# Patient Record
Sex: MALE | Race: BLACK OR AFRICAN AMERICAN | Employment: OTHER | URBAN - METROPOLITAN AREA
[De-identification: names, ages, dates, MRNs, and addresses within clinical notes are randomized per-mention and may not be internally consistent; named-entity substitution may affect disease eponyms.]

---

## 2017-08-25 ENCOUNTER — HOSPITAL ENCOUNTER (INPATIENT)
Age: 72
LOS: 3 days | Discharge: HOME | DRG: 168 | End: 2017-08-28
Attending: EMERGENCY MEDICINE | Admitting: INTERNAL MEDICINE
Payer: MEDICARE

## 2017-08-25 ENCOUNTER — APPOINTMENT (OUTPATIENT)
Dept: CT IMAGING | Age: 72
DRG: 168 | End: 2017-08-25
Attending: EMERGENCY MEDICINE
Payer: MEDICARE

## 2017-08-25 ENCOUNTER — APPOINTMENT (OUTPATIENT)
Dept: GENERAL RADIOLOGY | Age: 72
DRG: 168 | End: 2017-08-25
Attending: EMERGENCY MEDICINE
Payer: MEDICARE

## 2017-08-25 DIAGNOSIS — J18.9 PNEUMONIA OF LEFT LOWER LOBE DUE TO INFECTIOUS ORGANISM: Primary | ICD-10-CM

## 2017-08-25 PROBLEM — I25.10 CAD (CORONARY ARTERY DISEASE): Status: ACTIVE | Noted: 2017-08-25

## 2017-08-25 PROBLEM — Z95.1 S/P CABG X 1: Status: ACTIVE | Noted: 2017-08-25

## 2017-08-25 PROBLEM — Z95.2 S/P AVR (AORTIC VALVE REPLACEMENT): Status: ACTIVE | Noted: 2017-08-25

## 2017-08-25 LAB
ALBUMIN SERPL-MCNC: 3.8 G/DL (ref 3.5–5)
ALBUMIN/GLOB SERPL: 1 {RATIO} (ref 1.1–2.2)
ALP SERPL-CCNC: 127 U/L (ref 45–117)
ALT SERPL-CCNC: 27 U/L (ref 12–78)
ANION GAP SERPL CALC-SCNC: 5 MMOL/L (ref 5–15)
AST SERPL-CCNC: 19 U/L (ref 15–37)
ATRIAL RATE: 79 BPM
BASOPHILS # BLD: 0 K/UL (ref 0–0.1)
BASOPHILS NFR BLD: 1 % (ref 0–1)
BILIRUB SERPL-MCNC: 0.7 MG/DL (ref 0.2–1)
BNP SERPL-MCNC: 106 PG/ML (ref 0–125)
BUN SERPL-MCNC: 12 MG/DL (ref 6–20)
BUN/CREAT SERPL: 12 (ref 12–20)
CALCIUM SERPL-MCNC: 9.4 MG/DL (ref 8.5–10.1)
CALCULATED P AXIS, ECG09: 79 DEGREES
CALCULATED R AXIS, ECG10: 42 DEGREES
CALCULATED T AXIS, ECG11: 89 DEGREES
CHLORIDE SERPL-SCNC: 105 MMOL/L (ref 97–108)
CO2 SERPL-SCNC: 32 MMOL/L (ref 21–32)
CREAT SERPL-MCNC: 0.98 MG/DL (ref 0.7–1.3)
DIAGNOSIS, 93000: NORMAL
EOSINOPHIL # BLD: 0.1 K/UL (ref 0–0.4)
EOSINOPHIL NFR BLD: 2 % (ref 0–7)
ERYTHROCYTE [DISTWIDTH] IN BLOOD BY AUTOMATED COUNT: 15.5 % (ref 11.5–14.5)
GLOBULIN SER CALC-MCNC: 4 G/DL (ref 2–4)
GLUCOSE SERPL-MCNC: 97 MG/DL (ref 65–100)
HCT VFR BLD AUTO: 38.6 % (ref 36.6–50.3)
HGB BLD-MCNC: 12.3 G/DL (ref 12.1–17)
INR PPP: 1.4 (ref 0.9–1.1)
LYMPHOCYTES # BLD: 1.2 K/UL (ref 0.8–3.5)
LYMPHOCYTES NFR BLD: 18 % (ref 12–49)
MAGNESIUM SERPL-MCNC: 1.8 MG/DL (ref 1.6–2.4)
MCH RBC QN AUTO: 28.5 PG (ref 26–34)
MCHC RBC AUTO-ENTMCNC: 31.9 G/DL (ref 30–36.5)
MCV RBC AUTO: 89.6 FL (ref 80–99)
MONOCYTES # BLD: 0.4 K/UL (ref 0–1)
MONOCYTES NFR BLD: 6 % (ref 5–13)
NEUTS SEG # BLD: 4.8 K/UL (ref 1.8–8)
NEUTS SEG NFR BLD: 73 % (ref 32–75)
P-R INTERVAL, ECG05: 176 MS
PLATELET # BLD AUTO: 268 K/UL (ref 150–400)
POTASSIUM SERPL-SCNC: 3.9 MMOL/L (ref 3.5–5.1)
PROT SERPL-MCNC: 7.8 G/DL (ref 6.4–8.2)
PROTHROMBIN TIME: 14 SEC (ref 9–11.1)
Q-T INTERVAL, ECG07: 404 MS
QRS DURATION, ECG06: 124 MS
QTC CALCULATION (BEZET), ECG08: 463 MS
RBC # BLD AUTO: 4.31 M/UL (ref 4.1–5.7)
SODIUM SERPL-SCNC: 142 MMOL/L (ref 136–145)
TROPONIN I SERPL-MCNC: <0.04 NG/ML
VENTRICULAR RATE, ECG03: 79 BPM
WBC # BLD AUTO: 6.5 K/UL (ref 4.1–11.1)

## 2017-08-25 PROCEDURE — 71250 CT THORAX DX C-: CPT

## 2017-08-25 PROCEDURE — 93005 ELECTROCARDIOGRAM TRACING: CPT

## 2017-08-25 PROCEDURE — 99285 EMERGENCY DEPT VISIT HI MDM: CPT

## 2017-08-25 PROCEDURE — 85025 COMPLETE CBC W/AUTO DIFF WBC: CPT | Performed by: EMERGENCY MEDICINE

## 2017-08-25 PROCEDURE — 71020 XR CHEST PA LAT: CPT

## 2017-08-25 PROCEDURE — 87798 DETECT AGENT NOS DNA AMP: CPT | Performed by: NURSE PRACTITIONER

## 2017-08-25 PROCEDURE — 83880 ASSAY OF NATRIURETIC PEPTIDE: CPT | Performed by: EMERGENCY MEDICINE

## 2017-08-25 PROCEDURE — 86738 MYCOPLASMA ANTIBODY: CPT | Performed by: INTERNAL MEDICINE

## 2017-08-25 PROCEDURE — 85610 PROTHROMBIN TIME: CPT | Performed by: EMERGENCY MEDICINE

## 2017-08-25 PROCEDURE — 94640 AIRWAY INHALATION TREATMENT: CPT

## 2017-08-25 PROCEDURE — 80053 COMPREHEN METABOLIC PANEL: CPT | Performed by: EMERGENCY MEDICINE

## 2017-08-25 PROCEDURE — 87040 BLOOD CULTURE FOR BACTERIA: CPT | Performed by: EMERGENCY MEDICINE

## 2017-08-25 PROCEDURE — 87449 NOS EACH ORGANISM AG IA: CPT | Performed by: NURSE PRACTITIONER

## 2017-08-25 PROCEDURE — 84484 ASSAY OF TROPONIN QUANT: CPT | Performed by: EMERGENCY MEDICINE

## 2017-08-25 PROCEDURE — 87899 AGENT NOS ASSAY W/OPTIC: CPT | Performed by: NURSE PRACTITIONER

## 2017-08-25 PROCEDURE — 74011250636 HC RX REV CODE- 250/636: Performed by: INTERNAL MEDICINE

## 2017-08-25 PROCEDURE — 36415 COLL VENOUS BLD VENIPUNCTURE: CPT | Performed by: EMERGENCY MEDICINE

## 2017-08-25 PROCEDURE — 74011250636 HC RX REV CODE- 250/636: Performed by: EMERGENCY MEDICINE

## 2017-08-25 PROCEDURE — 65660000000 HC RM CCU STEPDOWN

## 2017-08-25 PROCEDURE — 74011000258 HC RX REV CODE- 258: Performed by: INTERNAL MEDICINE

## 2017-08-25 PROCEDURE — 83735 ASSAY OF MAGNESIUM: CPT | Performed by: INTERNAL MEDICINE

## 2017-08-25 PROCEDURE — 74011250636 HC RX REV CODE- 250/636: Performed by: NURSE PRACTITIONER

## 2017-08-25 PROCEDURE — 96374 THER/PROPH/DIAG INJ IV PUSH: CPT

## 2017-08-25 PROCEDURE — 74011250637 HC RX REV CODE- 250/637: Performed by: INTERNAL MEDICINE

## 2017-08-25 PROCEDURE — 77030013140 HC MSK NEB VYRM -A

## 2017-08-25 PROCEDURE — 74011000250 HC RX REV CODE- 250: Performed by: EMERGENCY MEDICINE

## 2017-08-25 PROCEDURE — 74011000258 HC RX REV CODE- 258: Performed by: EMERGENCY MEDICINE

## 2017-08-25 PROCEDURE — 74011000250 HC RX REV CODE- 250: Performed by: INTERNAL MEDICINE

## 2017-08-25 RX ORDER — ALBUTEROL SULFATE 0.83 MG/ML
2.5 SOLUTION RESPIRATORY (INHALATION)
Status: DISCONTINUED | OUTPATIENT
Start: 2017-08-25 | End: 2017-08-28 | Stop reason: HOSPADM

## 2017-08-25 RX ORDER — ASPIRIN 81 MG/1
81 TABLET ORAL DAILY
Status: DISCONTINUED | OUTPATIENT
Start: 2017-08-26 | End: 2017-08-28 | Stop reason: HOSPADM

## 2017-08-25 RX ORDER — IPRATROPIUM BROMIDE AND ALBUTEROL SULFATE 2.5; .5 MG/3ML; MG/3ML
3 SOLUTION RESPIRATORY (INHALATION)
Status: DISCONTINUED | OUTPATIENT
Start: 2017-08-25 | End: 2017-08-28 | Stop reason: HOSPADM

## 2017-08-25 RX ORDER — SODIUM CHLORIDE 0.9 % (FLUSH) 0.9 %
5-10 SYRINGE (ML) INJECTION AS NEEDED
Status: DISCONTINUED | OUTPATIENT
Start: 2017-08-25 | End: 2017-08-28 | Stop reason: HOSPADM

## 2017-08-25 RX ORDER — CLONAZEPAM 0.5 MG/1
0.5 TABLET ORAL 3 TIMES DAILY
Status: DISCONTINUED | OUTPATIENT
Start: 2017-08-25 | End: 2017-08-28 | Stop reason: HOSPADM

## 2017-08-25 RX ORDER — SODIUM CHLORIDE 0.9 % (FLUSH) 0.9 %
5-10 SYRINGE (ML) INJECTION EVERY 8 HOURS
Status: DISCONTINUED | OUTPATIENT
Start: 2017-08-25 | End: 2017-08-28 | Stop reason: HOSPADM

## 2017-08-25 RX ORDER — IPRATROPIUM BROMIDE AND ALBUTEROL SULFATE 2.5; .5 MG/3ML; MG/3ML
3 SOLUTION RESPIRATORY (INHALATION)
Status: COMPLETED | OUTPATIENT
Start: 2017-08-25 | End: 2017-08-25

## 2017-08-25 RX ORDER — ESCITALOPRAM OXALATE 10 MG/1
10 TABLET ORAL DAILY
Status: DISCONTINUED | OUTPATIENT
Start: 2017-08-26 | End: 2017-08-28 | Stop reason: HOSPADM

## 2017-08-25 RX ORDER — GUAIFENESIN 600 MG/1
600 TABLET, EXTENDED RELEASE ORAL EVERY 12 HOURS
Status: DISCONTINUED | OUTPATIENT
Start: 2017-08-25 | End: 2017-08-28 | Stop reason: HOSPADM

## 2017-08-25 RX ORDER — FLUTICASONE FUROATE AND VILANTEROL 200; 25 UG/1; UG/1
1 POWDER RESPIRATORY (INHALATION) DAILY
Status: DISCONTINUED | OUTPATIENT
Start: 2017-08-26 | End: 2017-08-28 | Stop reason: HOSPADM

## 2017-08-25 RX ORDER — ATORVASTATIN CALCIUM 80 MG/1
80 TABLET, FILM COATED ORAL EVERY EVENING
COMMUNITY
End: 2020-01-01

## 2017-08-25 RX ORDER — ENOXAPARIN SODIUM 100 MG/ML
30 INJECTION SUBCUTANEOUS EVERY 12 HOURS
Status: DISCONTINUED | OUTPATIENT
Start: 2017-08-25 | End: 2017-08-28 | Stop reason: HOSPADM

## 2017-08-25 RX ORDER — ASPIRIN 81 MG/1
81 TABLET ORAL DAILY
Status: ON HOLD | COMMUNITY
End: 2020-01-01

## 2017-08-25 RX ORDER — ENOXAPARIN SODIUM 100 MG/ML
40 INJECTION SUBCUTANEOUS EVERY 12 HOURS
Status: DISCONTINUED | OUTPATIENT
Start: 2017-08-25 | End: 2017-08-28 | Stop reason: HOSPADM

## 2017-08-25 RX ORDER — ATORVASTATIN CALCIUM 20 MG/1
80 TABLET, FILM COATED ORAL EVERY EVENING
Status: DISCONTINUED | OUTPATIENT
Start: 2017-08-25 | End: 2017-08-28 | Stop reason: HOSPADM

## 2017-08-25 RX ORDER — NALOXONE HYDROCHLORIDE 0.4 MG/ML
0.4 INJECTION, SOLUTION INTRAMUSCULAR; INTRAVENOUS; SUBCUTANEOUS AS NEEDED
Status: DISCONTINUED | OUTPATIENT
Start: 2017-08-25 | End: 2017-08-28 | Stop reason: HOSPADM

## 2017-08-25 RX ORDER — WARFARIN SODIUM 5 MG/1
2.5 TABLET ORAL EVERY OTHER DAY
COMMUNITY
End: 2020-01-01

## 2017-08-25 RX ORDER — ISOSORBIDE MONONITRATE 10 MG/1
10 TABLET ORAL 3 TIMES DAILY
Status: DISCONTINUED | OUTPATIENT
Start: 2017-08-25 | End: 2017-08-28 | Stop reason: HOSPADM

## 2017-08-25 RX ORDER — ENOXAPARIN SODIUM 100 MG/ML
1 INJECTION SUBCUTANEOUS EVERY 12 HOURS
Status: DISCONTINUED | OUTPATIENT
Start: 2017-08-25 | End: 2017-08-25

## 2017-08-25 RX ADMIN — CLONAZEPAM 0.5 MG: 0.5 TABLET ORAL at 21:08

## 2017-08-25 RX ADMIN — ISOSORBIDE MONONITRATE 10 MG: 10 TABLET ORAL at 21:07

## 2017-08-25 RX ADMIN — AZITHROMYCIN MONOHYDRATE 500 MG: 500 INJECTION, POWDER, LYOPHILIZED, FOR SOLUTION INTRAVENOUS at 17:10

## 2017-08-25 RX ADMIN — CEFEPIME HYDROCHLORIDE 2 G: 2 INJECTION, POWDER, FOR SOLUTION INTRAVENOUS at 16:22

## 2017-08-25 RX ADMIN — IPRATROPIUM BROMIDE AND ALBUTEROL SULFATE 3 ML: .5; 3 SOLUTION RESPIRATORY (INHALATION) at 16:23

## 2017-08-25 RX ADMIN — GUAIFENESIN 600 MG: 600 TABLET, EXTENDED RELEASE ORAL at 21:08

## 2017-08-25 RX ADMIN — CEFEPIME HYDROCHLORIDE 2 G: 2 INJECTION, POWDER, FOR SOLUTION INTRAVENOUS at 23:50

## 2017-08-25 RX ADMIN — IPRATROPIUM BROMIDE AND ALBUTEROL SULFATE 3 ML: .5; 3 SOLUTION RESPIRATORY (INHALATION) at 19:31

## 2017-08-25 RX ADMIN — ENOXAPARIN SODIUM 40 MG: 40 INJECTION SUBCUTANEOUS at 19:06

## 2017-08-25 RX ADMIN — ENOXAPARIN SODIUM 30 MG: 40 INJECTION SUBCUTANEOUS at 19:06

## 2017-08-25 RX ADMIN — METHYLPREDNISOLONE SODIUM SUCCINATE 40 MG: 40 INJECTION, POWDER, FOR SOLUTION INTRAMUSCULAR; INTRAVENOUS at 21:06

## 2017-08-25 RX ADMIN — Medication 10 ML: at 21:09

## 2017-08-25 RX ADMIN — WARFARIN SODIUM 6 MG: 1 TABLET ORAL at 19:05

## 2017-08-25 RX ADMIN — METHYLPREDNISOLONE SODIUM SUCCINATE 125 MG: 125 INJECTION, POWDER, FOR SOLUTION INTRAMUSCULAR; INTRAVENOUS at 12:48

## 2017-08-25 RX ADMIN — ATORVASTATIN CALCIUM 80 MG: 20 TABLET, FILM COATED ORAL at 19:05

## 2017-08-25 RX ADMIN — IPRATROPIUM BROMIDE AND ALBUTEROL SULFATE 3 ML: .5; 3 SOLUTION RESPIRATORY (INHALATION) at 12:48

## 2017-08-25 NOTE — CONSULTS
Name: [address] [place] [address]    : 1945 Admit Date: 2017   Phone: [phone]  Room: 29 -[address]   PCP: Arie Fernández MD  MRN: [id number]   Date: 2017  Code: Full Code        HPI:    Chart and notes reviewed. Data reviewed. I review the patient's current medications in the medical record at each encounter. I have evaluated and examined the patient. History of Present Illness:  Mr. Rafael Emery presents to the ED with complaints of worsening SOB and wheezing. Symptoms have been progressively getting worse over the last 2 weeks. He denies cough, fever, chills, N/V/D. He does complain of some night sweats as well. He has been using Ventolin at least every 4 hours if not more over the last 2 weeks. He does have grandchildren that have been sick that he has been exposed to. Denies any recent travel. He has been treated in the last few months with Omnicef. Had chest imaging with PCP which was abnormal and Mr. Rafael Emery was advised to come to ED. He has a history of COPD, right MCA CVA, PNA, CAD s/p CABG , throat cancer, AVR, and anxiety/depression. He currently smokes a pipe daily. He used to smoke cigarettes. He is managed on Advair 500 and Ventolin/albuterol nebs. He was hospitalized in 2017 for a CVA. He also was found to have PNA and pulmonary was consulted. Following discharge, he was treated with Levaquin outpatient as well as prednisone taper. Has not been seen though since 2016. Afebrile  HR and BP stable  Oxygen saturations 99% on RA  WBC 6.5  HgB 12.3  INR 1.4  Na 142  K 3.9  Creatinine 0.98  Mg 1.8  Troponin < 0.04  proBNP 106  BC pending    Images:  CT Chest 17: Persistent air space process in the LLL. CXR 17: No acute findings. Resolution of pneumonia. CXR 17: Slight interval improvement of left basilar patchy airspace consolidation and slight interval worsening of medial right lung base patchy   airspace disease.     CT Chest 6/12/16: Persistent areas of pleural and parenchymal scarring in the left lung. New opacification in the lower lobes bilaterally, likely representing scarring; emphysema. ECHO 6/12/16: EF 65%; with no regional wall motion abnormalities. Aortic valve had mechanical prosthesis that was present. It exhibited normal  function. Valve mean gradient was 16 mmHg.     Past Medical History:   Diagnosis Date    CAD (coronary artery disease)     Cancer ([facility] \"10 years ago\"    throat    COPD (chronic obstructive pulmonary disease) ([facility]     CVA (cerebral vascular accident) ([facility]     H/O mechanical aortic valve replacement     CT surgery 1994 - CABG (LIMA to LAD) and AVR ([facility])    S/P CABG x 1     CT surgery 1994 - CABG (LIMA to LAD) and AVR ([facility])       Past Surgical History:   Procedure Laterality Date    HX HEART VALVE SURGERY      CT surgery 1994 - CABG (LIMA to LAD) and AVR ([facility])       Family History   Problem Relation Age of Onset    Heart Disease Mother     Heart Disease Sister        Social History   Substance Use Topics    Smoking status: Current Every Day Smoker    Smokeless tobacco: Never Used      Comment: pipe    Alcohol use No       Allergies   Allergen Reactions    Iodinated Contrast- Oral And Iv Dye Shortness of Breath    Pcn [Penicillins] Shortness of Breath       Current Facility-Administered Medications   Medication Dose Route Frequency    cefepime (MAXIPIME) 2 g in 0.9% sodium chloride (MBP/ADV) 100 mL  2 g IntraVENous ONCE    azithromycin (ZITHROMAX) 500 mg in 0.9% sodium chloride (MBP/ADV) 250 mL  500 mg IntraVENous NOW    sodium chloride (NS) flush 5-10 mL  5-10 mL IntraVENous Q8H    sodium chloride (NS) flush 5-10 mL  5-10 mL IntraVENous PRN    naloxone (NARCAN) injection 0.4 mg  0.4 mg IntraVENous PRN    [START ON 8/26/2017] cefepime (MAXIPIME) 2 g in 0.9% sodium chloride (MBP/ADV) 100 mL  2 g IntraVENous Q8H    [START ON 8/26/2017] azithromycin (ZITHROMAX) 500 mg in 0.9% sodium chloride 250 mL IVPB  500 mg IntraVENous Q24H    albuterol-ipratropium (DUO-NEB) 2.5 MG-0.5 MG/3 ML  3 mL Nebulization Q6H RT    guaiFENesin ER (MUCINEX) tablet 600 mg  600 mg Oral Q12H     Current Outpatient Prescriptions   Medication Sig    albuterol (PROVENTIL HFA, VENTOLIN HFA, PROAIR HFA) 90 mcg/actuation inhaler Take 2 Puffs by inhalation every six (6) hours as needed.  fluticasone-salmeterol (ADVAIR) 500-50 mcg/dose diskus inhaler Take 1 Puff by inhalation every twelve (12) hours.  albuterol (PROVENTIL VENTOLIN) 2.5 mg /3 mL (0.083 %) nebulizer solution 3 mL by Nebulization route every four (4) hours as needed for Wheezing.  atorvastatin (LIPITOR) 40 mg tablet Take 40 mg by mouth daily.  clonazepam (KLONOPIN) 0.5 mg tablet Take 0.5 mg by mouth three (3) times daily.  escitalopram oxalate (LEXAPRO) 10 mg tablet Take 10 mg by mouth daily.  warfarin (COUMADIN) 5 mg tablet Patient thinks he takes 7.5 mg three times a week (unsure what days) and 5 mg the rest of the week. Patient took 7.5 mg last night (6/11) at 17:00  Patient is due for 5 mg tonight (6/12)    isosorbide mononitrate (ISMO, MONOKET) 10 mg tablet Take 10 mg by mouth three (3) times daily. REVIEW OF SYSTEMS   12 point ROS negative except as stated in the HPI. Physical Exam:   Visit Vitals    /79 (BP 1 Location: Right arm, BP Patient Position: At rest)    Pulse 88    Temp 97.8 °F (36.6 °C)    Resp 26    Ht 5' 6\" (1.676 m)    Wt 70.9 kg (156 lb 4.9 oz)    SpO2 99%    BMI 25.23 kg/m2       General:  Alert, cooperative, no distress, appears stated age. Head:  Normocephalic, without obvious abnormality, atraumatic. Eyes:  Conjunctivae/corneas clear. Nose: Nares normal. Septum midline. Mucosa normal.    Throat: Lips, mucosa, and tongue normal.    Neck: Supple, symmetrical, trachea midline, no adenopathy.    Lungs:   Diminished breath sounds bilaterally with scattered rhonchi and expiratory wheezing. Chest wall:  No tenderness or deformity. Heart:  Regular rate and rhythm, S1, S2 normal, no murmur, click, rub or gallop. Abdomen:   Soft, non-tender. Bowel sounds normal. No masses,  No organomegaly. Extremities: Extremities normal, atraumatic, no cyanosis or edema. Pulses: 2+ and symmetric all extremities. Skin: Skin color, texture, turgor normal. No rashes or lesions   Lymph nodes: Cervical, supraclavicular nodes normal.   Neurologic: Grossly nonfocal       Lab Results   Component Value Date/Time    Sodium 142 08/25/2017 12:37 PM    Potassium 3.9 08/25/2017 12:37 PM    Chloride 105 08/25/2017 12:37 PM    CO2 32 08/25/2017 12:37 PM    BUN 12 08/25/2017 12:37 PM    Creatinine 0.98 08/25/2017 12:37 PM    Glucose 97 08/25/2017 12:37 PM    Calcium 9.4 08/25/2017 12:37 PM    Magnesium 1.8 08/25/2017 12:37 PM    Phosphorus 2.5 06/14/2016 02:06 AM       Lab Results   Component Value Date/Time    WBC 6.5 08/25/2017 12:37 PM    HGB 12.3 08/25/2017 12:37 PM    PLATELET 230 31/26/4005 12:37 PM    MCV 89.6 08/25/2017 12:37 PM       Lab Results   Component Value Date/Time    INR 4.3 06/14/2016 02:06 AM    aPTT 42.6 06/12/2016 02:10 PM    AST (SGOT) 19 08/25/2017 12:37 PM    Alk.  phosphatase 127 08/25/2017 12:37 PM    Protein, total 7.8 08/25/2017 12:37 PM    Albumin 3.8 08/25/2017 12:37 PM    Globulin 4.0 08/25/2017 12:37 PM       No results found for: IRON, FE, TIBC, IBCT, PSAT, FERR    Lab Results   Component Value Date/Time    C-Reactive protein 9.18 07/15/2010 03:15 AM    TSH 0.89 07/15/2010 03:15 AM        No results found for: PH, PHI, PCO2, PCO2I, PO2, PO2I, HCO3, HCO3I, FIO2, FIO2I    Lab Results   Component Value Date/Time    CK 71 07/15/2010 03:15 AM    CK-MB Index CANNOT BE CALCULATED 07/15/2010 03:15 AM    Troponin-I, Qt. <0.04 08/25/2017 12:37 PM        Lab Results   Component Value Date/Time    Culture result: LIGHT  NORMAL RESPIRATORY RUTH ANN   06/21/2016 09:40 PM    Culture result: MODERATE  APPARENT  SHARIFA ALBICANS   06/21/2016 09:40 PM    Culture result: NO GROWTH 1 DAY 06/15/2016 09:30 AM       No results found for: TOXA1, RPR, HBCM, HBSAG, HAAB, HCAB1, HAAT, G6PD, CRYAC, HIVGT, HIVR, HIV1, HIV12, HIVPC, HIVRPI    Lab Results   Component Value Date/Time    CK 71 07/15/2010 03:15 AM    CK 64 07/14/2010 10:20 PM       Lab Results   Component Value Date/Time    Color DARK YELLOW 06/15/2016 09:30 AM    Appearance CLEAR 06/15/2016 09:30 AM    pH (UA) 6.0 06/15/2016 09:30 AM    Protein NEGATIVE  06/15/2016 09:30 AM    Glucose NEGATIVE  06/15/2016 09:30 AM    Ketone NEGATIVE  06/15/2016 09:30 AM    Bilirubin NEGATIVE  06/15/2016 09:30 AM    Blood SMALL 06/15/2016 09:30 AM    Urobilinogen 1.0 06/15/2016 09:30 AM    Nitrites NEGATIVE  06/15/2016 09:30 AM    Leukocyte Esterase NEGATIVE  06/15/2016 09:30 AM    WBC 0-4 06/15/2016 09:30 AM    RBC 0-5 06/15/2016 09:30 AM    Bacteria NEGATIVE  06/15/2016 09:30 AM       IMPRESSION  · Dyspnea  · COPD exacerbation  · Abnormal Chest imaging: LLL airspace process  · Anxiety/Depression  · Hx CVA  · Hx Throat Cancer  · Current Pipe Smoker    PLAN  · Maintain oxygen saturations > 90% (no baseline home oxygen)  · Will start IV SoluMedrol 40 mg Q8  · Will order for RVP, legionella, strep pneumo, and mycoplama  · Start Breo 200 in place of home Advair  · Sputum culture and AFB ordered  · Continue DuoNebs Q6  · Continue Mucinex  · Continue Azithromycin and Cefepime  · Would benefit from outpatient pulmonary follow up and PFT's; can perform bronchoscopy outpatient if needed  · DVT prophylaxis: Lovenox  · GI prophylaxis: not indicated    Thank you for allowing us to participate in the care of this patient. We will be happy to follow along in his progress with you.     Lesa Story NP

## 2017-08-25 NOTE — ED TRIAGE NOTES
Shortness of breath, wet cough and night time low grade fever. Outpatient treatment for upper respiratory infection on Cefdinir since 8/14 without improvement. Also has an abscess on right torso that needs attention.

## 2017-08-25 NOTE — IP AVS SNAPSHOT
[address] [place] [address] 
[phone] Patient: Tova Morel MRN: [id number] TJO:[id number] You are allergic to the following Allergen Reactions Iodinated Contrast- Oral And Iv Dye Shortness of Breath Pcn (Penicillins) Shortness of Breath Patient reports to have tolerated a 10 day course of cefdinir started 8/24/17 Recent Documentation Height Weight BMI Smoking Status 1.676 m 70.9 kg 25.23 kg/m2 Current Every Day Smoker Unresulted Labs Order Current Status AFB CULTURE + SMEAR W/RFLX ID FROM CULTURE In process AFB CULTURE + SMEAR W/RFLX PCR AND ID In process CULTURE, FUNGUS In process CULTURE, LEGIONELLA In process CULTURE, RESPIRATORY/SPUTUM/BRONCH W GRAM STAIN In process CULTURE, VIRAL In process LEGIONELLA PNEUMO BY DFA In process MYCOPLASMA AB, IGG/IGM In process CULTURE, BLOOD Preliminary result CULTURE, BLOOD Preliminary result CULTURE, RESPIRATORY/SPUTUM/BRONCH W GRAM STAIN Preliminary result Emergency Contacts Name Discharge Info Relation Home Work Mobile Rachel Francisco DISCHARGE CAREGIVER [3] Spouse [3] [phone] About your hospitalization You were admitted on:  August 25, 2017 You last received care in the:  [facility]  MED SURG 2 You were discharged on:  August 28, 2017 Unit phone number:  [phone] Why you were hospitalized Your primary diagnosis was:  Not on File Your diagnoses also included: Tobacco Abuse, Throat Cancer (Hcc), Depression, Cva (Cerebral Vascular Accident) ([facility]), Copd (Chronic Obstructive Pulmonary Disease) ([facility]), Aortic Stenosis, Anticoagulated, S/P Cabg X 1, Pneumonia, S/P Avr (Aortic Valve Replacement), Cad (Coronary Artery Disease) Providers Seen During Your Hospitalizations Provider Role Specialty Primary office phone Sabino Cuba MD Attending Provider Emergency Medicine [phone] Verlan Crigler, MD Attending Provider Internal Medicine [phone] Leonie Castro MD Attending Provider Internal Medicine [phone] Your Primary Care Physician (PCP) Primary Care Physician Office Phone Office Fax Jaspreet Wood [phone] Follow-up Information Follow up With Details Comments Contact Info Miriam Gill MD In 2 weeks For wound re-check [address] 
[phone] MD Natty Heath [address] 
[phone] Current Discharge Medication List  
  
START taking these medications Dose & Instructions Dispensing Information Comments Morning Noon Evening Bedtime  
 doxycycline 100 mg tablet Commonly known as:  ADOXA Your last dose was: Your next dose is:    
   
   
 Dose:  100 mg Take 1 Tab by mouth two (2) times a day for 7 days. Quantity:  14 Tab Refills:  0  
     
   
   
   
  
 guaiFENesin  mg ER tablet Commonly known as:  William & William Your last dose was: Your next dose is:    
   
   
 Dose:  600 mg Take 1 Tab by mouth every twelve (12) hours for 30 days. Quantity:  60 Tab Refills:  0  
     
   
   
   
  
 levoFLOXacin 750 mg tablet Commonly known as:  Elba Patient Your last dose was: Your next dose is:    
   
   
 Dose:  750 mg Take 1 Tab by mouth daily for 5 days. Quantity:  5 Tab Refills:  0  
     
   
   
   
  
 predniSONE 10 mg tablet Commonly known as:  Fay Nava Your last dose was: Your next dose is:    
   
   
 Prednisone 10mg tabs: 4 tabs (40 mg) daily x 3 days, then 3 tabs (30 mg) daily x 3 days, then 2 tabs (20 mg) daily x 3 days, then 1 tab   (10 mg) daily until gone. Dispense 30 tabs Quantity:  30 Tab Refills:  0 CONTINUE these medications which have CHANGED Dose & Instructions Dispensing Information Comments Morning Noon Evening Bedtime * warfarin 5 mg tablet Commonly known as:  COUMADIN What changed:  Another medication with the same name was added. Make sure you understand how and when to take each. Your last dose was: Your next dose is:    
   
   
 Dose:  5 mg Take 5 mg by mouth every evening. Refills:  0  
     
   
   
   
  
 * warfarin 1 mg tablet Commonly known as:  COUMADIN What changed: You were already taking a medication with the same name, and this prescription was added. Make sure you understand how and when to take each. Your last dose was: Your next dose is:    
   
   
 Dose:  1 mg Take 1 Tab by mouth daily for 30 doses. Quantity:  30 Tab Refills:  0  
     
   
   
   
  
 * Notice: This list has 2 medication(s) that are the same as other medications prescribed for you. Read the directions carefully, and ask your doctor or other care provider to review them with you. CONTINUE these medications which have NOT CHANGED Dose & Instructions Dispensing Information Comments Morning Noon Evening Bedtime * albuterol 90 mcg/actuation inhaler Commonly known as:  PROVENTIL HFA, VENTOLIN HFA, PROAIR HFA Your last dose was: Your next dose is:    
   
   
 Dose:  2 Puff Take 2 Puffs by inhalation every six (6) hours as needed. Refills:  0  
     
   
   
   
  
 * albuterol 2.5 mg /3 mL (0.083 %) nebulizer solution Commonly known as:  PROVENTIL VENTOLIN Your last dose was: Your next dose is:    
   
   
 Dose:  2.5 mg  
3 mL by Nebulization route every four (4) hours as needed for Wheezing. Quantity:  30 vial  
Refills:  1  
     
   
   
   
  
 aspirin delayed-release 81 mg tablet Your last dose was: Your next dose is:    
   
   
 Dose:  81 mg Take 81 mg by mouth daily. Refills:  0  
     
   
   
   
  
 atorvastatin 80 mg tablet Commonly known as:  LIPITOR Your last dose was: Your next dose is:    
   
   
 Dose:  80 mg Take 80 mg by mouth every evening. Refills:  0  
     
   
   
   
  
 clonazePAM 0.5 mg tablet Commonly known as:  Charis Pates Your last dose was: Your next dose is:    
   
   
 Dose:  0.5 mg Take 0.5 mg by mouth three (3) times daily. Refills:  0  
     
   
   
   
  
 escitalopram oxalate 10 mg tablet Commonly known as:  Jody Mor Your last dose was: Your next dose is:    
   
   
 Dose:  10 mg Take 10 mg by mouth daily. Refills:  0  
     
   
   
   
  
 fluticasone-salmeterol 500-50 mcg/dose diskus inhaler Commonly known as:  ADVAIR Your last dose was: Your next dose is:    
   
   
 Dose:  1 Puff Take 1 Puff by inhalation every twelve (12) hours. Refills:  0  
     
   
   
   
  
 isosorbide mononitrate 10 mg tablet Commonly known as:  ISMO, MONOKET Your last dose was: Your next dose is:    
   
   
 Dose:  10 mg Take 10 mg by mouth three (3) times daily. Patient takes at 9am, noon, and 5pm  
 Refills:  0  
     
   
   
   
  
 promethazine 12.5 mg tablet Commonly known as:  PHENERGAN Your last dose was: Your next dose is:    
   
   
 Dose:  12.5 mg Take 12.5 mg by mouth daily. Indications: Nausea Refills:  0  
     
   
   
   
  
 * Notice: This list has 2 medication(s) that are the same as other medications prescribed for you. Read the directions carefully, and ask your doctor or other care provider to review them with you. Where to Get Your Medications Information on where to get these meds will be given to you by the nurse or doctor. ! Ask your nurse or doctor about these medications  
  doxycycline 100 mg tablet  
 guaiFENesin  mg ER tablet  
 levoFLOXacin 750 mg tablet  
 predniSONE 10 mg tablet warfarin 1 mg tablet Discharge Instructions HOSPITALIST DISCHARGE INSTRUCTIONS 
 
NAME: Tian Pinzon :  1945 MRN:  [id number] Date:     2017 ADMIT DATE: 2017 DISCHARGE DATE: 2017 PRINCIPAL ADMITTING DIAGNOSIS: 
Pneumonia 
pneumonia DISCHARGE DIAGNOSES: 
Active Problems: 
  Pneumonia (2017) COPD (chronic obstructive pulmonary disease) ([facility]) () Tobacco abuse (10/22/2014) Throat cancer ([facility] (10/22/2014) Depression (10/22/2014) CVA (cerebral vascular accident) ([facility] (2016) Aortic stenosis (2016) Anticoagulated (2016) S/P CABG x 1 (2017): S/P AVR (aortic valve replacement) (2017) CAD (coronary artery disease) (2017) MEDICATIONS: 
 
· It is important that medications are taken exactly as they are prescribed on the discharge medication instructions and keep them your  in the bottles provided by the pharmacist.  
· Keep a list of the medication names, dosages, and times to be taken at all times. · Do not take other medications without consulting your doctor. Recommended diet:  Cardiac Diet Recommended activity: Activity as tolerated Post discharge care: 
 
Notify follow up health care provider or return to the emergency department if you cannot get hold of your doctor if you feel worse or experience symptoms similar to those that brought you to hospital 
 
Follow-up Information Follow up With Details Comments Contact Info Mauro Ayala MD In 2 weeks For wound re-check [address] [place] [address] 
[phone] Lizzie Reid MD   [address] 
[phone] Information obtained by : 
I understand that if any problems occur once I am at home I am to contact my physician and I understand and acknowledge receipt of the instructions indicated above. Physician's or R.N.'s Signature                                                                  Date/Time Patient or Representative Signature                                                          Date/Time Discharge Orders None [facility] Announcement We are excited to announce that we are making your provider's discharge notes available to you in [employer]. You will see these notes when they are completed and signed by the physician that discharged you from your recent hospital stay. If you have any questions or concerns about any information you see in [facility], please call the Health Information Department where you were seen or reach out to your Primary Care Provider for more information about your plan of care. General Information Please provide this summary of care documentation to your next provider. Patient Signature:  ____________________________________________________________ Date:  ____________________________________________________________  
  
[facility] Signature:  ____________________________________________________________ Date:  ____________________________________________________________

## 2017-08-25 NOTE — ED PROVIDER NOTES
HPI Comments: 67 y.o. male with past medical history significant for COPD, CAD s/p CABG (1994), mechanical aortic valve on Coumadin  who presents from home via private vehilce with chief complaint of SOB and cough. Pt reports that for the past 1-2 months he has had persistent SOB and associated dry cough. During that time he has been placed on steroids as well as antibiotics for a flouroquinolone and Omincef all without relief of his Sx. About 2 weeks ago he had a plain film CXR by his PCP that was concerning and was scheduled for a follow up outpatient CT scan that he had 7 days ago. Today he was called by his PCP and advised that his CT scan of the chest showed a right side pneumonia and he was advised to go to the ED for further evaluation. Pt states that he had nightly fevers for the past few weeks that have not relieved with the antibiotics, but that he feels slightly better symptomatically after completing the treatments. His last dose of steroids was 1-2 months ago and he completed a course of Omnicef yesterday. Pt denies CP, palpitations, diaphoresis, dizziness, nausea, vomiting, diarrhea, abdominal pain. There are no other acute medical concerns at this time. Social hx: Pt admits to smoking cigarettes daily. PCP: Raegan Manning MD  Note written by mariana Pelaez, as dictated by Aranza Enamorado MD 12:32 PM                                                                                                                                                                                                                                                                                                                                                                The history is provided by the patient and the spouse.         Past Medical History:   Diagnosis Date    CAD (coronary artery disease)     Cancer ([facility] \"10 years ago\"    throat    COPD (chronic obstructive pulmonary disease) ([facility]  CVA [facility]cere[facility]) [facility]     H/O mechanical aortic valve replacement     CT surgery 1994 - CABG (LIM[facility] to LAD) and [facility]R ([facility])    S/P CABG x 1     CT surgery 1994 - CABG (LIMA to LAD) and AVR ([facility])       Past Surgical History:   Procedure Laterality Date    HX HEART VALVE SURGERY      CT surgery 1994 - CABG (LIMA to LAD) and AVR ([facility])         Family History:   Problem Relation Age of Onset    Heart Disease Mother     Heart Disease Sister        Social History     Social History    Marital status:      Spouse name: N/A    Number of children: N/A    Years of education: N/A     Occupational History    Not on file. Social History Main Topics    Smoking status: Current Every Day Smoker    Smokeless tobacco: Never Used      Comment: pipe    Alcohol use No    Drug use: No    Sexual activity: Not on file     Other Topics Concern    Not on file     Social History Narrative         ALLERGIES: Iodinated contrast- oral and iv dye and Pcn [penicillins]    Review of Systems   Constitutional: Positive for chills, fatigue and fever. HENT: Negative for congestion, rhinorrhea, sneezing and sore throat. Eyes: Negative for redness and visual disturbance. Respiratory: Positive for cough and shortness of breath. Cardiovascular: Negative for chest pain, palpitations and leg swelling. Gastrointestinal: Negative for abdominal pain, nausea and vomiting. Genitourinary: Negative for difficulty urinating and frequency. Musculoskeletal: Negative for back pain, myalgias and neck stiffness. Skin: Negative for rash. Neurological: Negative for dizziness, syncope, weakness and headaches. Hematological: Negative for adenopathy. All other systems reviewed and are negative.       Vitals:    08/25/17 1207   BP: 175/79   Pulse: 88   Resp: 26   Temp: 97.8 °F (36.6 °C)   SpO2: 99%   Weight: 70.9 kg (156 lb 4.9 oz)   Height: 5' 6\" (1.676 m)            Physical Exam Constitutional: He is oriented to person, place, and time. He appears well-developed and well-nourished. No distress. HENT:   Head: Normocephalic and atraumatic. Eyes: Conjunctivae and EOM are normal. Pupils are equal, round, and reactive to light. Neck: Normal range of motion. Neck supple. Cardiovascular: Normal rate, regular rhythm, normal heart sounds and intact distal pulses. Exam reveals no friction rub. No murmur heard. Pulmonary/Chest: No respiratory distress. He has no wheezes. He has no rales. He exhibits no tenderness. Poor air entry b/l; rhonchi right base   Abdominal: Soft. Bowel sounds are normal. He exhibits no distension. There is no tenderness. There is no rebound and no guarding. Musculoskeletal: Normal range of motion. He exhibits no edema or tenderness. Neurological: He is alert and oriented to person, place, and time. Coordination normal.   Skin: Skin is warm and dry. He is not diaphoretic. No pallor. Small 2 cm area of warmth and erythema to right flank; no drainage   Psychiatric: He has a normal mood and affect. His behavior is normal.   Nursing note and vitals reviewed. MDM  Number of Diagnoses or Management Options  Pneumonia of left lower lobe due to infectious organism [facility]):   Diagnosis management comments: Get imaging if true PNA will  Need admission as has been on 2 rounds of abx. Nebs and sterids.  Check for anemia as well with fatigue, MI as well and reason for fatigue       Amount and/or Complexity of Data Reviewed  Clinical lab tests: ordered and reviewed  Tests in the radiology section of CPT®: ordered and reviewed  Obtain history from someone other than the patient: yes (wife)  Review and summarize past medical records: yes  Discuss the patient with other providers: yes (hospitalist)  Independent visualization of images, tracings, or specimens: yes    Patient Progress  Patient progress: stable    ED Course       Procedures    EKG interpretation: (Preliminary)  Rhythm: normal sinus rhythm; and regular . Rate (approx.): 80; Axis: normal; P wave: normal; QRS interval: normal ; ST/T wave: non-specific changes     3:34 PM  Spoke with hospitalist who will admit. Failed outpt treatment suspect needs a bronchoscopy.

## 2017-08-25 NOTE — ED NOTES
TRANSFER - OUT REPORT:    Verbal report given to Sarath on Piotr   being transferred to [address] [place] [address] for routine progression of care       Report consisted of patients Situation, Background, Assessment and   Recommendations(SBAR). Information from the following report(s) SBAR, ED Summary, [facility] and Recent Results was reviewed with the receiving nurse. Opportunity for questions and clarification was provided.

## 2017-08-25 NOTE — PROGRESS NOTES
[facility] Dosing Services: 8/25/17    Consult for Warfarin Dosing by Pharmacy by Dr. Min Morales provided for this 67 y.o.  male , for indication of Mechanical Heart Valve. Day of Therapy Continued from home (warfarin 5mg daily - The patient was on this dose two weeks prior to admission due to a previously elevated INR from recent antibiotic course)  Dose to achieve an INR goal of 2.5- 3.5    Order entered for  Warfarin  6 (mg) ordered to be given today at 18:00. Significant drug interactions: Lovenox Bridge (administration instruction from provider state - Bridging, with mechanical AVR. Stop this when INR >2.5)  Previous dose given 5mg on 8/24   PT/INR Lab Results   Component Value Date/Time    INR 1.4 08/25/2017 12:37 PM      Platelets Lab Results   Component Value Date/Time    PLATELET 825 01/59/1575 12:37 PM      H/H Lab Results   Component Value Date/Time    HGB 12.3 08/25/2017 12:37 PM        Pharmacy to follow daily and will provide subsequent Warfarin dosing based on clinical status.   CARI Jackson)  Contact information [phone]

## 2017-08-25 NOTE — PROGRESS NOTES
[facility]: MED RECONCILIATION    Comments/Recommendations:   Patient is awake, alert, and knowledgeable about home medications   Verifies allergies  Reports compliance to prescribed regimen  Clonazepam - The patient reports he has taken this medication scheduled for years due to \"nerves\" which occurred after a stroke years ago  Albuterol - Prior to his recent issues with infection he used his rescue albuterol inhaler twice per week  Warfarin - Patient can clearly report this his INR goal is 2.5 to 3.5 and is monitored by Dr. Juwan Dove. Due to recent use of antibiotics the patients INR has been elevated. The patient's INR was 3.9 two weeks ago. The patient was reduced to his current regimen of warfarin 5mg every evening with the last dose yesterday. Blood pressure is not monitored regularly at home but the patient reports it is \"good\" at office visits    Medications added:     · Aspirin    Medications removed:    · None    Medications adjusted:    · Atorvastatin changed from 40mg daily to 80mg daily  · Warfarin changed to 5mg every evening      Allergies: Iodinated contrast- oral and iv dye and Pcn [penicillins]    Prior to Admission Medications:     Medication Documentation Review Audit       Reviewed by Mary Kate Mendez PHARMD (Pharmacist) on 08/25/17 at 1625         Medication Sig Documenting Provider Last Dose Status Taking? albuterol (PROVENTIL HFA, VENTOLIN HFA, PROAIR HFA) 90 mcg/actuation inhaler Take 2 Puffs by inhalation every six (6) hours as needed. Kallie Bautista MD 8/24/2017 Unknown time Active Yes    albuterol (PROVENTIL VENTOLIN) 2.5 mg /3 mL (0.083 %) nebulizer solution 3 mL by Nebulization route every four (4) hours as needed for Wheezing. Verlan Crigler, MD 8/21/2017 Unknown time Active Yes    aspirin delayed-release 81 mg tablet Take 81 mg by mouth daily. Historical Provider 8/25/2017 Unknown time Active Yes    atorvastatin (LIPITOR) 80 mg tablet Take 80 mg by mouth every evening.  Historical Provider 8/24/2017 Unknown time Active Yes    clonazepam (KLONOPIN) 0.5 mg tablet Take 0.5 mg by mouth three (3) times daily. Kallie Bautista MD 8/25/2017 am Active Yes    escitalopram oxalate (LEXAPRO) 10 mg tablet Take 10 mg by mouth daily. Kallie Bautista MD 8/25/2017 am Active Yes    fluticasone-salmeterol (ADVAIR) 500-50 mcg/dose diskus inhaler Take 1 Puff by inhalation every twelve (12) hours. Kallie Bautista MD 8/24/2017 pm Active Yes    isosorbide mononitrate (ISMO, MONOKET) 10 mg tablet Take 10 mg by mouth three (3) times daily. Patient takes at 9am, noon, and 5pm Kallie Bautista MD 8/25/2017 am Active Yes    warfarin (COUMADIN) 5 mg tablet Take 5 mg by mouth every evening.  Historical Provider 8/24/2017 Unknown time Active Yes                  Thank you,    Maay Frias, PharmD, BCPS

## 2017-08-25 NOTE — H&P
[facility]  [address] [facility] [address]  [phone]    Hospitalist Admission Note      NAME:  Piotr Hardin   :   1945   MRN:  [id number]     PCP:  Мария Chris MD     Date/Time:  2017 4:20 PM          Subjective:     CHIEF COMPLAINT: cough    HISTORY OF PRESENT ILLNESS:     Mr. Michael Carl is a 67 y.o. male w/ hx of COPD, CAD, AS s/p mechanical AVR, CVA who presents with cough. Ongoing for a month, productive with clear sputum, no hemoptysis, intermittent, associated with dyspnea and low grade fevers and night sweats, worse with exertion, better with rest, no better after 10 days of Omnicef which he finished 1 day ago. No weight loss. ED workup showed persistent air space process in the LLL apical segment concerning for obstruction or Mycobacterium Avium. Mr. Michael Carl is admitted for further evaluation and management of PNA failing outpatient abx.        Past Medical History:   Diagnosis Date    CAD (coronary artery disease)     Cancer ([address] \"10 years ago\"    throat    COPD (chronic obstructive pulmonary disease) ([address]     CVA (cerebral vascular accident) ([address]     H/O mechanical aortic valve replacement     CT surgery  - CABG (LIMA to LAD) and AVR ([facility])    S/P CABG x 1     CT surgery  - CABG (LIMA to LAD) and AVR ([facility])        Past Surgical History:   Procedure Laterality Date    HX HEART VALVE SURGERY      CT surgery  - CABG (LIMA to LAD) and AVR ([facility])       Social History   Substance Use Topics    Smoking status: Current Every Day Smoker    Smokeless tobacco: Never Used      Comment: pipe    Alcohol use No        Family History   Problem Relation Age of Onset    Heart Disease Mother     Heart Disease Sister         Allergies   Allergen Reactions    Iodinated Contrast- Oral And Iv Dye Shortness of Breath    Pcn [Penicillins] Shortness of Breath        Prior to Admission medications    Medication Sig Start Date End Date Taking? Authorizing Provider   albuterol (PROVENTIL HFA, VENTOLIN HFA, PROAIR HFA) 90 mcg/actuation inhaler Take 2 Puffs by inhalation every six (6) hours as needed. Yes Kallie Bautista MD   fluticasone-salmeterol (ADVAIR) 500-50 mcg/dose diskus inhaler Take 1 Puff by inhalation every twelve (12) hours. Yes Kallie Bautista MD   albuterol (PROVENTIL VENTOLIN) 2.5 mg /3 mL (0.083 %) nebulizer solution 3 mL by Nebulization route every four (4) hours as needed for Wheezing. 10/26/14  Yes Solon Goodell, MD   atorvastatin (LIPITOR) 40 mg tablet Take 40 mg by mouth daily. 10/21/14  Yes Kallie Bautista MD   clonazepam (KLONOPIN) 0.5 mg tablet Take 0.5 mg by mouth three (3) times daily. 10/14/14  Yes Kallie Bautista MD   escitalopram oxalate (LEXAPRO) 10 mg tablet Take 10 mg by mouth daily. 10/21/14  Yes Kallie Bautista MD   warfarin (COUMADIN) 5 mg tablet Patient thinks he takes 7.5 mg three times a week (unsure what days) and 5 mg the rest of the week. Patient took 7.5 mg last night (6/11) at 17:00  Patient is due for 5 mg tonight (6/12) 10/1/14  Yes Kallie Bautista MD   isosorbide mononitrate (ISMO, MONOKET) 10 mg tablet Take 10 mg by mouth three (3) times daily.  10/21/14   Kallie Bautista MD       Review of Systems:  (bold if positive, if negative)    Gen:   fever, chills, fatigue, night sweats  Eyes:  ENT:  CVS:  Pulm:  Cough, dyspnea, sputum,GI:    :    MS:  Skin:  Psych:  Endo:    Hem:  Renal:    Neuro:            Objective:      VITALS:    Vital signs reviewed; most recent are:    Visit Vitals    /79 (BP 1 Location: Right arm, BP Patient Position: At rest)    Pulse 88    Temp 97.8 °F (36.6 °C)    Resp 26    Ht 5' 6\" (1.676 m)    Wt 70.9 kg (156 lb 4.9 oz)    SpO2 99%    BMI 25.23 kg/m2     SpO2 Readings from Last 6 Encounters:   08/25/17 99%   06/14/16 94%   10/26/14 97%        No intake or output data in the 24 hours ending 08/25/17 1620         Exam:     Physical Exam:    Gen:  Well-developed, well-nourished, in no acute distress  HEENT:  No scleral icterus, hearing intact to voice, moist mucous membranes. Hoarseness   Neck:  Supple  Resp:  No accessory muscle use. Increased WOB. Poor air movement with few exp wheezing. L basilar rales. Card: distant, RRR. Normal S1 and S2 w/ 2/6 systolic murmur and click. No rubs, or gallops. systo  No peripheral lower extremity edema. No JVD. Peripheral pulses in tact. Abd:  Normoactive bowel sounds. Soft, non-tender, non-distended. No rebound, no guarding. Musc:  No cyanosis or clubbing  Skin:  R flank nodule, scant drainage. Turgor intact. Cap refill ~2 secs  Neuro:  Cranial nerves are grossly intact, no focal motor weakness, follows commands appropriately  Psych:  Fair insight, normal affect. Alert, oriented x 3. Answers questions appropriately       Labs:    Recent Labs      08/25/17   1237   WBC  6.5   HGB  12.3   HCT  38.6   PLT  268     Recent Labs      08/25/17   1237   NA  142   K  3.9   CL  105   CO2  32   GLU  97   BUN  12   CREA  0.98   CA  9.4   MG  1.8   ALB  3.8   SGOT  19   ALT  27     No components found for: GLPOC  No results for input(s): PH, PCO2, PO2, HCO3, FIO2 in the last 72 hours. No results for input(s): INR in the last 72 hours. No lab exists for component: INREXT  Lab Results   Component Value Date/Time    Specimen Description: BRONCHIAL WASHING 07/20/2010 12:05 PM    Specimen Description: BRONCHIAL WASHING 07/20/2010 12:05 PM    Specimen Description: TRACHEAL ASPIRATE 07/14/2010 06:00 AM     Lab Results   Component Value Date/Time    Culture result: LIGHT  NORMAL RESPIRATORY RUTH ANN   06/21/2016 09:40 PM    Culture result: MODERATE  APPARENT  SHARIFA ALBICANS   06/21/2016 09:40 PM    Culture result: NO GROWTH 1 DAY 06/15/2016 09:30 AM     All other current labs reviewed in the computer. Imaging/Studies:    CT chest:  Persistent air space process in the LLL apical segment.  Consider obstruction or  Mycobacterium Avium   Imaging personally reviewed    EKG: NSR, LVH,   EKG personally reviewed         Assessment / Plan:       67 y.o. male with hx of COPD, CAD, AS s/p mechanical AVR, CVA who presents with cough, night sweats/fevers, found to have persistent ASDz in LLL concerning for MAC      Pneumonia: with abnormal CT chest findings concerning for MAC as above  -- failed Omnicef and recent Levaquin. Agree with azithromycin and addition of cefepime for now (Pseudomonal coverage). Doubt MRSA so will hold vanc  -- sputum culture  -- AFB per sputum x 2 at least eight hours apart, or bronch on Monday      COPD (chronic obstructive pulmonary disease) (HCC) (): with poor air movement and acute exacerbation, precipitated by the above. Still smoking pipes  -- start scheduled duo-nebs with PRN albuterol  -- cont LABA/ICS inpatient formulary equivalent  -- defer steroids for now given findings above     Aortic stenosis / s/p mechanical AVR  -- INR 1.4 and subtherapeutic (goal 2.5-3.5). Will bridge with Lovenox 1mg/kg Q12H given mechanical valve and high risk of CVA (history of this already). STOP LOVENOX WHEN INR >2.5. Discussed with nursing  -- cont warfarin, follow INR closely given interactions with azithromycin. Discussed with pharmacy    CAD:  S/P CABG   -- monitor on ASA and warfarin  -- cont statin      Tobacco abuse (10/22/2014)  -- recommended smoking cessation. I spent ~4 minutes (outside of the time documented for this note) exclusively focused on tobacco cessation counseling      Throat [facility] ([facility] (10/22/2014): with hoarseness which per pt and wife is not new  -- encourage outpatient follow up with ENT / oncology  -- smoking cessation      Depression (10/22/2014):   -- cont SSRI and clonazepam (scheduled, on it for years)      Skin lesion: R flank, ? resolving abscess. Per wife improved. Scant drainage  -- culture wound  -- was supposed to have gen surgery Dr. Cele Membreno appt outpatient.  Will courtesy consult in-house    Code Status: FULL     Surrogate decision maker: wife      Previous notes and lab results reviewed, including ED notes, d/c summary, prior echo result      Total time spent with patient: 79 Minutes     Risk of deterioration: High                 Care Plan discussed with: ED provider, Patient, Family and Nursing Staff    Discussed:  Care Plan and D/C Planning    Prophylaxis:  Lovenox and Coumadin    Disposition:  Home w/Family       ___________________________________________________    Attending Physician: Veronica Aaron MD

## 2017-08-25 NOTE — IP AVS SNAPSHOT
[address] 
[phone] Patient: Joey Boyd MRN: [id number] HO1 Current Discharge Medication List  
  
START taking these medications Dose & Instructions Dispensing Information Comments Morning Noon Evening Bedtime  
 doxycycline 100 mg tablet Commonly known as:  ADOXA Your last dose was: Your next dose is:    
   
   
 Dose:  100 mg Take 1 Tab by mouth two (2) times a day for 7 days. Quantity:  14 Tab Refills:  0  
     
   
   
   
  
 guaiFENesin  mg ER tablet Commonly known as:  William & William Your last dose was: Your next dose is:    
   
   
 Dose:  600 mg Take 1 Tab by mouth every twelve (12) hours for 30 days. Quantity:  60 Tab Refills:  0  
     
   
   
   
  
 levoFLOXacin 750 mg tablet Commonly known as:  Kelly Menchaca Your last dose was: Your next dose is:    
   
   
 Dose:  750 mg Take 1 Tab by mouth daily for 5 days. Quantity:  5 Tab Refills:  0  
     
   
   
   
  
 predniSONE 10 mg tablet Commonly known as:  Merwyn Going Your last dose was: Your next dose is:    
   
   
 Prednisone 10mg tabs: 4 tabs (40 mg) daily x 3 days, then 3 tabs (30 mg) daily x 3 days, then 2 tabs (20 mg) daily x 3 days, then 1 tab   (10 mg) daily until gone. Dispense 30 tabs Quantity:  30 Tab Refills:  0 CONTINUE these medications which have CHANGED Dose & Instructions Dispensing Information Comments Morning Noon Evening Bedtime * warfarin 5 mg tablet Commonly known as:  COUMADIN What changed:  Another medication with the same name was added. Make sure you understand how and when to take each. Your last dose was: Your next dose is:    
   
   
 Dose:  5 mg Take 5 mg by mouth every evening. Refills:  0  
     
   
   
   
  
 * warfarin 1 mg tablet Commonly known as:  COUMADIN What changed: You were already taking a medication with the same name, and this prescription was added. Make sure you understand how and when to take each. Your last dose was: Your next dose is:    
   
   
 Dose:  1 mg Take 1 Tab by mouth daily for 30 doses. Quantity:  30 Tab Refills:  0  
     
   
   
   
  
 * Notice: This list has 2 medication(s) that are the same as other medications prescribed for you. Read the directions carefully, and ask your doctor or other care provider to review them with you. CONTINUE these medications which have NOT CHANGED Dose & Instructions Dispensing Information Comments Morning Noon Evening Bedtime * albuterol 90 mcg/actuation inhaler Commonly known as:  PROVENTIL HFA, VENTOLIN HFA, PROAIR HFA Your last dose was: Your next dose is:    
   
   
 Dose:  2 Puff Take 2 Puffs by inhalation every six (6) hours as needed. Refills:  0  
     
   
   
   
  
 * albuterol 2.5 mg /3 mL (0.083 %) nebulizer solution Commonly known as:  PROVENTIL VENTOLIN Your last dose was: Your next dose is:    
   
   
 Dose:  2.5 mg  
3 mL by Nebulization route every four (4) hours as needed for Wheezing. Quantity:  30 vial  
Refills:  1  
     
   
   
   
  
 aspirin delayed-release 81 mg tablet Your last dose was: Your next dose is:    
   
   
 Dose:  81 mg Take 81 mg by mouth daily. Refills:  0  
     
   
   
   
  
 atorvastatin 80 mg tablet Commonly known as:  LIPITOR Your last dose was: Your next dose is:    
   
   
 Dose:  80 mg Take 80 mg by mouth every evening. Refills:  0  
     
   
   
   
  
 clonazePAM 0.5 mg tablet Commonly known as:  Shiva Brownos Your last dose was: Your next dose is:    
   
   
 Dose:  0.5 mg Take 0.5 mg by mouth three (3) times daily. Refills:  0 escitalopram oxalate 10 mg tablet Commonly known as:  David Salinas Your last dose was: Your next dose is:    
   
   
 Dose:  10 mg Take 10 mg by mouth daily. Refills:  0  
     
   
   
   
  
 fluticasone-salmeterol 500-50 mcg/dose diskus inhaler Commonly known as:  ADVAIR Your last dose was: Your next dose is:    
   
   
 Dose:  1 Puff Take 1 Puff by inhalation every twelve (12) hours. Refills:  0  
     
   
   
   
  
 isosorbide mononitrate 10 mg tablet Commonly known as:  ISMO, MONOKET Your last dose was: Your next dose is:    
   
   
 Dose:  10 mg Take 10 mg by mouth three (3) times daily. Patient takes at 9am, noon, and 5pm  
 Refills:  0  
     
   
   
   
  
 promethazine 12.5 mg tablet Commonly known as:  PHENERGAN Your last dose was: Your next dose is:    
   
   
 Dose:  12.5 mg Take 12.5 mg by mouth daily. Indications: Nausea Refills:  0  
     
   
   
   
  
 * Notice: This list has 2 medication(s) that are the same as other medications prescribed for you. Read the directions carefully, and ask your doctor or other care provider to review them with you. Where to Get Your Medications Information on where to get these meds will be given to you by the nurse or doctor. ! Ask your nurse or doctor about these medications  
  doxycycline 100 mg tablet  
 guaiFENesin  mg ER tablet  
 levoFLOXacin 750 mg tablet  
 predniSONE 10 mg tablet  
 warfarin 1 mg tablet

## 2017-08-26 LAB
ALBUMIN SERPL-MCNC: 3.4 G/DL (ref 3.5–5)
ALBUMIN/GLOB SERPL: 0.9 {RATIO} (ref 1.1–2.2)
ALP SERPL-CCNC: 113 U/L (ref 45–117)
ALT SERPL-CCNC: 24 U/L (ref 12–78)
ANION GAP SERPL CALC-SCNC: 9 MMOL/L (ref 5–15)
AST SERPL-CCNC: 20 U/L (ref 15–37)
B PERT DNA SPEC QL NAA+PROBE: NOT DETECTED
BASOPHILS # BLD: 0 K/UL (ref 0–0.1)
BASOPHILS NFR BLD: 0 % (ref 0–1)
BILIRUB SERPL-MCNC: 0.7 MG/DL (ref 0.2–1)
BUN SERPL-MCNC: 17 MG/DL (ref 6–20)
BUN/CREAT SERPL: 18 (ref 12–20)
C PNEUM DNA SPEC QL NAA+PROBE: NOT DETECTED
CALCIUM SERPL-MCNC: 8.9 MG/DL (ref 8.5–10.1)
CHLORIDE SERPL-SCNC: 106 MMOL/L (ref 97–108)
CO2 SERPL-SCNC: 25 MMOL/L (ref 21–32)
CREAT SERPL-MCNC: 0.93 MG/DL (ref 0.7–1.3)
EOSINOPHIL # BLD: 0 K/UL (ref 0–0.4)
EOSINOPHIL NFR BLD: 0 % (ref 0–7)
ERYTHROCYTE [DISTWIDTH] IN BLOOD BY AUTOMATED COUNT: 15.2 % (ref 11.5–14.5)
FLUAV H1 2009 PAND RNA SPEC QL NAA+PROBE: NOT DETECTED
FLUAV H1 RNA SPEC QL NAA+PROBE: NOT DETECTED
FLUAV H3 RNA SPEC QL NAA+PROBE: NOT DETECTED
FLUAV SUBTYP SPEC NAA+PROBE: NOT DETECTED
FLUBV RNA SPEC QL NAA+PROBE: NOT DETECTED
GLOBULIN SER CALC-MCNC: 3.8 G/DL (ref 2–4)
GLUCOSE SERPL-MCNC: 143 MG/DL (ref 65–100)
HADV DNA SPEC QL NAA+PROBE: NOT DETECTED
HCOV 229E RNA SPEC QL NAA+PROBE: NOT DETECTED
HCOV HKU1 RNA SPEC QL NAA+PROBE: NOT DETECTED
HCOV NL63 RNA SPEC QL NAA+PROBE: NOT DETECTED
HCOV OC43 RNA SPEC QL NAA+PROBE: NOT DETECTED
HCT VFR BLD AUTO: 34.9 % (ref 36.6–50.3)
HGB BLD-MCNC: 11.9 G/DL (ref 12.1–17)
HMPV RNA SPEC QL NAA+PROBE: NOT DETECTED
HPIV1 RNA SPEC QL NAA+PROBE: NOT DETECTED
HPIV2 RNA SPEC QL NAA+PROBE: NOT DETECTED
HPIV3 RNA SPEC QL NAA+PROBE: NOT DETECTED
HPIV4 RNA SPEC QL NAA+PROBE: NOT DETECTED
INR PPP: 1.5 (ref 0.9–1.1)
LYMPHOCYTES # BLD: 0.5 K/UL (ref 0.8–3.5)
LYMPHOCYTES NFR BLD: 9 % (ref 12–49)
M PNEUMO DNA SPEC QL NAA+PROBE: NOT DETECTED
MAGNESIUM SERPL-MCNC: 1.9 MG/DL (ref 1.6–2.4)
MCH RBC QN AUTO: 29.7 PG (ref 26–34)
MCHC RBC AUTO-ENTMCNC: 34.1 G/DL (ref 30–36.5)
MCV RBC AUTO: 87 FL (ref 80–99)
MONOCYTES # BLD: 0.1 K/UL (ref 0–1)
MONOCYTES NFR BLD: 1 % (ref 5–13)
NEUTS SEG # BLD: 4.9 K/UL (ref 1.8–8)
NEUTS SEG NFR BLD: 90 % (ref 32–75)
PLATELET # BLD AUTO: 250 K/UL (ref 150–400)
POTASSIUM SERPL-SCNC: 4.1 MMOL/L (ref 3.5–5.1)
PROT SERPL-MCNC: 7.2 G/DL (ref 6.4–8.2)
PROTHROMBIN TIME: 15.4 SEC (ref 9–11.1)
RBC # BLD AUTO: 4.01 M/UL (ref 4.1–5.7)
RBC MORPH BLD: ABNORMAL
RSV RNA SPEC QL NAA+PROBE: NOT DETECTED
RV+EV RNA SPEC QL NAA+PROBE: NOT DETECTED
SODIUM SERPL-SCNC: 140 MMOL/L (ref 136–145)
WBC # BLD AUTO: 5.5 K/UL (ref 4.1–11.1)

## 2017-08-26 PROCEDURE — 87070 CULTURE OTHR SPECIMN AEROBIC: CPT | Performed by: INTERNAL MEDICINE

## 2017-08-26 PROCEDURE — 65660000000 HC RM CCU STEPDOWN

## 2017-08-26 PROCEDURE — 74011000250 HC RX REV CODE- 250: Performed by: INTERNAL MEDICINE

## 2017-08-26 PROCEDURE — 94640 AIRWAY INHALATION TREATMENT: CPT

## 2017-08-26 PROCEDURE — 85025 COMPLETE CBC W/AUTO DIFF WBC: CPT | Performed by: INTERNAL MEDICINE

## 2017-08-26 PROCEDURE — 83735 ASSAY OF MAGNESIUM: CPT | Performed by: INTERNAL MEDICINE

## 2017-08-26 PROCEDURE — 0H97XZZ DRAINAGE OF ABDOMEN SKIN, EXTERNAL APPROACH: ICD-10-PCS | Performed by: SURGERY

## 2017-08-26 PROCEDURE — 85610 PROTHROMBIN TIME: CPT | Performed by: INTERNAL MEDICINE

## 2017-08-26 PROCEDURE — 76010000093 HC SPECIAL PROCEDURE

## 2017-08-26 PROCEDURE — 80053 COMPREHEN METABOLIC PANEL: CPT | Performed by: INTERNAL MEDICINE

## 2017-08-26 PROCEDURE — 74011250636 HC RX REV CODE- 250/636: Performed by: NURSE PRACTITIONER

## 2017-08-26 PROCEDURE — 74011000250 HC RX REV CODE- 250: Performed by: SURGERY

## 2017-08-26 PROCEDURE — 87116 MYCOBACTERIA CULTURE: CPT | Performed by: INTERNAL MEDICINE

## 2017-08-26 PROCEDURE — 74011000258 HC RX REV CODE- 258: Performed by: INTERNAL MEDICINE

## 2017-08-26 PROCEDURE — 74011250637 HC RX REV CODE- 250/637: Performed by: INTERNAL MEDICINE

## 2017-08-26 PROCEDURE — 36415 COLL VENOUS BLD VENIPUNCTURE: CPT | Performed by: INTERNAL MEDICINE

## 2017-08-26 PROCEDURE — 77030018836 HC SOL IRR NACL ICUM -A

## 2017-08-26 PROCEDURE — 77030013140 HC MSK NEB VYRM -A

## 2017-08-26 PROCEDURE — 74011250636 HC RX REV CODE- 250/636: Performed by: INTERNAL MEDICINE

## 2017-08-26 PROCEDURE — 74011250637 HC RX REV CODE- 250/637: Performed by: NURSE PRACTITIONER

## 2017-08-26 RX ORDER — PROMETHAZINE HYDROCHLORIDE 12.5 MG/1
25 TABLET ORAL
COMMUNITY

## 2017-08-26 RX ORDER — MORPHINE SULFATE 4 MG/ML
2 INJECTION, SOLUTION INTRAMUSCULAR; INTRAVENOUS
Status: DISCONTINUED | OUTPATIENT
Start: 2017-08-26 | End: 2017-08-28 | Stop reason: HOSPADM

## 2017-08-26 RX ORDER — LIDOCAINE HYDROCHLORIDE 10 MG/ML
10 INJECTION INFILTRATION; PERINEURAL ONCE
Status: COMPLETED | OUTPATIENT
Start: 2017-08-26 | End: 2017-08-26

## 2017-08-26 RX ADMIN — METHYLPREDNISOLONE SODIUM SUCCINATE 40 MG: 40 INJECTION, POWDER, FOR SOLUTION INTRAMUSCULAR; INTRAVENOUS at 13:50

## 2017-08-26 RX ADMIN — CEFEPIME HYDROCHLORIDE 2 G: 2 INJECTION, POWDER, FOR SOLUTION INTRAVENOUS at 16:41

## 2017-08-26 RX ADMIN — IPRATROPIUM BROMIDE AND ALBUTEROL SULFATE 3 ML: .5; 3 SOLUTION RESPIRATORY (INHALATION) at 01:29

## 2017-08-26 RX ADMIN — ESCITALOPRAM OXALATE 10 MG: 10 TABLET ORAL at 08:30

## 2017-08-26 RX ADMIN — Medication 10 ML: at 06:06

## 2017-08-26 RX ADMIN — FLUTICASONE FUROATE AND VILANTEROL TRIFENATATE 1 PUFF: 200; 25 POWDER RESPIRATORY (INHALATION) at 08:30

## 2017-08-26 RX ADMIN — Medication 2 MG: at 10:02

## 2017-08-26 RX ADMIN — Medication 2 MG: at 23:26

## 2017-08-26 RX ADMIN — CEFEPIME HYDROCHLORIDE 2 G: 2 INJECTION, POWDER, FOR SOLUTION INTRAVENOUS at 23:27

## 2017-08-26 RX ADMIN — Medication 2 MG: at 18:41

## 2017-08-26 RX ADMIN — GUAIFENESIN 600 MG: 600 TABLET, EXTENDED RELEASE ORAL at 08:31

## 2017-08-26 RX ADMIN — GUAIFENESIN 600 MG: 600 TABLET, EXTENDED RELEASE ORAL at 21:03

## 2017-08-26 RX ADMIN — ENOXAPARIN SODIUM 40 MG: 40 INJECTION SUBCUTANEOUS at 17:40

## 2017-08-26 RX ADMIN — WARFARIN SODIUM 1 MG: 1 TABLET ORAL at 17:40

## 2017-08-26 RX ADMIN — CEFEPIME HYDROCHLORIDE 2 G: 2 INJECTION, POWDER, FOR SOLUTION INTRAVENOUS at 08:30

## 2017-08-26 RX ADMIN — METHYLPREDNISOLONE SODIUM SUCCINATE 40 MG: 40 INJECTION, POWDER, FOR SOLUTION INTRAMUSCULAR; INTRAVENOUS at 21:03

## 2017-08-26 RX ADMIN — ENOXAPARIN SODIUM 30 MG: 40 INJECTION SUBCUTANEOUS at 06:06

## 2017-08-26 RX ADMIN — ISOSORBIDE MONONITRATE 10 MG: 10 TABLET ORAL at 08:30

## 2017-08-26 RX ADMIN — IPRATROPIUM BROMIDE AND ALBUTEROL SULFATE 3 ML: .5; 3 SOLUTION RESPIRATORY (INHALATION) at 13:11

## 2017-08-26 RX ADMIN — Medication 10 ML: at 13:51

## 2017-08-26 RX ADMIN — METHYLPREDNISOLONE SODIUM SUCCINATE 40 MG: 40 INJECTION, POWDER, FOR SOLUTION INTRAMUSCULAR; INTRAVENOUS at 06:06

## 2017-08-26 RX ADMIN — ENOXAPARIN SODIUM 30 MG: 40 INJECTION SUBCUTANEOUS at 18:00

## 2017-08-26 RX ADMIN — ATORVASTATIN CALCIUM 80 MG: 20 TABLET, FILM COATED ORAL at 17:40

## 2017-08-26 RX ADMIN — CLONAZEPAM 0.5 MG: 0.5 TABLET ORAL at 21:03

## 2017-08-26 RX ADMIN — AZITHROMYCIN MONOHYDRATE 500 MG: 500 INJECTION, POWDER, LYOPHILIZED, FOR SOLUTION INTRAVENOUS at 17:00

## 2017-08-26 RX ADMIN — ASPIRIN 81 MG: 81 TABLET, COATED ORAL at 08:31

## 2017-08-26 RX ADMIN — CLONAZEPAM 0.5 MG: 0.5 TABLET ORAL at 16:40

## 2017-08-26 RX ADMIN — ISOSORBIDE MONONITRATE 10 MG: 10 TABLET ORAL at 16:40

## 2017-08-26 RX ADMIN — Medication 2 MG: at 14:35

## 2017-08-26 RX ADMIN — IPRATROPIUM BROMIDE AND ALBUTEROL SULFATE 3 ML: .5; 3 SOLUTION RESPIRATORY (INHALATION) at 07:46

## 2017-08-26 RX ADMIN — IPRATROPIUM BROMIDE AND ALBUTEROL SULFATE 3 ML: .5; 3 SOLUTION RESPIRATORY (INHALATION) at 20:12

## 2017-08-26 RX ADMIN — CLONAZEPAM 0.5 MG: 0.5 TABLET ORAL at 08:30

## 2017-08-26 RX ADMIN — ISOSORBIDE MONONITRATE 10 MG: 10 TABLET ORAL at 12:17

## 2017-08-26 RX ADMIN — ENOXAPARIN SODIUM 40 MG: 40 INJECTION SUBCUTANEOUS at 06:05

## 2017-08-26 RX ADMIN — LIDOCAINE HYDROCHLORIDE 10 ML: 10 INJECTION, SOLUTION INFILTRATION; PERINEURAL at 09:10

## 2017-08-26 RX ADMIN — Medication 10 ML: at 21:03

## 2017-08-26 NOTE — PROGRESS NOTES
[facility] [address], [place], [address] [facility]  [phone]      Medical Progress Note      NAME:         Olivia Fink   :        1945  MRM:        [id number]    Date:          2017      Subjective: Patient has been seen and examined as a follow up for pneumonia, flank abscess. Chart, labs, diagnostics reviewed. Has aching right flank discomfort. No fever or chills. Some cough but no fever. Objective:    Vital Signs:    Visit Vitals    /67 (BP 1 Location: Left arm, BP Patient Position: At rest)    Pulse 76    Temp 97.2 °F (36.2 °C)    Resp 18    Ht 5' 6\" (1.676 m)    Wt 70.9 kg (156 lb 4.9 oz)    SpO2 97%    BMI 25.23 kg/m2        Intake/Output Summary (Last 24 hours) at 17 1212  Last data filed at 17 1904   Gross per 24 hour   Intake                0 ml   Output              200 ml   Net             -200 ml        Physical Examination:    General:   Weak looking and not in much acute distress   Eyes:   pink conjunctivae, PERRLA with no discharge. ENT:   no ottorrhea or rhinorrhea with moist mucous membranes  Neck: no masses, thyroid non-tender and trachea central.  Pulm:  no accessory muscle use, clear breath sounds without crackles or wheezes  Card:  no JVD or murmurs, has regular and normal S1, S2 without thrills, bruits or peripheral edema  Abd:  Soft, non-tender, non-distended, normoactive bowel sounds with no palpable organomegaly  Musc:  No cyanosis, clubbing, atrophy or deformities. R flank dressing  Skin:  No rashes, bruising or ulcers. Neuro: Awake and alert.  Generally a non focal exam. Follows commands appropriately  Psych:  Has a good insight and is oriented x 3    Current Facility-Administered Medications   Medication Dose Route Frequency    warfarin (COUMADIN) tablet 6 mg  6 mg Oral ONCE    morphine injection 2 mg  2 mg IntraVENous Q4H PRN    sodium chloride (NS) flush 5-10 mL  5-10 mL IntraVENous Q8H    sodium chloride (NS) flush 5-10 mL  5-10 mL IntraVENous PRN    naloxone (NARCAN) injection 0.4 mg  0.4 mg IntraVENous PRN    cefepime (MAXIPIME) 2 g in 0.9% sodium chloride (MBP/ADV) 100 mL  2 g IntraVENous Q8H    albuterol-ipratropium (DUO-NEB) 2.5 MG-0.5 MG/3 ML  3 mL Nebulization Q6H RT    guaiFENesin ER (MUCINEX) tablet 600 mg  600 mg Oral Q12H    aspirin delayed-release tablet 81 mg  81 mg Oral DAILY    atorvastatin (LIPITOR) tablet 80 mg  80 mg Oral QPM    clonazePAM (KlonoPIN) tablet 0.5 mg  0.5 mg Oral TID    escitalopram oxalate (LEXAPRO) tablet 10 mg  10 mg Oral DAILY    isosorbide mononitrate (ISMO, MONOKET) tablet 10 mg  10 mg Oral TID    enoxaparin (LOVENOX) injection 30 mg  30 mg SubCUTAneous Q12H    And    enoxaparin (LOVENOX) injection 40 mg  40 mg SubCUTAneous Q12H    albuterol (PROVENTIL VENTOLIN) nebulizer solution 2.5 mg  2.5 mg Nebulization Q2H PRN    fluticasone-vilanterol (BREO ELLIPTA) 200mcg-25mcg/puff  1 Puff Inhalation DAILY    Warfarin Pharmacy to Dose   Other Rx Dosing/Monitoring    azithromycin (ZITHROMAX) 500 mg in 0.9% sodium chloride (MBP/ADV) 250 mL  500 mg IntraVENous Q24H    methylPREDNISolone (PF) (SOLU-MEDROL) injection 40 mg  40 mg IntraVENous Q8H        Laboratory data and review:    Recent Labs      08/26/17   0150  08/25/17   1237   WBC  5.5  6.5   HGB  11.9*  12.3   HCT  34.9*  38.6   PLT  250  268     Recent Labs      08/26/17   0150  08/25/17   1237   NA  140  142   K  4.1  3.9   CL  106  105   CO2  25  32   GLU  143*  97   BUN  17  12   CREA  0.93  0.98   CA  8.9  9.4   MG  1.9  1.8   ALB  3.4*  3.8   SGOT  20  19   ALT  24  27   INR  1.5*  1.4*     No components found for: [place]    CT scan chest - Persistent air space process in the LLL apical segment. Consider obstruction or  Mycobacterium Avium    Assessment and Plan:    Pneumonia, LLL apical POA: failed Omnicef and recent Levaquin.  Cultures and AFBs pending. Has been reviewed by pulmonology. Continue IV Azithromycin and Cefepime and follow cultures. COPD (chronic obstructive pulmonary disease) ([facility] [address]): minimal wheezing. Still smoking pipes. Continue scheduled duo-nebs with PRN albuterol, Breo, Mucinex, solumedrol and follow     Aortic stenosis / s/p mechanical AVR: he says he has been compliant with medications and follow up. INR 1.5 today. Continue enoxaparin and coumadin. Pharmacy monitoring. Target INR 2.5 -3.5     CAD:  S/P CABG: continue Asprin, Imdur, Lipitor     Throat cancer ([facility] [address] (10/22/2014): with hoarseness which per pt and wife is not new. Out patient follow up      Depression (10/22/2014): continue Lexapro, Clonazepam     Abscess R flank POA: seen by surgery and had a bedside I&D. Continue wound care. Follow clinically.      Tobacco abuse (10/22/2014): already been counseled on cessation    Total time spent for the patient's care: [address] discussed with: Patient and Nursing Staff    Discussed:  Care Plan    Prophylaxis:  Lovenox and Coumadin    Anticipated Disposition:  Home w/Family           ___________________________________________________    Attending Physician:   Xu Lutz MD

## 2017-08-26 NOTE — CONSULTS
Assessment:     Right flank abscess    Plan:     Consent for : Incision and drainage of Right flank abscess at the bedside. Risks discussed with the patient included bleeding, infection, wound complication, recurrence. All questions answered. Will send Culture  Local wound care: Pack right flank abscess cavity with 1/4\" gauze strip damp with saline. Cover with dry gauze and secure with tape. Change Daily. Signed By: Becki Westfall MD  [facility]  Office:  [phone]    August 26, 2017          General Surgery Consult    Subjective:      I was asked to see Geovani Bautista by Gustavo Salazar MD for management of a right flank abscess. The patient is a 67 y.o. male admitted for pneumonia. He has a history of a aortic valve repair with a mechanical valve, on warfarin, currently being bridged with lovenox. For the last 2 months he has had an intermittently draining right flank abscess. His wife has been caring for it but it has not resolved. He is currently on azithromycin and cefepime for his pneumonia.      Past Medical History:   Diagnosis Date    CAD (coronary artery disease)     Cancer ([facility] [address] \"10 years ago\"    throat    COPD (chronic obstructive pulmonary disease) ([facility] [address]     CVA (cerebral vascular accident) ([facility] [address]     H/O mechanical aortic valve replacement     CT surgery 1994 - CABG (LIMA to LAD) and AVR ([facility])    S/P CABG x 1     CT surgery 1994 - CABG (LIMA to LAD) and AVR ([facility])         Past Surgical History:   Procedure Laterality Date    HX HEART VALVE SURGERY      CT surgery 1994 - CABG (LIMA to LAD) and AVR ([facility])      Family History   Problem Relation Age of Onset    Heart Disease Mother     Heart Disease Sister      Social History     Social History    Marital status:      Spouse name: N/A    Number of children: N/A    Years of education: N/A     Social History Main Topics    Smoking status: Current Every Day Smoker    Smokeless tobacco: Never Used Comment: pipe    Alcohol use No    Drug use: No    Sexual activity: Not Asked     Other Topics Concern    None     Social History Narrative      Current Facility-Administered Medications   Medication Dose Route Frequency    lidocaine (XYLOCAINE) 10 mg/mL (1 %) injection 10 mL  10 mL SubCUTAneous ONCE    sodium chloride (NS) flush 5-10 mL  5-10 mL IntraVENous Q8H    sodium chloride (NS) flush 5-10 mL  5-10 mL IntraVENous PRN    naloxone (NARCAN) injection 0.4 mg  0.4 mg IntraVENous PRN    cefepime (MAXIPIME) 2 g in 0.9% sodium chloride (MBP/ADV) 100 mL  2 g IntraVENous Q8H    albuterol-ipratropium (DUO-NEB) 2.5 MG-0.5 MG/3 ML  3 mL Nebulization Q6H RT    guaiFENesin ER (MUCINEX) tablet 600 mg  600 mg Oral Q12H    aspirin delayed-release tablet 81 mg  81 mg Oral DAILY    atorvastatin (LIPITOR) tablet 80 mg  80 mg Oral QPM    clonazePAM (KlonoPIN) tablet 0.5 mg  0.5 mg Oral TID    escitalopram oxalate (LEXAPRO) tablet 10 mg  10 mg Oral DAILY    isosorbide mononitrate (ISMO, MONOKET) tablet 10 mg  10 mg Oral TID    enoxaparin (LOVENOX) injection 30 mg  30 mg SubCUTAneous Q12H    And    enoxaparin (LOVENOX) injection 40 mg  40 mg SubCUTAneous Q12H    albuterol (PROVENTIL VENTOLIN) nebulizer solution 2.5 mg  2.5 mg Nebulization Q2H PRN    fluticasone-vilanterol (BREO ELLIPTA) 200mcg-25mcg/puff  1 Puff Inhalation DAILY    Warfarin Pharmacy to Dose   Other Rx Dosing/Monitoring    azithromycin (ZITHROMAX) 500 mg in 0.9% sodium chloride (MBP/ADV) 250 mL  500 mg IntraVENous Q24H    methylPREDNISolone (PF) (SOLU-MEDROL) injection 40 mg  40 mg IntraVENous Q8H      Allergies   Allergen Reactions    Iodinated Contrast- Oral And Iv Dye Shortness of Breath    Pcn [Penicillins] Shortness of Breath     Patient reports to have tolerated a 10 day course of cefdinir started 8/24/17       Review of Systems:     []     Unable to obtain  ROS due to  []    mental status change  []    sedated   []    intubated [x]    Total of 12 system negative, unless specified below or in HPI:  Constitutional: negative fever, negative chills, negative weight loss  Eyes:   negative visual changes  ENT:   negative sore throat, tongue or lip swelling  Respiratory:  negative cough, negative dyspnea  Cards:  negative for chest pain, palpitations, lower extremity edema  GI:   negative for nausea, vomiting, diarrhea, and abdominal pain  :  negative for frequency, dysuria  Integument:  negative for rash and pruritus  Heme:  negative for easy bruising and gum/nose bleeding  Musculoskel: negative for myalgias,  back pain and muscle weakness  Neuro:  negative for headaches, dizziness, vertigo  Psych:  negative for feelings of anxiety, depression     Objective:      Patient Vitals for the past 8 hrs:   BP Temp Pulse Resp SpO2   17 0839 144/75 98 °F (36.7 °C) 76 18 98 %   17 0817 128/74 98 °F (36.7 °C) 79 17 97 %   17 0701 - - 72 - -   17 0358 120/62 97.8 °F (36.6 °C) 81 17 96 %   17 0052 - - 91 - -       Temp (24hrs), Av.9 °F (36.6 °C), Min:97.5 °F (36.4 °C), Max:98.3 °F (36.8 °C)      Physical Exam:  General:  Alert, cooperative, no distress, appears stated age. Eyes:  Conjunctivae/corneas clear. PERRL, EOMs intact. Nose: Nares normal. Septum midline. Mucosa normal. No drainage or sinus tenderness. Mouth/Throat: Lips, mucosa, and tongue normal. Teeth and gums normal.   Neck: Supple, symmetrical, trachea midline, no adenopathy, thyroid: no enlargment/tenderness/nodules, no carotid bruit and no JVD. Back:   Symmetric, no curvature. ROM normal. No CVA tenderness. Lungs:   Clear to auscultation bilaterally. Heart:  Regular rate and rhythm, S1, S2 normal, no murmur, click, rub or gallop. Abdomen:   Soft, non-tender. Bowel sounds normal. No masses,  No organomegaly. Extremities: Extremities normal, atraumatic, no cyanosis or edema. Pulses: 2+ and symmetric all extremities.    Skin: 1.5 cm right flank abscess with purulent discharge   Lymph nodes: Cervical, supraclavicular, and axillary nodes normal.     Labs: Recent Labs      08/26/17 0150   WBC  5.5   HGB  11.9*   HCT  34.9*   PLT  250     Recent Labs      08/26/17 0150   NA  140   K  4.1   CL  106   CO2  25   GLU  143*   BUN  17   CREA  0.93   CA  8.9   MG  1.9   ALB  3.4*   TBILI  0.7   SGOT  20   ALT  24     Recent Labs      08/26/17 0150   INR  1.5*

## 2017-08-26 NOTE — OP NOTES
Date: 08/26/17    Pre-operative Diagnosis: Right flank abscess    Post-operative Diagnosis:  Right flank abscess    Procedure: Incision and drainage of Right flank abscess    Surgeon: Mae Fox MD    Assistant:  None    Anesthesia: Local    Estimated Blood Loss: 5 cc    Findings: 1.5 cm ruptured sebaceous cyst    Specimen: Wound culture; Contents of abscess cavity       Indication: 67year-old male with a ruptured epidermal cyst       Procedure: Written informed consent was obtained. The right flank was prepped and draped in the usual sterile fashion. A time-out was performed verifying the correct patient, procedure, allergies, laterality and administration of antibiotics. He was on scheduled antibiotics pre-incision.      The skin and subcutaneous tissue was anesthetized with 1% lidocaine. A 2 cm incision was made with a #11 blade. Purulent material was expressed. The abscess cavity was cultured. It appeared that the source of the infection was from a ruptured epidermal cyst.  The contents of the cyst were sent as specimen. The cavity was irrigated and packed with 1/4\" gauze strips. Dry gauze was applied and secured with tape. The patient tolerated the procedure well.      Mae Fox MD

## 2017-08-26 NOTE — ED NOTES
Chart opened to review medication administration for the 5th floor per Lenis Nissen RN antibiotics were hung prior to pt leaving the ED

## 2017-08-26 NOTE — PROGRESS NOTES
[facility] Pharmacy Dosing Services: 8/26/17    Consult for Warfarin Dosing by Pharmacy by Dr. Natalie Da Silva provided for this 67 y.o.  male , for indication of Mechanical Heart Valve. Day of Therapy Continued from home (warfarin 5mg daily - The patient was on this dose two weeks prior to admission due to a previously elevated INR from recent antibiotic course)  Dose to achieve an INR goal of 2.5- 3.5    Order entered for  Warfarin  6 (mg) ordered to be given today at 18:00. Significant drug interactions: Azithromycing, Lovenox Bridge (administration instruction from provider state - Bridging, with mechanical AVR. Stop this when INR >2.5)  Previous dose given 6mg   PT/INR Lab Results   Component Value Date/Time    INR 1.5 08/26/2017 01:50 AM      Platelets Lab Results   Component Value Date/Time    PLATELET 813 70/00/5152 01:50 AM      H/H Lab Results   Component Value Date/Time    HGB 11.9 08/26/2017 01:50 AM        Pharmacy to follow daily and will provide subsequent Warfarin dosing based on clinical status. CARI Hall)  [facility] 23 information [phone]

## 2017-08-26 NOTE — PROGRESS NOTES
Bedside and Verbal shift change report given to Jessica Choudhary RN (oncoming nurse) by Aranza Salazar RN (offgoing nurse). Report included the following information SBAR, Kardex, ED Summary, Intake/Output, MAR and Recent Results.

## 2017-08-26 NOTE — PROGRESS NOTES
Name: [address] [place] [address] Dr HOWARDB: 1945 Admit Date: 2017   Phone: [phone]  Room: [facility]/   PCP: Мария Chris MD  MRN: [id number]   Date: 2017  Code: Full Code        Overnight events: Had right sided flank abscess I&Ded. Feels better today. Denies any shortness of breath.      Past Medical History:   Diagnosis Date    CAD (coronary artery disease)     Cancer ([facility] [address] \"10 years ago\"    throat    COPD (chronic obstructive pulmonary disease) ([facility] [address]     CVA (cerebral vascular accident) ([facility] [address]     H/O mechanical aortic valve replacement     CT surgery  - CABG (LIMA to LAD) and AVR ([facility])    S/P CABG x 1     CT surgery  - CABG (LIMA to LAD) and AVR ([facility])       Past Surgical History:   Procedure Laterality Date    HX HEART VALVE SURGERY      CT surgery  - CABG (LIMA to LAD) and AVR ([facility])       Family History   Problem Relation Age of Onset    Heart Disease Mother     Heart Disease Sister        Social History   Substance Use Topics    Smoking status: Current Every Day Smoker    Smokeless tobacco: Never Used      Comment: pipe    Alcohol use No       Allergies   Allergen Reactions    Iodinated Contrast- Oral And Iv Dye Shortness of Breath    Pcn [Penicillins] Shortness of Breath     Patient reports to have tolerated a 10 day course of cefdinir started 17       Current Facility-Administered Medications   Medication Dose Route Frequency    warfarin (COUMADIN) tablet 6 mg  6 mg Oral ONCE    morphine injection 2 mg  2 mg IntraVENous Q4H PRN    sodium chloride (NS) flush 5-10 mL  5-10 mL IntraVENous Q8H    sodium chloride (NS) flush 5-10 mL  5-10 mL IntraVENous PRN    naloxone (NARCAN) injection 0.4 mg  0.4 mg IntraVENous PRN    cefepime (MAXIPIME) 2 g in 0.9% sodium chloride (MBP/ADV) 100 mL  2 g IntraVENous Q8H    albuterol-ipratropium (DUO-NEB) 2.5 MG-0.5 MG/3 ML  3 mL Nebulization Q6H RT    guaiFENesin ER (MUCINEX) tablet 600 mg  600 mg Oral Q12H    aspirin delayed-release tablet 81 mg  81 mg Oral DAILY    atorvastatin (LIPITOR) tablet 80 mg  80 mg Oral QPM    clonazePAM (KlonoPIN) tablet 0.5 mg  0.5 mg Oral TID    escitalopram oxalate (LEXAPRO) tablet 10 mg  10 mg Oral DAILY    isosorbide mononitrate (ISMO, MONOKET) tablet 10 mg  10 mg Oral TID    enoxaparin (LOVENOX) injection 30 mg  30 mg SubCUTAneous Q12H    And    enoxaparin (LOVENOX) injection 40 mg  40 mg SubCUTAneous Q12H    albuterol (PROVENTIL VENTOLIN) nebulizer solution 2.5 mg  2.5 mg Nebulization Q2H PRN    fluticasone-vilanterol (BREO ELLIPTA) 200mcg-25mcg/puff  1 Puff Inhalation DAILY    Warfarin Pharmacy to Dose   Other Rx Dosing/Monitoring    azithromycin (ZITHROMAX) 500 mg in 0.9% sodium chloride (MBP/ADV) 250 mL  500 mg IntraVENous Q24H    methylPREDNISolone (PF) (SOLU-MEDROL) injection 40 mg  40 mg IntraVENous Q8H         REVIEW OF SYSTEMS   12 point ROS negative except as stated in the HPI. Physical Exam:   Visit Vitals    /67 (BP 1 Location: Left arm, BP Patient Position: At rest)    Pulse 76    Temp 97.2 °F (36.2 °C)    Resp 18    Ht 5' 6\" (1.676 m)    Wt 70.9 kg (156 lb 4.9 oz)    SpO2 97%    BMI 25.23 kg/m2       General:  Alert, cooperative, no distress, appears stated age. Head:  Normocephalic, without obvious abnormality, atraumatic. Eyes:  Conjunctivae/corneas clear. Nose: Nares normal. Septum midline. Mucosa normal.    Throat: Lips, mucosa, and tongue normal.    Neck: Supple, symmetrical, trachea midline, no adenopathy. Lungs:   Diminished breath sounds bilaterally with scattered rhonchi and expiratory wheezing. Chest wall:  No tenderness or deformity. Heart:  Regular rate and rhythm, S1, S2 normal, no murmur, click, rub or gallop. Abdomen:   Soft, non-tender. Bowel sounds normal. No masses,  No organomegaly. Extremities: Extremities normal, atraumatic, no cyanosis or edema.    Pulses: 2+ and symmetric all extremities. Skin: Skin color, texture, turgor normal. No rashes or lesions   Lymph nodes: Cervical, supraclavicular nodes normal.   Neurologic: Grossly nonfocal       Lab Results   Component Value Date/Time    Sodium 140 08/26/2017 01:50 AM    Potassium 4.1 08/26/2017 01:50 AM    Chloride 106 08/26/2017 01:50 AM    CO2 25 08/26/2017 01:50 AM    BUN 17 08/26/2017 01:50 AM    Creatinine 0.93 08/26/2017 01:50 AM    Glucose 143 08/26/2017 01:50 AM    Calcium 8.9 08/26/2017 01:50 AM    Magnesium 1.9 08/26/2017 01:50 AM    Phosphorus 2.5 06/14/2016 02:06 AM       Lab Results   Component Value Date/Time    WBC 5.5 08/26/2017 01:50 AM    HGB 11.9 08/26/2017 01:50 AM    PLATELET 073 99/78/3035 01:50 AM    MCV 87.0 08/26/2017 01:50 AM       Lab Results   Component Value Date/Time    INR 1.5 08/26/2017 01:50 AM    aPTT 42.6 06/12/2016 02:10 PM    AST (SGOT) 20 08/26/2017 01:50 AM    Alk.  phosphatase 113 08/26/2017 01:50 AM    Protein, total 7.2 08/26/2017 01:50 AM    Albumin 3.4 08/26/2017 01:50 AM    Globulin 3.8 08/26/2017 01:50 AM       No results found for: IRON, FE, TIBC, IBCT, PSAT, FERR    Lab Results   Component Value Date/Time    C-Reactive protein 9.18 07/15/2010 03:15 AM    TSH 0.89 07/15/2010 03:15 AM        No results found for: PH, PHI, PCO2, PCO2I, PO2, PO2I, HCO3, HCO3I, FIO2, FIO2I    Lab Results   Component Value Date/Time    CK 71 07/15/2010 03:15 AM    CK-MB Index CANNOT BE CALCULATED 07/15/2010 03:15 AM    Troponin-I, Qt. <0.04 08/25/2017 12:37 PM        Lab Results   Component Value Date/Time    Culture result: NO GROWTH AFTER 18 HOURS 08/25/2017 01:24 PM    Culture result: NO GROWTH AFTER 18 HOURS 08/25/2017 12:37 PM    Culture result: LIGHT  NORMAL RESPIRATORY RUTH ANN   06/21/2016 09:40 PM    Culture result: MODERATE  APPARENT  SHARIFA ALBICANS   06/21/2016 09:40 PM       No results found for: TOXA1, RPR, HBCM, HBSAG, HAAB, HCAB1, HAAT, G6PD, CRYAC, HIVGT, HIVR, HIV1, HIV12, HIVPC, HIVRPI    Lab Results   Component Value Date/Time    CK 71 07/15/2010 03:15 AM    CK 64 07/14/2010 10:20 PM       Lab Results   Component Value Date/Time    Color DARK YELLOW 06/15/2016 09:30 AM    Appearance CLEAR 06/15/2016 09:30 AM    pH (UA) 6.0 06/15/2016 09:30 AM    Protein NEGATIVE  06/15/2016 09:30 AM    Glucose NEGATIVE  06/15/2016 09:30 AM    Ketone NEGATIVE  06/15/2016 09:30 AM    Bilirubin NEGATIVE  06/15/2016 09:30 AM    Blood SMALL 06/15/2016 09:30 AM    Urobilinogen 1.0 06/15/2016 09:30 AM    Nitrites NEGATIVE  06/15/2016 09:30 AM    Leukocyte Esterase NEGATIVE  06/15/2016 09:30 AM    WBC 0-4 06/15/2016 09:30 AM    RBC 0-5 06/15/2016 09:30 AM    Bacteria NEGATIVE  06/15/2016 09:30 AM       IMPRESSION  · Dyspnea  · COPD exacerbation  · Abnormal Chest imaging: LLL airspace process- present since 2010  · Anxiety/Depression  · Hx CVA  · Hx Throat Cancer  · Current Pipe Smoker    PLAN  - LLL consolidation appears to be chronic but since pt has had off and on infectious sxs (may have been due to right flank abscess) will bronch Monday if still in hospital. If discharged, can be bronched as an outpt.     Vipin Valles MD

## 2017-08-26 NOTE — PROGRESS NOTES
Bedside and Verbal shift change report given to Marjorie Silverman RN (oncoming nurse) by Manpreet Lopez RN (offgoing nurse). Report included the following information SBAR, Kardex, Intake/Output, MAR, Accordion and Recent Results.

## 2017-08-27 LAB
BACTERIA SPEC CULT: NORMAL
BACTERIA SPEC CULT: NORMAL
INR PPP: 2.2 (ref 0.9–1.1)
PROTHROMBIN TIME: 22.6 SEC (ref 9–11.1)
SERVICE CMNT-IMP: NORMAL

## 2017-08-27 PROCEDURE — 74011250636 HC RX REV CODE- 250/636: Performed by: NURSE PRACTITIONER

## 2017-08-27 PROCEDURE — 74011250637 HC RX REV CODE- 250/637: Performed by: INTERNAL MEDICINE

## 2017-08-27 PROCEDURE — 77030018836 HC SOL IRR NACL ICUM -A

## 2017-08-27 PROCEDURE — 65660000000 HC RM CCU STEPDOWN

## 2017-08-27 PROCEDURE — 85610 PROTHROMBIN TIME: CPT | Performed by: INTERNAL MEDICINE

## 2017-08-27 PROCEDURE — 74011250636 HC RX REV CODE- 250/636: Performed by: INTERNAL MEDICINE

## 2017-08-27 PROCEDURE — 74011000258 HC RX REV CODE- 258: Performed by: INTERNAL MEDICINE

## 2017-08-27 PROCEDURE — 74011000250 HC RX REV CODE- 250: Performed by: INTERNAL MEDICINE

## 2017-08-27 PROCEDURE — 94640 AIRWAY INHALATION TREATMENT: CPT

## 2017-08-27 PROCEDURE — 36415 COLL VENOUS BLD VENIPUNCTURE: CPT | Performed by: INTERNAL MEDICINE

## 2017-08-27 RX ORDER — PREDNISONE 20 MG/1
40 TABLET ORAL
Status: DISCONTINUED | OUTPATIENT
Start: 2017-08-28 | End: 2017-08-28 | Stop reason: HOSPADM

## 2017-08-27 RX ADMIN — ISOSORBIDE MONONITRATE 10 MG: 10 TABLET ORAL at 08:18

## 2017-08-27 RX ADMIN — METHYLPREDNISOLONE SODIUM SUCCINATE 40 MG: 40 INJECTION, POWDER, FOR SOLUTION INTRAMUSCULAR; INTRAVENOUS at 21:37

## 2017-08-27 RX ADMIN — ENOXAPARIN SODIUM 40 MG: 40 INJECTION SUBCUTANEOUS at 17:01

## 2017-08-27 RX ADMIN — ENOXAPARIN SODIUM 40 MG: 40 INJECTION SUBCUTANEOUS at 07:04

## 2017-08-27 RX ADMIN — IPRATROPIUM BROMIDE AND ALBUTEROL SULFATE 3 ML: .5; 3 SOLUTION RESPIRATORY (INHALATION) at 14:24

## 2017-08-27 RX ADMIN — CEFEPIME HYDROCHLORIDE 2 G: 2 INJECTION, POWDER, FOR SOLUTION INTRAVENOUS at 08:18

## 2017-08-27 RX ADMIN — ISOSORBIDE MONONITRATE 10 MG: 10 TABLET ORAL at 16:20

## 2017-08-27 RX ADMIN — GUAIFENESIN 600 MG: 600 TABLET, EXTENDED RELEASE ORAL at 21:37

## 2017-08-27 RX ADMIN — CLONAZEPAM 0.5 MG: 0.5 TABLET ORAL at 16:20

## 2017-08-27 RX ADMIN — CLONAZEPAM 0.5 MG: 0.5 TABLET ORAL at 08:18

## 2017-08-27 RX ADMIN — Medication 10 ML: at 21:37

## 2017-08-27 RX ADMIN — CLONAZEPAM 0.5 MG: 0.5 TABLET ORAL at 21:37

## 2017-08-27 RX ADMIN — IPRATROPIUM BROMIDE AND ALBUTEROL SULFATE 3 ML: .5; 3 SOLUTION RESPIRATORY (INHALATION) at 02:26

## 2017-08-27 RX ADMIN — GUAIFENESIN 600 MG: 600 TABLET, EXTENDED RELEASE ORAL at 09:20

## 2017-08-27 RX ADMIN — Medication 10 ML: at 13:10

## 2017-08-27 RX ADMIN — METHYLPREDNISOLONE SODIUM SUCCINATE 40 MG: 40 INJECTION, POWDER, FOR SOLUTION INTRAMUSCULAR; INTRAVENOUS at 13:10

## 2017-08-27 RX ADMIN — AZITHROMYCIN MONOHYDRATE 500 MG: 500 INJECTION, POWDER, LYOPHILIZED, FOR SOLUTION INTRAVENOUS at 16:58

## 2017-08-27 RX ADMIN — Medication 10 ML: at 07:04

## 2017-08-27 RX ADMIN — IPRATROPIUM BROMIDE AND ALBUTEROL SULFATE 3 ML: .5; 3 SOLUTION RESPIRATORY (INHALATION) at 08:11

## 2017-08-27 RX ADMIN — ENOXAPARIN SODIUM 30 MG: 40 INJECTION SUBCUTANEOUS at 07:03

## 2017-08-27 RX ADMIN — Medication 2 MG: at 21:45

## 2017-08-27 RX ADMIN — FLUTICASONE FUROATE AND VILANTEROL TRIFENATATE 1 PUFF: 200; 25 POWDER RESPIRATORY (INHALATION) at 09:46

## 2017-08-27 RX ADMIN — ISOSORBIDE MONONITRATE 10 MG: 10 TABLET ORAL at 13:00

## 2017-08-27 RX ADMIN — ATORVASTATIN CALCIUM 80 MG: 20 TABLET, FILM COATED ORAL at 17:01

## 2017-08-27 RX ADMIN — CEFEPIME HYDROCHLORIDE 2 G: 2 INJECTION, POWDER, FOR SOLUTION INTRAVENOUS at 16:21

## 2017-08-27 RX ADMIN — ESCITALOPRAM OXALATE 10 MG: 10 TABLET ORAL at 09:20

## 2017-08-27 RX ADMIN — ENOXAPARIN SODIUM 30 MG: 40 INJECTION SUBCUTANEOUS at 17:01

## 2017-08-27 RX ADMIN — ASPIRIN 81 MG: 81 TABLET, COATED ORAL at 09:20

## 2017-08-27 RX ADMIN — WARFARIN SODIUM 6 MG: 5 TABLET ORAL at 17:01

## 2017-08-27 RX ADMIN — IPRATROPIUM BROMIDE AND ALBUTEROL SULFATE 3 ML: .5; 3 SOLUTION RESPIRATORY (INHALATION) at 20:11

## 2017-08-27 RX ADMIN — METHYLPREDNISOLONE SODIUM SUCCINATE 40 MG: 40 INJECTION, POWDER, FOR SOLUTION INTRAMUSCULAR; INTRAVENOUS at 07:03

## 2017-08-27 NOTE — PROGRESS NOTES
[facility] Pharmacy Dosing Services: 8/27/17     Consult for Warfarin Dosing by Pharmacy by Dr. Ramos Houston provided for this 67 y.o.  male , for indication of Mechanical Heart Valve. Day of Therapy Continued from home (warfarin 5mg daily - The patient was on this dose two weeks prior to admission due to a previously elevated INR from recent antibiotic course)  Dose to achieve an INR goal of 2.5- 3.5     S/w Dr. Josey Treviño. INR increased from 1.5 to 2.2 in ~24 hr. He would like to continue 6 mg tonight to ensure patient is therapeutic in preparation for discharge, possibly tomorrow. Order entered for  Warfarin  6 (mg) ordered to be given today at 18:00. Significant drug interactions: Lovenox Bridge (administration instruction from provider state - Bridging, with mechanical AVR. Stop this when INR >2.5)  LABS    PT/INR Lab Results   Component Value Date/Time    INR 2.2 08/27/2017 03:55 AM      Platelets Lab Results   Component Value Date/Time    PLATELET 259 71/61/6161 01:50 AM      H/H     Lab Results   Component Value Date/Time    HGB 11.9 08/26/2017 01:50 AM        Warfarin Administrations (last 168 hours)       Date/Time Action Medication Dose      08/26/17 1740 Given    warfarin (COUMADIN) tablet 6 mg 1 mg    08/25/17 1905 Given    warfarin (COUMADIN) tablet 6 mg 6 mg          Pharmacy to follow daily and will provide subsequent Warfarin dosing based on clinical status. Thank you for the consult,  Brad L. Severa Buckles D, [place]

## 2017-08-27 NOTE — PROGRESS NOTES
Bedside and Verbal shift change report given to  [facility] [place] (oncoming nurse) by Judah Manrique (offgoing nurse). Report included the following information SBAR, Kardex, Intake/Output, MAR and Recent Results.

## 2017-08-27 NOTE — PROGRESS NOTES
[facility] [place] [facility]  [address] [facility], [place], [address] [place] [address]  [phone]      Medical Progress Note      NAME:         Norman Parekh   :        1945  MRM:        [id number]    Date:          2017      Subjective: Patient has been seen and examined as a follow up for pneumonia, flank abscess and a subtherapeutic INR. Chart, labs, diagnostics reviewed. Has minimal aching right flank discomfort, well controlled by current pain medications. Has no fever or chills. No nausea or vomiting. Objective:    Vital Signs:    Visit Vitals    /80    Pulse 72    Temp 97.5 °F (36.4 °C)    Resp 20    Ht 5' 6\" (1.676 m)    Wt 70.9 kg (156 lb 4.9 oz)    SpO2 97%    BMI 25.23 kg/m2          Intake/Output Summary (Last 24 hours) at 17 1135  Last data filed at 17 0808   Gross per 24 hour   Intake              140 ml   Output             1500 ml   Net            -1360 ml        Physical Examination:    General:   Weak looking and not in much acute distress   Eyes:   pink conjunctivae, PERRLA with no discharge. ENT:   no ottorrhea or rhinorrhea with moist mucous membranes  Neck: no masses, thyroid non-tender and trachea central.  Pulm: clear breath sounds without crackles or wheezes  Card:  has regular and normal S1, S2 without thrills, bruits or peripheral edema  Abd:  Soft, non-tender, non-distended, normoactive bowel sounds   Musc:  No cyanosis, clubbing, atrophy or deformities. R flank dressing  Skin:  No rashes, bruising or ulcers. Neuro: Awake and alert.  Generally a non focal exam.   Psych:  Has a good insight and is oriented x 3    Current Facility-Administered Medications   Medication Dose Route Frequency    morphine injection 2 mg  2 mg IntraVENous Q4H PRN    sodium chloride (NS) flush 5-10 mL  5-10 mL IntraVENous Q8H    sodium chloride (NS) flush 5-10 mL  5-10 mL IntraVENous PRN    naloxone [facility]) injection 0.4 mg  0.4 mg IntraVENous PRN    cefepime (MAXIPIME) 2 g in 0.9% sodium chloride (MBP/ADV) 100 mL  2 g IntraVENous Q8H    albuterol-ipratropium (DUO-NEB) 2.5 MG-0.5 MG/3 ML  3 mL Nebulization Q6H RT    guaiFENesin ER (MUCINEX) tablet 600 mg  600 mg Oral Q12H    aspirin delayed-release tablet 81 mg  81 mg Oral DAILY    atorvastatin (LIPITOR) tablet 80 mg  80 mg Oral QPM    clonazePAM (KlonoPIN) tablet 0.5 mg  0.5 mg Oral TID    escitalopram oxalate (LEXAPRO) tablet 10 mg  10 mg Oral DAILY    isosorbide mononitrate (ISMO, MONOKET) tablet 10 mg  10 mg Oral TID    enoxaparin (LOVENOX) injection 30 mg  30 mg SubCUTAneous Q12H    And    enoxaparin (LOVENOX) injection 40 mg  40 mg SubCUTAneous Q12H    albuterol (PROVENTIL VENTOLIN) nebulizer solution 2.5 mg  2.5 mg Nebulization Q2H PRN    fluticasone-vilanterol (BREO ELLIPTA) 200mcg-25mcg/puff  1 Puff Inhalation DAILY    Warfarin Pharmacy to Dose   Other Rx Dosing/Monitoring    azithromycin (ZITHROMAX) 500 mg in 0.9% sodium chloride (MBP/ADV) 250 mL  500 mg IntraVENous Q24H    methylPREDNISolone (PF) (SOLU-MEDROL) injection 40 mg  40 mg IntraVENous Q8H        Laboratory data and review:    Recent Labs      08/26/17   0150  08/25/17   1237   WBC  5.5  6.5   HGB  11.9*  12.3   HCT  34.9*  38.6   PLT  250  268     Recent Labs      08/27/17   0355  08/26/17   0150  08/25/17   1237   NA   --   140  142   K   --   4.1  3.9   CL   --   106  105   CO2   --   25  32   GLU   --   143*  97   BUN   --   17  12   CREA   --   0.93  0.98   CA   --   8.9  9.4   MG   --   1.9  1.8   ALB   --   3.4*  3.8   SGOT   --   20  19   ALT   --   24  27   INR  2.2*  1.5*  1.4*     No components found for: [place]    CT scan chest - Persistent air space process in the LLL apical segment. Consider obstruction or  Mycobacterium Avium    Assessment and Plan:    Pneumonia, LLL apical POA: failed Omnicef and recent Levaquin. Cultures and AFBs still pending.  Has been reviewed by pulmonology. Continue IV Azithromycin and Cefepime for now. +/- bronchoscopy prior to discharge. COPD (chronic obstructive pulmonary disease) ([facility]): minimal wheezing. Still smoking pipes. Continue scheduled duo-nebs with PRN albuterol, Breo, Mucinex, taper solumedrol and start prednisone in AM     Aortic stenosis / s/p mechanical AVR: he says he has been compliant with medications and follow up. INR 2.2. Today. Continue enoxaparin and coumadin. Pharmacy monitoring. Target INR 2.5 -3.5. CAD:  S/P CABG: continue Asprin, Imdur, Lipitor     Throat cancer ([facility] (10/22/2014): with hoarseness which per pt and wife is not new. Out patient follow up      Depression (10/22/2014): continue Lexapro, Clonazepam     Abscess R flank POA: seen by surgery and had a bedside I&D. Cultures neg. Continue antibiotics and wound care. Follow clinically.      Tobacco abuse (10/22/2014): already been counseled on cessation    Total time spent for the patient's care: [facility] discussed with: Patient and Nursing Staff    Discussed:  Care Plan    Prophylaxis:  Lovenox and Coumadin    Anticipated Disposition:  Home w/Family           ___________________________________________________    Attending Physician:   Mu Cordero MD

## 2017-08-27 NOTE — PROGRESS NOTES
Bedside and Verbal shift change report given to Milton Griggs RN (oncoming nurse) by Kal Koroma RN (offgoing nurse). Report included the following information SBAR, MAR and Vinion.

## 2017-08-27 NOTE — PROGRESS NOTES
Bedside and Verbal shift change report given to Marianna Eduardo RN (oncoming nurse) by Mitul Dimas RN (offgoing nurse). Report included the following information SBAR, Kardex, Intake/Output, MAR, Accordion and Recent Results.

## 2017-08-27 NOTE — PROGRESS NOTES
General Surgery Progress Note      S: Pain controlled on current regimen. No organisms on Gram stain. Patient Vitals for the past 24 hrs:   Temp Pulse Resp BP SpO2   08/27/17 0811 - - - - 97 %   08/27/17 0756 97.5 °F (36.4 °C) 72 20 160/80 95 %   08/27/17 0659 - 68 - - -   08/27/17 0353 97.6 °F (36.4 °C) 64 18 111/59 93 %   08/27/17 0219 - 73 - - -   08/26/17 2355 97.4 °F (36.3 °C) 66 17 129/64 93 %   08/26/17 2102 98.4 °F (36.9 °C) 70 18 129/62 95 %   08/26/17 2014 - - - - 100 %   08/26/17 1549 97.6 °F (36.4 °C) 73 18 118/56 97 %   08/26/17 1519 - - - - 98 %   08/26/17 1436 - 80 - - -   08/26/17 1145 97.2 °F (36.2 °C) 76 18 136/67 97 %           Date 08/26/17 0700 - 08/27/17 0659 08/27/17 0700 - 08/28/17 0659   Shift 2889-4832 6404-3140 24 Hour Total 0166-5924 8608-2139 24 Hour Total   I  N  T  A  K  E   I.V.  (mL/kg/hr)    140  140      I.V.    140  140    Shift Total  (mL/kg)    140  (2)  140  (2)   O  U  T  P  U  T   Urine  (mL/kg/hr) 200  (0.2) 500  (0.6) 700  (0.4) 800  800      Urine Voided 200 500 700 800  800      Urine Occurrence(s)  1 x 1 x       Shift Total  (mL/kg) 200  (2.8) 500  (7.1) 700  (9.9) 800  (11.3)  800  (11.3)   NET -200 -500 -700 -660  -660   Weight (kg) 70.9 70.9 70.9 70.9 70.9 70.9           Physical Exam:      General: NAD, A&Ox3  Resp: non-labored  CV: RRR  Abdomen: soft, non-tender  Skin: Right flank I&D site dressing changed, base clean without odor.   Extremity: no edema    Lab Results   Component Value Date/Time    WBC 5.5 08/26/2017 01:50 AM    HGB 11.9 08/26/2017 01:50 AM    HCT 34.9 08/26/2017 01:50 AM    PLATELET 906 25/31/2592 01:50 AM    MCV 87.0 08/26/2017 01:50 AM       Lab Results   Component Value Date/Time    Sodium 140 08/26/2017 01:50 AM    Potassium 4.1 08/26/2017 01:50 AM    Chloride 106 08/26/2017 01:50 AM    CO2 25 08/26/2017 01:50 AM    Anion gap 9 08/26/2017 01:50 AM    Glucose 143 08/26/2017 01:50 AM    BUN 17 08/26/2017 01:50 AM    Creatinine 0.93 08/26/2017 01:50 AM    BUN/Creatinine ratio 18 08/26/2017 01:50 AM    GFR est AA >60 08/26/2017 01:50 AM    GFR est non-AA >60 08/26/2017 01:50 AM    Calcium 8.9 08/26/2017 01:50 AM        Lab Results   Component Value Date/Time    INR 2.2 08/27/2017 03:55 AM    INR 1.5 08/26/2017 01:50 AM    INR 1.4 08/25/2017 12:37 PM    Prothrombin time 22.6 08/27/2017 03:55 AM    Prothrombin time 15.4 08/26/2017 01:50 AM    Prothrombin time 14.0 08/25/2017 12:37 PM           A/P:  72M s/p bedside I&D of a ruptured epidermal cyst. Doing well. Continue local wound care  Antibiotics per primary team  Can follow-up with me in the clinic in 2 weeks. Call with questions or concerns.        Mike Ramos MD

## 2017-08-28 VITALS
WEIGHT: 156.31 LBS | OXYGEN SATURATION: 96 % | TEMPERATURE: 97.6 F | HEIGHT: 66 IN | RESPIRATION RATE: 16 BRPM | BODY MASS INDEX: 25.12 KG/M2 | SYSTOLIC BLOOD PRESSURE: 142 MMHG | HEART RATE: 65 BPM | DIASTOLIC BLOOD PRESSURE: 95 MMHG

## 2017-08-28 LAB
APPEARANCE FLD: ABNORMAL
BACTERIA SPEC CULT: NORMAL
COLOR FLD: COLORLESS
FLUID CULTURE, SPNG2: NORMAL
GLUCOSE BLD STRIP.AUTO-MCNC: 120 MG/DL (ref 65–100)
GLUCOSE BLD STRIP.AUTO-MCNC: 96 MG/DL (ref 65–100)
GRAM STN SPEC: NORMAL
GRAM STN SPEC: NORMAL
INR PPP: 2.3 (ref 0.9–1.1)
L PNEUMO1 AG UR QL IA: NEGATIVE
LYMPHOCYTES NFR FLD: 15 %
MONOS+MACROS NFR FLD: 18 %
NEUTROPHILS NFR FLD: 53 %
NUC CELL # FLD: 56 /CU MM (ref 0–5)
ORGANISM ID, SPNG3: NORMAL
OTHER CELL,FOTHC: 14 %
PLEASE NOTE, SPNG4: NORMAL
PROTHROMBIN TIME: 24.2 SEC (ref 9–11.1)
RBC # FLD: >100 /CU MM
S PNEUM AG SPEC QL LA: NEGATIVE
SERVICE CMNT-IMP: ABNORMAL
SERVICE CMNT-IMP: NORMAL
SERVICE CMNT-IMP: NORMAL
SPECIMEN SOURCE FLD: ABNORMAL
SPECIMEN SOURCE: NORMAL
SPECIMEN SOURCE: NORMAL
SPECIMEN, SPNG1: NORMAL

## 2017-08-28 PROCEDURE — 74011250636 HC RX REV CODE- 250/636: Performed by: INTERNAL MEDICINE

## 2017-08-28 PROCEDURE — 87070 CULTURE OTHR SPECIMN AEROBIC: CPT | Performed by: INTERNAL MEDICINE

## 2017-08-28 PROCEDURE — 77030022556 HC FCPS BIOP TIS ENDOSC BSC -B: Performed by: INTERNAL MEDICINE

## 2017-08-28 PROCEDURE — 87278 LEGION PNEUMOPHILIA AG IF: CPT | Performed by: INTERNAL MEDICINE

## 2017-08-28 PROCEDURE — 85610 PROTHROMBIN TIME: CPT | Performed by: INTERNAL MEDICINE

## 2017-08-28 PROCEDURE — 88112 CYTOPATH CELL ENHANCE TECH: CPT | Performed by: INTERNAL MEDICINE

## 2017-08-28 PROCEDURE — 36415 COLL VENOUS BLD VENIPUNCTURE: CPT | Performed by: INTERNAL MEDICINE

## 2017-08-28 PROCEDURE — 74011250637 HC RX REV CODE- 250/637: Performed by: INTERNAL MEDICINE

## 2017-08-28 PROCEDURE — 76040000019: Performed by: INTERNAL MEDICINE

## 2017-08-28 PROCEDURE — 74011000258 HC RX REV CODE- 258: Performed by: INTERNAL MEDICINE

## 2017-08-28 PROCEDURE — 74011000250 HC RX REV CODE- 250: Performed by: INTERNAL MEDICINE

## 2017-08-28 PROCEDURE — 74011636637 HC RX REV CODE- 636/637: Performed by: INTERNAL MEDICINE

## 2017-08-28 PROCEDURE — 87106 FUNGI IDENTIFICATION YEAST: CPT | Performed by: INTERNAL MEDICINE

## 2017-08-28 PROCEDURE — 94640 AIRWAY INHALATION TREATMENT: CPT

## 2017-08-28 PROCEDURE — 82962 GLUCOSE BLOOD TEST: CPT

## 2017-08-28 PROCEDURE — 99152 MOD SED SAME PHYS/QHP 5/>YRS: CPT

## 2017-08-28 PROCEDURE — 77030003657 HC NDL SCLER BSC -B: Performed by: INTERNAL MEDICINE

## 2017-08-28 PROCEDURE — 87252 VIRUS INOCULATION TISSUE: CPT | Performed by: INTERNAL MEDICINE

## 2017-08-28 PROCEDURE — 89050 BODY FLUID CELL COUNT: CPT | Performed by: INTERNAL MEDICINE

## 2017-08-28 PROCEDURE — 87102 FUNGUS ISOLATION CULTURE: CPT | Performed by: INTERNAL MEDICINE

## 2017-08-28 PROCEDURE — 87116 MYCOBACTERIA CULTURE: CPT | Performed by: INTERNAL MEDICINE

## 2017-08-28 PROCEDURE — 0B9J8ZX DRAINAGE OF LEFT LOWER LUNG LOBE, VIA NATURAL OR ARTIFICIAL OPENING ENDOSCOPIC, DIAGNOSTIC: ICD-10-PCS | Performed by: INTERNAL MEDICINE

## 2017-08-28 RX ORDER — DOXYCYCLINE 100 MG/1
100 TABLET ORAL 2 TIMES DAILY
Qty: 14 TAB | Refills: 0 | Status: SHIPPED | OUTPATIENT
Start: 2017-08-28 | End: 2017-09-04

## 2017-08-28 RX ORDER — GUAIFENESIN 600 MG/1
600 TABLET, EXTENDED RELEASE ORAL EVERY 12 HOURS
Qty: 60 TAB | Refills: 0 | Status: SHIPPED | OUTPATIENT
Start: 2017-08-28 | End: 2017-09-27

## 2017-08-28 RX ORDER — SODIUM CHLORIDE 9 MG/ML
50 INJECTION, SOLUTION INTRAVENOUS CONTINUOUS
Status: DISCONTINUED | OUTPATIENT
Start: 2017-08-28 | End: 2017-08-28 | Stop reason: HOSPADM

## 2017-08-28 RX ORDER — FENTANYL CITRATE 50 UG/ML
25 INJECTION, SOLUTION INTRAMUSCULAR; INTRAVENOUS
Status: DISCONTINUED | OUTPATIENT
Start: 2017-08-28 | End: 2017-08-28 | Stop reason: HOSPADM

## 2017-08-28 RX ORDER — LIDOCAINE HYDROCHLORIDE 20 MG/ML
JELLY TOPICAL ONCE
Status: DISCONTINUED | OUTPATIENT
Start: 2017-08-28 | End: 2017-08-28 | Stop reason: HOSPADM

## 2017-08-28 RX ORDER — LEVOFLOXACIN 750 MG/1
750 TABLET ORAL DAILY
Qty: 5 TAB | Refills: 0 | Status: SHIPPED | OUTPATIENT
Start: 2017-08-28 | End: 2017-09-02

## 2017-08-28 RX ORDER — MIDAZOLAM HYDROCHLORIDE 1 MG/ML
.25-5 INJECTION, SOLUTION INTRAMUSCULAR; INTRAVENOUS
Status: DISCONTINUED | OUTPATIENT
Start: 2017-08-28 | End: 2017-08-28 | Stop reason: HOSPADM

## 2017-08-28 RX ORDER — LIDOCAINE HYDROCHLORIDE 20 MG/ML
5 SOLUTION OROPHARYNGEAL ONCE
Status: COMPLETED | OUTPATIENT
Start: 2017-08-28 | End: 2017-08-28

## 2017-08-28 RX ORDER — LIDOCAINE HYDROCHLORIDE AND EPINEPHRINE 20; 10 MG/ML; UG/ML
2 INJECTION, SOLUTION INFILTRATION; PERINEURAL AS NEEDED
Status: DISCONTINUED | OUTPATIENT
Start: 2017-08-28 | End: 2017-08-28 | Stop reason: HOSPADM

## 2017-08-28 RX ORDER — FLUMAZENIL 0.1 MG/ML
0.2 INJECTION INTRAVENOUS
Status: DISCONTINUED | OUTPATIENT
Start: 2017-08-28 | End: 2017-08-28 | Stop reason: HOSPADM

## 2017-08-28 RX ORDER — ACETYLCYSTEINE 100 MG/ML
8 SOLUTION ORAL; RESPIRATORY (INHALATION) ONCE
Status: DISCONTINUED | OUTPATIENT
Start: 2017-08-28 | End: 2017-08-28 | Stop reason: HOSPADM

## 2017-08-28 RX ORDER — LIDOCAINE HYDROCHLORIDE 40 MG/ML
SOLUTION TOPICAL ONCE
Status: COMPLETED | OUTPATIENT
Start: 2017-08-28 | End: 2017-08-28

## 2017-08-28 RX ORDER — LIDOCAINE HYDROCHLORIDE 20 MG/ML
2 INJECTION, SOLUTION INFILTRATION; PERINEURAL
Status: DISCONTINUED | OUTPATIENT
Start: 2017-08-28 | End: 2017-08-28 | Stop reason: HOSPADM

## 2017-08-28 RX ORDER — WARFARIN 1 MG/1
1 TABLET ORAL ONCE
Status: COMPLETED | OUTPATIENT
Start: 2017-08-28 | End: 2017-08-28

## 2017-08-28 RX ORDER — PREDNISONE 10 MG/1
TABLET ORAL
Qty: 30 TAB | Refills: 0 | Status: SHIPPED | OUTPATIENT
Start: 2017-08-28 | End: 2019-03-17

## 2017-08-28 RX ORDER — NALOXONE HYDROCHLORIDE 0.4 MG/ML
0.2 INJECTION, SOLUTION INTRAMUSCULAR; INTRAVENOUS; SUBCUTANEOUS
Status: DISCONTINUED | OUTPATIENT
Start: 2017-08-28 | End: 2017-08-28 | Stop reason: HOSPADM

## 2017-08-28 RX ORDER — WARFARIN SODIUM 5 MG/1
5 TABLET ORAL ONCE
Status: COMPLETED | OUTPATIENT
Start: 2017-08-28 | End: 2017-08-28

## 2017-08-28 RX ORDER — WARFARIN 1 MG/1
1 TABLET ORAL DAILY
Qty: 30 TAB | Refills: 0 | Status: SHIPPED | OUTPATIENT
Start: 2017-08-28 | End: 2017-09-27

## 2017-08-28 RX ADMIN — WARFARIN SODIUM 1 MG: 1 TABLET ORAL at 15:24

## 2017-08-28 RX ADMIN — IPRATROPIUM BROMIDE AND ALBUTEROL SULFATE 3 ML: .5; 3 SOLUTION RESPIRATORY (INHALATION) at 13:47

## 2017-08-28 RX ADMIN — ENOXAPARIN SODIUM 40 MG: 40 INJECTION SUBCUTANEOUS at 15:25

## 2017-08-28 RX ADMIN — LIDOCAINE HYDROCHLORIDE 5 ML: 20 SOLUTION ORAL; TOPICAL at 10:58

## 2017-08-28 RX ADMIN — ENOXAPARIN SODIUM 30 MG: 40 INJECTION SUBCUTANEOUS at 15:26

## 2017-08-28 RX ADMIN — ESCITALOPRAM OXALATE 10 MG: 10 TABLET ORAL at 08:39

## 2017-08-28 RX ADMIN — CEFEPIME HYDROCHLORIDE 2 G: 2 INJECTION, POWDER, FOR SOLUTION INTRAVENOUS at 00:52

## 2017-08-28 RX ADMIN — Medication 2 MG: at 08:39

## 2017-08-28 RX ADMIN — PREDNISONE 40 MG: 20 TABLET ORAL at 08:39

## 2017-08-28 RX ADMIN — FENTANYL CITRATE 50 MCG: 50 INJECTION, SOLUTION INTRAMUSCULAR; INTRAVENOUS at 11:27

## 2017-08-28 RX ADMIN — ISOSORBIDE MONONITRATE 10 MG: 10 TABLET ORAL at 13:45

## 2017-08-28 RX ADMIN — LIDOCAINE HYDROCHLORIDE: 40 SOLUTION TOPICAL at 10:55

## 2017-08-28 RX ADMIN — CLONAZEPAM 0.5 MG: 0.5 TABLET ORAL at 08:39

## 2017-08-28 RX ADMIN — LIDOCAINE HYDROCHLORIDE 18 MG: 20 INJECTION, SOLUTION INFILTRATION; PERINEURAL at 11:26

## 2017-08-28 RX ADMIN — MIDAZOLAM HYDROCHLORIDE 2 MG: 1 INJECTION, SOLUTION INTRAMUSCULAR; INTRAVENOUS at 11:28

## 2017-08-28 RX ADMIN — CEFEPIME HYDROCHLORIDE 2 G: 2 INJECTION, POWDER, FOR SOLUTION INTRAVENOUS at 08:39

## 2017-08-28 RX ADMIN — IPRATROPIUM BROMIDE AND ALBUTEROL SULFATE 3 ML: .5; 3 SOLUTION RESPIRATORY (INHALATION) at 07:24

## 2017-08-28 RX ADMIN — IPRATROPIUM BROMIDE AND ALBUTEROL SULFATE 3 ML: .5; 3 SOLUTION RESPIRATORY (INHALATION) at 01:04

## 2017-08-28 RX ADMIN — WARFARIN SODIUM 5 MG: 5 TABLET ORAL at 15:10

## 2017-08-28 RX ADMIN — GUAIFENESIN 600 MG: 600 TABLET, EXTENDED RELEASE ORAL at 08:39

## 2017-08-28 RX ADMIN — ISOSORBIDE MONONITRATE 10 MG: 10 TABLET ORAL at 08:39

## 2017-08-28 RX ADMIN — Medication 10 ML: at 07:08

## 2017-08-28 RX ADMIN — FLUTICASONE FUROATE AND VILANTEROL TRIFENATATE 1 PUFF: 200; 25 POWDER RESPIRATORY (INHALATION) at 08:39

## 2017-08-28 RX ADMIN — ASPIRIN 81 MG: 81 TABLET, COATED ORAL at 08:39

## 2017-08-28 NOTE — PROGRESS NOTES
Lalita Cardoza  1945  [id number]    Situation:  Verbal report received from: Kristen Dumas, RN  Procedure: Procedure(s):  BRONCHOSCOPY    Background:    Preoperative diagnosis: pneumonia  Postoperative diagnosis: * No post-op diagnosis entered *    :  Dr. Lyons Grant  Assistant(s): Endoscopy Technician-1: Jose Sullivan  Endoscopy RN-1: Juwan Spence RN    Specimens: * No specimens in log *  H. Pylori  no    Assessment:  Intra-procedure medications   Versed 2 mg  Fentanyl 50 mcg  Anesthesia gave intra-procedure sedation and medications, see anesthesia flow sheet yes and n/a    Intravenous fluids: NS@ KVO     Vital signs stable   yes    Abdominal assessment: round and soft   yes    Recommendation:  Discharge patient per MD order  inpatient.   Return to floor  yes  Family or Friend   spouse  Permission to share finding with family or friend yes

## 2017-08-28 NOTE — PROGRESS NOTES
8/28/2017  12:35 PM  CM met w/ Pt for needs assessment and D/C planning. Charted demographics verified, Pt lives w/ wife Chuyita Lung (J) [phone]  And adult son in 1 story home w/ 1 entry step in the back 6 in front. PTA Pt was independent w/ ADL's and drives. PCP is Pasha Vaughan MD; evans Rx coverage and fills long term through [facility]; short term. Pt uses no DME, has not had HH. Family will transort home and to all F/U. Care Management Interventions  PCP Verified by CM: Yes (PCP is Pasha Vaughan MD; last visit about 1 wk ago)  Palliative Care Consult (Criteria: CHF and RRAT>21): No  Reason for No Palliative Care Consult: Other (see comment) (Pt RRAT is 18)  Mode of Transport at Discharge: Self  Discharge Durable Medical Equipment: No  Physical Therapy Consult: No  Occupational Therapy Consult: No  Speech Therapy Consult: No  Current Support Network: Lives with Spouse (Pt lives w/ wife Chuyita Lung (H) [phone] and son additional emergency contact is DTCARRIE Lee [phone])  Confirm Follow Up Transport: Family  Discharge Location  Discharge Placement: Home  Plan is to D/C to home when stable, CM will follow for D/C needs.   Yon Liao

## 2017-08-28 NOTE — PROGRESS NOTES
[facility] Dosing Services  Consult for Warfarin Dosing by Pharmacy by Dr. Hannah Gilliam  Indication: Mechanical heart valve  Day of Therapy Continued from home (warfarin 5mg daily - The patient was on this dose two weeks prior to admission due to a previously elevated INR from recent antibiotic course)  Dose to achieve an INR goal of 2.5- 3.5      8/28/17 @ 15:22 addendum: MD would like warfarin 6 mg PO today. Significant drug interactions: azithromycin, levofloxacin, enoxaparin bridge  Previous dose given Warfarin 6 mg   PT/INR Lab Results   Component Value Date/Time    INR 2.3 08/28/2017 03:03 AM      Platelets Lab Results   Component Value Date/Time    PLATELET 801 21/40/9211 01:50 AM      H/H Lab Results   Component Value Date/Time    HGB 11.9 08/26/2017 01:50 AM        Pharmacy will follow daily and will provide subsequent warfarin dosing based on clinical status.     Thank you,  Melisa Montes, PharmD, [facility]

## 2017-08-28 NOTE — PERIOP NOTES
TRANSFER - OUT REPORT:    Verbal report given to Julianna Ferrer RN(name) on Pasha Comings  being transferred to [facility]) for routine progression of care       Report consisted of patients Situation, Background, Assessment and   Recommendations(SBAR). Information from the following report(s) SBAR, Procedure Summary, Intake/Output and Cardiac Rhythm Olga Jha was reviewed with the receiving nurse. Lines:   Peripheral IV 06/12/16 Left Antecubital (Active)       Peripheral IV 08/27/17 Right Forearm (Active)   Site Assessment Clean, dry, & intact 8/28/2017  8:42 AM   Phlebitis Assessment 0 8/28/2017  8:42 AM   Infiltration Assessment 0 8/28/2017  8:42 AM   Dressing Status Clean, dry, & intact 8/28/2017  8:42 AM   Dressing Type Transparent;Tape 8/28/2017  8:42 AM   Hub Color/Line Status Flushed;Pink;Patent 8/28/2017  8:42 AM   Action Taken Open ports on tubing capped 8/28/2017  8:42 AM   Alcohol Cap Used Yes 8/28/2017  8:42 AM        Opportunity for questions and clarification was provided.       Patient transported with:   O2 @ 2 liters

## 2017-08-28 NOTE — DISCHARGE INSTRUCTIONS
HOSPITALIST DISCHARGE INSTRUCTIONS    NAME: Kiya Sharma   :  1945   MRN:  [id number]     Date:     2017    ADMIT DATE: 2017     DISCHARGE DATE: 2017     PRINCIPAL ADMITTING DIAGNOSIS:  Pneumonia  pneumonia    DISCHARGE DIAGNOSES:  Active Problems:    Pneumonia (2017)    COPD (chronic obstructive pulmonary disease) ([facility] ()    Tobacco abuse (10/22/2014)    Throat cancer ([facility] (10/22/2014)    Depression (10/22/2014)    CVA (cerebral vascular accident) ([facility] (2016)    Aortic stenosis (2016)    Anticoagulated (2016)    S/P CABG x 1 (2017): S/P AVR (aortic valve replacement) (2017)    CAD (coronary artery disease) (2017)    MEDICATIONS:    · It is important that medications are taken exactly as they are prescribed on the discharge medication instructions and keep them your  in the bottles provided by the pharmacist.   · Keep a list of the medication names, dosages, and times to be taken at all times. · Do not take other medications without consulting your doctor. Recommended diet:  Cardiac Diet    Recommended activity: Activity as tolerated    Post discharge care:    Notify follow up health care provider or return to the emergency department if you cannot get hold of your doctor if you feel worse or experience symptoms similar to those that brought you to hospital    Follow-up Information     Follow up With Details Comments Howard Simpson MD In 2 weeks For wound re-check  [place] [address]  [phone]            Information obtained by :  I understand that if any problems occur once I am at home I am to contact my physician and I understand and acknowledge receipt of the instructions indicated above. Physician's or R.N.'s Signature                                                                  Date/Time                                                                                                                                              Patient or Representative Signature                                                          Date/Time

## 2017-08-28 NOTE — PERIOP NOTES
Patient tolerated procedure well. Alert and oriented. Denies c/o pain/discomfort. Will continue to monitor.

## 2017-08-28 NOTE — DISCHARGE SUMMARY
Hubert Allen [facility]  [address] [facility], [address] [place] [address]  Tel: [phone]    Physician Discharge Summary    Patient ID:  Jasiel Berg  Age: 67 y.o.    : 1945  MRN: [id number]     PCP: Raegan Manning MD     Admit date: 2017    Discharge date: 2017    Principal admission Diagnosis:  Pneumonia    Discharge Diagnoses: Active Problems:    Pneumonia (2017)    COPD (chronic obstructive pulmonary disease) (HCC) ()    Tobacco abuse (10/22/2014)    [facility] (10/22/2014)    Depression (10/22/2014)    CVA (cerebral vascular accident) ([facility] (2016): Overview: Acute CVA-- refer to MRI brain 16    Aortic stenosis (2016)    Anticoagulated (2016)    S/P CABG x 1 (2017): Overview: CT surgery  - CABG (LIMA to LAD) and AVR ([facility])    S/P AVR (aortic valve replacement) (2017)    CAD (coronary artery disease) (2017)    Consults: Pulmonary/Intensive care and [address] [place] [address] Course:     Mr. Angelina Virgen is a 67 y.o. admitted to [facility] and treated for the following:    Pneumonia, LLL apical POA: failed Omnicef and recent Levaquin. Cultures and AFBs unremarkable this far. Has been reviewed by pulmonology and has been on IV Azithromycin and Cefepime. He is sp bronchoscopy and will be discharged home on Levofloxacin. Follow up with pulm      COPD (chronic obstructive pulmonary disease) ([facility]): stable. Still smoking pipes. Continue home meds with a prednisone taper.        Aortic stenosis / s/p mechanical AVR: he says he has been compliant with medications and follow up. INR 2.3. Has been on enoxaparin bridge. Coumadin dose adjusted to 6 mg daily. He will have an INR checked in a few days.        CAD:  S/P CABG: continue Asprin, Imdur, Lipitor      Throat cancer ([facility] (10/22/2014): with hoarseness which per pt and wife is not new. Already being followed by ENT.  Continue follow up       Depression (10/22/2014): continue Lexapro, Clonazepam      Abscess R flank POA: seen by surgery and had a bedside I&D. Cultures neg. Discharge on doxycycline.       Tobacco abuse (10/22/2014): already been counseled on cessation     Discharge Exam:    Visit Vitals    BP (!) 142/95    Pulse 65    Temp 97.6 °F (36.4 °C)    Resp 16    Ht 5' 6\" (1.676 m)    Wt 70.9 kg (156 lb 4.9 oz)    SpO2 96%    BMI 25.23 kg/m2      General: well looking and stable patient in no acute distress  Pulm: clear breath sounds without wheezes  Card: no murmurs, normal S1, S2 without thrills, bruits   Abd:    soft, non-tender, normoactive bowel sounds  Skin: no rashes or ulcers, skin turgor is good  Neuro: awake, alert and has a non focal     Activity: Activity as tolerated    Diet: Cardiac Diet    Current Discharge Medication List      START taking these medications    Details   guaiFENesin ER (MUCINEX) 600 mg ER tablet Take 1 Tab by mouth every twelve (12) hours for 30 days. Qty: 60 Tab, Refills: 0      warfarin (COUMADIN) 1 mg tablet Take 1 Tab by mouth daily for 30 doses. Qty: 30 Tab, Refills: 0      levoFLOXacin (LEVAQUIN) 750 mg tablet Take 1 Tab by mouth daily for 5 days. Qty: 5 Tab, Refills: 0      doxycycline (ADOXA) 100 mg tablet Take 1 Tab by mouth two (2) times a day for 7 days. Qty: 14 Tab, Refills: 0      predniSONE (DELTASONE) 10 mg tablet Prednisone 10mg tabs:  4 tabs (40 mg) daily x 3 days, then  3 tabs (30 mg) daily x 3 days, then  2 tabs (20 mg) daily x 3 days, then  1 tab   (10 mg) daily until gone. Dispense 30 tabs  Qty: 30 Tab, Refills: 0         CONTINUE these medications which have NOT CHANGED    Details   promethazine (PHENERGAN) 12.5 mg tablet Take 12.5 mg by mouth daily. Indications: Nausea      atorvastatin (LIPITOR) 80 mg tablet Take 80 mg by mouth every evening. warfarin (COUMADIN) 5 mg tablet Take 5 mg by mouth every evening.       aspirin delayed-release 81 mg tablet Take 81 mg by mouth daily. albuterol (PROVENTIL HFA, VENTOLIN HFA, PROAIR HFA) 90 mcg/actuation inhaler Take 2 Puffs by inhalation every six (6) hours as needed. fluticasone-salmeterol (ADVAIR) 500-50 mcg/dose diskus inhaler Take 1 Puff by inhalation every twelve (12) hours. albuterol (PROVENTIL VENTOLIN) 2.5 mg /3 mL (0.083 %) nebulizer solution 3 mL by Nebulization route every four (4) hours as needed for Wheezing. Qty: 30 vial, Refills: 1      clonazepam (KLONOPIN) 0.5 mg tablet Take 0.5 mg by mouth three (3) times daily. escitalopram oxalate (LEXAPRO) 10 mg tablet Take 10 mg by mouth daily. isosorbide mononitrate (ISMO, MONOKET) 10 mg tablet Take 10 mg by mouth three (3) times daily. Patient takes at [address] [place] [address], noon, and 5pm             Follow-up Information     Follow up With Details Comments Howard Simpson MD In 2 weeks For wound re-check [address] Facundo Nelson   [phone]            Follow-up tests or labs: None    Discharge Condition: Stable    Disposition: home    Time taken to arrange discharge:  35 minutes.     Signed:  Pee Warren MD     [facility]  8/28/2017   2:22 PM

## 2017-08-28 NOTE — PROCEDURES
[address]  [address]  [address] [address] [facility], [facility]  [phone]    Talat Meza  1945  [id number]      Date of Procedure: 8/28/2017    Preoperative diagnosis: pneumonia    Procedure: Procedure(s):  BRONCHOSCOPY  BRONCHIAL ALVEOLAR LAVAGE  INJECTION    Indication: abnormal chest CT    :  Dmitry Rosas MD    Assistant(s): Endoscopy Technician-1: Brodie Hinojosa  Endoscopy RN-1: Dahiana Caceres RN    Anesthesia/Sedation:  Versed 2 mg IV and Fentanyl 50 mcg IV      Procedure Details:  After infomed consent was obtained for the procedure, with all risks and benefits of procedure explained the patient was taken to the endoscopy suite and placed in the supine position. Following sequential administration of sedation as per above, the bronchoscope was inserted into the mouth and advanced under direct vision to the tracheobronhial tree. Findings:   Oropharynx and vocal cords normal appearing. Pt had a moderate amount of thick white secretions in his tracheobronchial tree. Bronch advanced to all subsegments of the lung. LLL posterior segment with old,fibrotic changes with no signs of active infection. No secretions appreciated in LLL. BAL done of LLL- 20 cc inserted with 7 cc return. Therapies:   None    Specimens:   BAL and wash- see above           Complications:   None noted; patient tolerated the procedure well. EBL:  Minimal           Impression:    Sequela of old, remote infection in LLL. Recommendations: Follow up micro and cytology.        Ray Anand MD  8/28/2017  2:35 PM

## 2017-08-28 NOTE — PROGRESS NOTES
Name: [address] [place] [address] Dr HOWARDB: 1945 Admit Date: 2017   Phone: [phone]  Room: [facility]/   PCP: Olaf Gunter MD  MRN: [id number]   Date: 2017  Code: Full Code          Chart and notes reviewed. Data reviewed. I review the patient's current medications in the medical record at each encounter. I have evaluated and examined the patient. Overnight events:  Afebrile  HR mid 50s  Sats 100% on RA  Viral panel negative  Pna work up in process    ROS: Feeling fine this morning. Denies SOB or CP. Denies cough, fever, or chills. Denies flank/side pain. Denies abd pain or LE pain/swelling.       Current Facility-Administered Medications   Medication Dose Route Frequency    predniSONE (DELTASONE) tablet 40 mg  40 mg Oral DAILY WITH BREAKFAST    morphine injection 2 mg  2 mg IntraVENous Q4H PRN    sodium chloride (NS) flush 5-10 mL  5-10 mL IntraVENous Q8H    sodium chloride (NS) flush 5-10 mL  5-10 mL IntraVENous PRN    naloxone (NARCAN) injection 0.4 mg  0.4 mg IntraVENous PRN    cefepime (MAXIPIME) 2 g in 0.9% sodium chloride (MBP/ADV) 100 mL  2 g IntraVENous Q8H    albuterol-ipratropium (DUO-NEB) 2.5 MG-0.5 MG/3 ML  3 mL Nebulization Q6H RT    guaiFENesin ER (MUCINEX) tablet 600 mg  600 mg Oral Q12H    aspirin delayed-release tablet 81 mg  81 mg Oral DAILY    atorvastatin (LIPITOR) tablet 80 mg  80 mg Oral QPM    clonazePAM (KlonoPIN) tablet 0.5 mg  0.5 mg Oral TID    escitalopram oxalate (LEXAPRO) tablet 10 mg  10 mg Oral DAILY    isosorbide mononitrate (ISMO, MONOKET) tablet 10 mg  10 mg Oral TID    enoxaparin (LOVENOX) injection 30 mg  30 mg SubCUTAneous Q12H    And    enoxaparin (LOVENOX) injection 40 mg  40 mg SubCUTAneous Q12H    albuterol (PROVENTIL VENTOLIN) nebulizer solution 2.5 mg  2.5 mg Nebulization Q2H PRN    fluticasone-vilanterol (BREO ELLIPTA) 200mcg-25mcg/puff  1 Puff Inhalation DAILY    Warfarin Pharmacy to Dose   Other Rx Dosing/Monitoring    azithromycin (ZITHROMAX) 500 mg in 0.9% sodium chloride (MBP/ADV) 250 mL  500 mg IntraVENous Q24H         REVIEW OF SYSTEMS   12 point ROS negative except as stated in the HPI. Physical Exam:   Visit Vitals    /86    Pulse (!) 55    Temp 98 °F (36.7 °C)    Resp 19    Ht 5' 6\" (1.676 m)    Wt 70.9 kg (156 lb 4.9 oz)    SpO2 100%    BMI 25.23 kg/m2       General:  Alert, cooperative, no distress, appears stated age. Head:  Normocephalic, without obvious abnormality, atraumatic. Eyes:  Conjunctivae/corneas clear. Nose: Nares normal. Septum midline. Mucosa normal.    Throat: Lips, mucosa, and tongue normal.    Neck: Supple, symmetrical, trachea midline, no adenopathy. Lungs:   Mildly diminished breath sounds bilaterally with mild expiratory wheezing. Chest wall:  No tenderness or deformity. Heart:  Regular rate and rhythm, S1, S2 normal, no murmur, click, rub or gallop. Abdomen:   Soft, non-tender. Bowel sounds normal. No masses,  No organomegaly. Extremities: Extremities normal, atraumatic, no cyanosis or edema. Pulses: 2+ and symmetric all extremities.    Skin: Skin color, texture, turgor normal. No rashes or lesions   Lymph nodes: Cervical, supraclavicular nodes normal.   Neurologic: Grossly nonfocal       Lab Results   Component Value Date/Time    Sodium 140 08/26/2017 01:50 AM    Potassium 4.1 08/26/2017 01:50 AM    Chloride 106 08/26/2017 01:50 AM    CO2 25 08/26/2017 01:50 AM    BUN 17 08/26/2017 01:50 AM    Creatinine 0.93 08/26/2017 01:50 AM    Glucose 143 08/26/2017 01:50 AM    Calcium 8.9 08/26/2017 01:50 AM    Magnesium 1.9 08/26/2017 01:50 AM    Phosphorus 2.5 06/14/2016 02:06 AM       Lab Results   Component Value Date/Time    WBC 5.5 08/26/2017 01:50 AM    HGB 11.9 08/26/2017 01:50 AM    PLATELET 271 14/46/4784 01:50 AM    MCV 87.0 08/26/2017 01:50 AM       Lab Results   Component Value Date/Time    INR 2.3 08/28/2017 03:03 AM    aPTT 42.6 06/12/2016 02:10 PM    AST (SGOT) 20 08/26/2017 01:50 AM    Alk. phosphatase 113 08/26/2017 01:50 AM    Protein, total 7.2 08/26/2017 01:50 AM    Albumin 3.4 08/26/2017 01:50 AM    Globulin 3.8 08/26/2017 01:50 AM       No results found for: IRON, FE, TIBC, IBCT, PSAT, FERR    Lab Results   Component Value Date/Time    C-Reactive protein 9.18 07/15/2010 03:15 AM    TSH 0.89 07/15/2010 03:15 AM        No results found for: PH, PHI, PCO2, PCO2I, PO2, PO2I, HCO3, HCO3I, FIO2, FIO2I    Lab Results   Component Value Date/Time    CK 71 07/15/2010 03:15 AM    CK-MB Index CANNOT BE CALCULATED 07/15/2010 03:15 AM    Troponin-I, Qt. <0.04 08/25/2017 12:37 PM        Lab Results   Component Value Date/Time    Culture result: PENDING 08/26/2017 10:50 AM    Culture result: NO GROWTH AFTER 23 HOURS 08/26/2017 09:11 AM    Culture result: MRSA NOT PRESENT 08/25/2017 07:18 PM    Culture result:  08/25/2017 07:18 PM         Screening of patient nares for MRSA is for surveillance purposes and, if positive, to facilitate isolation considerations in high risk settings. It is not intended for automatic decolonization interventions per se as regimens are not sufficiently effective to warrant routine use.        No results found for: TOXA1, RPR, HBCM, HBSAG, HAAB, HCAB1, HAAT, G6PD, CRYAC, HIVGT, HIVR, HIV1, HIV12, HIVPC, HIVRPI    Lab Results   Component Value Date/Time    CK 71 07/15/2010 03:15 AM    CK 64 07/14/2010 10:20 PM       Lab Results   Component Value Date/Time    Color DARK YELLOW 06/15/2016 09:30 AM    Appearance CLEAR 06/15/2016 09:30 AM    pH (UA) 6.0 06/15/2016 09:30 AM    Protein NEGATIVE  06/15/2016 09:30 AM    Glucose NEGATIVE  06/15/2016 09:30 AM    Ketone NEGATIVE  06/15/2016 09:30 AM    Bilirubin NEGATIVE  06/15/2016 09:30 AM    Blood SMALL 06/15/2016 09:30 AM    Urobilinogen 1.0 06/15/2016 09:30 AM    Nitrites NEGATIVE  06/15/2016 09:30 AM    Leukocyte Esterase NEGATIVE  06/15/2016 09:30 AM    WBC 0-4 06/15/2016 09:30 AM    RBC 0-5 06/15/2016 09:30 AM    Bacteria NEGATIVE  06/15/2016 09:30 AM       Images: no new images this morning    IMPRESSION  · Dyspnea  · COPD exacerbation  · Abnormal Chest imaging: LLL consolidation appears to be chronic but since pt has had off and on infectious sxs (may have been due to right flank abscess) will bronch since still in hospital.  · Anxiety/Depression  · Hx CVA  · Hx Throat Cancer  · Current Pipe Smoker    PLAN  · Bronch with BAL to LLL this morning  · Prednisone taper  · F/U legionella, strep pneumo, and mycoplama  · Continue Breo 200 in place of home Advair  · F/U sputum culture and AFB ordered  · Continue DuoNebs Q6  · Continue Mucinex  · Continue Azithromycin and Cefepime  · Would benefit from outpatient pulmonary follow up and PFT's  · DVT prophylaxis: Lovenox  · GI prophylaxis: not indicated  · Dispo: potential d/c to home later today pending bronch findings    Addendum 1:37 pm - Bronch with old fibrotic findings and white sputum. Will treat for bacterial bronchitis with 5 days of po Levaquin. Discussed with Dr. Shanae Tran. Patient stable for discharge from pulmonary standpoint. We will arrange for outpatient follow up in 1-2 weeks.     Robert Hoff [place]

## 2017-08-28 NOTE — PROGRESS NOTES
Bedside and Verbal shift change report given to Agus Levy RN (oncoming nurse) by Sonya Sparks RN (offgoing nurse). Report included the following information SBAR, Kardex, Intake/Output, MAR and Recent Results.

## 2017-08-28 NOTE — PERIOP NOTES
Kiya Sharma  1945  [id number]    Situation:    Scheduled Procedure: Procedure(s):  BRONCHOSCOPY  Verbal report received from: [facility], RN  Preoperative diagnosis: pneumonia    Background:    Procedure: Procedure(s):  BRONCHOSCOPY  Physician performing procedure; Dr. Ivis Martinez RN    NPO Status/Last PO Intake: 12 midnight    Pregnancy Test:Not applicable If yes, result: none    Is the patient taking Blood Thinners: yes If yes, list: Coumadin and last taken 8/24/17  Is the patient diabetic:no       If yes, what was the last BS:    Time taken? Anything given? no and           Does the patient have a Pacemaker/Defibrillator in place?: no   Does the patient need antibiotics before/during/after procedure: no   If the patient is having a colon, How much prep was drank? NA    What were the Colon prep results? Does the patient have SCD in place:no   Is patient on CONTACT precautions:no        If yes, what kind of CONTACT precautions:     Assessment:  Are the vital signs stable prior to patient coming to ENDO?  yes  Is the patient alert/oriented and able to sign consent for the procedures:yes  How does the patient's abdomen feel prior to coming to ENDO? round and soft yes   Does the patient have a patient IV in place?  yes     Recommendation:  Family or Friend present no not at this time    Permission to share finding with Family or Friend yes

## 2017-08-28 NOTE — PROGRESS NOTES
Hubert Saldana [address]  [address] [facility], [address] [place] [address]  [phone]      Medical Progress Note      NAME:         Baldomero Lipoma   :        1945  MRM:        [id number]    Date:          2017      Subjective: Patient has been seen and examined as a follow up for pneumonia, flank abscess and a subtherapeutic INR. Chart, labs, diagnostics reviewed. Has says he feels well. Has some SOB, at rest but this is his baseline. Minimal cough, No fever or chills. Objective:    Vital Signs:    Visit Vitals    /86    Pulse (!) 55    Temp 98 °F (36.7 °C)    Resp 19    Ht 5' 6\" (1.676 m)    Wt 70.9 kg (156 lb 4.9 oz)    SpO2 100%    BMI 25.23 kg/m2          Intake/Output Summary (Last 24 hours) at 17 0754  Last data filed at 17 0056   Gross per 24 hour   Intake              140 ml   Output              950 ml   Net             -810 ml        Physical Examination:    General:   Weak looking and not in much acute distress   Eyes:   pink conjunctivae, PERRLA with no discharge. ENT:   no ottorrhea or rhinorrhea with moist mucous membranes  Pulm: clear breath sounds with scattered crackles. No wheezes  Card:  has regular and normal S1, S2 without thrills, bruits or peripheral edema  Abd:  Soft, non-tender, non-distended, normoactive bowel sounds   Musc:  No cyanosis, clubbing, atrophy or deformities. R flank dressing  Skin:  No rashes, bruising or ulcers. Neuro: Awake and alert.  Generally a non focal exam.   Psych:  Has a good insight and is oriented x 3    Current Facility-Administered Medications   Medication Dose Route Frequency    predniSONE (DELTASONE) tablet 40 mg  40 mg Oral DAILY WITH BREAKFAST    morphine injection 2 mg  2 mg IntraVENous Q4H PRN    sodium chloride (NS) flush 5-10 mL  5-10 mL IntraVENous Q8H    sodium chloride (NS) flush 5-10 mL  5-10 mL IntraVENous PRN    naloxone [facility]) injection 0.4 mg  0.4 mg IntraVENous PRN    cefepime (MAXIPIME) 2 g in 0.9% sodium chloride (MBP/ADV) 100 mL  2 g IntraVENous Q8H    albuterol-ipratropium (DUO-NEB) 2.5 MG-0.5 MG/3 ML  3 mL Nebulization Q6H RT    guaiFENesin ER (MUCINEX) tablet 600 mg  600 mg Oral Q12H    aspirin delayed-release tablet 81 mg  81 mg Oral DAILY    atorvastatin (LIPITOR) tablet 80 mg  80 mg Oral QPM    clonazePAM (KlonoPIN) tablet 0.5 mg  0.5 mg Oral TID    escitalopram oxalate (LEXAPRO) tablet 10 mg  10 mg Oral DAILY    isosorbide mononitrate (ISMO, MONOKET) tablet 10 mg  10 mg Oral TID    enoxaparin (LOVENOX) injection 30 mg  30 mg SubCUTAneous Q12H    And    enoxaparin (LOVENOX) injection 40 mg  40 mg SubCUTAneous Q12H    albuterol (PROVENTIL VENTOLIN) nebulizer solution 2.5 mg  2.5 mg Nebulization Q2H PRN    fluticasone-vilanterol (BREO ELLIPTA) 200mcg-25mcg/puff  1 Puff Inhalation DAILY    Warfarin Pharmacy to Dose   Other Rx Dosing/Monitoring    azithromycin (ZITHROMAX) 500 mg in 0.9% sodium chloride (MBP/ADV) 250 mL  500 mg IntraVENous Q24H        Laboratory data and review:    Recent Labs      08/26/17   0150  08/25/17   1237   WBC  5.5  6.5   HGB  11.9*  12.3   HCT  34.9*  38.6   PLT  250  268     Recent Labs      08/28/17   0303  08/27/17   0355  08/26/17   0150  08/25/17   1237   NA   --    --   140  142   K   --    --   4.1  3.9   CL   --    --   106  105   CO2   --    --   25  32   GLU   --    --   143*  97   BUN   --    --   17  12   CREA   --    --   0.93  0.98   CA   --    --   8.9  9.4   MG   --    --   1.9  1.8   ALB   --    --   3.4*  3.8   SGOT   --    --   20  19   ALT   --    --   24  27   INR  2.3*  2.2*  1.5*  1.4*     No components found for: [place]    CT scan chest - Persistent air space process in the LLL apical segment. Consider obstruction or  Mycobacterium Avium    Assessment and Plan:    Pneumonia, LLL apical POA: failed Omnicef and recent Levaquin. Cultures and AFBs still pending. Has been reviewed by pulmonology. Continue IV Azithromycin and Cefepime for now. Getting a bronchoscopy. COPD (chronic obstructive pulmonary disease) ([facility] [address]): minimal wheezing. Still smoking pipes. Continue scheduled duo-nebs with PRN albuterol, Breo, Mucinex, prednisone taper. Aortic stenosis / s/p mechanical AVR: he says he has been compliant with medications and follow up. INR 2.3. Today. Continue enoxaparin and coumadin while here. AM dose held for bronchoscopy. Pharmacy monitoring. Target INR 2.5 -3.5. CAD:  S/P CABG: continue Asprin, Imdur, Lipitor     Throat cancer ([facility] [address] (10/22/2014): with hoarseness which per pt and wife is not new. Out patient follow up      Depression (10/22/2014): continue Lexapro, Clonazepam     Abscess R flank POA: seen by surgery and had a bedside I&D. Cultures neg. Continue antibiotics and wound care. Follow clinically.      Tobacco abuse (10/22/2014): already been counseled on cessation    Total time spent for the patient's care: [address] [facility] discussed with: Patient and Nursing Staff    Discussed:  Care Plan    Prophylaxis:  Lovenox and Coumadin    Anticipated Disposition:  Home w/Family           ___________________________________________________    Attending Physician:   Faraz Alvarenga MD

## 2017-08-28 NOTE — PROGRESS NOTES
I have reviewed discharge instructions with the patient and spouse. The patient and spouse verbalized understanding. Teaching performed on patient's daily dressing changes with patient's spouse. Able to verbalize understanding. Send home with extra dressing supplies to last until follow up appointment. Per Dr. Chaitanya Bennett, patient to receive dose of coumadin prior to leaving. Pharmacist to dose and order, patient understands that he must receive this before departure this afternoon. Patient is aware he is to get his INR checked on Thursday this week.

## 2017-08-29 LAB
BACTERIA SPEC CULT: ABNORMAL
BACTERIA SPEC CULT: ABNORMAL
GRAM STN SPEC: ABNORMAL
L PNEUMO AG SPEC QL IF: NEGATIVE
M PNEUMO IGG SER IA-ACNC: 148 U/ML (ref 0–99)
M PNEUMO IGM SER IA-ACNC: 1096 U/ML (ref 0–769)
SERVICE CMNT-IMP: ABNORMAL
SPECIMEN SOURCE: NORMAL

## 2017-08-30 LAB
BACTERIA SPEC CULT: ABNORMAL
BACTERIA SPEC CULT: ABNORMAL
GRAM STN SPEC: ABNORMAL
SERVICE CMNT-IMP: ABNORMAL

## 2017-08-31 LAB
BACTERIA SPEC CULT: NORMAL
BACTERIA SPEC CULT: NORMAL
SERVICE CMNT-IMP: NORMAL
SERVICE CMNT-IMP: NORMAL

## 2017-09-05 LAB
SPECIMEN SOURCE: ABNORMAL
VIRUS SPEC CULT: ABNORMAL

## 2017-09-06 LAB
BACTERIA SPEC CULT: ABNORMAL
BACTERIA SPEC CULT: ABNORMAL
LEGIONELLA SPEC CULT: NORMAL
LEGIONELLA SPEC CULT: NORMAL
SERVICE CMNT-IMP: ABNORMAL
SPECIMEN SOURCE: NORMAL

## 2017-10-10 LAB
ACID FAST STN SPEC: NEGATIVE
MYCOBACTERIUM SPEC QL CULT: NEGATIVE
SPECIMEN PREPARATION: NORMAL
SPECIMEN SOURCE: NORMAL

## 2017-12-19 ENCOUNTER — HOSPITAL ENCOUNTER (EMERGENCY)
Age: 72
Discharge: HOME | End: 2017-12-19
Attending: STUDENT IN AN ORGANIZED HEALTH CARE EDUCATION/TRAINING PROGRAM
Payer: MEDICARE

## 2017-12-19 VITALS
RESPIRATION RATE: 16 BRPM | BODY MASS INDEX: 23.86 KG/M2 | HEART RATE: 88 BPM | TEMPERATURE: 97.7 F | HEIGHT: 67 IN | OXYGEN SATURATION: 96 % | SYSTOLIC BLOOD PRESSURE: 155 MMHG | DIASTOLIC BLOOD PRESSURE: 85 MMHG | WEIGHT: 152 LBS

## 2017-12-19 DIAGNOSIS — K92.1 HEMATOCHEZIA: Primary | ICD-10-CM

## 2017-12-19 LAB
ALBUMIN SERPL-MCNC: 3.6 G/DL (ref 3.5–5)
ALBUMIN/GLOB SERPL: 1 {RATIO} (ref 1.1–2.2)
ALP SERPL-CCNC: 133 U/L (ref 45–117)
ALT SERPL-CCNC: 34 U/L (ref 12–78)
ANION GAP SERPL CALC-SCNC: 8 MMOL/L (ref 5–15)
AST SERPL-CCNC: 27 U/L (ref 15–37)
BASOPHILS # BLD: 0 K/UL (ref 0–0.1)
BASOPHILS NFR BLD: 0 % (ref 0–1)
BILIRUB SERPL-MCNC: 0.7 MG/DL (ref 0.2–1)
BUN SERPL-MCNC: 13 MG/DL (ref 6–20)
BUN/CREAT SERPL: 14 (ref 12–20)
CALCIUM SERPL-MCNC: 8.8 MG/DL (ref 8.5–10.1)
CHLORIDE SERPL-SCNC: 102 MMOL/L (ref 97–108)
CO2 SERPL-SCNC: 30 MMOL/L (ref 21–32)
CREAT SERPL-MCNC: 0.96 MG/DL (ref 0.7–1.3)
EOSINOPHIL # BLD: 0.1 K/UL (ref 0–0.4)
EOSINOPHIL NFR BLD: 2 % (ref 0–7)
ERYTHROCYTE [DISTWIDTH] IN BLOOD BY AUTOMATED COUNT: 14.5 % (ref 11.5–14.5)
GLOBULIN SER CALC-MCNC: 3.7 G/DL (ref 2–4)
GLUCOSE SERPL-MCNC: 103 MG/DL (ref 65–100)
HCT VFR BLD AUTO: 37.1 % (ref 36.6–50.3)
HEMOCCULT STL QL: POSITIVE
HGB BLD-MCNC: 12.7 G/DL (ref 12.1–17)
INR PPP: 3.1 (ref 0.9–1.1)
LYMPHOCYTES # BLD: 1.1 K/UL (ref 0.8–3.5)
LYMPHOCYTES NFR BLD: 17 % (ref 12–49)
MCH RBC QN AUTO: 29.9 PG (ref 26–34)
MCHC RBC AUTO-ENTMCNC: 34.2 G/DL (ref 30–36.5)
MCV RBC AUTO: 87.3 FL (ref 80–99)
MONOCYTES # BLD: 0.4 K/UL (ref 0–1)
MONOCYTES NFR BLD: 6 % (ref 5–13)
NEUTS SEG # BLD: 4.6 K/UL (ref 1.8–8)
NEUTS SEG NFR BLD: 75 % (ref 32–75)
PLATELET # BLD AUTO: 284 K/UL (ref 150–400)
POTASSIUM SERPL-SCNC: 4.1 MMOL/L (ref 3.5–5.1)
PROT SERPL-MCNC: 7.3 G/DL (ref 6.4–8.2)
PROTHROMBIN TIME: 31.9 SEC (ref 9–11.1)
RBC # BLD AUTO: 4.25 M/UL (ref 4.1–5.7)
SODIUM SERPL-SCNC: 140 MMOL/L (ref 136–145)
WBC # BLD AUTO: 6.2 K/UL (ref 4.1–11.1)

## 2017-12-19 PROCEDURE — 85025 COMPLETE CBC W/AUTO DIFF WBC: CPT

## 2017-12-19 PROCEDURE — 85610 PROTHROMBIN TIME: CPT

## 2017-12-19 PROCEDURE — 99282 EMERGENCY DEPT VISIT SF MDM: CPT

## 2017-12-19 PROCEDURE — 82272 OCCULT BLD FECES 1-3 TESTS: CPT

## 2017-12-19 PROCEDURE — 80053 COMPREHEN METABOLIC PANEL: CPT

## 2017-12-19 PROCEDURE — 36415 COLL VENOUS BLD VENIPUNCTURE: CPT

## 2017-12-19 NOTE — ED NOTES
Discharged by [facility] Resident, with verbal and written instructions provided to patient.

## 2017-12-19 NOTE — ED TRIAGE NOTES
Pt reports dark blood noticed in his stool 2x this morning. Denies n/v/d and abdominal pain. Pt states he is on coumadin.

## 2017-12-19 NOTE — ED PROVIDER NOTES
HPI Comments: 67 y.o. male with past medical history significant for COPD, throat CA, CVA, CAD, and AVR who presents from home with chief complaint of melena and bloody bowel movements. Pt states that he had 2 bloody bowel movements this morning, stools were dark colored and there was bright red blood when wiping. Denies abdominal pain, N/V/D or hx of diverticulosis. Endorses hemorrhoidectomy 10 years ago. Pt is currently on coumadin, last INR was 2.6 one month ago in coumadin clinic. Last coumadin dose was 5mg yesterday. Reports that his last colonoscopy was 3 years ago, no abnormal findings and 10 year follow up. FIT test on 12/19/16 was wnl. Social hx: current smoker, no EtOH use  PCP: Gomez Rivera MD        The history is provided by the patient. No  was used. Past Medical History:   Diagnosis Date    CAD (coronary artery disease)     Cancer ([address] \"10 years ago\"    throat    COPD (chronic obstructive pulmonary disease) ([address]     CVA (cerebral vascular accident) ([address]     H/O mechanical aortic valve replacement     CT surgery 1994 - CABG ([place] to [place]) and AVR ([facility])    S/P CABG x 1     CT surgery 1994 - CABG ([place] to [place]) and AVR ([facility])       Past Surgical History:   Procedure Laterality Date    HX HEART VALVE SURGERY      CT surgery 1994 - CABG ([place] to LAD) and AVR ([facility])         Family History:   Problem Relation Age of Onset    Heart Disease Mother     Heart Disease Sister        Social History     Social History    Marital status:      Spouse name: N/A    Number of children: N/A    Years of education: N/A     Occupational History    Not on file.      Social History Main Topics    Smoking status: Current Every Day Smoker    Smokeless tobacco: Never Used      Comment: pipe    Alcohol use No    Drug use: No    Sexual activity: Not on file     Other Topics Concern    Not on file     Social History Narrative         ALLERGIES: Iodinated contrast- oral and iv dye and Pcn [penicillins]    Review of Systems   Constitutional: Negative for chills, fatigue and fever. HENT: Negative for sneezing and sore throat. Respiratory: Negative for cough, shortness of breath and wheezing. Cardiovascular: Negative for chest pain. Gastrointestinal: Positive for blood in stool. Negative for abdominal distention, abdominal pain, constipation, diarrhea, nausea, rectal pain and vomiting. Genitourinary: Negative for dysuria and frequency. Musculoskeletal: Negative for back pain and myalgias. Neurological: Negative for dizziness, weakness, numbness and headaches. Vitals:    12/19/17 1112   BP: 140/80   Pulse: 88   Resp: 16   Temp: 97.7 °F (36.5 °C)   SpO2: 95%   Weight: 68.9 kg (152 lb)   Height: 5' 7\" (1.702 m)            Physical Exam   Constitutional: He is oriented to person, place, and time. He appears well-developed and well-nourished. HENT:   Head: Normocephalic and atraumatic. Eyes: Pupils are equal, round, and reactive to light. Cardiovascular: Normal rate and regular rhythm. Pulmonary/Chest: Effort normal and breath sounds normal. No respiratory distress. He has no wheezes. He has no rales. He exhibits no tenderness. Abdominal: Soft. Bowel sounds are normal. He exhibits no distension and no mass. There is no tenderness. There is no rebound and no guarding. Neurological: He is alert and oriented to person, place, and time. Psychiatric: He has a normal mood and affect. MDM  Number of Diagnoses or Management Options  Diagnosis management comments: DDx includes internal hemorrhoids     ED Course   CBC: Hgb wnl  FOBT: positive  INR: 3.1  Procedures  A: 67 y.o. male with past medical history significant for COPD, throat CA, CVA, CAD, and AVR who presents from home with hematochezia. Plan:   - hold Coumadin dose today and restart tomorrow. - F/u with PCP to INR recheck and Coumadin dose adjustment.   - F/u with GI regarding GI bleed. - Advised Pt to return to ER if symptoms worsen.

## 2017-12-19 NOTE — DISCHARGE INSTRUCTIONS
Lower Gastrointestinal Bleeding: Care Instructions  Your Care Instructions    The digestive or gastrointestinal tract goes from the mouth to the anus. It is often called the GI tract. Bleeding in the lower GI tract can happen anywhere in your small or large intestine. It can also happen in your rectum or anus. In some cases, it is caused by an infection, cancer, or inflammatory bowel disease. Or it may be caused by hemorrhoids, diverticulitis, or clotting problems. Light bleeding may not cause any symptoms at first. But if you continue to bleed for a while, you may feel very weak or tired. Sudden, heavy bleeding means you need to see a doctor right away. This kind of bleeding can be very dangerous. But it can usually be cured or controlled. The doctor may do some tests to find the cause of your bleeding. Follow-up care is a key part of your treatment and safety. Be sure to make and go to all appointments, and call your doctor if you are having problems. It's also a good idea to know your test results and keep a list of the medicines you take. How can you care for yourself at home? · Be safe with medicines. Take your medicines exactly as prescribed. Call your doctor if you think you are having a problem with your medicine. You will get more details on the specific medicines your doctor prescribes. · Do not take aspirin or other anti-inflammatory medicines, such as naproxen (Aleve) or ibuprofen (Advil, Motrin), without talking to your doctor first. Ask your doctor if it is okay to use acetaminophen (Tylenol). · Do not drink alcohol. · The bleeding may make you lose iron. So it's important to eat foods that have a lot of iron. These include red meat, shellfish, poultry, and eggs. They also include beans, raisins, whole-grain breads, and leafy green vegetables. If you want help planning meals, you can meet with a dietitian. When should you call for help?   Call 911 anytime you think you may need emergency care. For example, call if:  ? · You have sudden, severe belly pain. ? · You vomit blood or what looks like coffee grounds. ? · You passed out (lost consciousness). ? · Your stools are maroon or very bloody. ?Call your doctor now or seek immediate medical care if:  ? · You are dizzy or lightheaded, or you feel like you may faint. ? · Your stools are black and look like tar, or they have streaks of blood. ? · You have belly pain. ? · You vomit or have nausea. ? Watch closely for changes in your health, and be sure to contact your doctor if you do not get better as expected. Where can you learn more? Go to http://rigoberto-marcos.info/. Enter X744 in the search box to learn more about \"Lower Gastrointestinal Bleeding: Care Instructions. \"  Current as of: March 20, 2017  Content Version: 11.4  © 5112-0697 [employer]. Care instructions adapted under license by [employer] (which disclaims liability or warranty for this information). If you have questions about a medical condition or this instruction, always ask your healthcare professional. [place] [address] any warranty or liability for your use of this information.

## 2018-07-30 ENCOUNTER — APPOINTMENT (OUTPATIENT)
Dept: GENERAL RADIOLOGY | Age: 73
End: 2018-07-30
Attending: EMERGENCY MEDICINE
Payer: MEDICARE

## 2018-07-30 ENCOUNTER — HOSPITAL ENCOUNTER (EMERGENCY)
Age: 73
Discharge: HOME | End: 2018-07-30
Attending: EMERGENCY MEDICINE
Payer: MEDICARE

## 2018-07-30 VITALS
WEIGHT: 162 LBS | OXYGEN SATURATION: 98 % | HEIGHT: 66 IN | TEMPERATURE: 97.7 F | SYSTOLIC BLOOD PRESSURE: 134 MMHG | DIASTOLIC BLOOD PRESSURE: 81 MMHG | HEART RATE: 87 BPM | BODY MASS INDEX: 26.03 KG/M2 | RESPIRATION RATE: 16 BRPM

## 2018-07-30 DIAGNOSIS — J01.00 ACUTE NON-RECURRENT MAXILLARY SINUSITIS: Primary | ICD-10-CM

## 2018-07-30 PROCEDURE — 71046 X-RAY EXAM CHEST 2 VIEWS: CPT

## 2018-07-30 PROCEDURE — 99281 EMR DPT VST MAYX REQ PHY/QHP: CPT

## 2018-07-30 RX ORDER — FEXOFENADINE HCL AND PSEUDOEPHEDRINE HCI 60; 120 MG/1; MG/1
1 TABLET, EXTENDED RELEASE ORAL EVERY 12 HOURS
Qty: 20 TAB | Refills: 0 | Status: SHIPPED | OUTPATIENT
Start: 2018-07-30 | End: 2019-01-01

## 2018-07-30 RX ORDER — ACETAMINOPHEN 500 MG
1000 TABLET ORAL
Status: DISCONTINUED | OUTPATIENT
Start: 2018-07-30 | End: 2018-07-30 | Stop reason: HOSPADM

## 2018-07-30 NOTE — ED PROVIDER NOTES
HPI Comments: 68 y.o. male with past medical history significant for COPD, throat cancer, mechanical aortic valve replacement, CVA, CAD, and CABG who presents from home with chief complaint of cough. Pt states that he has been experiencing worsening sinus congestion, cough, rhinitis, and a R-sided HA. He usually has a cough at baseline but it has worsened with clear phlegm. He has been taking Tylenol 500 mg at home with relief and his last dose was 4 hours ago. He has chronic problems with his R-eye that he has been f/u for eye drops with his Ophthalmologist. Pt denies sore throat. There are no other acute medical concerns at this time. Social hx: current everyday tobacco smoker; no EtOH use PCP: Bennie Joe MD 
 
Note written by Tee Seasl, as dictated by Khurram Marley MD 12:19 PM 
 
The history is provided by the patient and the spouse. No  was used. Past Medical History:  
Diagnosis Date  CAD (coronary artery disease)  Cancer ([facility] [address] \"10 years ago\" throat  COPD (chronic obstructive pulmonary disease) (HCC)  CVA (cerebral vascular accident) ([facility] [address]  H/O mechanical aortic valve replacement CT surgery 1994 - CABG ([facility]) and [facility] ([facility])  S/P CABG x 1   
 CT surgery 1994 - CABG ([facility] to [facility]) and AVR ([facility]) Past Surgical History:  
Procedure Laterality Date  HX HEART VALVE SURGERY    
 CT surgery 1994 - CABG ([facility] to [facility]) and AVR ([facility]) Family History:  
Problem Relation Age of Onset  Heart Disease Mother  Heart Disease Sister Social History Social History  Marital status:  Spouse name: N/A  
 Number of children: N/A  
 Years of education: N/A Occupational History  Not on file. Social History Main Topics  Smoking status: Current Every Day Smoker  Smokeless tobacco: Never Used Comment: pipe  Alcohol use No  
 Drug use: No  
 Sexual activity: Not on file Other Topics Concern  Not on file Social History Narrative ALLERGIES: Iodinated contrast- oral and iv dye and Pcn [penicillins] Review of Systems HENT: Positive for congestion, rhinorrhea and sinus pain. Negative for sore throat. Respiratory: Positive for cough. Cardiovascular: Negative for chest pain. Gastrointestinal: Negative for abdominal pain. Neurological: Positive for headaches. All other systems reviewed and are negative. Vitals:  
 07/30/18 1206 BP: 134/81 Pulse: 87 Resp: 16 Temp: 97.7 °F (36.5 °C) SpO2: 98% Weight: 73.5 kg (162 lb) Height: 5' 6\" (1.676 m) Physical Exam  
Constitutional: He is oriented to person, place, and time. He appears well-developed and well-nourished. No distress. HENT:  
Head: Normocephalic and atraumatic. Mouth/Throat: Posterior oropharyngeal erythema present. Eyes: Conjunctivae are normal.  
Neck: Neck supple. No tracheal deviation present. Cardiovascular: Normal rate, regular rhythm and normal heart sounds. Pulmonary/Chest: Effort normal and breath sounds normal. No respiratory distress. Clear auscultation bilaterally. Abdominal: He exhibits no distension. Musculoskeletal: Normal range of motion. Neurological: He is alert and oriented to person, place, and time. No cranial nerve deficit. Normal finger to nose. Skin: Skin is warm and dry. Psychiatric: He has a normal mood and affect. Nursing note and vitals reviewed. Note written by Tee Sims, as dictated by Joslyn Miramontes MD 12:19 PM 
 
 
MDM Number of Diagnoses or Management Options Acute non-recurrent maxillary sinusitis:  
 
68 y.o. male presents with headaches and sinus congestion with cough. CXR negative for pneumonia or other complicating features. Will treat with decongestants, antihistamine, cough suppression and expectorants.  Plan to follow up with PCP as needed and return precautions discussed for worsening or new concerning symptoms. ED Course Procedures

## 2018-07-30 NOTE — DISCHARGE INSTRUCTIONS
Sinusitis: Care Instructions  Your Care Instructions    Sinusitis is an infection of the lining of the sinus cavities in your head. Sinusitis often follows a cold. It causes pain and pressure in your head and face. In most cases, sinusitis gets better on its own in 1 to 2 weeks. But some mild symptoms may last for several weeks. Sometimes antibiotics are needed. Follow-up care is a key part of your treatment and safety. Be sure to make and go to all appointments, and call your doctor if you are having problems. It's also a good idea to know your test results and keep a list of the medicines you take. How can you care for yourself at home? · Take an over-the-counter pain medicine, such as acetaminophen (Tylenol), ibuprofen (Advil, Motrin), or naproxen (Aleve). Read and follow all instructions on the label. · If the doctor prescribed antibiotics, take them as directed. Do not stop taking them just because you feel better. You need to take the full course of antibiotics. · Be careful when taking over-the-counter cold or flu medicines and Tylenol at the same time. Many of these medicines have acetaminophen, which is Tylenol. Read the labels to make sure that you are not taking more than the recommended dose. Too much acetaminophen (Tylenol) can be harmful. · Breathe warm, moist air from a steamy shower, a hot bath, or a sink filled with hot water. Avoid cold, dry air. Using a humidifier in your home may help. Follow the directions for cleaning the machine. · Use saline (saltwater) nasal washes to help keep your nasal passages open and wash out mucus and bacteria. You can buy saline nose drops at a grocery store or drugstore. Or you can make your own at home by adding 1 teaspoon of salt and 1 teaspoon of baking soda to 2 cups of distilled water. If you make your own, fill a bulb syringe with the solution, insert the tip into your nostril, and squeeze gently. Alease Ruffini your nose.   · Put a hot, wet towel or a warm gel pack on your face 3 or 4 times a day for 5 to 10 minutes each time. · Try a decongestant nasal spray like oxymetazoline (Afrin). Do not use it for more than 3 days in a row. Using it for more than 3 days can make your congestion worse. When should you call for help? Call your doctor now or seek immediate medical care if:    · You have new or worse swelling or redness in your face or around your eyes.     · You have a new or higher fever.    Watch closely for changes in your health, and be sure to contact your doctor if:    · You have new or worse facial pain.     · The mucus from your nose becomes thicker (like pus) or has new blood in it.     · You are not getting better as expected. Where can you learn more? Go to http://rigoberto-marcos.info/. Enter P579 in the search box to learn more about \"Sinusitis: Care Instructions. \"  Current as of: May 12, 2017  Content Version: 11.7  © 7815-2864 [employer]. Care instructions adapted under license by [employer] (which disclaims liability or warranty for this information). If you have questions about a medical condition or this instruction, always ask your healthcare professional. [address] any warranty or liability for your use of this information.

## 2018-07-30 NOTE — ED TRIAGE NOTES
Pt c/o right sided headache, productive cough and sinus pressure. Pt has not been to PCP. Pt took tylenol at 0800. Pt adds his head hurts when he coughs.

## 2019-01-01 ENCOUNTER — HOSPITAL ENCOUNTER (EMERGENCY)
Age: 74
Discharge: HOME | End: 2019-11-03
Attending: EMERGENCY MEDICINE
Payer: MEDICARE

## 2019-01-01 ENCOUNTER — APPOINTMENT (OUTPATIENT)
Dept: VASCULAR SURGERY | Age: 74
End: 2019-01-01
Attending: INTERNAL MEDICINE
Payer: MEDICARE

## 2019-01-01 ENCOUNTER — APPOINTMENT (OUTPATIENT)
Dept: CT IMAGING | Age: 74
End: 2019-01-01
Attending: EMERGENCY MEDICINE
Payer: MEDICARE

## 2019-01-01 ENCOUNTER — APPOINTMENT (OUTPATIENT)
Dept: MRI IMAGING | Age: 74
End: 2019-01-01
Attending: EMERGENCY MEDICINE
Payer: MEDICARE

## 2019-01-01 ENCOUNTER — APPOINTMENT (OUTPATIENT)
Dept: NON INVASIVE DIAGNOSTICS | Age: 74
End: 2019-01-01
Attending: INTERNAL MEDICINE
Payer: MEDICARE

## 2019-01-01 ENCOUNTER — APPOINTMENT (OUTPATIENT)
Dept: CT IMAGING | Age: 74
End: 2019-01-01
Attending: INTERNAL MEDICINE
Payer: MEDICARE

## 2019-01-01 ENCOUNTER — OFFICE VISIT (OUTPATIENT)
Dept: NEUROLOGY | Age: 74
End: 2019-01-01

## 2019-01-01 ENCOUNTER — APPOINTMENT (OUTPATIENT)
Dept: CT IMAGING | Age: 74
End: 2019-01-01
Attending: PHYSICIAN ASSISTANT
Payer: MEDICARE

## 2019-01-01 ENCOUNTER — HOSPITAL ENCOUNTER (EMERGENCY)
Age: 74
Discharge: HOME | End: 2019-11-17
Attending: EMERGENCY MEDICINE
Payer: MEDICARE

## 2019-01-01 ENCOUNTER — HOSPITAL ENCOUNTER (OUTPATIENT)
Age: 74
Setting detail: OBSERVATION
Discharge: HOME | End: 2019-06-22
Attending: EMERGENCY MEDICINE | Admitting: INTERNAL MEDICINE
Payer: MEDICARE

## 2019-01-01 ENCOUNTER — APPOINTMENT (OUTPATIENT)
Dept: GENERAL RADIOLOGY | Age: 74
End: 2019-01-01
Attending: EMERGENCY MEDICINE
Payer: MEDICARE

## 2019-01-01 ENCOUNTER — HOSPITAL ENCOUNTER (INPATIENT)
Dept: MRI IMAGING | Age: 74
Discharge: HOME | End: 2019-06-19
Attending: EMERGENCY MEDICINE
Payer: MEDICARE

## 2019-01-01 ENCOUNTER — APPOINTMENT (OUTPATIENT)
Dept: GENERAL RADIOLOGY | Age: 74
End: 2019-01-01
Attending: INTERNAL MEDICINE
Payer: MEDICARE

## 2019-01-01 VITALS
RESPIRATION RATE: 19 BRPM | SYSTOLIC BLOOD PRESSURE: 139 MMHG | HEART RATE: 59 BPM | WEIGHT: 138 LBS | DIASTOLIC BLOOD PRESSURE: 87 MMHG | BODY MASS INDEX: 22.18 KG/M2 | HEIGHT: 66 IN | OXYGEN SATURATION: 99 % | TEMPERATURE: 97.6 F

## 2019-01-01 VITALS
BODY MASS INDEX: 24.59 KG/M2 | SYSTOLIC BLOOD PRESSURE: 119 MMHG | TEMPERATURE: 98.5 F | WEIGHT: 153 LBS | HEART RATE: 71 BPM | RESPIRATION RATE: 20 BRPM | HEIGHT: 66 IN | OXYGEN SATURATION: 95 % | DIASTOLIC BLOOD PRESSURE: 59 MMHG

## 2019-01-01 VITALS
TEMPERATURE: 97.5 F | DIASTOLIC BLOOD PRESSURE: 62 MMHG | WEIGHT: 142 LBS | OXYGEN SATURATION: 96 % | RESPIRATION RATE: 17 BRPM | BODY MASS INDEX: 22.82 KG/M2 | HEIGHT: 66 IN | SYSTOLIC BLOOD PRESSURE: 147 MMHG | HEART RATE: 61 BPM

## 2019-01-01 VITALS
OXYGEN SATURATION: 95 % | RESPIRATION RATE: 20 BRPM | BODY MASS INDEX: 23.3 KG/M2 | HEART RATE: 67 BPM | WEIGHT: 145 LBS | DIASTOLIC BLOOD PRESSURE: 67 MMHG | SYSTOLIC BLOOD PRESSURE: 130 MMHG | HEIGHT: 66 IN

## 2019-01-01 DIAGNOSIS — R10.84 ABDOMINAL PAIN, GENERALIZED: Primary | ICD-10-CM

## 2019-01-01 DIAGNOSIS — G51.0 BELL'S PALSY: Primary | ICD-10-CM

## 2019-01-01 DIAGNOSIS — G45.9 TIA (TRANSIENT ISCHEMIC ATTACK): Primary | ICD-10-CM

## 2019-01-01 DIAGNOSIS — I63.9 CEREBROVASCULAR ACCIDENT (CVA), UNSPECIFIED MECHANISM (HCC): Primary | ICD-10-CM

## 2019-01-01 DIAGNOSIS — I69.90 LATE EFFECTS OF CVA (CEREBROVASCULAR ACCIDENT): ICD-10-CM

## 2019-01-01 DIAGNOSIS — C79.51 OSTEOLYTIC LESION DUE TO METASTASIS (HCC): ICD-10-CM

## 2019-01-01 LAB
ALBUMIN SERPL-MCNC: 3.6 G/DL (ref 3.5–5)
ALBUMIN SERPL-MCNC: 3.8 G/DL (ref 3.5–5)
ALBUMIN SERPL-MCNC: 3.8 G/DL (ref 3.5–5)
ALBUMIN/GLOB SERPL: 0.9 {RATIO} (ref 1.1–2.2)
ALBUMIN/GLOB SERPL: 1 {RATIO} (ref 1.1–2.2)
ALBUMIN/GLOB SERPL: 1 {RATIO} (ref 1.1–2.2)
ALP SERPL-CCNC: 127 U/L (ref 45–117)
ALP SERPL-CCNC: 150 U/L (ref 45–117)
ALP SERPL-CCNC: 180 U/L (ref 45–117)
ALT SERPL-CCNC: 20 U/L (ref 12–78)
ALT SERPL-CCNC: 23 U/L (ref 12–78)
ALT SERPL-CCNC: 25 U/L (ref 12–78)
ANION GAP SERPL CALC-SCNC: 3 MMOL/L (ref 5–15)
ANION GAP SERPL CALC-SCNC: 4 MMOL/L (ref 5–15)
ANION GAP SERPL CALC-SCNC: 4 MMOL/L (ref 5–15)
ANION GAP SERPL CALC-SCNC: 5 MMOL/L (ref 5–15)
ANION GAP SERPL CALC-SCNC: 9 MMOL/L (ref 5–15)
APPEARANCE UR: CLEAR
APTT PPP: 26.5 SEC (ref 22.1–32)
APTT PPP: 54.9 SEC (ref 22.1–32)
AST SERPL-CCNC: 17 U/L (ref 15–37)
AST SERPL-CCNC: 18 U/L (ref 15–37)
AST SERPL-CCNC: 26 U/L (ref 15–37)
ATRIAL RATE: 63 BPM
ATRIAL RATE: 66 BPM
BACTERIA SPEC CULT: ABNORMAL
BACTERIA URNS QL MICRO: NEGATIVE /HPF
BASOPHILS # BLD: 0 K/UL (ref 0–0.1)
BASOPHILS NFR BLD: 0 % (ref 0–1)
BASOPHILS NFR BLD: 1 % (ref 0–1)
BILIRUB SERPL-MCNC: 0.6 MG/DL (ref 0.2–1)
BILIRUB UR QL: NEGATIVE
BUN SERPL-MCNC: 10 MG/DL (ref 6–20)
BUN SERPL-MCNC: 12 MG/DL (ref 6–20)
BUN SERPL-MCNC: 12 MG/DL (ref 6–20)
BUN SERPL-MCNC: 13 MG/DL (ref 6–20)
BUN SERPL-MCNC: 9 MG/DL (ref 6–20)
BUN/CREAT SERPL: 10 (ref 12–20)
BUN/CREAT SERPL: 11 (ref 12–20)
BUN/CREAT SERPL: 11 (ref 12–20)
BUN/CREAT SERPL: 12 (ref 12–20)
BUN/CREAT SERPL: 13 (ref 12–20)
CALCIUM SERPL-MCNC: 7.9 MG/DL (ref 8.5–10.1)
CALCIUM SERPL-MCNC: 8.9 MG/DL (ref 8.5–10.1)
CALCIUM SERPL-MCNC: 8.9 MG/DL (ref 8.5–10.1)
CALCIUM SERPL-MCNC: 9.1 MG/DL (ref 8.5–10.1)
CALCIUM SERPL-MCNC: 9.2 MG/DL (ref 8.5–10.1)
CALCULATED P AXIS, ECG09: 48 DEGREES
CALCULATED P AXIS, ECG09: 54 DEGREES
CALCULATED R AXIS, ECG10: 2 DEGREES
CALCULATED R AXIS, ECG10: 8 DEGREES
CALCULATED T AXIS, ECG11: 113 DEGREES
CALCULATED T AXIS, ECG11: 84 DEGREES
CHLORIDE SERPL-SCNC: 104 MMOL/L (ref 97–108)
CHLORIDE SERPL-SCNC: 106 MMOL/L (ref 97–108)
CHLORIDE SERPL-SCNC: 106 MMOL/L (ref 97–108)
CHLORIDE SERPL-SCNC: 109 MMOL/L (ref 97–108)
CHLORIDE SERPL-SCNC: 112 MMOL/L (ref 97–108)
CHOLEST SERPL-MCNC: 110 MG/DL
CO2 SERPL-SCNC: 26 MMOL/L (ref 21–32)
CO2 SERPL-SCNC: 26 MMOL/L (ref 21–32)
CO2 SERPL-SCNC: 28 MMOL/L (ref 21–32)
CO2 SERPL-SCNC: 29 MMOL/L (ref 21–32)
CO2 SERPL-SCNC: 30 MMOL/L (ref 21–32)
COLOR UR: ABNORMAL
COMMENT, HOLDF: NORMAL
COMMENT, HOLDF: NORMAL
CREAT SERPL-MCNC: 0.84 MG/DL (ref 0.7–1.3)
CREAT SERPL-MCNC: 0.95 MG/DL (ref 0.7–1.3)
CREAT SERPL-MCNC: 0.98 MG/DL (ref 0.7–1.3)
CREAT SERPL-MCNC: 1.06 MG/DL (ref 0.7–1.3)
CREAT SERPL-MCNC: 1.06 MG/DL (ref 0.7–1.3)
DIAGNOSIS, 93000: NORMAL
DIAGNOSIS, 93000: NORMAL
DIFFERENTIAL METHOD BLD: ABNORMAL
ECHO AO ASC DIAM: 2.43 CM
ECHO AO ROOT DIAM: 2.95 CM
ECHO AV AREA PEAK VELOCITY: 0.5 CM2
ECHO AV AREA VTI: 0.5 CM2
ECHO AV AREA/BSA PEAK VELOCITY: 0.3 CM2/M2
ECHO AV AREA/BSA VTI: 0.3 CM2/M2
ECHO AV MEAN GRADIENT: 29.7 MMHG
ECHO AV PEAK GRADIENT: 51.9 MMHG
ECHO AV PEAK VELOCITY: 360.35 CM/S
ECHO AV VTI: 86.69 CM
ECHO LA AREA 4C: 16.6 CM2
ECHO LA MAJOR AXIS: 3.61 CM
ECHO LA TO AORTIC ROOT RATIO: 1.22
ECHO LA VOL 2C: 31.35 ML (ref 18–58)
ECHO LA VOL 4C: 47.06 ML (ref 18–58)
ECHO LA VOL BP: 43.6 ML (ref 18–58)
ECHO LA VOL/BSA BIPLANE: 24.43 ML/M2 (ref 16–28)
ECHO LA VOLUME INDEX A2C: 17.56 ML/M2 (ref 16–28)
ECHO LA VOLUME INDEX A4C: 26.37 ML/M2 (ref 16–28)
ECHO LV E' LATERAL VELOCITY: 8.52 CENTIMETER/SECOND
ECHO LV E' SEPTAL VELOCITY: 7.01 CENTIMETER/SECOND
ECHO LV INTERNAL DIMENSION DIASTOLIC: 4.19 CM (ref 4.2–5.9)
ECHO LV INTERNAL DIMENSION SYSTOLIC: 3.1 CM
ECHO LV IVSD: 1.31 CM (ref 0.6–1)
ECHO LV MASS 2D: 214.8 G (ref 88–224)
ECHO LV MASS INDEX 2D: 120.3 G/M2 (ref 49–115)
ECHO LV POSTERIOR WALL DIASTOLIC: 1.14 CM (ref 0.6–1)
ECHO LVOT DIAM: 1.8 CM
ECHO LVOT PEAK GRADIENT: 2.2 MMHG
ECHO LVOT PEAK VELOCITY: 74.47 CM/S
ECHO LVOT SV: 47.5 ML
ECHO LVOT VTI: 18.58 CM
ECHO MV A VELOCITY: 91.18 CM/S
ECHO MV AREA PHT: 3.2 CM2
ECHO MV E DECELERATION TIME (DT): 240.4 MS
ECHO MV E VELOCITY: 89.39 CM/S
ECHO MV E/A RATIO: 0.98
ECHO MV PRESSURE HALF TIME (PHT): 69.7 MS
ECHO PULMONARY ARTERY SYSTOLIC PRESSURE (PASP): 29 MMHG
ECHO RV TAPSE: 1.7 CM (ref 1.5–2)
ECHO TV REGURGITANT MAX VELOCITY: 256.09 CM/S
ECHO TV REGURGITANT PEAK GRADIENT: 26.2 MMHG
EOSINOPHIL # BLD: 0 K/UL (ref 0–0.4)
EOSINOPHIL # BLD: 0.1 K/UL (ref 0–0.4)
EOSINOPHIL # BLD: 0.1 K/UL (ref 0–0.4)
EOSINOPHIL # BLD: 0.2 K/UL (ref 0–0.4)
EOSINOPHIL # BLD: 0.2 K/UL (ref 0–0.4)
EOSINOPHIL NFR BLD: 0 % (ref 0–7)
EOSINOPHIL NFR BLD: 1 % (ref 0–7)
EOSINOPHIL NFR BLD: 2 % (ref 0–7)
EOSINOPHIL NFR BLD: 2 % (ref 0–7)
EOSINOPHIL NFR BLD: 3 % (ref 0–7)
EPITH CASTS URNS QL MICRO: ABNORMAL /LPF
ERYTHROCYTE [DISTWIDTH] IN BLOOD BY AUTOMATED COUNT: 15 % (ref 11.5–14.5)
ERYTHROCYTE [DISTWIDTH] IN BLOOD BY AUTOMATED COUNT: 15.1 % (ref 11.5–14.5)
ERYTHROCYTE [DISTWIDTH] IN BLOOD BY AUTOMATED COUNT: 15.2 % (ref 11.5–14.5)
EST. AVERAGE GLUCOSE BLD GHB EST-MCNC: 126 MG/DL
GLOBULIN SER CALC-MCNC: 3.7 G/DL (ref 2–4)
GLOBULIN SER CALC-MCNC: 3.8 G/DL (ref 2–4)
GLOBULIN SER CALC-MCNC: 3.9 G/DL (ref 2–4)
GLUCOSE BLD STRIP.AUTO-MCNC: 88 MG/DL (ref 65–100)
GLUCOSE BLD STRIP.AUTO-MCNC: 94 MG/DL (ref 65–100)
GLUCOSE BLD STRIP.AUTO-MCNC: 97 MG/DL (ref 65–100)
GLUCOSE SERPL-MCNC: 73 MG/DL (ref 65–100)
GLUCOSE SERPL-MCNC: 82 MG/DL (ref 65–100)
GLUCOSE SERPL-MCNC: 87 MG/DL (ref 65–100)
GLUCOSE SERPL-MCNC: 95 MG/DL (ref 65–100)
GLUCOSE SERPL-MCNC: 95 MG/DL (ref 65–100)
GLUCOSE UR STRIP.AUTO-MCNC: NEGATIVE MG/DL
GRAM STN SPEC: ABNORMAL
HBA1C MFR BLD: 6 % (ref 4.2–6.3)
HCT VFR BLD AUTO: 38 % (ref 36.6–50.3)
HCT VFR BLD AUTO: 38.2 % (ref 36.6–50.3)
HCT VFR BLD AUTO: 39.4 % (ref 36.6–50.3)
HCT VFR BLD AUTO: 39.9 % (ref 36.6–50.3)
HCT VFR BLD AUTO: 40 % (ref 36.6–50.3)
HDLC SERPL-MCNC: 48 MG/DL
HDLC SERPL: 2.3 {RATIO} (ref 0–5)
HGB BLD-MCNC: 12.5 G/DL (ref 12.1–17)
HGB BLD-MCNC: 12.6 G/DL (ref 12.1–17)
HGB BLD-MCNC: 12.9 G/DL (ref 12.1–17)
HGB BLD-MCNC: 13 G/DL (ref 12.1–17)
HGB BLD-MCNC: 13.1 G/DL (ref 12.1–17)
HGB UR QL STRIP: ABNORMAL
HYALINE CASTS URNS QL MICRO: ABNORMAL /LPF (ref 0–5)
IMM GRANULOCYTES # BLD AUTO: 0 K/UL (ref 0–0.04)
IMM GRANULOCYTES # BLD AUTO: 0.1 K/UL (ref 0–0.04)
IMM GRANULOCYTES NFR BLD AUTO: 0 % (ref 0–0.5)
IMM GRANULOCYTES NFR BLD AUTO: 1 % (ref 0–0.5)
IMM GRANULOCYTES NFR BLD AUTO: 1 % (ref 0–0.5)
INR BLD: 5.4 (ref 0.9–1.2)
INR PPP: 1.3 (ref 0.9–1.1)
INR PPP: 2.6 (ref 0.9–1.1)
INR PPP: 7.3 (ref 0.9–1.1)
INR PPP: 7.7 (ref 0.9–1.1)
INR PPP: 8.1 (ref 0.9–1.1)
INR PPP: 8.1 (ref 0.9–1.1)
INR PPP: 9.7 (ref 0.9–1.1)
KETONES UR QL STRIP.AUTO: NEGATIVE MG/DL
LDLC SERPL CALC-MCNC: 55 MG/DL (ref 0–100)
LEFT CCA DIST DIAS: 11.6 CM/S
LEFT CCA DIST SYS: 63.3 CM/S
LEFT CCA PROX DIAS: 11.7 CM/S
LEFT CCA PROX SYS: 66.6 CM/S
LEFT ECA DIAS: 0 CM/S
LEFT ECA SYS: 175.6 CM/S
LEFT ICA DIST DIAS: 14.5 CM/S
LEFT ICA DIST SYS: 54.6 CM/S
LEFT ICA MID DIAS: 12.7 CM/S
LEFT ICA MID SYS: 58.1 CM/S
LEFT ICA PROX DIAS: 12.7 CM/S
LEFT ICA PROX SYS: 47.6 CM/S
LEFT ICA/CCA SYS: 0.92
LEFT SUBCLAVIAN DIAS: 0 CM/S
LEFT SUBCLAVIAN SYS: 96.8 CM/S
LEFT VERTEBRAL DIAS: 10.19 CM/S
LEFT VERTEBRAL SYS: 38.1 CM/S
LEUKOCYTE ESTERASE UR QL STRIP.AUTO: NEGATIVE
LIPASE SERPL-CCNC: 169 U/L (ref 73–393)
LIPID PROFILE,FLP: NORMAL
LYMPHOCYTES # BLD: 0.9 K/UL (ref 0.8–3.5)
LYMPHOCYTES # BLD: 1 K/UL (ref 0.8–3.5)
LYMPHOCYTES # BLD: 1 K/UL (ref 0.8–3.5)
LYMPHOCYTES # BLD: 1.3 K/UL (ref 0.8–3.5)
LYMPHOCYTES # BLD: 1.5 K/UL (ref 0.8–3.5)
LYMPHOCYTES NFR BLD: 13 % (ref 12–49)
LYMPHOCYTES NFR BLD: 16 % (ref 12–49)
LYMPHOCYTES NFR BLD: 18 % (ref 12–49)
LYMPHOCYTES NFR BLD: 20 % (ref 12–49)
LYMPHOCYTES NFR BLD: 6 % (ref 12–49)
MAGNESIUM SERPL-MCNC: 1.7 MG/DL (ref 1.6–2.4)
MAGNESIUM SERPL-MCNC: 2 MG/DL (ref 1.6–2.4)
MCH RBC QN AUTO: 29.4 PG (ref 26–34)
MCH RBC QN AUTO: 29.5 PG (ref 26–34)
MCH RBC QN AUTO: 29.6 PG (ref 26–34)
MCHC RBC AUTO-ENTMCNC: 32.6 G/DL (ref 30–36.5)
MCHC RBC AUTO-ENTMCNC: 32.7 G/DL (ref 30–36.5)
MCHC RBC AUTO-ENTMCNC: 32.8 G/DL (ref 30–36.5)
MCHC RBC AUTO-ENTMCNC: 32.9 G/DL (ref 30–36.5)
MCHC RBC AUTO-ENTMCNC: 33 G/DL (ref 30–36.5)
MCV RBC AUTO: 89.5 FL (ref 80–99)
MCV RBC AUTO: 89.7 FL (ref 80–99)
MCV RBC AUTO: 89.8 FL (ref 80–99)
MCV RBC AUTO: 90.1 FL (ref 80–99)
MCV RBC AUTO: 90.5 FL (ref 80–99)
MONOCYTES # BLD: 0.5 K/UL (ref 0–1)
MONOCYTES # BLD: 0.7 K/UL (ref 0–1)
MONOCYTES # BLD: 0.7 K/UL (ref 0–1)
MONOCYTES # BLD: 0.8 K/UL (ref 0–1)
MONOCYTES # BLD: 1.3 K/UL (ref 0–1)
MONOCYTES NFR BLD: 10 % (ref 5–13)
MONOCYTES NFR BLD: 11 % (ref 5–13)
MONOCYTES NFR BLD: 9 % (ref 5–13)
NEUTS SEG # BLD: 11.4 K/UL (ref 1.8–8)
NEUTS SEG # BLD: 4.1 K/UL (ref 1.8–8)
NEUTS SEG # BLD: 4.8 K/UL (ref 1.8–8)
NEUTS SEG # BLD: 5.4 K/UL (ref 1.8–8)
NEUTS SEG # BLD: 6.1 K/UL (ref 1.8–8)
NEUTS SEG NFR BLD: 67 % (ref 32–75)
NEUTS SEG NFR BLD: 71 % (ref 32–75)
NEUTS SEG NFR BLD: 73 % (ref 32–75)
NEUTS SEG NFR BLD: 73 % (ref 32–75)
NEUTS SEG NFR BLD: 84 % (ref 32–75)
NITRITE UR QL STRIP.AUTO: NEGATIVE
NRBC # BLD: 0 K/UL (ref 0–0.01)
NRBC BLD-RTO: 0 PER 100 WBC
P-R INTERVAL, ECG05: 148 MS
P-R INTERVAL, ECG05: 182 MS
PH UR STRIP: 6 [PH] (ref 5–8)
PHOSPHATE SERPL-MCNC: 2.3 MG/DL (ref 2.6–4.7)
PHOSPHATE SERPL-MCNC: 2.9 MG/DL (ref 2.6–4.7)
PLATELET # BLD AUTO: 243 K/UL (ref 150–400)
PLATELET # BLD AUTO: 250 K/UL (ref 150–400)
PLATELET # BLD AUTO: 259 K/UL (ref 150–400)
PLATELET # BLD AUTO: 292 K/UL (ref 150–400)
PLATELET # BLD AUTO: 330 K/UL (ref 150–400)
PMV BLD AUTO: 8.7 FL (ref 8.9–12.9)
PMV BLD AUTO: 8.8 FL (ref 8.9–12.9)
PMV BLD AUTO: 9.1 FL (ref 8.9–12.9)
PMV BLD AUTO: 9.3 FL (ref 8.9–12.9)
PMV BLD AUTO: 9.5 FL (ref 8.9–12.9)
POTASSIUM SERPL-SCNC: 3.4 MMOL/L (ref 3.5–5.1)
POTASSIUM SERPL-SCNC: 3.9 MMOL/L (ref 3.5–5.1)
POTASSIUM SERPL-SCNC: 3.9 MMOL/L (ref 3.5–5.1)
POTASSIUM SERPL-SCNC: 4.1 MMOL/L (ref 3.5–5.1)
POTASSIUM SERPL-SCNC: 4.3 MMOL/L (ref 3.5–5.1)
PROT SERPL-MCNC: 7.5 G/DL (ref 6.4–8.2)
PROT SERPL-MCNC: 7.5 G/DL (ref 6.4–8.2)
PROT SERPL-MCNC: 7.6 G/DL (ref 6.4–8.2)
PROT UR STRIP-MCNC: NEGATIVE MG/DL
PROTHROMBIN TIME: 12.9 SEC (ref 9–11.1)
PROTHROMBIN TIME: 25.2 SEC (ref 9–11.1)
PROTHROMBIN TIME: 66.9 SEC (ref 9–11.1)
PROTHROMBIN TIME: 70.2 SEC (ref 9–11.1)
PROTHROMBIN TIME: 73.2 SEC (ref 9–11.1)
PROTHROMBIN TIME: 73.3 SEC (ref 9–11.1)
PROTHROMBIN TIME: 87.3 SEC (ref 9–11.1)
Q-T INTERVAL, ECG07: 440 MS
Q-T INTERVAL, ECG07: 458 MS
QRS DURATION, ECG06: 126 MS
QRS DURATION, ECG06: 140 MS
QTC CALCULATION (BEZET), ECG08: 450 MS
QTC CALCULATION (BEZET), ECG08: 480 MS
RBC # BLD AUTO: 4.23 M/UL (ref 4.1–5.7)
RBC # BLD AUTO: 4.27 M/UL (ref 4.1–5.7)
RBC # BLD AUTO: 4.39 M/UL (ref 4.1–5.7)
RBC # BLD AUTO: 4.41 M/UL (ref 4.1–5.7)
RBC # BLD AUTO: 4.44 M/UL (ref 4.1–5.7)
RBC #/AREA URNS HPF: ABNORMAL /HPF (ref 0–5)
RIGHT CCA DIST DIAS: 18.9 CM/S
RIGHT CCA DIST SYS: 153 CM/S
RIGHT CCA PROX DIAS: 9.5 CM/S
RIGHT CCA PROX SYS: 73.2 CM/S
RIGHT ECA DIAS: 0 CM/S
RIGHT ECA SYS: 102.7 CM/S
RIGHT ICA DIST DIAS: 10.2 CM/S
RIGHT ICA DIST SYS: 44.2 CM/S
RIGHT ICA MID DIAS: 11.3 CM/S
RIGHT ICA MID SYS: 42 CM/S
RIGHT ICA PROX DIAS: 7.3 CM/S
RIGHT ICA PROX SYS: 55.6 CM/S
RIGHT ICA/CCA SYS: 0.4
RIGHT SUBCLAVIAN DIAS: 0 CM/S
RIGHT SUBCLAVIAN SYS: 101.6 CM/S
RIGHT VERTEBRAL DIAS: 5.81 CM/S
RIGHT VERTEBRAL SYS: 22.2 CM/S
SAMPLES BEING HELD,HOLD: NORMAL
SAMPLES BEING HELD,HOLD: NORMAL
SERVICE CMNT-IMP: ABNORMAL
SERVICE CMNT-IMP: NORMAL
SODIUM SERPL-SCNC: 139 MMOL/L (ref 136–145)
SODIUM SERPL-SCNC: 139 MMOL/L (ref 136–145)
SODIUM SERPL-SCNC: 140 MMOL/L (ref 136–145)
SODIUM SERPL-SCNC: 141 MMOL/L (ref 136–145)
SODIUM SERPL-SCNC: 142 MMOL/L (ref 136–145)
SP GR UR REFRACTOMETRY: 1.02 (ref 1–1.03)
THERAPEUTIC RANGE,PTTT: ABNORMAL SECS (ref 58–77)
THERAPEUTIC RANGE,PTTT: NORMAL SECS (ref 58–77)
TRIGL SERPL-MCNC: 35 MG/DL (ref ?–150)
TROPONIN I SERPL-MCNC: <0.05 NG/ML
UA: UC IF INDICATED,UAUC: ABNORMAL
UROBILINOGEN UR QL STRIP.AUTO: 1 EU/DL (ref 0.2–1)
VENTRICULAR RATE, ECG03: 63 BPM
VENTRICULAR RATE, ECG03: 66 BPM
VLDLC SERPL CALC-MCNC: 7 MG/DL
WBC # BLD AUTO: 13.7 K/UL (ref 4.1–11.1)
WBC # BLD AUTO: 5.8 K/UL (ref 4.1–11.1)
WBC # BLD AUTO: 7.3 K/UL (ref 4.1–11.1)
WBC # BLD AUTO: 7.4 K/UL (ref 4.1–11.1)
WBC # BLD AUTO: 8.2 K/UL (ref 4.1–11.1)
WBC URNS QL MICRO: ABNORMAL /HPF (ref 0–4)

## 2019-01-01 PROCEDURE — 92610 EVALUATE SWALLOWING FUNCTION: CPT

## 2019-01-01 PROCEDURE — 82962 GLUCOSE BLOOD TEST: CPT

## 2019-01-01 PROCEDURE — 85025 COMPLETE CBC W/AUTO DIFF WBC: CPT

## 2019-01-01 PROCEDURE — 51798 US URINE CAPACITY MEASURE: CPT

## 2019-01-01 PROCEDURE — 80053 COMPREHEN METABOLIC PANEL: CPT

## 2019-01-01 PROCEDURE — 74011000250 HC RX REV CODE- 250: Performed by: INTERNAL MEDICINE

## 2019-01-01 PROCEDURE — 94640 AIRWAY INHALATION TREATMENT: CPT

## 2019-01-01 PROCEDURE — 74011250637 HC RX REV CODE- 250/637: Performed by: INTERNAL MEDICINE

## 2019-01-01 PROCEDURE — 36415 COLL VENOUS BLD VENIPUNCTURE: CPT

## 2019-01-01 PROCEDURE — 85610 PROTHROMBIN TIME: CPT

## 2019-01-01 PROCEDURE — 74011250636 HC RX REV CODE- 250/636: Performed by: EMERGENCY MEDICINE

## 2019-01-01 PROCEDURE — 83735 ASSAY OF MAGNESIUM: CPT

## 2019-01-01 PROCEDURE — 65390000012 HC CONDITION CODE 44 OBSERVATION

## 2019-01-01 PROCEDURE — 70544 MR ANGIOGRAPHY HEAD W/O DYE: CPT

## 2019-01-01 PROCEDURE — A9575 INJ GADOTERATE MEGLUMI 0.1ML: HCPCS | Performed by: EMERGENCY MEDICINE

## 2019-01-01 PROCEDURE — 99218 HC RM OBSERVATION: CPT

## 2019-01-01 PROCEDURE — 93005 ELECTROCARDIOGRAM TRACING: CPT

## 2019-01-01 PROCEDURE — 93880 EXTRACRANIAL BILAT STUDY: CPT

## 2019-01-01 PROCEDURE — 84484 ASSAY OF TROPONIN QUANT: CPT

## 2019-01-01 PROCEDURE — 87186 SC STD MICRODIL/AGAR DIL: CPT

## 2019-01-01 PROCEDURE — 94760 N-INVAS EAR/PLS OXIMETRY 1: CPT

## 2019-01-01 PROCEDURE — 85730 THROMBOPLASTIN TIME PARTIAL: CPT

## 2019-01-01 PROCEDURE — 84100 ASSAY OF PHOSPHORUS: CPT

## 2019-01-01 PROCEDURE — 71250 CT THORAX DX C-: CPT

## 2019-01-01 PROCEDURE — 74176 CT ABD & PELVIS W/O CONTRAST: CPT

## 2019-01-01 PROCEDURE — 81001 URINALYSIS AUTO W/SCOPE: CPT

## 2019-01-01 PROCEDURE — 97112 NEUROMUSCULAR REEDUCATION: CPT

## 2019-01-01 PROCEDURE — C8929 TTE W OR WO FOL WCON,DOPPLER: HCPCS

## 2019-01-01 PROCEDURE — 70551 MRI BRAIN STEM W/O DYE: CPT

## 2019-01-01 PROCEDURE — 74011250636 HC RX REV CODE- 250/636: Performed by: INTERNAL MEDICINE

## 2019-01-01 PROCEDURE — 99285 EMERGENCY DEPT VISIT HI MDM: CPT

## 2019-01-01 PROCEDURE — 77030027138 HC INCENT SPIROMETER -A

## 2019-01-01 PROCEDURE — 97161 PT EVAL LOW COMPLEX 20 MIN: CPT

## 2019-01-01 PROCEDURE — 83690 ASSAY OF LIPASE: CPT

## 2019-01-01 PROCEDURE — 77010033678 HC OXYGEN DAILY

## 2019-01-01 PROCEDURE — 65660000000 HC RM CCU STEPDOWN

## 2019-01-01 PROCEDURE — 99284 EMERGENCY DEPT VISIT MOD MDM: CPT

## 2019-01-01 PROCEDURE — 96374 THER/PROPH/DIAG INJ IV PUSH: CPT

## 2019-01-01 PROCEDURE — 80048 BASIC METABOLIC PNL TOTAL CA: CPT

## 2019-01-01 PROCEDURE — 87077 CULTURE AEROBIC IDENTIFY: CPT

## 2019-01-01 PROCEDURE — 80061 LIPID PANEL: CPT

## 2019-01-01 PROCEDURE — 97165 OT EVAL LOW COMPLEX 30 MIN: CPT

## 2019-01-01 PROCEDURE — 97116 GAIT TRAINING THERAPY: CPT

## 2019-01-01 PROCEDURE — 83036 HEMOGLOBIN GLYCOSYLATED A1C: CPT

## 2019-01-01 PROCEDURE — 87070 CULTURE OTHR SPECIMN AEROBIC: CPT

## 2019-01-01 PROCEDURE — 77030029684 HC NEB SM VOL KT MONA -A

## 2019-01-01 PROCEDURE — 74011250637 HC RX REV CODE- 250/637: Performed by: EMERGENCY MEDICINE

## 2019-01-01 PROCEDURE — 92526 ORAL FUNCTION THERAPY: CPT

## 2019-01-01 PROCEDURE — 70450 CT HEAD/BRAIN W/O DYE: CPT

## 2019-01-01 PROCEDURE — 70553 MRI BRAIN STEM W/O & W/DYE: CPT

## 2019-01-01 PROCEDURE — 71045 X-RAY EXAM CHEST 1 VIEW: CPT

## 2019-01-01 PROCEDURE — P9612 CATHETERIZE FOR URINE SPEC: HCPCS

## 2019-01-01 PROCEDURE — 97535 SELF CARE MNGMENT TRAINING: CPT

## 2019-01-01 RX ORDER — OXYCODONE HYDROCHLORIDE 5 MG/1
2.5 TABLET ORAL
Status: DISCONTINUED | OUTPATIENT
Start: 2019-01-01 | End: 2019-01-01 | Stop reason: HOSPADM

## 2019-01-01 RX ORDER — BUDESONIDE 0.5 MG/2ML
500 INHALANT ORAL
Status: DISCONTINUED | OUTPATIENT
Start: 2019-01-01 | End: 2019-01-01 | Stop reason: HOSPADM

## 2019-01-01 RX ORDER — SODIUM CHLORIDE 0.9 % (FLUSH) 0.9 %
5-40 SYRINGE (ML) INJECTION EVERY 8 HOURS
Status: DISCONTINUED | OUTPATIENT
Start: 2019-01-01 | End: 2019-01-01 | Stop reason: HOSPADM

## 2019-01-01 RX ORDER — ACETAMINOPHEN 325 MG/1
650 TABLET ORAL
Status: DISCONTINUED | OUTPATIENT
Start: 2019-01-01 | End: 2019-01-01 | Stop reason: HOSPADM

## 2019-01-01 RX ORDER — BUPROPION HYDROCHLORIDE 100 MG/1
100 TABLET ORAL 2 TIMES DAILY
COMMUNITY
End: 2019-01-01

## 2019-01-01 RX ORDER — BUPROPION HYDROCHLORIDE 150 MG/1
150 TABLET, EXTENDED RELEASE ORAL 2 TIMES DAILY
Status: DISCONTINUED | OUTPATIENT
Start: 2019-01-01 | End: 2019-01-01 | Stop reason: HOSPADM

## 2019-01-01 RX ORDER — PREDNISONE 10 MG/1
TABLET ORAL
Qty: 45 TAB | Refills: 0 | Status: ON HOLD | OUTPATIENT
Start: 2019-01-01 | End: 2020-01-01

## 2019-01-01 RX ORDER — GADOTERATE MEGLUMINE 376.9 MG/ML
6 INJECTION INTRAVENOUS
Status: COMPLETED | OUTPATIENT
Start: 2019-01-01 | End: 2019-01-01

## 2019-01-01 RX ORDER — MORPHINE SULFATE 2 MG/ML
2 INJECTION, SOLUTION INTRAMUSCULAR; INTRAVENOUS
Status: COMPLETED | OUTPATIENT
Start: 2019-01-01 | End: 2019-01-01

## 2019-01-01 RX ORDER — GUAIFENESIN 600 MG/1
1200 TABLET, EXTENDED RELEASE ORAL EVERY 12 HOURS
Status: DISCONTINUED | OUTPATIENT
Start: 2019-01-01 | End: 2019-01-01 | Stop reason: HOSPADM

## 2019-01-01 RX ORDER — ESCITALOPRAM OXALATE 10 MG/1
10 TABLET ORAL DAILY
Status: DISCONTINUED | OUTPATIENT
Start: 2019-01-01 | End: 2019-01-01 | Stop reason: HOSPADM

## 2019-01-01 RX ORDER — DOXYCYCLINE HYCLATE 100 MG
100 TABLET ORAL EVERY 12 HOURS
Status: DISCONTINUED | OUTPATIENT
Start: 2019-01-01 | End: 2019-01-01 | Stop reason: HOSPADM

## 2019-01-01 RX ORDER — ALBUTEROL SULFATE 0.83 MG/ML
2.5 SOLUTION RESPIRATORY (INHALATION)
Status: DISCONTINUED | OUTPATIENT
Start: 2019-01-01 | End: 2019-01-01 | Stop reason: HOSPADM

## 2019-01-01 RX ORDER — ARFORMOTEROL TARTRATE 15 UG/2ML
15 SOLUTION RESPIRATORY (INHALATION)
Status: DISCONTINUED | OUTPATIENT
Start: 2019-01-01 | End: 2019-01-01 | Stop reason: HOSPADM

## 2019-01-01 RX ORDER — ATORVASTATIN CALCIUM 20 MG/1
80 TABLET, FILM COATED ORAL EVERY EVENING
Status: DISCONTINUED | OUTPATIENT
Start: 2019-01-01 | End: 2019-01-01 | Stop reason: HOSPADM

## 2019-01-01 RX ORDER — LATANOPROST 50 UG/ML
1 SOLUTION/ DROPS OPHTHALMIC
COMMUNITY
Start: 2020-01-01

## 2019-01-01 RX ORDER — SODIUM CHLORIDE 0.9 % (FLUSH) 0.9 %
5-40 SYRINGE (ML) INJECTION AS NEEDED
Status: DISCONTINUED | OUTPATIENT
Start: 2019-01-01 | End: 2019-01-01 | Stop reason: HOSPADM

## 2019-01-01 RX ORDER — IPRATROPIUM BROMIDE AND ALBUTEROL SULFATE 2.5; .5 MG/3ML; MG/3ML
3 SOLUTION RESPIRATORY (INHALATION)
Status: DISCONTINUED | OUTPATIENT
Start: 2019-01-01 | End: 2019-01-01 | Stop reason: HOSPADM

## 2019-01-01 RX ORDER — LATANOPROST 50 UG/ML
1 SOLUTION/ DROPS OPHTHALMIC
Status: DISCONTINUED | OUTPATIENT
Start: 2019-01-01 | End: 2019-01-01 | Stop reason: HOSPADM

## 2019-01-01 RX ORDER — ISOSORBIDE MONONITRATE 10 MG/1
10 TABLET ORAL 3 TIMES DAILY
Status: DISCONTINUED | OUTPATIENT
Start: 2019-01-01 | End: 2019-01-01 | Stop reason: HOSPADM

## 2019-01-01 RX ORDER — AMLODIPINE BESYLATE 10 MG/1
10 TABLET ORAL DAILY
COMMUNITY
End: 2020-01-01

## 2019-01-01 RX ORDER — VALACYCLOVIR HYDROCHLORIDE 1 G/1
1000 TABLET, FILM COATED ORAL 3 TIMES DAILY
Qty: 21 TAB | Refills: 0 | Status: SHIPPED | OUTPATIENT
Start: 2019-01-01 | End: 2019-01-01

## 2019-01-01 RX ORDER — BUPROPION HYDROCHLORIDE 150 MG/1
150 TABLET, EXTENDED RELEASE ORAL 2 TIMES DAILY
Status: ON HOLD | COMMUNITY
End: 2020-01-01

## 2019-01-01 RX ORDER — CLONAZEPAM 1 MG/1
0.5 TABLET ORAL 3 TIMES DAILY
Status: DISCONTINUED | OUTPATIENT
Start: 2019-01-01 | End: 2019-01-01 | Stop reason: HOSPADM

## 2019-01-01 RX ORDER — ACETAMINOPHEN 650 MG/1
650 SUPPOSITORY RECTAL
Status: DISCONTINUED | OUTPATIENT
Start: 2019-01-01 | End: 2019-01-01 | Stop reason: HOSPADM

## 2019-01-01 RX ADMIN — IPRATROPIUM BROMIDE AND ALBUTEROL SULFATE 3 ML: .5; 3 SOLUTION RESPIRATORY (INHALATION) at 04:39

## 2019-01-01 RX ADMIN — CLONAZEPAM 0.5 MG: 1 TABLET ORAL at 16:20

## 2019-01-01 RX ADMIN — IPRATROPIUM BROMIDE AND ALBUTEROL SULFATE 3 ML: .5; 3 SOLUTION RESPIRATORY (INHALATION) at 04:31

## 2019-01-01 RX ADMIN — LATANOPROST 1 DROP: 50 SOLUTION OPHTHALMIC at 21:06

## 2019-01-01 RX ADMIN — BUDESONIDE 500 MCG: 0.5 INHALANT RESPIRATORY (INHALATION) at 08:07

## 2019-01-01 RX ADMIN — CLONAZEPAM 0.5 MG: 1 TABLET ORAL at 23:52

## 2019-01-01 RX ADMIN — CLONAZEPAM 0.5 MG: 1 TABLET ORAL at 21:06

## 2019-01-01 RX ADMIN — CLONAZEPAM 0.5 MG: 1 TABLET ORAL at 09:16

## 2019-01-01 RX ADMIN — IPRATROPIUM BROMIDE AND ALBUTEROL SULFATE 3 ML: .5; 3 SOLUTION RESPIRATORY (INHALATION) at 00:47

## 2019-01-01 RX ADMIN — DEXTRAN 70 AND HYPROMELLOSE 2910 2 DROP: 1; 3 SOLUTION/ DROPS OPHTHALMIC at 17:16

## 2019-01-01 RX ADMIN — LATANOPROST 1 DROP: 50 SOLUTION OPHTHALMIC at 21:47

## 2019-01-01 RX ADMIN — BUPROPION HYDROCHLORIDE 150 MG: 150 TABLET, EXTENDED RELEASE ORAL at 08:23

## 2019-01-01 RX ADMIN — IPRATROPIUM BROMIDE AND ALBUTEROL SULFATE 3 ML: .5; 3 SOLUTION RESPIRATORY (INHALATION) at 08:29

## 2019-01-01 RX ADMIN — BUDESONIDE 500 MCG: 0.5 INHALANT RESPIRATORY (INHALATION) at 08:29

## 2019-01-01 RX ADMIN — GUAIFENESIN 1200 MG: 600 TABLET, EXTENDED RELEASE ORAL at 10:24

## 2019-01-01 RX ADMIN — Medication 10 ML: at 21:05

## 2019-01-01 RX ADMIN — CLONAZEPAM 0.5 MG: 1 TABLET ORAL at 08:23

## 2019-01-01 RX ADMIN — ATORVASTATIN CALCIUM 80 MG: 20 TABLET, FILM COATED ORAL at 17:49

## 2019-01-01 RX ADMIN — IPRATROPIUM BROMIDE AND ALBUTEROL SULFATE 3 ML: .5; 3 SOLUTION RESPIRATORY (INHALATION) at 11:11

## 2019-01-01 RX ADMIN — Medication 10 ML: at 05:56

## 2019-01-01 RX ADMIN — IPRATROPIUM BROMIDE AND ALBUTEROL SULFATE 3 ML: .5; 3 SOLUTION RESPIRATORY (INHALATION) at 22:12

## 2019-01-01 RX ADMIN — Medication 10 ML: at 05:12

## 2019-01-01 RX ADMIN — BUPROPION HYDROCHLORIDE 150 MG: 150 TABLET, EXTENDED RELEASE ORAL at 08:03

## 2019-01-01 RX ADMIN — MORPHINE SULFATE 2 MG: 2 INJECTION, SOLUTION INTRAMUSCULAR; INTRAVENOUS at 21:29

## 2019-01-01 RX ADMIN — DOXYCYCLINE HYCLATE 100 MG: 100 TABLET, COATED ORAL at 20:46

## 2019-01-01 RX ADMIN — ESCITALOPRAM OXALATE 10 MG: 10 TABLET ORAL at 09:16

## 2019-01-01 RX ADMIN — IPRATROPIUM BROMIDE AND ALBUTEROL SULFATE 3 ML: .5; 3 SOLUTION RESPIRATORY (INHALATION) at 11:33

## 2019-01-01 RX ADMIN — IPRATROPIUM BROMIDE AND ALBUTEROL SULFATE 3 ML: .5; 3 SOLUTION RESPIRATORY (INHALATION) at 15:27

## 2019-01-01 RX ADMIN — GUAIFENESIN 1200 MG: 600 TABLET, EXTENDED RELEASE ORAL at 09:16

## 2019-01-01 RX ADMIN — IPRATROPIUM BROMIDE AND ALBUTEROL SULFATE 3 ML: .5; 3 SOLUTION RESPIRATORY (INHALATION) at 11:49

## 2019-01-01 RX ADMIN — PHYTONADIONE 1.25 MG: 10 INJECTION, EMULSION INTRAMUSCULAR; INTRAVENOUS; SUBCUTANEOUS at 11:26

## 2019-01-01 RX ADMIN — SODIUM CHLORIDE 500 ML: 900 INJECTION, SOLUTION INTRAVENOUS at 21:29

## 2019-01-01 RX ADMIN — SODIUM CHLORIDE 2 ML: 9 INJECTION INTRAMUSCULAR; INTRAVENOUS; SUBCUTANEOUS at 10:29

## 2019-01-01 RX ADMIN — GUAIFENESIN 1200 MG: 600 TABLET, EXTENDED RELEASE ORAL at 20:46

## 2019-01-01 RX ADMIN — IPRATROPIUM BROMIDE AND ALBUTEROL SULFATE 3 ML: .5; 3 SOLUTION RESPIRATORY (INHALATION) at 20:58

## 2019-01-01 RX ADMIN — CLONAZEPAM 0.5 MG: 1 TABLET ORAL at 08:02

## 2019-01-01 RX ADMIN — DOXYCYCLINE HYCLATE 100 MG: 100 TABLET, COATED ORAL at 09:16

## 2019-01-01 RX ADMIN — GUAIFENESIN 1200 MG: 600 TABLET, EXTENDED RELEASE ORAL at 08:22

## 2019-01-01 RX ADMIN — IPRATROPIUM BROMIDE AND ALBUTEROL SULFATE 3 ML: .5; 3 SOLUTION RESPIRATORY (INHALATION) at 20:55

## 2019-01-01 RX ADMIN — DOXYCYCLINE HYCLATE 100 MG: 100 TABLET, COATED ORAL at 08:23

## 2019-01-01 RX ADMIN — BUDESONIDE 500 MCG: 0.5 INHALANT RESPIRATORY (INHALATION) at 08:23

## 2019-01-01 RX ADMIN — IPRATROPIUM BROMIDE AND ALBUTEROL SULFATE 3 ML: .5; 3 SOLUTION RESPIRATORY (INHALATION) at 15:33

## 2019-01-01 RX ADMIN — IPRATROPIUM BROMIDE AND ALBUTEROL SULFATE 3 ML: .5; 3 SOLUTION RESPIRATORY (INHALATION) at 08:07

## 2019-01-01 RX ADMIN — DOXYCYCLINE HYCLATE 100 MG: 100 TABLET, COATED ORAL at 10:24

## 2019-01-01 RX ADMIN — BUPROPION HYDROCHLORIDE 150 MG: 150 TABLET, EXTENDED RELEASE ORAL at 18:49

## 2019-01-01 RX ADMIN — ESCITALOPRAM OXALATE 10 MG: 10 TABLET ORAL at 08:22

## 2019-01-01 RX ADMIN — GADOTERATE MEGLUMINE 6 ML: 376.9 INJECTION INTRAVENOUS at 16:34

## 2019-01-01 RX ADMIN — ATORVASTATIN CALCIUM 80 MG: 20 TABLET, FILM COATED ORAL at 18:49

## 2019-01-01 RX ADMIN — BUDESONIDE 500 MCG: 0.5 INHALANT RESPIRATORY (INHALATION) at 20:58

## 2019-01-01 RX ADMIN — Medication 10 ML: at 16:20

## 2019-01-01 RX ADMIN — LATANOPROST 1 DROP: 50 SOLUTION OPHTHALMIC at 00:15

## 2019-01-01 RX ADMIN — BUPROPION HYDROCHLORIDE 150 MG: 150 TABLET, EXTENDED RELEASE ORAL at 17:49

## 2019-01-01 RX ADMIN — DOXYCYCLINE HYCLATE 100 MG: 100 TABLET, COATED ORAL at 21:05

## 2019-01-01 RX ADMIN — Medication 10 ML: at 15:07

## 2019-01-01 RX ADMIN — ESCITALOPRAM OXALATE 10 MG: 10 TABLET ORAL at 08:03

## 2019-01-01 RX ADMIN — CLONAZEPAM 0.5 MG: 1 TABLET ORAL at 21:51

## 2019-01-01 RX ADMIN — CLONAZEPAM 0.5 MG: 1 TABLET ORAL at 15:07

## 2019-01-01 RX ADMIN — GUAIFENESIN 1200 MG: 600 TABLET, EXTENDED RELEASE ORAL at 21:05

## 2019-01-01 RX ADMIN — BUDESONIDE 500 MCG: 0.5 INHALANT RESPIRATORY (INHALATION) at 20:55

## 2019-01-01 RX ADMIN — BUPROPION HYDROCHLORIDE 150 MG: 150 TABLET, EXTENDED RELEASE ORAL at 09:16

## 2019-01-01 RX ADMIN — IPRATROPIUM BROMIDE AND ALBUTEROL SULFATE 3 ML: .5; 3 SOLUTION RESPIRATORY (INHALATION) at 08:23

## 2019-01-01 RX ADMIN — BUDESONIDE 500 MCG: 0.5 INHALANT RESPIRATORY (INHALATION) at 22:12

## 2019-03-17 ENCOUNTER — APPOINTMENT (OUTPATIENT)
Dept: GENERAL RADIOLOGY | Age: 74
End: 2019-03-17
Attending: EMERGENCY MEDICINE
Payer: MEDICARE

## 2019-03-17 ENCOUNTER — HOSPITAL ENCOUNTER (EMERGENCY)
Age: 74
Discharge: HOME | End: 2019-03-17
Attending: EMERGENCY MEDICINE
Payer: MEDICARE

## 2019-03-17 VITALS
HEIGHT: 66 IN | WEIGHT: 154 LBS | SYSTOLIC BLOOD PRESSURE: 136 MMHG | HEART RATE: 58 BPM | DIASTOLIC BLOOD PRESSURE: 68 MMHG | BODY MASS INDEX: 24.75 KG/M2 | RESPIRATION RATE: 20 BRPM | OXYGEN SATURATION: 96 % | TEMPERATURE: 97.4 F

## 2019-03-17 DIAGNOSIS — J44.1 COPD EXACERBATION (HCC): Primary | ICD-10-CM

## 2019-03-17 LAB
ANION GAP SERPL CALC-SCNC: 5 MMOL/L (ref 5–15)
BASOPHILS # BLD: 0 K/UL (ref 0–0.1)
BASOPHILS NFR BLD: 1 % (ref 0–1)
BUN SERPL-MCNC: 12 MG/DL (ref 6–20)
BUN/CREAT SERPL: 11 (ref 12–20)
CALCIUM SERPL-MCNC: 9.2 MG/DL (ref 8.5–10.1)
CHLORIDE SERPL-SCNC: 106 MMOL/L (ref 97–108)
CO2 SERPL-SCNC: 28 MMOL/L (ref 21–32)
CREAT SERPL-MCNC: 1.09 MG/DL (ref 0.7–1.3)
DIFFERENTIAL METHOD BLD: NORMAL
EOSINOPHIL # BLD: 0.1 K/UL (ref 0–0.4)
EOSINOPHIL NFR BLD: 2 % (ref 0–7)
ERYTHROCYTE [DISTWIDTH] IN BLOOD BY AUTOMATED COUNT: 14.5 % (ref 11.5–14.5)
FLUAV AG NPH QL IA: NEGATIVE
FLUBV AG NOSE QL IA: NEGATIVE
GLUCOSE SERPL-MCNC: 93 MG/DL (ref 65–100)
HCT VFR BLD AUTO: 40.4 % (ref 36.6–50.3)
HGB BLD-MCNC: 13.1 G/DL (ref 12.1–17)
IMM GRANULOCYTES # BLD AUTO: 0 K/UL (ref 0–0.04)
IMM GRANULOCYTES NFR BLD AUTO: 0 % (ref 0–0.5)
LYMPHOCYTES # BLD: 1.1 K/UL (ref 0.8–3.5)
LYMPHOCYTES NFR BLD: 19 % (ref 12–49)
MCH RBC QN AUTO: 29.4 PG (ref 26–34)
MCHC RBC AUTO-ENTMCNC: 32.4 G/DL (ref 30–36.5)
MCV RBC AUTO: 90.6 FL (ref 80–99)
MONOCYTES # BLD: 0.5 K/UL (ref 0–1)
MONOCYTES NFR BLD: 8 % (ref 5–13)
NEUTS SEG # BLD: 4.1 K/UL (ref 1.8–8)
NEUTS SEG NFR BLD: 70 % (ref 32–75)
NRBC # BLD: 0 K/UL (ref 0–0.01)
NRBC BLD-RTO: 0 PER 100 WBC
PLATELET # BLD AUTO: 255 K/UL (ref 150–400)
PMV BLD AUTO: 9.4 FL (ref 8.9–12.9)
POTASSIUM SERPL-SCNC: 3.9 MMOL/L (ref 3.5–5.1)
RBC # BLD AUTO: 4.46 M/UL (ref 4.1–5.7)
SODIUM SERPL-SCNC: 139 MMOL/L (ref 136–145)
TROPONIN I SERPL-MCNC: <0.05 NG/ML
WBC # BLD AUTO: 5.9 K/UL (ref 4.1–11.1)

## 2019-03-17 PROCEDURE — 87804 INFLUENZA ASSAY W/OPTIC: CPT

## 2019-03-17 PROCEDURE — 93005 ELECTROCARDIOGRAM TRACING: CPT

## 2019-03-17 PROCEDURE — 77030029684 HC NEB SM VOL KT MONA -A

## 2019-03-17 PROCEDURE — 85025 COMPLETE CBC W/AUTO DIFF WBC: CPT

## 2019-03-17 PROCEDURE — 36415 COLL VENOUS BLD VENIPUNCTURE: CPT

## 2019-03-17 PROCEDURE — 80048 BASIC METABOLIC PNL TOTAL CA: CPT

## 2019-03-17 PROCEDURE — 99284 EMERGENCY DEPT VISIT MOD MDM: CPT

## 2019-03-17 PROCEDURE — 84484 ASSAY OF TROPONIN QUANT: CPT

## 2019-03-17 PROCEDURE — 71046 X-RAY EXAM CHEST 2 VIEWS: CPT

## 2019-03-17 PROCEDURE — 94640 AIRWAY INHALATION TREATMENT: CPT

## 2019-03-17 PROCEDURE — 74011000250 HC RX REV CODE- 250: Performed by: EMERGENCY MEDICINE

## 2019-03-17 PROCEDURE — 74011636637 HC RX REV CODE- 636/637: Performed by: EMERGENCY MEDICINE

## 2019-03-17 RX ORDER — ALBUTEROL SULFATE 0.83 MG/ML
2.5 SOLUTION RESPIRATORY (INHALATION)
Qty: 30 EACH | Refills: 0 | Status: SHIPPED | OUTPATIENT
Start: 2019-03-17

## 2019-03-17 RX ORDER — PREDNISONE 20 MG/1
60 TABLET ORAL
Status: COMPLETED | OUTPATIENT
Start: 2019-03-17 | End: 2019-03-17

## 2019-03-17 RX ORDER — DOXYCYCLINE HYCLATE 100 MG
100 TABLET ORAL 2 TIMES DAILY
Qty: 14 TAB | Refills: 0 | Status: SHIPPED | OUTPATIENT
Start: 2019-03-17 | End: 2019-03-24

## 2019-03-17 RX ORDER — PREDNISONE 10 MG/1
TABLET ORAL
Qty: 30 TAB | Refills: 0 | Status: SHIPPED | OUTPATIENT
Start: 2019-03-17 | End: 2019-01-01

## 2019-03-17 RX ADMIN — PREDNISONE 60 MG: 20 TABLET ORAL at 12:34

## 2019-03-17 RX ADMIN — ALBUTEROL SULFATE 1 DOSE: 2.5 SOLUTION RESPIRATORY (INHALATION) at 12:34

## 2019-03-17 NOTE — DISCHARGE INSTRUCTIONS

## 2019-03-17 NOTE — ED NOTES
MD reviewed discharge instructions and options with patient; patient verbalized understanding. Pt. Ambulated to exit without difficulty and in no signs of acute distress. Patient will follow up as discussed.

## 2019-03-17 NOTE — ED PROVIDER NOTES
I have evaluated the patient as the Provider in Triage. I have reviewed His vital signs and the triage nurse assessment. I have talked with the patient and any available family and advised that I am the provider in triage and have ordered the appropriate study to initiate their work up based on the clinical presentation during my assessment. I have advised that the patient will be accommodated in the Main ED as soon as possible. I have also requested to contact the triage nurse or myself immediately if the patient experiences any changes in their condition during this brief waiting period. Note written by Tee Ahn, as dictated by Dirk Becerril., MD 11:47 AM    67 y/o M with PMH of COPD, tobacco abuse, and CAD presents with chief complaint of \"it feels like I have a pneumonia\". Pt reports worsening cough, sputum, and SOB over the past 2-3 weeks. Sputum is yellow. SOB worsened with exertion. Pt does not require home O2. Pt with increased use of albuterol at home, 3-4 times per day. Pt says he still smokes, about 3 pipes per day. Pt denies any fever, chills, dizziness, LOC, chest pain, palpitations, weakness, or numbness. Loly Lamb MD. Patient seen and examined with Dr. Ventura Todd. History obtained from patient. Chart reviewed.                 Past Medical History:   Diagnosis Date    CAD (coronary artery disease)     Cancer ([facility] [address] \"10 years ago\"    throat    COPD (chronic obstructive pulmonary disease) ([address]     CVA (cerebral vascular accident) ([facility] [address]     H/O mechanical aortic valve replacement     CT surgery 1994 - CABG ([facility] to [facility]) and [facility]R ([facility])    S/P CABG x 1     CT surgery 1994 - CABG ([facility] to [facility]) and [facility]R ([facility])       Past Surgical History:   Procedure Laterality Date    HX HEART VALVE SURGERY      CT surgery 1994 - CABG ([facility] to [facility]) and AVR ([facility])         Family History:   Problem Relation Age of Onset    Heart Disease Mother     Heart Disease Sister Social History     Socioeconomic History    Marital status:      Spouse name: Not on file    Number of children: Not on file    Years of education: Not on file    Highest education level: Not on file   Social Needs    Financial resource strain: Not on file    Food insecurity - worry: Not on file    Food insecurity - inability: Not on file    Transportation needs - medical: Not on file   [place] needs - non-medical: Not on file   Occupational History    Not on file   Tobacco Use    Smoking status: Current Every Day Smoker    Smokeless tobacco: Never Used    Tobacco comment: pipe   Substance and Sexual Activity    Alcohol use: No    Drug use: No    Sexual activity: Not on file   Other Topics Concern    Not on file   Social History Narrative    Not on file         ALLERGIES: Iodinated contrast- oral and iv dye and Pcn [penicillins]    Review of Systems   Constitutional: Negative for chills and fatigue. HENT: Negative for congestion and sneezing. Respiratory: Positive for shortness of breath and wheezing. Cardiovascular: Negative for chest pain. Gastrointestinal: Positive for nausea. Negative for abdominal pain, blood in stool, constipation, diarrhea and vomiting. Genitourinary: Negative for difficulty urinating and dysuria. Musculoskeletal: Negative for myalgias. Neurological: Negative for dizziness, weakness and numbness. Vitals:    03/17/19 1311 03/17/19 1319 03/17/19 1328 03/17/19 1330   BP: 143/70   136/68   Pulse: 65 60 (!) 59 (!) 58   Resp: 23 14 19 20   Temp:       SpO2:  97% 98% 96%   Weight:       Height:                Physical Exam   Constitutional: He is oriented to person, place, and time. He appears well-developed and well-nourished. No distress. HENT:   Head: Normocephalic and atraumatic.    Right Ear: External ear normal.   Left Ear: External ear normal.   Mouth/Throat: Oropharynx is clear and moist.   Eyes: Conjunctivae and EOM are normal. Pupils are equal, round, and reactive to light. Neck: Normal range of motion. Neck supple. Cardiovascular: Normal rate, regular rhythm, normal heart sounds and intact distal pulses. No murmur heard. Pulmonary/Chest: Effort normal. No respiratory distress. He has wheezes. He has rales. Abdominal: Soft. Bowel sounds are normal. He exhibits no distension. There is no tenderness. There is no guarding. Musculoskeletal: Normal range of motion. He exhibits no edema, tenderness or deformity. Lymphadenopathy:     He has no cervical adenopathy. Neurological: He is alert and oriented to person, place, and time. Skin: Skin is warm and dry. Capillary refill takes less than 2 seconds. He is not diaphoretic. Psychiatric: He has a normal mood and affect. His behavior is normal.        MDM  Number of Diagnoses or Management Options  COPD exacerbation ([facility] [address]): established and worsening  Diagnosis management comments: A/P: 67 y/o M with PMH of COPD and 2-3 weeks of worsening cough, sputum, and SOB. Likely COPD exacerbation, vs PNA, vs URI. Will get EKG, CXR, rapid flu, CBC, CMP, troponin. Will treat with duo-neb and prednisone. Amount and/or Complexity of Data Reviewed  Tests in the radiology section of CPT®: ordered and reviewed  Tests in the medicine section of CPT®: ordered and reviewed    Patient Progress  Patient progress: stable         Procedures      1350 - CXR showed no acute process, flu negative, labs unremarkable. Will send patient home with doxycycline for COPD exacerbation. Pt stable on room air. Pt stable for discharge.    Rad Jules MD

## 2019-03-18 LAB
ATRIAL RATE: 69 BPM
CALCULATED P AXIS, ECG09: 47 DEGREES
CALCULATED R AXIS, ECG10: 53 DEGREES
CALCULATED T AXIS, ECG11: 93 DEGREES
DIAGNOSIS, 93000: NORMAL
P-R INTERVAL, ECG05: 172 MS
Q-T INTERVAL, ECG07: 410 MS
QRS DURATION, ECG06: 124 MS
QTC CALCULATION (BEZET), ECG08: 439 MS
VENTRICULAR RATE, ECG03: 69 BPM

## 2019-06-19 PROBLEM — R79.1 SUPRATHERAPEUTIC INR: Status: ACTIVE | Noted: 2019-01-01

## 2019-06-19 NOTE — ED PROVIDER NOTES
76 y.o. male with past medical history significant for COPD, throat CA, CAD s/p CABG and CVA who presents via private vehicle with chief complaint of left sided weakness. Pt reports around 1700 today he had weakness in his left extremities and speech difficulty. He states that he was not able to stand on his left leg once his symptoms started. Pt notes he has a h/o 3 previous strokes. He reports use of Warfarin. There are no other acute medical concerns at this time. Social hx: Daily smoker; Denies EtOH use PCP: Elissa Escalera MD 
 
Note written by Tee Heart, as dictated by Desiree Arciniega MD 7:08 PM 
 
The history is provided by the patient. No  was used. Past Medical History:  
Diagnosis Date  CAD (coronary artery disease)  Cancer ([facility] [address] \"10 years ago\" throat  COPD (chronic obstructive pulmonary disease) ([facility])  CVA (cerebral vascular accident) ([facility] [address]  H/O mechanical aortic valve replacement CT surgery 1994 - CABG ([facility] to [facility]) and [facility]R ([facility])  S/P CABG x 1   
 CT surgery 1994 - CABG ([facility] to [facility]) and AVR ([facility]) Past Surgical History:  
Procedure Laterality Date  HX HEART VALVE SURGERY    
 CT surgery 1994 - CABG ([facility] to [facility]) and AVR ([facility]) Family History:  
Problem Relation Age of Onset  Heart Disease Mother  Heart Disease Sister Social History Socioeconomic History  Marital status:  Spouse name: Not on file  Number of children: Not on file  Years of education: Not on file  Highest education level: Not on file Occupational History  Not on file Social Needs  Financial resource strain: Not on file  Food insecurity:  
  Worry: Not on file Inability: Not on file  Transportation needs:  
  Medical: Not on file Non-medical: Not on file Tobacco Use  Smoking status: Current Every Day Smoker  Smokeless tobacco: Never Used  Tobacco comment: pipe Substance and Sexual Activity  Alcohol use: No  
 Drug use: No  
 Sexual activity: Not on file Lifestyle  Physical activity:  
  Days per week: Not on file Minutes per session: Not on file  Stress: Not on file Relationships  Social connections:  
  Talks on phone: Not on file Gets together: Not on file Attends [employer] service: Not on file Active member of club or organization: Not on file Attends meetings of clubs or organizations: Not on file Relationship status: Not on file  Intimate partner violence:  
  Fear of current or ex partner: Not on file Emotionally abused: Not on file Physically abused: Not on file Forced sexual activity: Not on file Other Topics Concern  Not on file Social History Narrative  Not on file ALLERGIES: Iodinated contrast- oral and iv dye and Pcn [penicillins] Review of Systems Neurological: Positive for speech difficulty and weakness. All other systems reviewed and are negative. Vitals:  
 06/19/19 1856 BP: 128/61 Pulse: 68 Resp: 18 SpO2: 100% Weight: 69.5 kg (153 lb 3.5 oz) Physical Exam  
Constitutional: He is oriented to person, place, and time. He appears well-developed and well-nourished. No distress. HENT:  
Head: Normocephalic and atraumatic. Eyes: Pupils are equal, round, and reactive to light. Neck: Normal range of motion. Neck supple. Cardiovascular: Normal rate and regular rhythm. Exam reveals no gallop and no friction rub. Murmur heard. Systolic murmur is present. Pulmonary/Chest: Effort normal and breath sounds normal. No respiratory distress. He has no wheezes. Abdominal: Soft. Bowel sounds are normal. He exhibits no distension. There is no tenderness. There is no rebound and no guarding. Musculoskeletal: Normal range of motion. Neurological: He is alert and oriented to person, place, and time. Weakness of left upper and lower extremities. Slurred speech. Skin: Skin is warm. No rash noted. He is not diaphoretic. Psychiatric: He has a normal mood and affect. His behavior is normal. Judgment and thought content normal. His speech is slurred. Nursing note and vitals reviewed. Note written by Tee Moore, as dictated by Edson Lynn MD 7:08 PM 
MDM Number of Diagnoses or Management Options Cerebrovascular accident (CVA), unspecified mechanism [facility]):  
Critical Care Total time providing critical care: 30-74 minutes (Total critical care time spent exclusive of procedures:  45 minutes.) This is a 61-year-old male with past medical history, review of systems, physical exam as above, presenting with complaints of acute onset left upper and left lower extremity weakness, slurred speech, facial droop, approximately 2 hours prior to arrival.  Patient states he was watching TV, for approximately 30 minutes, and when he went to go to use the restroom discovered his left upper and left lower extremities were weak. Upon arrival patient awake and alert, weakness and distributions as above, sensation intact, mildly slurred speech, and facial droop. Plan to proceed with emergent imaging for likely CVA. Obtain CMP, CBC, EKG, chest x-ray, coags. We will consult with telemetry neurology, disposition pending. Procedures CONSULT NOTE: 
7:09 PM Edson Lynn MD spoke with Dr. Melisa Santos, Consult for Neurology. Discussed available diagnostic tests and clinical findings. Dr. Melisa Santos will see the pt. PROGRESS NOTE: 
7:14 PM 
Right parietal lucency noted per CT. CONSULT NOTE: 
7:44 PM Edson Lynn MD spoke with Dr. Melisa Santos, Consult for Neurology. Discussed available diagnostic tests and clinical findings. Dr. Melisa Santos recommends admitting the pt to the hospital. Not a candidate for TPA 2/2 elevated INR, patient cannot receive IV contrast 2/2 contrast allergy, will order MRI/MRA. 45 mins cc CONSULT NOTE: 
7:49 PM Hua Palacios MD spoke with Dr. Niles Mendez, Consult for Hospitalist.  Discussed available diagnostic tests and clinical findings.   Dr. Niles Mendez will admit pt to the hospital.

## 2019-06-19 NOTE — ED TRIAGE NOTES
Code S called prior to triage. Pt presents with cc of left arm and leg weakness and slurred speech that started 1 hour ago. Pt reports he takes warfarin. VAN negative in triage.

## 2019-06-20 PROBLEM — G45.9 TIA (TRANSIENT ISCHEMIC ATTACK): Status: ACTIVE | Noted: 2019-01-01

## 2019-06-20 NOTE — PROGRESS NOTES
[facility] Dosing Services: 6/20/2019 Consult for Warfarin Dosing by Pharmacy by Dr. Darby Merritt Consult provided for this 76 y.o.  male , for indication of Mechanical Heart Valve (mitral and aortic). Day of Therapy: continued from home (Home dose 5 mg daily) Dose to achieve an INR goal of 2.5-3.5 Will hold warfarin 6/20/2019 as INR is supratherapeutic at 7.7. Significant drug interactions: N/A Previous dose given None since admission. (5 mg prior to admission) PT/INR Lab Results Component Value Date/Time INR 7.7 (HH) 06/20/2019 06:24 AM  
  
Platelets Lab Results Component Value Date/Time PLATELET 812 29/59/4666 07:15 PM  
  
H/H Lab Results Component Value Date/Time HGB 12.9 06/19/2019 07:15 PM  
  
 
Pharmacist will follow daily and will provide subsequent Warfarin dosing based on clinical status.  
Cordelia Ledezma, Pharmacist

## 2019-06-20 NOTE — PROGRESS NOTES
Occupational Therapy Note: 
Orders acknowledged, chart reviewed, and spoke with team at [address] [place] [address]. Patient currently with supra therapeutic INR (7.7) which is contraindicated for OOB activity. Patient was resting when checking in for bed level activity and requesting OT attempt back tomorrow. Will follow.  
Lin Welsh, OTR/L

## 2019-06-20 NOTE — PROGRESS NOTES
[facility] Medical Progress Note NAME: Charanjit Lott :  1945 MRM:  [id number] Date/Time: 2019  9:11 AM    
 
  
Assessment and Plan: 1. Hx of CVA (cerebral vascular accident) / Left sided weakness. Hx of CVA in the past. MRI/MRA brain without acute infarct but notes old ones. Update TTE. Carotid dopplers pending. On ASA and coumadin. Neurology to evaluate. 2.  COPD (chronic obstructive pulmonary disease) ([facility] [address] Mild wheezing and some rhonchi. Continue nebs, add acapella/IS/mucolytics 
  
3. S/P MVR (mitral valve replacement) (10/22/2014)/ S/P AVR (aortic valve replacement) (2017). On coumadin. hold coumadin as INR is supra therapeutic. Check echo. Overview: CT surgery  - CABG (LIMA to LAD) and AVR ([facility]) 
  
4. CAD (coronary artery disease) (2017)/ S/P CABG x 1 (2017). Continue isosorbide dinitrate and ASA 
  
5. Supratherapeutic INR (2019). No bleeding. Continue to hold coumadin and check INR. Pharmacy consulted to dose coumadin once INR become therapeutic. INR goal is 2.5-3.5 
  
6. Depression (10/22/2014). Continue home anti depressants.  
  
7. HTN. Resume amlodipine tomorrow 
  
 
 
  
Subjective: Chief Complaint:  Patient seen and examined. Chart reviewed. Reports some SOB with exertion which he states is normal, is coughing more and has a decent amount of phlegm. ROS: 
(bold if positive, if negative) CoughSputumSOB/DOETolerating PT  Tolerating Diet Objective:  
 
Last 24hrs VS reviewed since prior progress note. Most recent are: 
 
Visit Vitals /58 Pulse (!) 53 Temp 98.1 °F (36.7 °C) Resp 22 Wt 69.5 kg (153 lb 3.5 oz) SpO2 94% BMI 24.73 kg/m² SpO2 Readings from Last 6 Encounters:  
19 94% 19 96% 18 98% 17 96% 17 96% 16 94% Intake/Output Summary (Last 24 hours) at 2019 7531 Last data filed at 2019 0600 Gross per 24 hour Intake  Output 400 ml Net -400 ml Physical Exam: 
 
Gen:  Well-developed, well-nourished, in no acute distress HEENT:  Pink conjunctivae, PERRL, hearing intact to voice, moist mucous membranes Neck:  Supple, without masses, thyroid non-tender Resp:  No accessory muscle use, coarse rhonchi bilaterally that partially clear with coughing and mild background wheezing Card:  No murmurs, normal S1, S2 without thrills, bruits or peripheral edema Abd:  Soft, non-tender, non-distended, normoactive bowel sounds are present, no palpable organomegaly and no detectable hernias Lymph:  No cervical or inguinal adenopathy Musc:  No cyanosis or clubbing Skin:  No rashes or ulcers, skin turgor is good Neuro:  Cranial nerves are grossly intact, no focal motor weakness, follows commands appropriately Psych:  Good insight, oriented to person, place and time, alert Telemetry reviewed:   normal sinus rhythm 
__________________________________________________________________ Medications Reviewed: (see below) Medications:  
 
Current Facility-Administered Medications Medication Dose Route Frequency  guaiFENesin ER (MUCINEX) tablet 1,200 mg  1,200 mg Oral Q12H  
 sodium chloride (NS) flush 5-40 mL  5-40 mL IntraVENous Q8H  
 sodium chloride (NS) flush 5-40 mL  5-40 mL IntraVENous PRN  
 acetaminophen (TYLENOL) tablet 650 mg  650 mg Oral Q4H PRN Or  
 acetaminophen (TYLENOL) solution 650 mg  650 mg Per NG tube Q4H PRN Or  
 acetaminophen (TYLENOL) suppository 650 mg  650 mg Rectal Q4H PRN  
 atorvastatin (LIPITOR) tablet 80 mg  80 mg Oral QPM  
 buPROPion SR (WELLBUTRIN SR) tablet 150 mg  150 mg Oral BID  clonazePAM (KlonoPIN) tablet 0.5 mg  0.5 mg Oral TID  escitalopram oxalate (LEXAPRO) tablet 10 mg  10 mg Oral DAILY  isosorbide mononitrate (ISMO, MONOKET) tablet 10 mg  10 mg Oral TID  latanoprost (XALATAN) 0.005 % ophthalmic solution 1 Drop  1 Drop Both Eyes QHS  albuterol-ipratropium (DUO-NEB) 2.5 MG-0.5 MG/3 ML  3 mL Nebulization Q4H RT  
 albuterol (PROVENTIL VENTOLIN) nebulizer solution 2.5 mg  2.5 mg Nebulization Q2H PRN  
 arformoterol (BROVANA) neb solution 15 mcg  15 mcg Nebulization BID RT  
 budesonide (PULMICORT) 500 mcg/2 ml nebulizer suspension  500 mcg Nebulization BID RT Lab Data Reviewed: (see below) Lab Review:  
 
Recent Labs  
  06/19/19 1915 WBC 7.3 HGB 12.9 HCT 39.4  Recent Labs  
  06/20/19 0624 06/19/19 1919 06/19/19 1915 NA  --   --  140 K  --   --  4.3 CL  --   --  109* CO2  --   --  28  
GLU  --   --  87 BUN  --   --  13  
CREA  --   --  1.06  
CA  --   --  8.9 ALB  --   --  3.8 TBILI  --   --  0.6 SGOT  --   --  26 ALT  --   --  23 INR 7.7* 5.4* 7.3* Lab Results Component Value Date/Time Glucose (POC) 97 06/19/2019 06:57 PM  
 Glucose (POC) 96 08/28/2017 12:11 PM  
 Glucose (POC) 120 (H) 08/28/2017 08:20 AM  
 Glucose (POC) 87 06/12/2016 02:07 PM  
 
No results for input(s): PH, PCO2, PO2, HCO3, FIO2 in the last 72 hours. Recent Labs  
  06/20/19 0624 06/19/19 1919 06/19/19 1915 INR 7.7* 5.4* 7.3* All Micro Results None Other pertinent lab: None Total time spent with patient: 35 min I reviewed chart, notes, data and current medications in the medical record. I have examined and treated the patient at bedside during this period. Care Plan discussed with: Patient, Care Manager, Nursing Staff and Consultant/Specialist 
 
Discussed:  Care Plan and D/C Planning Prophylaxis:  Coumadin Disposition:   PT, OT, RN 
        
___________________________________________________ Attending Physician: Molly Kapoor DO

## 2019-06-20 NOTE — CONSULTS
NEUROLOGY IN-PATIENT NEW CONSULTATION 
 
 
6/20/2019 RE: Bebeto Cevallos 1945 REFERRED BY: 
Paula Rosenbaum MD 
 
 
CHIEF COMPLAINT:  This is Bebeto Cevallos is a 76 y.o. male right handed who had concerns including Extremity Weakness. HPI:  
 
Patient apparently had prior strokes with residual L sided mild weakness. Yesterday, patient noted increased L sided weakness lasting for 1 hr. Symptoms have resolved Admits to smoking a pipe. ROS  
(-) fever (-) rash All other systems reviewed and are negative Past Medical Hx Past Medical History:  
Diagnosis Date  CAD (coronary artery disease)  Cancer ([facility] [address] \"10 years ago\" throat  COPD (chronic obstructive pulmonary disease) ([facility])  CVA (cerebral vascular accident) ([facility] [address]  H/O mechanical aortic valve replacement CT surgery 1994 - CABG (LIMA to LAD) and AVR ([facility])  S/P CABG x 1   
 CT surgery 1994 - CABG (LIMA to LAD) and AVR ([facility]) Social Hx Social History Socioeconomic History  Marital status:  Spouse name: Not on file  Number of children: Not on file  Years of education: Not on file  Highest education level: Not on file Tobacco Use  Smoking status: Current Every Day Smoker  Smokeless tobacco: Never Used  Tobacco comment: pipe Substance and Sexual Activity  Alcohol use: No  
 Drug use: No  
 
 
Family Hx Family History Problem Relation Age of Onset  Heart Disease Mother  Heart Disease Sister ALLERGIES Allergies Allergen Reactions  Adenosine Shortness of Breath  Iodinated Contrast- Oral And Iv Dye Shortness of Breath  Pcn [Penicillins] Shortness of Breath Patient reports to have tolerated a 10 day course of cefdinir started 8/24/17 CURRENT MEDS Current Facility-Administered Medications Medication Dose Route Frequency Provider Last Rate Last Dose  guaiFENesin ER (MUCINEX) tablet 1,200 mg  1,200 mg Oral Q12H Fonnie Lorrie, Redell Mc V, DO   1,200 mg at 06/20/19 1024  
 doxycycline (VIBRA-TABS) tablet 100 mg  100 mg Oral Q12H Fonnie Lorrie, Redell Mc V, DO   100 mg at 06/20/19 1024  Warfarin: Pharmacist dosing   Other PRN Christel Vu MD      
 Warfarin: No Warfarin on 6/20/2019 (INR 7.7)   Other ONCE Elian Rhodes MD      
 sodium chloride (NS) flush 5-40 mL  5-40 mL IntraVENous Q8H Christle Vu MD   10 mL at 06/20/19 9755  sodium chloride (NS) flush 5-40 mL  5-40 mL IntraVENous PRN Elian Walters MD      
 acetaminophen (TYLENOL) tablet 650 mg  650 mg Oral Q4H PRN Elian Rhodes MD      
 Or  
 acetaminophen (TYLENOL) solution 650 mg  650 mg Per NG tube Q4H PRN Elian Rhodes MD      
 Or  
 acetaminophen (TYLENOL) suppository 650 mg  650 mg Rectal Q4H PRN Elian Rhodes MD      
 atorvastatin (LIPITOR) tablet 80 mg  80 mg Oral QPM Elian Rhodes MD      
 buPROPion SR (WELLBUTRIN SR) tablet 150 mg  150 mg Oral BID Elian Walters MD   150 mg at 06/20/19 3328  clonazePAM (KlonoPIN) tablet 0.5 mg  0.5 mg Oral TID Elian Walters MD   0.5 mg at 06/20/19 4854  escitalopram oxalate (LEXAPRO) tablet 10 mg  10 mg Oral DAILY Elian Rhodes MD   10 mg at 06/20/19 5418  isosorbide mononitrate (ISMO, MONOKET) tablet 10 mg  10 mg Oral TID Christel Vu MD   Stopped at 06/20/19 0900  latanoprost (XALATAN) 0.005 % ophthalmic solution 1 Drop  1 Drop Both Eyes QHS Elian Rhodes MD   1 Drop at 06/20/19 0015  albuterol-ipratropium (DUO-NEB) 2.5 MG-0.5 MG/3 ML  3 mL Nebulization Q4H RT Carmelo, Elian, MD   3 mL at 06/20/19 1149  
 albuterol (PROVENTIL VENTOLIN) nebulizer solution 2.5 mg  2.5 mg Nebulization Q2H PRN Elian Rhodes MD      
 arformoterol (BROVANA) neb solution 15 mcg  15 mcg Nebulization BID RT Christel Vu MD   Stopped at 06/19/19 2127  
 budesonide (PULMICORT) 500 mcg/2 ml nebulizer suspension  500 mcg Nebulization BID RT Obdulio Graham MD   500 mcg at 06/20/19 8276 PREVIOUS WORKUP: (reviewed) IMAGING: 
 
CT Results (recent): 
Results from Hospital Encounter encounter on 06/19/19 CT CODE NEURO HEAD WO CONTRAST Narrative EXAM: CT CODE NEURO HEAD WO CONTRAST INDICATION: Code S 
 
COMPARISON: 2016. CONTRAST: None. TECHNIQUE: Unenhanced CT of the head was performed using 5 mm images. Brain and 
bone windows were generated. CT dose reduction was achieved through use of a 
standardized protocol tailored for this examination and automatic exposure 
control for dose modulation. FINDINGS: 
There is mild prominence of ventricles and sulci. There are changes small vessel 
disease periventricular white matter. There is an old right occipital 
infarction. There is a low-density in the right parietal lobe anteriorly and posterior right 
frontal lobe which was not present in 2016. The current infarction basal ganglia and right thalamus are unchanged. No 
hemorrhage is identified. Bony structures are intact. Impression IMPRESSION:  
1. There is a low-density in the right parietal lobe and posterior right frontal 
lobe not present in 2016 could represent an acute infarction. Old right occipital infarction, changes small vessel disease periventricular 
white matter and lacunar infarcts in basal ganglia and right thalami appear 
stable. Results called to the ER by myself. MRI Results (recent): 
Results from Hospital Encounter encounter on 06/19/19 MRA BRAIN WO CONT Narrative *PRELIMINARY REPORT* There is mild narrowing of the right vertebral V4 segment. There is slight 
plaquing of the basilar artery. Distal branches of the right posterior cerebral 
artery are attenuated most consistent with right occipital infarction. There is 
also attenuation of the right middle cerebral artery vessels in the sylvian 
fissure consistent with old infarction. Zachery Menard Preliminary report was provided by Dr. Graham Espino, the on-call radiologist, at 925 Final report to follow. *END PRELIMINARY REPORT* EXAM: MRA BRAIN WO CONT INDICATION:   Left-sided weakness, slurred speech. COMPARISON:  MRA head 6/12/2016. CONTRAST:  None. TECHNIQUE:   
3-D time-of-flight noncontrast MRA of the brain was performed. Multiplanar and 
MIP reconstructions were obtained. FINDINGS: 
There is no evidence of large vessel occlusion or flow-limiting stenosis of the 
intracranial internal carotid, anterior cerebral, and middle cerebral arteries. The anterior communicating artery is not seen. There is no evidence of large vessel occlusion or flow-limiting stenosis of the 
intracranial vertebral arteries, basilar artery, or posterior cerebral arteries. Mild stenosis of the distal right V4 segment is stable. Unchanged 
atherosclerotic irregularity in the distal right P2 and P3 segments. The 
posterior communicating arteries are not seen. There is no evidence of aneurysm or vascular malformation. Impression IMPRESSION:   
1. No evidence of large vessel occlusion or flow-limiting stenosis. IR Results (recent): No results found for this or any previous visit. VAS/US Results (recent): 
Results from Hospital Encounter encounter on 06/12/16 DUPLEX CAROTID BILATERAL  
 
 
 
 
LABS (reviewed) Results for orders placed or performed during the hospital encounter of 06/19/19 CBC WITH AUTOMATED DIFF Result Value Ref Range WBC 7.3 4.1 - 11.1 K/uL  
 RBC 4.39 4. 10 - 5.70 M/uL  
 HGB 12.9 12.1 - 17.0 g/dL HCT 39.4 36.6 - 50.3 % MCV 89.7 80.0 - 99.0 FL  
 MCH 29.4 26.0 - 34.0 PG  
 MCHC 32.7 30.0 - 36.5 g/dL  
 RDW 15.2 (H) 11.5 - 14.5 % PLATELET 697 259 - 238 K/uL MPV 9.1 8.9 - 12.9 FL  
 NRBC 0.0 0  WBC ABSOLUTE NRBC 0.00 0.00 - 0.01 K/uL NEUTROPHILS 67 32 - 75 % LYMPHOCYTES 20 12 - 49 % MONOCYTES 10 5 - 13 % EOSINOPHILS 3 0 - 7 % BASOPHILS 0 0 - 1 % IMMATURE GRANULOCYTES 0 0.0 - 0.5 % ABS. NEUTROPHILS 4.8 1.8 - 8.0 K/UL  
 ABS. LYMPHOCYTES 1.5 0.8 - 3.5 K/UL  
 ABS. MONOCYTES 0.7 0.0 - 1.0 K/UL  
 ABS. EOSINOPHILS 0.2 0.0 - 0.4 K/UL  
 ABS. BASOPHILS 0.0 0.0 - 0.1 K/UL  
 ABS. IMM. GRANS. 0.0 0.00 - 0.04 K/UL  
 DF AUTOMATED METABOLIC PANEL, COMPREHENSIVE Result Value Ref Range Sodium 140 136 - 145 mmol/L Potassium 4.3 3.5 - 5.1 mmol/L Chloride 109 (H) 97 - 108 mmol/L  
 CO2 28 21 - 32 mmol/L Anion gap 3 (L) 5 - 15 mmol/L Glucose 87 65 - 100 mg/dL BUN 13 6 - 20 MG/DL Creatinine 1.06 0.70 - 1.30 MG/DL  
 BUN/Creatinine ratio 12 12 - 20 GFR est AA >60 >60 ml/min/1.73m2 GFR est non-AA >60 >60 ml/min/1.73m2 Calcium 8.9 8.5 - 10.1 MG/DL Bilirubin, total 0.6 0.2 - 1.0 MG/DL  
 ALT (SGPT) 23 12 - 78 U/L  
 AST (SGOT) 26 15 - 37 U/L Alk. phosphatase 127 (H) 45 - 117 U/L Protein, total 7.5 6.4 - 8.2 g/dL Albumin 3.8 3.5 - 5.0 g/dL Globulin 3.7 2.0 - 4.0 g/dL A-G Ratio 1.0 (L) 1.1 - 2.2    
TROPONIN I Result Value Ref Range Troponin-I, Qt. <0.05 <0.05 ng/mL PTT Result Value Ref Range aPTT 54.9 (H) 22.1 - 32.0 sec  
 aPTT, therapeutic range     58.0 - 77.0 SECS  
PROTHROMBIN TIME + INR Result Value Ref Range INR 7.3 (HH) 0.9 - 1.1 Prothrombin time 66.9 (H) 9.0 - 11.1 sec LIPID PANEL Result Value Ref Range LIPID PROFILE Cholesterol, total 110 <200 MG/DL Triglyceride 35 <150 MG/DL  
 HDL Cholesterol 48 MG/DL  
 LDL, calculated 55 0 - 100 MG/DL VLDL, calculated 7 MG/DL  
 CHOL/HDL Ratio 2.3 0.0 - 5.0 HEMOGLOBIN A1C WITH EAG Result Value Ref Range Hemoglobin A1c 6.0 4.2 - 6.3 % Est. average glucose 126 mg/dL PROTHROMBIN TIME + INR Result Value Ref Range INR 7.7 (HH) 0.9 - 1.1 Prothrombin time 70.2 (H) 9.0 - 11.1 sec GLUCOSE, POC Result Value Ref Range Glucose (POC) 97 65 - 100 mg/dL Performed by Suzette Coyle   
POC INR Result Value Ref Range INR (POC) 5.4 (HH) <1.2 EKG, 12 LEAD, INITIAL Result Value Ref Range Ventricular Rate 63 BPM  
 Atrial Rate 63 BPM  
 P-R Interval 182 ms QRS Duration 126 ms  
 Q-T Interval 440 ms QTC Calculation (Bezet) 450 ms Calculated P Axis 54 degrees Calculated R Axis 2 degrees Calculated T Axis 113 degrees Diagnosis Normal sinus rhythm Nonspecific intraventricular block Abnormal QRS-T angle, consider primary T wave abnormality Abnormal ECG When compared with ECG of 17-MAR-2019 12:00, No significant change was found DUPLEX CAROTID BILATERAL Result Value Ref Range Right subclavian sys 101.6 cm/s RIGHT SUBCLAVIAN ARTERY D 0.00 cm/s Right cca dist sys 153.0 cm/s Right CCA dist farfan 18.9 cm/s Right CCA prox sys 73.2 cm/s Right CCA prox farfan 9.5 cm/s Right eca sys 102.7 cm/s RIGHT EXTERNAL CAROTID ARTERY D 0.00 cm/s Right ICA dist sys 44.2 cm/s Right ICA dist farfan 10.2 cm/s Right ICA mid sys 42.0 cm/s Right ICA mid farfan 11.3 cm/s Right ICA prox sys 55.6 cm/s Right ICA prox farfan 7.3 cm/s Right vertebral sys 22.2 cm/s RIGHT VERTEBRAL ARTERY D 5.81 cm/s Right ICA/CCA sys 0.4 Left subclavian sys 96.8 cm/s LEFT SUBCLAVIAN ARTERY D 0.00 cm/s Left CCA dist sys 63.3 cm/s Left CCA dist farfan 11.6 cm/s Left CCA prox sys 66.6 cm/s Left CCA prox farfan 11.7 cm/s Left ECA sys 175.6 cm/s LEFT EXTERNAL CAROTID ARTERY D 0.00 cm/s Left ICA dist sys 54.6 cm/s Left ICA dist farfan 14.5 cm/s Left ICA mid sys 58.1 cm/s Left ICA mid farfan 12.7 cm/s Left ICA prox sys 47.6 cm/s Left ICA prox farfan 12.7 cm/s Left vertebral sys 38.1 cm/s LEFT VERTEBRAL ARTERY D 10.19 cm/s Left ICA/CCA sys 0.92   
ECHO ADULT COMPLETE Result Value Ref Range LA Volume 43.6 18 - 58 mL  
 LV E' Lateral Velocity 8.52 centimeter/second LV E' Septal Velocity 7.01 centimeter/second Tapse 1.70 1.5 - 2.0 cm Ao Root D 2.95 cm  
 AO ASC D 2.43 cm Aortic Valve Systolic Peak Velocity 117.37 cm/s AoV VTI 86.69 cm Aortic Valve Area by Continuity of Peak Velocity 0.5 cm2 Aortic Valve Area by Continuity of VTI 0.5 cm2 AoV PG 51.9 mmHg LVIDd 4.19 (A) 4.2 - 5.9 cm  
 LVPWd 1.14 (A) 0.6 - 1.0 cm LVIDs 3.10 cm IVSd 1.31 (A) 0.6 - 1.0 cm  
 LVOT d 1.80 cm  
 LVOT Peak Velocity 74.47 cm/s LVOT Peak Gradient 2.2 mmHg LVOT VTI 18.58 cm  
 MVA (PHT) 3.2 cm2  
 MV A Marcelo 91.18 cm/s  
 MV E Marcelo 89.39 cm/s  
 MV E/A 1.00 Left Atrium to Aortic Root Ratio 1.22 Aortic Valve Systolic Mean Gradient 08.8 mmHg LA Vol 4C 47.06 18 - 58 mL  
 LA Vol 2C 31.35 18 - 58 mL  
 LA Area 4C 16.6 cm2 LV Mass .8 88 - 224 g LV Mass AL Index 102.0 49 - 115 g/m2 Mitral Valve E Wave Deceleration Time 240.4 ms Mitral Valve Pressure Half-time 69.7 ms Left Atrium Major Axis 3.61 cm Triscuspid Valve Regurgitation Peak Gradient 26.2 mmHg LVOT SV 47.5 ml  
 TR Max Velocity 256.09 cm/s  
 LA Vol Index 24.43 16 - 28 ml/m2 LA Vol Index 17.56 16 - 28 ml/m2 LA Vol Index 26.37 16 - 28 ml/m2 JANAE/BSA Pk Marcelo 0.3 cm2/m2 JANAE/BSA VTI 0.3 cm2/m2 Physical Exam:  
 
Visit Vitals /59 Pulse 62 Temp 97.8 °F (36.6 °C) Resp 19 Ht 5' 6\" (1.676 m) Wt 69.4 kg (153 lb) SpO2 98% BMI 24.69 kg/m² General:  Alert, cooperative, no distress. Head:  Normocephalic, without obvious abnormality, atraumatic. Eyes:  Conjunctivae/corneas clear. Lungs: 
Heart:   Non labored breathing Regular rate and rhythm, no carotid bruits Abdomen:   Soft, non-distended Extremities: Extremities normal, atraumatic, no cyanosis or edema. Pulses: 2+ and symmetric all extremities. Skin: Skin color, texture, turgor normal. No rashes or lesions.  
Neurologic Exam  
  Gen: Attention normal 
 Language: naming, repetition, fluency normal 
           Memory: intact recent and remote memory Cranial Nerves: 
I: smell Not tested II: visual fields Full to confrontation II: pupils Equal, round, reactive to light II: optic disc No papilledema III,VII: ptosis none III,IV,VI: extraocular muscles  Full ROM V: mastication normal  
V: facial light touch sensation  normal  
VII: facial muscle function   symmetric VIII: hearing symmetric IX: soft palate elevation  normal  
XI: trapezius strength  5/5 XI: sternocleidomastoid strength 5/5 XI: neck flexion strength  5/5 XII: tongue  midline Motor: normal bulk and tone, no tremor Strength: 5/5 all four extremities Sensory: intact to LT, PP, vibration, and temperature Reflexes: 2+ throughout; Down going toes Coordination: Good FTN and HTS Gait: deferred Impression:  
 
Davin Coleman is a 76 y.o. male who  has a past medical history of CAD (coronary artery disease), Cancer ([facility] [address] (\"10 years ago\"), COPD (chronic obstructive pulmonary disease) ([facility] [address] CVA (cerebral vascular accident) ([facility] [address] H/O mechanical aortic valve replacement, and S/P CABG x 1. who had prior strokes with residual L sided mild weakness. Yesterday, patient noted increased L sided weakness lasting for 1 hr. MRI brain did not show a new stroke but did show old R occipital and R thalamic strokes. Considerations include TIA. RECOMMENDATIONS 1. I had a long discussion with patient and wife. Discussed diagnosis, prognosis, pathophysiology and available treatment. Reviewed test results. All questions were answered. 2. Patient already on Coumadin and INR supratherapeutic. 3. Also on ASA 81 4. Permissive HTN for today to keep SBP < 160. Tomorrow can slowly decrease BP by ~15%. Can treat with IV labetalol or nicardipine 5. Advise to quit smoking which keeps his risk of stroke elevated 6. PT / Nithya Postal / Speech evaluation 7. LDL 55 on Lipitor 80 mg every day 8. Please call for questions Thank you for the consultation Mary Jo Jarquin MD 
Diplomate, American Board of Psychiatry and Neurology Diplomate, Neuromuscular Medicine Diplomate, [address] [place] [address] Board of Electrodiagnostic Medicine Greater than 50% of time spent counseling patient CC: Violet Javed MD 
Fax: [phone]

## 2019-06-20 NOTE — PROGRESS NOTES
Reason for Admission:   Left sided weakness,elevated INR 
               
RRAT Score:     16 Do you (patient/family) have any concerns for transition/discharge? Plan for utilizing home health:    
 
Current Advanced Directive/Advance Care Plan:   
         
Transition of Care Plan:      Pt was admitted weith left sided weakness and slurred speech. Pt states his left sided weakness is much better and his wife reports his speech has improved since yesterday. Speech is recommending either SNF or outpatient speech therapy. I informed pt and his wife that [address] [place] [address] has an outpatient clinic here @ [facility] where he can come to outpatient speech therapy. Khoa Solorio will be a candidate for outpatient speech per discussions with wife and pt but if PT and OT have other recommendations for rehab,pt and his wife are receptive to considering other options. If the final disposition is for outpatient speecgh therapy,pt will need a script for outpatient speech therapy. PCP is Dr Alena Joyce who does not have a nurse navigator. Pt has the stroke education book and wife has been reading the stroke education book. Case Management will continue to follow pt for discharge needs.  
 
Hiral Hernandez

## 2019-06-20 NOTE — H&P
[address] [place] [address] [address] [place] [address] 
[phone] Admission History and Physical 
 
 
NAME:  Davin Coleman :   1945 MRN:  [id number] PCP:  Elissa Escalera MD  
 
Date/Time:  2019 Subjective: CHIEF COMPLAINT: LT side weakness, slurred speech HISTORY OF PRESENT ILLNESS:    
Mr. Jarvis Horton is a 76 y.o.  male who is admitted with acute CVA. Mr. Jarvis Horton with PMH of CAD, COPD, CVA, mechanical aortic valve placement presented to ER c/o LT side weakness, which started around 5 PM today. Pt was watching TV and got up and went to the bathroom when he noticed RT side weakness. He was having some numbness of the LT side about 3 days ago, which was intermittent. His wife noted facial droop and slurred speech. Denies vision change. Past Medical History:  
Diagnosis Date  CAD (coronary artery disease)  Cancer ([facility] [address] \"10 years ago\" throat  COPD (chronic obstructive pulmonary disease) (HCC)  CVA (cerebral vascular accident) ([facility] [address]  H/O mechanical aortic valve replacement CT surgery  - CABG (LIMA to LAD) and AVR ([facility])  S/P CABG x 1   
 CT surgery  - CABG (LIMA to LAD) and AVR ([facility]) Past Surgical History:  
Procedure Laterality Date  HX HEART VALVE SURGERY    
 CT surgery  - CABG (LIMA to LAD) and AVR ([facility]) Social History Tobacco Use  Smoking status: Current Every Day Smoker  Smokeless tobacco: Never Used  Tobacco comment: pipe Substance Use Topics  Alcohol use: No  
  
 
Family History Problem Relation Age of Onset  Heart Disease Mother  Heart Disease Sister Allergies Allergen Reactions  Adenosine Shortness of Breath  Iodinated Contrast- Oral And Iv Dye Shortness of Breath  Pcn [Penicillins] Shortness of Breath Patient reports to have tolerated a 10 day course of cefdinir started 17 Prior to Admission medications Medication Sig Start Date End Date Taking? Authorizing Provider buPROPion (WELLBUTRIN) 100 mg tablet Take 100 mg by mouth two (2) times a day. Yes Lily, MD Kallie  
predniSONE (DELTASONE) 10 mg tablet Prednisone 10mg tabs: 
4 tabs (40 mg) daily x 3 days, then 
3 tabs (30 mg) daily x 3 days, then 
2 tabs (20 mg) daily x 3 days, then 
1 tab   (10 mg) daily until gone. Dispense 30 tabs 3/17/19   Tank Avila MD  
albuterol (PROVENTIL VENTOLIN) 2.5 mg /3 mL (0.083 %) nebulizer solution 3 mL by Nebulization route every four (4) hours as needed for Wheezing. 3/17/19   Tank Avila MD  
fexofenadine-pseudoephedrine (ALLEGRA-D 12 HOUR)  mg Tb12 Take 1 Tab by mouth every twelve (12) hours. 7/30/18   Pilo Mustafa MD  
guaiFENesin-dextromethorphan SR [facility] DM) 600-30 mg per tablet Take 1 Tab by mouth two (2) times a day. 7/30/18   Pilo Mustafa MD  
promethazine (PHENERGAN) 12.5 mg tablet Take 12.5 mg by mouth daily. Indications: Nausea    Provider, Historical  
atorvastatin (LIPITOR) 80 mg tablet Take 80 mg by mouth every evening. Provider, Historical  
warfarin (COUMADIN) 5 mg tablet Take 5 mg by mouth every evening. Provider, Historical  
aspirin delayed-release 81 mg tablet Take 81 mg by mouth daily. Provider, Historical  
albuterol (PROVENTIL HFA, VENTOLIN HFA, PROAIR HFA) 90 mcg/actuation inhaler Take 2 Puffs by inhalation every six (6) hours as needed. Other, MD Kallie  
fluticasone-salmeterol (ADVAIR) 500-50 mcg/dose diskus inhaler Take 1 Puff by inhalation every twelve (12) hours. Other, MD Kallie  
clonazepam (KLONOPIN) 0.5 mg tablet Take 0.5 mg by mouth three (3) times daily. 10/14/14   Kallie Bautista MD  
escitalopram oxalate (LEXAPRO) 10 mg tablet Take 10 mg by mouth daily.  10/21/14   Kallie Bautista MD  
isosorbide mononitrate (ISMO, MONOKET) 10 mg tablet Take 10 mg by mouth three (3) times daily. Patient takes at [address] [place] [address], noon, and 5pm 10/21/14   Other, MD Kallie  
 
 
 
Review of Systems: 
(bold if positive, if negative) Gen:  Eyes:  ENT:  CVS:  Pulm:  GI:   
:   
MS:  Skin:  Psych:  Endo:   
Hem:  Renal:   
Neuro:  weakness Objective: VITALS:   
Vital signs reviewed; most recent are: 
 
Visit Vitals /70 (BP 1 Location: Right arm, BP Patient Position: At rest) Pulse 62 Temp 98 °F (36.7 °C) Resp 17 Wt 69.5 kg (153 lb 3.5 oz) SpO2 95% BMI 24.73 kg/m² SpO2 Readings from Last 6 Encounters:  
06/19/19 95% 03/17/19 96% 07/30/18 98% 12/19/17 96% 08/28/17 96% 06/14/16 94% No intake or output data in the 24 hours ending 06/19/19 2051 Exam:  
 
Physical Exam: 
 
Gen:  Well-developed, well-nourished, in no acute distress HEENT:  Pink conjunctivae, PERRL, hearing intact to voice, moist mucous membranes Neck:  Supple, without masses, thyroid non-tender Resp:  No accessory muscle use, clear breath sounds without wheezes rales or rhonchi 
Card:  No murmurs, normal S1, S2 without thrills, bruits or peripheral edema Abd:  Soft, non-tender, non-distended, normoactive bowel sounds are present, no palpable organomegaly and no detectable hernias Lymph:  No cervical or inguinal adenopathy Musc:  No cyanosis or clubbing Skin:  No rashes or ulcers, skin turgor is good Neuro:  Cranial nerves are grossly intact, LT side weakness 3/5 Psych:  Good insight, oriented to person, place and time, alert Labs: 
 
Recent Labs  
  06/19/19 1915 WBC 7.3 HGB 12.9 HCT 39.4  Recent Labs  
  06/19/19 1915   
K 4.3 * CO2 28  
GLU 87 BUN 13  
CREA 1.06  
CA 8.9 ALB 3.8 TBILI 0.6 SGOT 26 ALT 23 Lab Results Component Value Date/Time Glucose (POC) 97 06/19/2019 06:57 PM  
 Glucose (POC) 96 08/28/2017 12:11 PM  
 
No results for input(s): PH, PCO2, PO2, HCO3, FIO2 in the last 72 hours. Recent Labs  
  06/19/19 1919 INR 5.4* Telemetry reviewed:   normal sinus rhythm Assessment/Plan: 1. CVA (cerebral vascular accident) ([facility] [address] (6/12/2016). Hx of CVA in the past. Admit to stepdown. Neuro check. CT scan of the head showed: There is a low-density in the right parietal lobe and posterior right frontal lobe. Check MRI/MRA of the head, echocardiogram and BL carotid doppler. Consult neurology, PT/OT. Pt is on ASA and coumadin. 2.  COPD (chronic obstructive pulmonary disease) ([facility] [address] Mild wheezing. Continue nebs and monitor 3. S/P MVR (mitral valve replacement) (10/22/2014)/ S/P AVR (aortic valve replacement) (8/25/2017). On coumadin. hold coumadin as INR is supra therapeutic Overview: CT surgery 1994 - CABG (LIMA to LAD) and AVR ([facility]) 4. CAD (coronary artery disease) (8/25/2017)/ S/P CABG x 1 (8/25/2017). Continue isosorbide and ASA 5. Supratherapeutic INR (6/19/2019). No bleeding. Continue to hold coumadin and check INR. Pharmacy consulted to dose coumadin once INR become therapeutic. INR goal is 2.5-3.5 6. Depression (10/22/2014). Continue home anti depressants. 7.  HTN. Allow permissive high BP for the next 24 hours. Holding amlodipine for now Previous medical records reviewed Risk of deterioration: high Total time spent with patient: 70 Minutes Care Plan discussed with: Patient, Family, Nursing Staff and >50% of time spent in counseling and coordination of care Discussed:  Care Plan Prophylaxis:  Coumadin 
 
Probable Disposition:  Home w/Family 
        
___________________________________________________ Attending Physician: Haley Chaney MD

## 2019-06-20 NOTE — ED NOTES
TRANSFER - OUT REPORT: 
 
Verbal report given to Forrest Duran RN(name) on [facility]  being transferred to [facility](unit) for routine progression of care Report consisted of patients Situation, Background, Assessment and  
Recommendations(SBAR). Information from the following report(s) SBAR, ED Summary, Procedure Summary, MAR, Recent Results and Cardiac Rhythm NSR was reviewed with the receiving nurse. Lines:  
Peripheral IV 06/19/19 Right Antecubital (Active) Site Assessment Clean, dry, & intact 6/19/2019  7:20 PM  
Phlebitis Assessment 0 6/19/2019  7:20 PM  
Infiltration Assessment 0 6/19/2019  7:20 PM  
Dressing Status Clean, dry, & intact 6/19/2019  7:20 PM  
  
 
Opportunity for questions and clarification was provided. Patient transported with: 
 Monitor Registered Nurse

## 2019-06-20 NOTE — PROGRESS NOTES
Physical Therapy 6/20/2019 Orders received and acknowledged. Chart reviewed and spoke with RN. Pt's INR is currently 7.7 and contraindicated for therapy evaluation. Will check in this afternoon if coagulation levels are stable.  
 
Thank Forrest Craft, PT, DPT

## 2019-06-20 NOTE — PROGRESS NOTES
2307 TRANSFER - IN REPORT: 
Verbal report received from Ines Noel RN (name) on [facility]  being received from ER  for routine progression of care Report consisted of patients Situation, Background, Assessment and  
Recommendations(SBAR). Information from the following report(s) SBAR, Kardex, ED Summary, Intake/Output, MAR, Accordion, Recent Results, Med Rec Status, Cardiac Rhythm normal sinus with BBB , Alarm Parameters  and Pre Procedure Checklist was reviewed with the receiving nurse. Opportunity for questions and clarification was provided. Assessment completed upon patients arrival to unit and care assumed. Primary Nurse Renetta Fitzgerald RN and Radhika Ruiz RN performed a dual skin assessment on this patient No impairment noted Devonte score is 23 Neuro assessment done at this time, patient neuro check wnl, patient still states that he feels week on left side. Patient denies any pain at this time 
0000 Reassessment done at this time no change noted at this time 
0200 Patient resting no distress noted at this time 0400 Reassessment done at this time, no changes noted at this time. Patient sleeping easy to arouse no deficits noted  
0700 Bedside and Verbal shift change report given to [address]  (oncoming nurse) by Kalli Sahni RN  (offgoing nurse). Report included the following information SBAR, Kardex, Procedure Summary, Intake/Output, MAR, Accordion, Recent Results, Med Rec Status, Cardiac Rhythm sinus thao, Alarm Parameters  and Quality Measures.

## 2019-06-20 NOTE — CONSULTS
[facility] Name: Jacqueline Cantu MRN: [id number] : 1945 Hospital: [address] Date: 2019 Impression Plan 1. Left sided weakness 2. COPD- FEV 34% 3. Tobacco abuse · Duonebs · Pulmicort/Brovana · Hold on any steroids · Wean O2 as tolerated, keeping sats above 90% · Repeat chest CT and follow up as outpatient · Follow up with [facility] Radiology ( personally reviewed) CXR reviewed: MICHAEL chronic changes ABG No results for input(s): PHI, PO2I, PCO2I in the last 72 hours. Subjective Cc: weakness 77 yo with PMHX of severe COPD (FEV1 of 34%) presenting with complaints of weakness and facial droop. MRI negative for acute event. Nursing noticed significant dyspnea with short amounts of walking. Pt use to see [facility] and was bronched in the past. He was lost to follow up in 2017. He remains on Advair 500/50 and uses resque inhaler every 4 hrs. Pt denies any LEE but states he remains congested. Weakness has overall improved. Review of Systems: A comprehensive review of systems was negative except for that written in the HPI. Past Medical History:  
Diagnosis Date  CAD (coronary artery disease)  Cancer ([facility] [address] \"10 years ago\" throat  COPD (chronic obstructive pulmonary disease) (HCC)  CVA (cerebral vascular accident) ([facility] [address]  H/O mechanical aortic valve replacement CT surgery  - CABG (LIMA to LAD) and AVR ([facility])  S/P CABG x 1   
 CT surgery  - CABG (LIMA to LAD) and AVR ([facility]) Past Surgical History:  
Procedure Laterality Date  HX HEART VALVE SURGERY    
 CT surgery  - CABG (LIMA to LAD) and AVR ([facility]) Prior to Admission medications Medication Sig Start Date End Date Taking? Authorizing Provider buPROPion SR (WELLBUTRIN SR) 150 mg SR tablet Take 150 mg by mouth two (2) times a day.    Yes Provider, Historical  
 latanoprost (XALATAN) 0.005 % ophthalmic solution Administer 1 Drop to both eyes nightly. Yes Provider, Historical  
amLODIPine (NORVASC) 10 mg tablet Take 10 mg by mouth daily. Yes Provider, Historical  
albuterol (PROVENTIL VENTOLIN) 2.5 mg /3 mL (0.083 %) nebulizer solution 3 mL by Nebulization route every four (4) hours as needed for Wheezing. 3/17/19  Yes Vickie Rivero MD  
promethazine (PHENERGAN) 12.5 mg tablet Take 12.5 mg by mouth daily. Indications: Nausea   Yes Provider, Historical  
atorvastatin (LIPITOR) 80 mg tablet Take 80 mg by mouth every evening. Yes Provider, Historical  
warfarin (COUMADIN) 5 mg tablet Take 5 mg by mouth every evening. Yes Provider, Historical  
aspirin delayed-release 81 mg tablet Take 81 mg by mouth daily. Yes Provider, Historical  
albuterol (PROVENTIL HFA, VENTOLIN HFA, PROAIR HFA) 90 mcg/actuation inhaler Take 2 Puffs by inhalation every six (6) hours as needed. Yes Other, MD Kallie  
fluticasone-salmeterol (ADVAIR) 500-50 mcg/dose diskus inhaler Take 1 Puff by inhalation every twelve (12) hours. Yes Other, MD Kallie  
clonazepam (KLONOPIN) 0.5 mg tablet Take 0.5 mg by mouth three (3) times daily. 10/14/14  Yes Other, MD Kallie  
escitalopram oxalate (LEXAPRO) 10 mg tablet Take 10 mg by mouth daily. 10/21/14  Yes Other, MD Kallie  
isosorbide mononitrate (ISMO, MONOKET) 10 mg tablet Take 10 mg by mouth three (3) times daily. Patient takes at [address] [place] [address], noon, and 5pm 10/21/14  Yes Lily, MD Kallie  
 
Current Facility-Administered Medications Medication Dose Route Frequency  guaiFENesin ER (MUCINEX) tablet 1,200 mg  1,200 mg Oral Q12H  
 doxycycline (VIBRA-TABS) tablet 100 mg  100 mg Oral Q12H  Warfarin: No Warfarin on 6/20/2019 (INR 7.7)   Other ONCE  
 sodium chloride (NS) flush 5-40 mL  5-40 mL IntraVENous Q8H  
 atorvastatin (LIPITOR) tablet 80 mg  80 mg Oral QPM  
 buPROPion SR (WELLBUTRIN SR) tablet 150 mg  150 mg Oral BID  
  clonazePAM (KlonoPIN) tablet 0.5 mg  0.5 mg Oral TID  escitalopram oxalate (LEXAPRO) tablet 10 mg  10 mg Oral DAILY  isosorbide mononitrate (ISMO, MONOKET) tablet 10 mg  10 mg Oral TID  latanoprost (XALATAN) 0.005 % ophthalmic solution 1 Drop  1 Drop Both Eyes QHS  albuterol-ipratropium (DUO-NEB) 2.5 MG-0.5 MG/3 ML  3 mL Nebulization Q4H RT  
 arformoterol (BROVANA) neb solution 15 mcg  15 mcg Nebulization BID RT  
 budesonide (PULMICORT) 500 mcg/2 ml nebulizer suspension  500 mcg Nebulization BID RT Allergies Allergen Reactions  Adenosine Shortness of Breath  Iodinated Contrast- Oral And Iv Dye Shortness of Breath  Pcn [Penicillins] Shortness of Breath Patient reports to have tolerated a 10 day course of cefdinir started 8/24/17 Social History Tobacco Use  Smoking status: Current Every Day Smoker  Smokeless tobacco: Never Used  Tobacco comment: pipe Substance Use Topics  Alcohol use: No  
  
Family History Problem Relation Age of Onset  Heart Disease Mother  Heart Disease Sister Laboratory: I have personally reviewed the critical care flowsheet and labs. Recent Labs  
  06/19/19 1915 WBC 7.3 HGB 12.9 HCT 39.4  Recent Labs  
  06/20/19 0624 06/19/19 1919 06/19/19 1915 NA  --   --  140 K  --   --  4.3 CL  --   --  109* CO2  --   --  28  
GLU  --   --  87 BUN  --   --  13  
CREA  --   --  1.06  
CA  --   --  8.9 ALB  --   --  3.8 SGOT  --   --  26 ALT  --   --  23 INR 7.7* 5.4* 7.3* Objective: Mode Rate Tidal Volume Pressure FiO2 PEEP Vital Signs:   
 TMAX(24) Intake/Output:  
Last shift:        
Last 3 shifts: No intake/output data recorded. Edvin Nuñez Intake/Output Summary (Last 24 hours) at 6/20/2019 1247 Last data filed at 6/20/2019 0600 Gross per 24 hour Intake  Output 400 ml Net -400 ml EXAM:  
 GENERAL: well developed, barrel chested, HEENT:  PERRL, EOMI, no alar flaring or epistaxis, oral mucosa moist without cyanosis, NECK:  no jugular vein distention, no retractions, no thyromegaly or masses, LUNGS: scant rhonci and wheezes , HEART:  Regular rate and rhythm with no MGR; no edema is present, ABDOMEN:  soft with no tenderness, bowel sounds present, EXTREMITIES:  warm with no cyanosis, SKIN:  no jaundice or ecchymosis and NEUROLOGIC:  alert and oriented, grossly non-focal 
 
Jose Roberto Meyer MD 
[facility] [place]

## 2019-06-20 NOTE — PROGRESS NOTES
[facility] Dosing Services: 6/19/2019 Consult for Warfarin Dosing by Pharmacy by Dr. Edie Narvaez Consult provided for this 76 y.o.  male , for indication of Mechanical Heart Valve (mitral and aortic). Day of Therapy continue from home Home dose 5 mg daily Dose to achieve an INR goal of 2.5- 3.5 Will hold warfarin 6/19 as INR is supratherapeutic at 5.4 Order entered for  Warfarin  0 (mg) ordered to be given today at 18:00. Significant drug interactions: N/A Previous dose given 5 mg PTA  
PT/INR Lab Results Component Value Date/Time INR 7.3 (HH) 06/19/2019 07:15 PM  
 INR (POC) 5.4 () 06/19/2019 07:19 PM  
 
  
Platelets Lab Results Component Value Date/Time PLATELET 100 89/94/3251 07:15 PM  
 
  
H/H Lab Results Component Value Date/Time HGB 12.9 06/19/2019 07:15 PM  
 
  
 
Pharmacy to follow daily and will provide subsequent Warfarin dosing based on clinical status. ELISABET Flowers  Contact information [phone]

## 2019-06-20 NOTE — PROGRESS NOTES
Pt downgraded from inpatient to observation. Code 40 issued,printed,copy placed in chart and copy given to pt plus both medicare observation letters reviewed by pt,signed by pt with copies given to pt and copies in chart to be scanned into [facility]. UR notified code 44 completed.  
Thi Malone

## 2019-06-20 NOTE — PROGRESS NOTES
0700: Bedside and Verbal shift change report given to Andrés Koroma RN (oncoming nurse) by Bell Fernández RN (offgoing nurse). Report included the following information SBAR, Kardex, Intake/Output and MAR. Patient alert and conversant. Neuro exam negative. 1200: Reassessment. Patient still alert and conversant, no new symptoms. 1600: Reassessment completed. 1900: Verbal shift change report given to Bell Fernández RN (oncoming nurse) by Andrés Koroma RN (offgoing nurse). Report included the following information SBAR, Kardex, Intake/Output and MAR.

## 2019-06-20 NOTE — PROGRESS NOTES
[facility]: MED RECONCILIATION Comments/Recommendations:  
 
Patient unable to confirm name, , allergies and preferred pharmacy, pt WEND[facility] - wife asisted with med rec by providing a list from MD office Warfain dose per wife is 5 mg daily with goal INR of 2.5 -3.5 - states INR sometime last week was 3.5 Medications added:  
 
Amlodipine Bupropion 150 mg SR  
xalatan Medications removed: Wellbutrin 100 mg Jewel [place] Mucinex Prednisone Medications adjusted: 
 
none Information obtained from: Family Member Allergies: Adenosine; Iodinated contrast- oral and iv dye; and Pcn [penicillins] Prior to Admission Medications:  
 
Prior to Admission Medications Prescriptions Last Dose Informant Patient Reported? Taking? albuterol (PROVENTIL HFA, VENTOLIN HFA, PROAIR HFA) 90 mcg/actuation inhaler 2019 at Unknown time Significant Other Yes Yes Sig: Take 2 Puffs by inhalation every six (6) hours as needed. albuterol (PROVENTIL VENTOLIN) 2.5 mg /3 mL (0.083 %) nebulizer solution 2019 at Unknown time Significant Other No Yes Sig: 3 mL by Nebulization route every four (4) hours as needed for Wheezing. amLODIPine (NORVASC) 10 mg tablet 2019 at Unknown time Significant Other Yes Yes Sig: Take 10 mg by mouth daily. aspirin delayed-release 81 mg tablet 2019 at Unknown time Significant Other Yes Yes Sig: Take 81 mg by mouth daily. atorvastatin (LIPITOR) 80 mg tablet 2019 at Unknown time Significant Other Yes Yes Sig: Take 80 mg by mouth every evening. buPROPion SR (WELLBUTRIN SR) 150 mg SR tablet 2019 at Unknown time Significant Other Yes Yes Sig: Take 150 mg by mouth two (2) times a day. clonazepam (KLONOPIN) 0.5 mg tablet 2019 at Unknown time Significant Other Yes Yes Sig: Take 0.5 mg by mouth three (3) times daily. escitalopram oxalate (LEXAPRO) 10 mg tablet 2019 at Unknown time Significant Other Yes Yes Sig: Take 10 mg by mouth daily. fluticasone-salmeterol (ADVAIR) 500-50 mcg/dose diskus inhaler 6/19/2019 at Unknown time Significant Other Yes Yes Sig: Take 1 Puff by inhalation every twelve (12) hours. isosorbide mononitrate (ISMO, MONOKET) 10 mg tablet 6/19/2019 at Unknown time Significant Other Yes Yes Sig: Take 10 mg by mouth three (3) times daily. Patient takes at 9am, noon, and 5pm  
latanoprost (XALATAN) 0.005 % ophthalmic solution 6/18/2019 at Unknown time Significant Other Yes Yes Sig: Administer 1 Drop to both eyes nightly. promethazine (PHENERGAN) 12.5 mg tablet 6/18/2019 at Unknown time Significant Other Yes Yes Sig: Take 12.5 mg by mouth daily. Indications: Nausea  
warfarin (COUMADIN) 5 mg tablet 6/19/2019 at Unknown time Significant Other Yes Yes Sig: Take 5 mg by mouth every evening. Facility-Administered Medications: None Willy Thurston Pharm. D. Clinical Staff Pharmacist 
Miriam [address] [place] [address] [phone]

## 2019-06-20 NOTE — PROGRESS NOTES
Problem: TIA/CVA Stroke: 0-24 hours Goal: Consults, if ordered Outcome: Progressing Towards Goal 
Note:  
Neuro consult in for this am 
Goal: *Hemodynamically stable Outcome: Progressing Towards Goal 
Note:  
Blood pressure is wnl Goal: *Neurologically stable Description Absence of additional neurological deficits Outcome: Progressing Towards Goal 
Note:  
Patient complains of some left sided weakness, nurse notes no difference in patient strength Goal: *Verbalizes anxiety and depression are reduced or absent Outcome: Progressing Towards Goal 
Note:  
No anxiety noted at this time Goal: *Absence of Signs of Aspiration on Current Diet Outcome: Progressing Towards Goal 
Note:  
Patient passed STAND no s/s of aspiration noted Goal: *Absence of deep venous thrombosis signs and symptoms(Stroke Metric) Outcome: Progressing Towards Goal 
Note: Dopplers are negative , No s/s of DVT Goal: *Ability to perform ADLs and demonstrates progressive mobility and function Outcome: Progressing Towards Goal 
Note:  
Patient up with assistance Goal: *Stroke education started(Stroke Metric) Outcome: Progressing Towards Goal 
Note:  
Patient given STROKE booklet given Goal: *Dysphagia screen performed(Stroke Metric) Outcome: Progressing Towards Goal 
Note:  
Patient passed STAND Goal: *Rehab consulted(Stroke Metric) Outcome: Progressing Towards Goal 
Note:  
PT and OT  ordered Problem: Falls - Risk of 
Goal: *Absence of Falls Description Document Elijah Osman Fall Risk and appropriate interventions in the flowsheet. Outcome: Progressing Towards Goal 
Note:  
Fall Risk Interventions: 
Mobility Interventions: Communicate number of staff needed for ambulation/transfer, Patient to call before getting OOB Medication Interventions: Assess postural VS orthostatic hypotension, Patient to call before getting OOB Fall precautions are in place at this time

## 2019-06-20 NOTE — PROGRESS NOTES
Problem: Dysphagia (Adult) Goal: *Acute Goals and Plan of Care (Insert Text) Description Swallowing goals initaited 6-20-19: 
1)  Tolerate mech soft, thins wihtout s/s aspiration by 6-23-19 
2) MBS if any increased coughing at meals. 3) full speech-language eval  
Outcome: Progressing Towards Goal 
 SPEECH LANGUAGE PATHOLOGY BEDSIDE SWALLOW EVALUATION Patient: Charanjit Lott (51 y.o. male) Date: 6/20/2019 Primary Diagnosis: CVA (cerebral vascular accident) ([facility] [address] [I63.9] CVA (cerebral vascular accident) ([facility] [address] [I63.9] Precautions: aspiration ASSESSMENT : 
Based on the objective data described below, the patient presents with moderate dysphonia, a brief spell of difficulty talking for a few seconds, possible dysphagia, complicated by [facility] throat CA and h/o CVAs. Admitted. With L arm and leg weakness, dysarthria. MRI: old R parietal, R frontal, and occipital infarct, basal ganglion, R thalamus. PMH: COPD, throat CA ( 10 years ago), CAD s/po CABG, CVA x3, +tobacco, +ETOH Patient will benefit from skilled intervention to address the above impairments. Patients rehabilitation potential is considered to be Good Factors which may influence rehabilitation potential include: ? None noted ?          s  Mental ability/status ?         s   Medical condition ? Home/family situation and support systems ?       s     Safety awareness ? Pain tolerance/management ? Other: PLAN : 
Recommendations and Planned Interventions: 
Ok for mech soft, thins. May need MBS if additional coughing at meals or new findings on CXR, based on old CVAS and H&N CA Frequency/Duration: Patient will be followed by speech-language pathology 5 times a week to address goals. Discharge Recommendations: Inpatient Rehab, Outpatient and [address] [place] [address] [place] he can get follow up SLP therapy SUBJECTIVE:  
Patient stated I thought I had PNA. OBJECTIVE:  
 Past Medical History:  
Diagnosis Date  CAD (coronary artery disease)  Cancer ([facility] [address] \"10 years ago\" throat  COPD (chronic obstructive pulmonary disease) ([facility])  CVA (cerebral vascular accident) ([facility] [address]  H/O mechanical aortic valve replacement CT surgery 1994 - CABG (LIMA to LAD) and AVR ([facility])  S/P CABG x 1   
 CT surgery 1994 - CABG (LIMA to LAD) and AVR ([facility]) Past Surgical History:  
Procedure Laterality Date  HX HEART VALVE SURGERY    
 CT surgery 1994 - CABG (LIMA to LAD) and AVR ([facility]) Prior Level of Function/Home Situation:  
Home Situation Home Environment: Private residence One/Two Story Residence: One story Living Alone: No 
Support Systems: Family member(s), Spouse/Significant Other/Partner Patient Expects to be Discharged to[de-identified] Private residence Current DME Used/Available at Home: None Diet prior to admission: regular, thins Current Diet:  NPO Cognitive and Communication Status: 
Neurologic State: Alert Orientation Level: Oriented to person, Oriented to place, Disoriented to situation, Disoriented to time Cognition: (in this session, patient had bouts of dysfluency, inability to talk, that lasted only a few seconds) Perception: Appears intact Perseveration: No perseveration noted Safety/Judgement: Decreased insight into deficits Oral Assessment: 
Oral Assessment Labial: (very slight L labial-facial flaccidity) Dentition: Upper dentures; Natural;Limited Oral Hygiene: [facility] Lingual: No impairment Mandible: No impairment P.O. Trials: 
Patient Position: upright in bed Vocal quality prior to P.O.: (moderately hoarse with diplophonia. he has a h/o throat cancer. He reports that his voice is worse b/c \"I've had a cold. I want to my doctor b/c I thought I had PNA\") Consistency Presented: Thin liquid; Solid;Puree How Presented: Straw;SLP-fed/presented;Self-fed/presented; Successive swallows;Spoon(some difficulty feeding self/apraxia?) ORAL PHASE: 
Random L hand apraxia with intermittent difficulty feeding self. He attempted to drink from the spoon handle. No other oral issues noted. PHARYNGEAL PHASE:  
Mild weakness He coughed at end of session only. Cough was wet. Spoke with MD. He is repeating CXR 
RN reports coughing after PO meds with water. Patient with aspiration risk due to h/o CVAs, h/o throat CA. speech/language:  
Patient with dysphonia: moderate hoarseness, weakness, diplophonia. Patient with h/o H&N CA in throat. He said that his voice is worse b/c Zigmund Roll has a cold\". He went to MD, b/c he thought he had PNA, but they said he did not. Language:  
Patient initially unable to talk for a few seconds when SLP entered room. He was gesturing with his hands, then started talking. No aphasia noted. his L hand moved only intermittently. Possible apraxia. Discussed with OT 
  
  
  
  
  
 
  
  
 
NOMS:  
The NOMS functional outcome measure was used to quantify this patient's level of swallowing impairment. Based on the NOMS, the patient was determined to be at level 5 for swallow function NOMS Swallowing Levels: 
Level 1 (CN): NPO Level 2 (CM): NPO but takes consistency in therapy Level 3 (CL): Takes less than 50% of nutrition p.o. and continues with nonoral feedings; and/or safe with mod cues; and/or max diet restriction Level 4 (CK): Safe swallow but needs mod cues; and/or mod diet restriction; and/or still requires some nonoral feeding/supplements Level 5 (CJ): Safe swallow with min diet restriction; and/or needs min cues Level 6 (CI): Independent with p.o.; rare cues; usually self cues; may need to avoid some foods or needs extra time Level 7 (CH): Independent for all p.o. SHARAD. (2003). National Outcomes Measurement System (NOMS): Adult Speech-Language Pathology User's Guide. Pain: 
Pain Scale 1: Numeric (0 - 10) Pain Intensity 1: 0 After treatment: ?            Patient left in no apparent distress sitting up in chair 
?      x      Patient left in no apparent distress in bed ? Call bell left within reach 
?        x    Nursing notified ? Caregiver present ? Bed alarm activated COMMUNICATION/EDUCATION:  
The patients plan of care including recommendations, planned interventions, and recommended diet changes were discussed with: Occupational Therapist and Registered Nurse. Patient was educated regarding His deficit(s) of dysphonia, possible intermittent language issues, possible dysphagia  as this relates to His diagnosis of CVA. He demonstrated Good understanding as evidenced by discussion. . 
?            Posted safety precautions in patient's room. ?      x      Patient/family have participated as able in goal setting and plan of care. ?       x     Patient/family agree to work toward stated goals and plan of care. ?            Patient understands intent and goals of therapy, but is neutral about his/her participation. ? Patient is unable to participate in goal setting and plan of care. Thank you for this referral. 
Pro Hathaway, SLP Time Calculation: 15 mins

## 2019-06-21 NOTE — PROGRESS NOTES
Problem: Self Care Deficits Care Plan (Adult) Goal: *Acute Goals and Plan of Care (Insert Text) Description Occupational Therapy Goals Initiated 6/21/2019 1. Patient will perform lower body dressing and bathing with supervision/set-up within 7 day(s). 2.  Patient will perform toilet transfers with supervision/set-up within 7 day(s). 3.  Patient will perform all aspects of toileting with supervision/set-up within 7 day(s). 4.  Patient will participate in upper extremity therapeutic exercise/activities with modified independence for 10 minutes within 7 day(s). 5.  Patient will utilize energy conservation techniques during functional activities with verbal cues within 7 day(s). 6.  Patient will improve Fugl-Rodríguez Assessment of Upper Extremity score by 5 points within 7 days. Outcome: Progressing Towards Goal 
 OCCUPATIONAL THERAPY EVALUATION Patient: Sanchez Mcrae (44 y.o. male) Date: 6/21/2019 Primary Diagnosis: CVA (cerebral vascular accident) ([facility] [address] [I63.9] CVA (cerebral vascular accident) ([facility] [address] [I63.9] TIA (transient ischemic attack) [G45.9] Precautions:  Fall ASSESSMENT : 
Based on the objective data described below, the patient presents with decreased activity tolerance following admission on 6/19 for L side weakness and slurred speech. CT and MRI were both negative for acute process however did reveal multiple chronic infracts, R occipital, basal ganglia, R thalamus, R frontoparietal.  Patient with supra therapeutic INR since admission but has been cleared by nursing and MD to engage in OT evaluation; Noted to be trending down after AM lab draw. Educated patient on fall risk and importance of safety specifically related to his elevated INR. Patient lives with his wife, occasionally uses a rollator for mobility/transfers, and needs assistance for bathing and dressing occasionally since last CVA (October 2018 per wife).   Patient is primarily limited by L UE weakness, L UE/LE ataxia/impaired coordination, impaired balance, impulsivity, and poor safety awareness. Patient required supervision to come to sitting EOB and min A for transfer to the chair and then to the stretcher when transport arrived to take patient to CT at end of tx. Patient is independent to supervision/setup level for completion of UB ADLs and requires min A for LB ADLs. Patient was educated on the signs and symptoms of stroke, including BEFAST, stroke risk factors, principles of neuro plasticity, and neuro re-education exercises to address previously mentioned deficits. Patient would benefit from continued skilled OT. Outpatient OT highly recommended however patient and wife resistant stating he is too fatigued to go to appointments. Offered home health however they are resistant; CM made aware. Patient will benefit from skilled intervention to address the above impairments. Patient?s rehabilitation potential is considered to be Good Factors which may influence rehabilitation potential include: ? None noted ? Mental ability/status ? Medical condition ? Home/family situation and support systems ? Safety awareness ? Pain tolerance/management ? Other: PLAN : 
Recommendations and Planned Interventions: 
?               Self Care Training                  ? Therapeutic Activities ? Functional Mobility Training    ? Cognitive Retraining 
? Therapeutic Exercises           ? Endurance Activities ? Balance Training                   ? Neuromuscular Re-Education ? Visual/Perceptual Training     ? Home Safety Training 
? Patient Education                 ? Family Training/Education ?                Other (comment): 
 
 Frequency/Duration: Patient will be followed by occupational therapy 5 times a week to address goals. Discharge Recommendations: Outpatient Further Equipment Recommendations for Discharge: none at this time SUBJECTIVE:  
Patient stated ? I have always moved fast; My wife tells me I need to slow down.? OBJECTIVE DATA SUMMARY:  
HISTORY:  
Past Medical History:  
Diagnosis Date CAD (coronary artery disease) Cancer ([facility] [address] \"10 years ago\" throat COPD (chronic obstructive pulmonary disease) ([facility] [address] CVA (cerebral vascular accident) [facility])   
 H/O mechanical aortic valve replacement CT surgery 1994 - CABG (LIMA to LAD) and AVR ([facility]) S/P CABG x 1   
 CT surgery 1994 - CABG (LIMA to LAD) and AVR ([facility]) Past Surgical History:  
Procedure Laterality Date HX HEART VALVE SURGERY    
 CT surgery 1994 - CABG (LIMA to LAD) and AVR ([facility]) Prior Level of Function/Environment/Context: Patient lives with his wife. He reports independence with ADLs and functional mobility PTA. Home Situation Home Environment: Private residence # Steps to Enter: 2 One/Two Story Residence: One story Living Alone: No 
Support Systems: Spouse/Significant Other/Partner Patient Expects to be Discharged to[de-identified] Private residence Current DME Used/Available at Home: Morrie Locket, rollator Tub or Shower Type: Shower(built in seat) Hand dominance: Right EXAMINATION OF PERFORMANCE DEFICITS: 
Cognitive/Behavioral Status: 
Neurologic State: Alert Orientation Level: Oriented X4 Cognition: Follows commands Perception: Appears intact Perseveration: No perseveration noted Safety/Judgement: Awareness of environment Skin: Intact in the uppers Edema: None noted in the uppers Hearing: Auditory Auditory Impairment: None Vision/Perceptual:   
Tracking: Able to track stimulus in all quadrants w/o difficulty Diplopia: No   
Acuity: Within Defined Limits Corrective Lenses: Glasses Range of Motion: 
Right upper: WDL Left upper: Decreased but functional  
Strength: 
Right upper: WDL Left upper: Decreased but functional  
Coordination: 
Coordination: Within functional limits(LEs, LUE ataxic) Fine Motor Skills-Upper: Left Impaired;Right Intact Gross Motor Skills-Upper: Left Impaired;Right Intact Tone & Sensation: 
Tone: Normal 
Balance: 
Sitting: Intact Standing: Impaired Standing - Static: Fair;Constant support Standing - Dynamic : Poor Functional Mobility and Transfers for ADLs: 
Bed Mobility: 
Rolling: Stand-by assistance Supine to Sit: Stand-by assistance Sit to Supine: Stand-by assistance Transfers: 
Sit to Stand: Minimum assistance Stand to Sit: Minimum assistance Bed to Chair: Minimum assistance Bathroom Mobility: Minimum assistance ADL Assessment: 
Feeding: Independent Oral Facial Hygiene/Grooming: Independent Bathing: Minimum assistance Upper Body Dressing: Supervision;Setup Lower Body Dressing: Minimum assistance Toileting: Minimum assistance Cognitive Retraining Safety/Judgement: Awareness of environment Functional Measure: 
Fugl-Rodríguez Assessment of Motor Recovery after Stroke:  
 
Reflex Activity Flexors/Biceps/Fingers: Can be elicited Extensors/Triceps: Can be elicited Reflex Subtotal: 4 Volitional Movement Within Synergies Shoulder Retraction: Full Shoulder Elevation: Full Shoulder Abduction (90 degrees): Full Shoulder External Rotation: Full Elbow Flexion: Full Forearm Supination: Full Shoulder Adduction/Internal Rotation: Full Elbow Extension: Full Forearm Pronation: Full Subtotal: 18 
 
Volitional Movement Mixing Synergies Hand to Lumbar Spine: Full Shoulder Flexion (0-90 degrees): Full Pronation-Supination: Full Subtotal: 6 Volitional Movement With Little or No Synergy Shoulder Abduction (0-90 degrees): Full Shoulder Flexion ( degrees): Full Pronation/Supination: Full Subtotal : 6 
 
 Normal Reflex Activity Biceps, Triceps, Finger Flexors: Full Subtotal : 2 Upper Extremity Total  
Upper Extremity Total: 36 Wrist 
Stability at 15 Degree Dorsiflexion: Partial 
Repeated Dorsiflexion/ Volar Flexion: Partial 
Stability at 15 Degree Dorsiflexion: Partial 
Repeated Dorsiflexion/ Volar Flexion: Partial 
Circumduction: Partial 
Wrist Total: 5 Hand Mass Flexion: Full Mass Extension: Full Grasp A: Partial 
Grasp B: Partial 
Grasp C: Partial 
Grasp D: Partial 
Grasp E: Partial 
Hand Total: 9 Coordination/Speed Tremor: Slight Dysmetria: Slight Time: 2-5s Coordination/Speed Total : 3 Total A-D Total A-D (Motor Function): 53/66 This is a reliable/valid measure of arm function after a neurological event. It has established value to characterize functional status and for measuring spontaneous and therapy-induced recovery; tests proximal and distal motor functions. Fugl-Rodríguez Assessment  UE scores recorded between five and 30 days post neurologic event can be used to predict UE recovery at six months post neurologic event. Severe = 0-21 points Moderately Severe = 22-33 points Moderate = 34-47 points Mild = 48-66 points MINERVA Hughes, MARGIE Valdez, & MALIKA Booker (1992). Measurement of motor recovery after stroke: Outcome assessment and sample size requirements. Stroke, 23, pp. 7633-6436.  
-------------------------------------------------------------------------------------------------------------------------------------------------------------------- MCID: 
Stroke: Soco Mckeon, 2001; n = 171; mean age 79 (6) years; assessed within 16 (12) days of stroke, Acute Stroke) FMA Motor Scores from Admission to Discharge 10 point increase in FMA Upper Extremity = 1.5 change in discharge FIM  
10 point increase in FMA Lower Extremity = 1.9 change in discharge FIM 
MDC:  
Stroke:  
Larwance Memory et al, 2008, n = 14, mean age = 59.9 (14.6) years, assessed on average 14 (6.5) months post stroke, Chronic Stroke) FMA = 5.2 points for the Upper Extremity portion of the assessment Occupational Therapy Evaluation Charge Determination History Examination Decision-Making LOW Complexity : Brief history review  LOW Complexity : 1-3 performance deficits relating to physical, cognitive , or psychosocial skils that result in activity limitations and / or participation restrictions  LOW Complexity : No comorbidities that affect functional and no verbal or physical assistance needed to complete eval tasks Based on the above components, the patient evaluation is determined to be of the following complexity level: LOW Pain: 
Pain Scale 1: Numeric (0 - 10) Pain Intensity 1: 0 Activity Tolerance:  
Patient tolerated eval well. After treatment:  
? Patient left in no apparent distress sitting up in chair ? Patient left in no apparent distress in bed 
? Call bell left within reach ? Nursing notified ? Caregiver present ? Bed alarm activated COMMUNICATION/EDUCATION:  
The patient?s plan of care was discussed with: Physical Therapist, Registered Nurse and patient . ? Home safety education was provided and the patient/caregiver indicated understanding. ? Patient/family have participated as able in goal setting and plan of care. ? Patient/family agree to work toward stated goals and plan of care. ? Patient understands intent and goals of therapy, but is neutral about his/her participation. ? Patient is unable to participate in goal setting and plan of care. This patient?s plan of care is appropriate for delegation to [facility]. Thank you for this referral. 
Beth Walton OTR/L Time Calculation: 39 mins

## 2019-06-21 NOTE — PROGRESS NOTES
OT also notified me that when discharged,pt will need a hard script for neuro-based OT. When discharged,please give pt a script for neuro-based  outpatient speech,PT and OT. I have placed the number for the outpatient [facility] on pt.'s AVS. Jose Antonio Curiel

## 2019-06-21 NOTE — PROGRESS NOTES
[facility] Dosing Services: 6/21/2019 Consult for Warfarin Dosing by Pharmacy by Dr. Yasmeen Israel Consult provided for this 76 y.o.  male , for indication of Mechanical Heart Valve (mitral and aortic). Day of Therapy: continued from home (Home dose 5 mg daily) Dose to achieve an INR goal of 2.5-3.5 Will hold warfarin 6/21/2019 as INR is supratherapeutic at 9.7. Significant drug interactions: N/A Previous dose given None since admission. (5 mg prior to admission) PT/INR Lab Results Component Value Date/Time INR 9.7 (HH) 06/21/2019 05:06 AM  
  
Platelets Lab Results Component Value Date/Time PLATELET 135 90/37/4861 05:06 AM  
  
H/H Lab Results Component Value Date/Time HGB 12.5 06/21/2019 05:06 AM  
  
 
Pharmacist will follow daily and will provide subsequent Warfarin dosing based on clinical status.  
Pepper Gatica, Pharmacist

## 2019-06-21 NOTE — PROGRESS NOTES
0715-Bedside report received from CHELSIE Javier RN. SBAR, Kardex, Procedure Summary, Intake/Output, MAR, Recent Results and Cardiac Rhythm SB to SR were discussed. Pt observed resting in bed, without distress. States all needs are met at this time. Will assess. Lorelei Galarza, IVAN 
4985-Bedside and Verbal shift change report given to KJ Mota (oncoming nurse) by GINNY Galarza RN (offgoing nurse). Report included the following information SBAR, Kardex, Procedure Summary, Intake/Output, MAR, Recent Results and Cardiac Rhythm NSR. There are no outstanding issues to note this shift. INR now trending downward. The patient sat in the chair for several hours and ambulated to the toilet without difficulty. VS have been stable.  Vicki RN

## 2019-06-21 NOTE — PROGRESS NOTES
Problem: TIA/CVA Stroke: Day 2 Until Discharge Goal: Activity/Safety Outcome: Progressing Towards Goal 
Note:  
Patient up with assistance at this time Goal: *Verbalizes anxiety and depression are reduced or absent Outcome: Progressing Towards Goal 
Note:  
No anxiety noted Goal: *Absence of aspiration Outcome: Progressing Towards Goal 
Note:  
No s/s of aspiration noted Goal: *Optimal pain control at patient's stated goal 
Outcome: Progressing Towards Goal 
Note:  
Pain is controled at this time Goal: *Tolerating diet Outcome: Progressing Towards Goal 
Note:  
Patient tolerating regular diet Goal: *Ability to perform ADLs and demonstrates progressive mobility and function Outcome: Progressing Towards Goal 
Note:  
Patient due to work with PT  
Goal: *Stroke education continued(Stroke Metric) Outcome: Progressing Towards Goal 
Note:  
Patient continues to be on stroke protocol Problem: Falls - Risk of 
Goal: *Absence of Falls Description Document Isabela Stringer Fall Risk and appropriate interventions in the flowsheet. Outcome: Progressing Towards Goal 
Note:  
Fall Risk Interventions: 
Mobility Interventions: Bed/chair exit alarm, Communicate number of staff needed for ambulation/transfer, PT Consult for assist device competence, PT Consult for mobility concerns Mentation Interventions: Bed/chair exit alarm, Evaluate medications/consider consulting pharmacy, Family/sitter at bedside, Eyeglasses and hearing aids, More frequent rounding, Increase mobility, Room close to nurse's station, Reorient patient Medication Interventions: Bed/chair exit alarm, Evaluate medications/consider consulting pharmacy, Teach patient to arise slowly Elimination Interventions: Call light in reach, Patient to call for help with toileting needs, Toileting schedule/hourly rounds History of Falls Interventions: Consult care management for discharge planning, Evaluate medications/consider consulting pharmacy, Room close to nurse's station Fall precautions are in place at this time

## 2019-06-21 NOTE — PROGRESS NOTES
[facility] Progress Note Patient ID: Slime Mckee [id number] 
28 y.o. 
1945 Subjective:  
History: 
Slime Mckee is a 76 y.o. male who  has a past medical history of CAD (coronary artery disease), Cancer ([facility] (\"10 years ago\"), COPD (chronic obstructive pulmonary disease) ([facility] CVA (cerebral vascular accident) ([facility] H/O mechanical aortic valve replacement, and S/P CABG x 1. who had prior strokes with residual L sided mild weakness. Yesterday, patient noted increased L sided weakness lasting for 1 hr. MRI brain did not show a new stroke but did show old R occipital and R thalamic strokes. 
  
Patient feels better with no new event. INR still elevated (9.7) ROS: 
Per HPI- 
Otherwise the remainder of the review of system was negative Social Hx: 
Social History Socioeconomic History  Marital status:  Spouse name: Not on file  Number of children: Not on file  Years of education: Not on file  Highest education level: Not on file Tobacco Use  Smoking status: Current Every Day Smoker  Smokeless tobacco: Never Used  Tobacco comment: pipe Substance and Sexual Activity  Alcohol use: No  
 Drug use: No  
 
Meds: 
No current facility-administered medications on file prior to encounter. Current Outpatient Medications on File Prior to Encounter Medication Sig Dispense Refill  buPROPion SR (WELLBUTRIN SR) 150 mg SR tablet Take 150 mg by mouth two (2) times a day.  latanoprost (XALATAN) 0.005 % ophthalmic solution Administer 1 Drop to both eyes nightly.  amLODIPine (NORVASC) 10 mg tablet Take 10 mg by mouth daily.  albuterol (PROVENTIL VENTOLIN) 2.5 mg /3 mL (0.083 %) nebulizer solution 3 mL by Nebulization route every four (4) hours as needed for Wheezing. 30 Each 0  
 promethazine (PHENERGAN) 12.5 mg tablet Take 12.5 mg by mouth daily. Indications: Nausea  atorvastatin (LIPITOR) 80 mg tablet Take 80 mg by mouth every evening.  warfarin (COUMADIN) 5 mg tablet Take 5 mg by mouth every evening.  aspirin delayed-release 81 mg tablet Take 81 mg by mouth daily.  albuterol (PROVENTIL HFA, VENTOLIN HFA, PROAIR HFA) 90 mcg/actuation inhaler Take 2 Puffs by inhalation every six (6) hours as needed.  fluticasone-salmeterol (ADVAIR) 500-50 mcg/dose diskus inhaler Take 1 Puff by inhalation every twelve (12) hours.  clonazepam (KLONOPIN) 0.5 mg tablet Take 0.5 mg by mouth three (3) times daily.  escitalopram oxalate (LEXAPRO) 10 mg tablet Take 10 mg by mouth daily.  isosorbide mononitrate (ISMO, MONOKET) 10 mg tablet Take 10 mg by mouth three (3) times daily. Patient takes at 9am, noon, and 5pm    
 
 
Imaging: CT Results (recent): 
Results from Hospital Encounter encounter on 06/19/19 CT CODE NEURO HEAD WO CONTRAST Narrative EXAM: CT CODE NEURO HEAD WO CONTRAST INDICATION: Code S 
 
COMPARISON: 2016. CONTRAST: None. TECHNIQUE: Unenhanced CT of the head was performed using 5 mm images. Brain and 
bone windows were generated. CT dose reduction was achieved through use of a 
standardized protocol tailored for this examination and automatic exposure 
control for dose modulation. FINDINGS: 
There is mild prominence of ventricles and sulci. There are changes small vessel 
disease periventricular white matter. There is an old right occipital 
infarction. There is a low-density in the right parietal lobe anteriorly and posterior right 
frontal lobe which was not present in 2016. The current infarction basal ganglia and right thalamus are unchanged. No 
hemorrhage is identified. Bony structures are intact. Impression IMPRESSION:  
1. There is a low-density in the right parietal lobe and posterior right frontal 
lobe not present in 2016 could represent an acute infarction. Old right occipital infarction, changes small vessel disease periventricular 
white matter and lacunar infarcts in basal ganglia and right thalami appear 
stable. Results called to the ER by myself. MRI Results (recent): 
Results from Hospital Encounter encounter on 06/19/19 MRA BRAIN WO CONT Narrative *PRELIMINARY REPORT* There is mild narrowing of the right vertebral V4 segment. There is slight 
plaquing of the basilar artery. Distal branches of the right posterior cerebral 
artery are attenuated most consistent with right occipital infarction. There is 
also attenuation of the right middle cerebral artery vessels in the sylvian 
fissure consistent with old infarction. Sharri Farnsworth Preliminary report was provided by Dr. Emeli Viveros, the on-call radiologist, at 437 Final report to follow. *END PRELIMINARY REPORT* EXAM: MRA BRAIN WO CONT INDICATION:   Left-sided weakness, slurred speech. COMPARISON:  MRA head 6/12/2016. CONTRAST:  None. TECHNIQUE:   
3-D time-of-flight noncontrast MRA of the brain was performed. Multiplanar and 
MIP reconstructions were obtained. FINDINGS: 
There is no evidence of large vessel occlusion or flow-limiting stenosis of the 
intracranial internal carotid, anterior cerebral, and middle cerebral arteries. The anterior communicating artery is not seen. There is no evidence of large vessel occlusion or flow-limiting stenosis of the 
intracranial vertebral arteries, basilar artery, or posterior cerebral arteries. Mild stenosis of the distal right V4 segment is stable. Unchanged 
atherosclerotic irregularity in the distal right P2 and P3 segments. The 
posterior communicating arteries are not seen. There is no evidence of aneurysm or vascular malformation. Impression IMPRESSION:   
1. No evidence of large vessel occlusion or flow-limiting stenosis. IR Results (recent): No results found for this or any previous visit. VAS/US Results (recent): 
Results from Hospital Encounter encounter on 06/12/16 DUPLEX CAROTID BILATERAL Reviewed records in [employer] and [facility] tab today Lab Review Admission on 06/19/2019 Component Date Value Ref Range Status  Glucose (POC) 06/19/2019 97  65 - 100 mg/dL Final  
 Comment: (NOTE) The Accu-Chek Inform II glucometer is not FDA cleared for critically  
ill patient use. A study was performed validating the equivalence of  
glucometer and clinical laboratory results on this patient  
population. Despite the study, use of glucometers with capillary  
specimens from critically ill patients, regardless of their location,  
makes the test high complexity and requires the performing individual  
to comply with CLIA requirements more stringent than those for waived  
testing in the hospital setting. Critical thinking skills are  
necessary to determine a potentially critically ill patients status  
prior to using a glucometer.  Performed by 06/19/2019 Garey Sandhoff   Final  
 WBC 06/19/2019 7.3  4.1 - 11.1 K/uL Final  
 RBC 06/19/2019 4.39  4. 10 - 5.70 M/uL Final  
 HGB 06/19/2019 12.9  12.1 - 17.0 g/dL Final  
 HCT 06/19/2019 39.4  36.6 - 50.3 % Final  
 MCV 06/19/2019 89.7  80.0 - 99.0 FL Final  
 MCH 06/19/2019 29.4  26.0 - 34.0 PG Final  
 MCHC 06/19/2019 32.7  30.0 - 36.5 g/dL Final  
 RDW 06/19/2019 15.2* 11.5 - 14.5 % Final  
 PLATELET 41/15/1781 919  150 - 400 K/uL Final  
 MPV 06/19/2019 9.1  8.9 - 12.9 FL Final  
 NRBC 06/19/2019 0.0  0  WBC Final  
 ABSOLUTE NRBC 06/19/2019 0.00  0.00 - 0.01 K/uL Final  
 NEUTROPHILS 06/19/2019 67  32 - 75 % Final  
 LYMPHOCYTES 06/19/2019 20  12 - 49 % Final  
 MONOCYTES 06/19/2019 10  5 - 13 % Final  
 EOSINOPHILS 06/19/2019 3  0 - 7 % Final  
 BASOPHILS 06/19/2019 0  0 - 1 % Final  
 IMMATURE GRANULOCYTES 06/19/2019 0  0.0 - 0.5 % Final  
 ABS.  NEUTROPHILS 06/19/2019 4.8  1.8 - 8.0 K/UL Final  
  ABS. LYMPHOCYTES 06/19/2019 1.5  0.8 - 3.5 K/UL Final  
 ABS. MONOCYTES 06/19/2019 0.7  0.0 - 1.0 K/UL Final  
 ABS. EOSINOPHILS 06/19/2019 0.2  0.0 - 0.4 K/UL Final  
 ABS. BASOPHILS 06/19/2019 0.0  0.0 - 0.1 K/UL Final  
 ABS. IMM. GRANS. 06/19/2019 0.0  0.00 - 0.04 K/UL Final  
 DF 06/19/2019 AUTOMATED    Final  
 Sodium 06/19/2019 140  136 - 145 mmol/L Final  
 Potassium 06/19/2019 4.3  3.5 - 5.1 mmol/L Final  
 Chloride 06/19/2019 109* 97 - 108 mmol/L Final  
 CO2 06/19/2019 28  21 - 32 mmol/L Final  
 Anion gap 06/19/2019 3* 5 - 15 mmol/L Final  
 Glucose 06/19/2019 87  65 - 100 mg/dL Final  
 BUN 06/19/2019 13  6 - 20 MG/DL Final  
 Creatinine 06/19/2019 1.06  0.70 - 1.30 MG/DL Final  
 BUN/Creatinine ratio 06/19/2019 12  12 - 20   Final  
 GFR est AA 06/19/2019 >60  >60 ml/min/1.73m2 Final  
 GFR est non-AA 06/19/2019 >60  >60 ml/min/1.73m2 Final  
 Comment: Estimated GFR is calculated using the IDMS-traceable Modification of Diet in Renal Disease (MDRD) Study equation, reported for both  Americans (GFRAA) and non- Americans (GFRNA), and normalized to 1.73m2 body surface area. The physician must decide which value applies to the patient. The MDRD study equation should only be used in individuals age 25 or older. It has not been validated for the following: pregnant women, patients with serious comorbid conditions, or on certain medications, or persons with extremes of body size, muscle mass, or nutritional status.  Calcium 06/19/2019 8.9  8.5 - 10.1 MG/DL Final  
 Bilirubin, total 06/19/2019 0.6  0.2 - 1.0 MG/DL Final  
 ALT (SGPT) 06/19/2019 23  12 - 78 U/L Final  
 AST (SGOT) 06/19/2019 26  15 - 37 U/L Final  
 Alk.  phosphatase 06/19/2019 127* 45 - 117 U/L Final  
 Protein, total 06/19/2019 7.5  6.4 - 8.2 g/dL Final  
 Albumin 06/19/2019 3.8  3.5 - 5.0 g/dL Final  
 Globulin 06/19/2019 3.7  2.0 - 4.0 g/dL Final  
 A-G Ratio 06/19/2019 1.0* 1.1 - 2.2   Final  
  Troponin-I, Qt. 06/19/2019 <0.05  <0.05 ng/mL Final  
 Comment: The presence of detectable troponin above the reference range indicates myocardial injury which may be due to ischemia, myocarditis, trauma, etc. 
Clinical correlation is necessary to establish the significance of this finding. Sequential testing is recommended to determine if the typical rise and fall of cTnI is demonstrated. Note:  Cardiac troponin I has a relatively long half life and may be present well after the CK MB has returned to baseline. The reference range is based on the 99th percentile of the referent population.  Ventricular Rate 06/19/2019 63  BPM Final  
 Atrial Rate 06/19/2019 63  BPM Final  
 P-R Interval 06/19/2019 182  ms Final  
 QRS Duration 06/19/2019 126  ms Final  
 Q-T Interval 06/19/2019 440  ms Final  
 QTC Calculation (Bezet) 06/19/2019 450  ms Final  
 Calculated P Axis 06/19/2019 54  degrees Final  
 Calculated R Axis 06/19/2019 2  degrees Final  
 Calculated T Axis 06/19/2019 113  degrees Final  
 Diagnosis 06/19/2019    Final  
                 Value:Normal sinus rhythm Nonspecific intraventricular block Nonspecific T wave abnormality Abnormal ECG When compared with ECG of 17-MAR-2019 12:00, No significant change was found Confirmed by Shakira Adames MD., Gilles Otilio ([id number]) on 6/20/2019 2:57:19 PM 
  
 aPTT 06/19/2019 54.9* 22.1 - 32.0 sec Final  
 In addition to factor deficiency, monitoring heparin therapy, etc., evaluation of a prolonged aPTT result should include consideration of preanalytic variables such as heparin flush contamination, specimen integrity issues, etc.  
 aPTT, therapeutic range 06/19/2019      58.0 - 77.0 SECS Final  
 INR 06/19/2019 7.3* 0.9 - 1.1   Final  
 Comment: A single therapeutic range for Vit K antagonists may not be optimal for all indications - see June, 2008 issue of Chest, American College of Chest Physicians Evidence-Based Clinical Practice Guidelines, 8th Edition. RESULTS VERIFIED, PHONED TO AND READ BACK BY 
JAMEE LOVE @6108 DTA  Prothrombin time 06/19/2019 66.9* 9.0 - 11.1 sec Final  
 INR (POC) 06/19/2019 5.4* <1.2   Final  
 Comment: (NOTE) The i-STAT PT/INR test is for monitoring Coumadin/warfarin therapy  
only. This test method has not been validated for establishing  
prognosis or treatment decisions for any other conditions and cannot  
be used in these circumstances.  
  
 LA Volume 06/20/2019 43.6  18 - 58 mL Final  
 LV E' Lateral Velocity 06/20/2019 8.52  centimeter/second Final  
 LV E' Septal Velocity 06/20/2019 7.01  centimeter/second Final  
 Tapse 06/20/2019 1.70  1.5 - 2.0 cm Final  
 Ao Root D 06/20/2019 2.95  cm Final  
 AO ASC D 06/20/2019 2.43  cm Final  
 Aortic Valve Systolic Peak Velocity 20/13/6169 360.35  cm/s Final  
 AoV VTI 06/20/2019 86.69  cm Final  
 Aortic Valve Area by Continuity of* 06/20/2019 0.5  cm2 Final  
 Aortic Valve Area by Continuity of* 06/20/2019 0.5  cm2 Final  
 AoV PG 06/20/2019 51.9  mmHg Final  
 LVIDd 06/20/2019 4.19* 4.2 - 5.9 cm Final  
 LVPWd 06/20/2019 1.14* 0.6 - 1.0 cm Final  
 LVIDs 06/20/2019 3.10  cm Final  
 IVSd 06/20/2019 1.31* 0.6 - 1.0 cm Final  
 LVOT d 06/20/2019 1.80  cm Final  
 LVOT Peak Velocity 06/20/2019 74.47  cm/s Final  
 LVOT Peak Gradient 06/20/2019 2.2  mmHg Final  
 LVOT VTI 06/20/2019 18.58  cm Final  
 MVA (PHT) 06/20/2019 3.2  cm2 Final  
 MV A Marcelo 06/20/2019 91.18  cm/s Final  
 MV E Marcelo 06/20/2019 89.39  cm/s Final  
 MV E/A 06/20/2019 0.98   Final  
 Left Atrium to Aortic Root Ratio 06/20/2019 1.22   Final  
 Aortic Valve Systolic Mean Gradient 47/39/9828 29.7  mmHg Final  
 LA Vol 4C 06/20/2019 47.06  18 - 58 mL Final  
 LA Vol 2C 06/20/2019 31.35  18 - 58 mL Final  
 LA Area 4C 06/20/2019 16.6  cm2 Final  
 LV Mass AL 06/20/2019 214.8  88 - 224 g Final  
 LV Mass AL Index 06/20/2019 120.3  49 - 115 g/m2 Final  
  Mitral Valve E Wave Deceleration T* 06/20/2019 240.4  ms Final  
 Mitral Valve Pressure Half-time 06/20/2019 69.7  ms Final  
 Left Atrium Major Axis 06/20/2019 3.61  cm Final  
 Triscuspid Valve Regurgitation Pea* 06/20/2019 26.2  mmHg Final  
 LVOT SV 06/20/2019 47.5  ml Final  
 TR Max Velocity 06/20/2019 256.09  cm/s Final  
 LA Vol Index 06/20/2019 24.43  16 - 28 ml/m2 Final  
 LA Vol Index 06/20/2019 17.56  16 - 28 ml/m2 Final  
 LA Vol Index 06/20/2019 26.37  16 - 28 ml/m2 Final  
 JANAE/BSA Pk Marcelo 06/20/2019 0.3  cm2/m2 Final  
 JANAE/BSA VTI 06/20/2019 0.3  cm2/m2 Final  
 PASP 06/20/2019 29.0  mmHg Final  
 Right subclavian sys 06/19/2019 101.6  cm/s Final  
 RIGHT SUBCLAVIAN ARTERY D 06/19/2019 0.00  cm/s Final  
 Right cca dist sys 06/19/2019 153.0  cm/s Final  
 Right CCA dist farfan 06/19/2019 18.9  cm/s Final  
 Right CCA prox sys 06/19/2019 73.2  cm/s Final  
 Right CCA prox farfan 06/19/2019 9.5  cm/s Final  
 Right eca sys 06/19/2019 102.7  cm/s Final  
 RIGHT EXTERNAL CAROTID ARTERY D 06/19/2019 0.00  cm/s Final  
 Right ICA dist sys 06/19/2019 44.2  cm/s Final  
 Right ICA dist farfan 06/19/2019 10.2  cm/s Final  
 Right ICA mid sys 06/19/2019 42.0  cm/s Final  
 Right ICA mid farfan 06/19/2019 11.3  cm/s Final  
 Right ICA prox sys 06/19/2019 55.6  cm/s Final  
 Right ICA prox farfan 06/19/2019 7.3  cm/s Final  
 Right vertebral sys 06/19/2019 22.2  cm/s Final  
 RIGHT VERTEBRAL ARTERY D 06/19/2019 5.81  cm/s Final  
 Right ICA/CCA sys 06/19/2019 0.4   Final  
 Left subclavian sys 06/19/2019 96.8  cm/s Final  
 LEFT SUBCLAVIAN ARTERY D 06/19/2019 0.00  cm/s Final  
 Left CCA dist sys 06/19/2019 63.3  cm/s Final  
 Left CCA dist farfan 06/19/2019 11.6  cm/s Final  
 Left CCA prox sys 06/19/2019 66.6  cm/s Final  
 Left CCA prox farfan 06/19/2019 11.7  cm/s Final  
 Left ECA sys 06/19/2019 175.6  cm/s Final  
 LEFT EXTERNAL CAROTID ARTERY D 06/19/2019 0.00  cm/s Final  
  Left ICA dist sys 06/19/2019 54.6  cm/s Final  
 Left ICA dist farfan 06/19/2019 14.5  cm/s Final  
 Left ICA mid sys 06/19/2019 58.1  cm/s Final  
 Left ICA mid farfan 06/19/2019 12.7  cm/s Final  
 Left ICA prox sys 06/19/2019 47.6  cm/s Final  
 Left ICA prox farfan 06/19/2019 12.7  cm/s Final  
 Left vertebral sys 06/19/2019 38.1  cm/s Final  
 LEFT VERTEBRAL ARTERY D 06/19/2019 10.19  cm/s Final  
 Left ICA/CCA sys 06/19/2019 0.92   Final  
 LIPID PROFILE 06/20/2019        Final  
 Cholesterol, total 06/20/2019 110  <200 MG/DL Final  
 Triglyceride 06/20/2019 35  <150 MG/DL Final  
 Based on NCEP-ATP III:  Triglycerides <150 mg/dL  is considered normal, 150-199 mg/dL  borderline high,  200-499 mg/dL high and  greater than or equal to 500 mg/dL very high.  HDL Cholesterol 06/20/2019 48  MG/DL Final  
 Based on NCEP ATP III, HDL Cholesterol <40 mg/dL is considered low and >60 mg/dL is elevated.  LDL, calculated 06/20/2019 55  0 - 100 MG/DL Final  
 Comment: Based on the NCEP-ATP: LDL-C concentrations are considered  optimal <100 mg/dL, 
near optimal/above Normal 100-129 mg/dL Borderline High: 130-159, High: 160-189 mg/dL Very High: Greater than or equal to 190 mg/dL  VLDL, calculated 06/20/2019 7  MG/DL Final  
 CHOL/HDL Ratio 06/20/2019 2.3  0.0 - 5.0   Final  
 Hemoglobin A1c 06/20/2019 6.0  4.2 - 6.3 % Final  
 Est. average glucose 06/20/2019 126  mg/dL Final  
 Comment: (NOTE) The eAG should be interpreted with patient characteristics in mind  
since ethnicity, interindividual differences, red cell lifespan,  
variation in rates of glycation, etc. may affect the validity of the  
calculation.  INR 06/20/2019 7.7* 0.9 - 1.1   Final  
 Comment: A single therapeutic range for Vit K antagonists may not be optimal for all indications - see June, 2008 issue of Chest, American College of Chest Physicians Evidence-Based Clinical Practice Guidelines, 8th Edition. RESULTS VERIFIED, PHONED TO AND READ BACK BY 
RN [id number] @ 2736 (TS)  Prothrombin time 06/20/2019 70.2* 9.0 - 11.1 sec Final  
 Special Requests: 06/20/2019 NO SPECIAL REQUESTS    Preliminary  GRAM STAIN 06/20/2019 OCCASIONAL WBCS SEEN    Preliminary  GRAM STAIN 06/20/2019 OCCASIONAL EPITHELIAL CELLS SEEN    Preliminary  GRAM STAIN 06/20/2019 3+ GRAM POSITIVE COCCI IN CLUSTERS    Preliminary  GRAM STAIN 06/20/2019 4+ GRAM NEGATIVE RODS    Preliminary  Culture result: 06/20/2019 PENDING   Preliminary  Sodium 06/21/2019 142  136 - 145 mmol/L Final  
 Potassium 06/21/2019 3.4* 3.5 - 5.1 mmol/L Final  
 INVESTIGATED PER DELTA CHECK PROTOCOL  Chloride 06/21/2019 112* 97 - 108 mmol/L Final  
 CO2 06/21/2019 26  21 - 32 mmol/L Final  
 Anion gap 06/21/2019 4* 5 - 15 mmol/L Final  
 Glucose 06/21/2019 73  65 - 100 mg/dL Final  
 BUN 06/21/2019 9  6 - 20 MG/DL Final  
 Creatinine 06/21/2019 0.84  0.70 - 1.30 MG/DL Final  
 BUN/Creatinine ratio 06/21/2019 11* 12 - 20   Final  
 GFR est AA 06/21/2019 >60  >60 ml/min/1.73m2 Final  
 GFR est non-AA 06/21/2019 >60  >60 ml/min/1.73m2 Final  
 Calcium 06/21/2019 7.9* 8.5 - 10.1 MG/DL Final  
 WBC 06/21/2019 8.2  4.1 - 11.1 K/uL Final  
 RBC 06/21/2019 4.23  4. 10 - 5.70 M/uL Final  
 HGB 06/21/2019 12.5  12.1 - 17.0 g/dL Final  
 HCT 06/21/2019 38.0  36.6 - 50.3 % Final  
 MCV 06/21/2019 89.8  80.0 - 99.0 FL Final  
 MCH 06/21/2019 29.6  26.0 - 34.0 PG Final  
 MCHC 06/21/2019 32.9  30.0 - 36.5 g/dL Final  
 RDW 06/21/2019 15.1* 11.5 - 14.5 % Final  
 PLATELET 95/77/8067 404  150 - 400 K/uL Final  
 MPV 06/21/2019 9.3  8.9 - 12.9 FL Final  
 NRBC 06/21/2019 0.0  0  WBC Final  
 ABSOLUTE NRBC 06/21/2019 0.00  0.00 - 0.01 K/uL Final  
 NEUTROPHILS 06/21/2019 73  32 - 75 % Final  
 LYMPHOCYTES 06/21/2019 16  12 - 49 % Final  
 MONOCYTES 06/21/2019 9  5 - 13 % Final  
 EOSINOPHILS 06/21/2019 2  0 - 7 % Final  
  BASOPHILS 06/21/2019 0  0 - 1 % Final  
 IMMATURE GRANULOCYTES 06/21/2019 0  0.0 - 0.5 % Final  
 ABS. NEUTROPHILS 06/21/2019 6.1  1.8 - 8.0 K/UL Final  
 ABS. LYMPHOCYTES 06/21/2019 1.3  0.8 - 3.5 K/UL Final  
 ABS. MONOCYTES 06/21/2019 0.7  0.0 - 1.0 K/UL Final  
 ABS. EOSINOPHILS 06/21/2019 0.1  0.0 - 0.4 K/UL Final  
 ABS. BASOPHILS 06/21/2019 0.0  0.0 - 0.1 K/UL Final  
 ABS. IMM. GRANS. 06/21/2019 0.0  0.00 - 0.04 K/UL Final  
 DF 06/21/2019 AUTOMATED    Final  
 Phosphorus 06/21/2019 2.9  2.6 - 4.7 MG/DL Final  
 Magnesium 06/21/2019 1.7  1.6 - 2.4 mg/dL Final  
 INR 06/21/2019 9.7* 0.9 - 1.1   Final  
 Comment: A single therapeutic range for Vit K antagonists may not be optimal for all indications - see June, 2008 issue of Chest, American College of Chest Physicians Evidence-Based Clinical Practice Guidelines, 8th Edition. RESULTS VERIFIED, PHONED TO AND READ BACK BY 
IVAN MEJIA @ 5902 (TS)  Prothrombin time 06/21/2019 87.3* 9.0 - 11.1 sec Final  
 
 
 
Objective:  
 
 
Exam: 
Visit Vitals /72 Pulse 67 Temp 98.1 °F (36.7 °C) Resp 19 Ht 5' 6\" (1.676 m) Wt 69.4 kg (153 lb) SpO2 94% BMI 24.69 kg/m² Gen: Awake, alert, follows commands Appropriate appearance, normal speech. Oriented to all spheres. No visual field defect on confrontation exam. 
Full eyes movement, with no nystagmus, no diplopia, no ptosis. Normal gag and swallow. All remaining cranial nerves were normal 
Motor function: 5/5 in all extremities Sensory: intact to LT, PP and JPS Good FTN and HTS Gait: Deferred Assessment: 1. Cerebrovascular accident (CVA), unspecified mechanism ([address] TIA Plan:  
RECOMMENDATIONS 1. Patient already on Coumadin and INR supratherapeutic. 2. Also on ASA 81 but held due to elevated INR 3. BP control to keep SBP < 140 
4. Advise to quit smoking which keeps his risk of stroke elevated 5. PT / [place] / Speech evaluation 6. LDL 55 on Lipitor 80 mg every day Will sign off Please call for questions 35 mins of time spent, 50% of which was spent on counseling and coordination of care. July Clemons MD 
Diplomate, American Board of Psychiatry and Neurology Diplomate, Neuromuscular Medicine Diplomate, [address] [place] [address] Board of Electrodiagnostic Medicine

## 2019-06-21 NOTE — PROGRESS NOTES
[facility] Progress Note NAME: Aakash Bundy :  1945 MRM:  [id number] Date/Time: 2019  9:11 AM    
 
  
Assessment and Plan: 1. Hx of CVA (cerebral vascular accident) / Left sided weakness. Hx of CVA in the past. MRI/MRA brain without acute infarct but notes old ones. TTE with LVEF 51-55%. Carotid dopplers 0-49% bilaterally. On ASA and coumadin. Neurology input appreciated 2. COPD (chronic obstructive pulmonary disease) ([address] Mild wheezing and some rhonchi. Continue nebs, add acapella/IS/mucolytics 
  
3. S/P MVR (mitral valve replacement) (10/22/2014)/ S/P AVR (aortic valve replacement) (2017). On coumadin. hold coumadin as INR is supra therapeutic. Echo unrevealing. Overview: CT surgery  - CABG (LIMA to LAD) and AVR ([facility]) 
  
4. CAD (coronary artery disease) (2017)/ S/P CABG x 1 (2017). Continue isosorbide dinitrate and ASA 
  
5. Supratherapeutic INR (2019). No bleeding. Continue to hold coumadin and check INR. Pharmacy consulted to dose coumadin once INR become therapeutic. INR goal is 2.5-3.5 
  
6. Depression (10/22/2014). Continue home anti depressants.  
  
7. HTN. Resume amlodipine tomorrow 
  
  
Subjective: Chief Complaint:  Patient seen and examined. Chart reviewed. SOB improving. Denies any further L-sided weakness ROS: 
(bold if positive, if negative) SOB/DOETolerating PT  Tolerating Diet Objective:  
 
Last 24hrs VS reviewed since prior progress note. Most recent are: 
 
Visit Vitals /61 Pulse 67 Temp 98.1 °F (36.7 °C) Resp 23 Ht 5' 6\" (1.676 m) Wt 69.4 kg (153 lb) SpO2 98% BMI 24.69 kg/m² SpO2 Readings from Last 6 Encounters:  
19 98% 19 96% 18 98% 17 96% 17 96% 16 94% O2 Flow Rate (L/min): 2 l/min Intake/Output Summary (Last 24 hours) at 2019 1541 Last data filed at 2019 1000 Gross per 24 hour Intake 240 ml Output 1150 ml Net -910 ml Physical Exam: 
 
Gen:  Well-developed, well-nourished, in no acute distress HEENT:  Pink conjunctivae, PERRL, hearing intact to voice, moist mucous membranes Neck:  Supple, without masses, thyroid non-tender Resp:  No accessory muscle use, coarse rhonchi improving bilaterally Card:  No murmurs, normal S1, S2 without thrills, bruits or peripheral edema Abd:  Soft, non-tender, non-distended, normoactive bowel sounds are present, no palpable organomegaly and no detectable hernias Lymph:  No cervical or inguinal adenopathy Musc:  No cyanosis or clubbing Skin:  No rashes or ulcers, skin turgor is good Neuro:  Cranial nerves are grossly intact, no focal motor weakness, follows commands appropriately Psych:  Good insight, oriented to person, place and time, alert Telemetry reviewed:   normal sinus rhythm 
__________________________________________________________________ Medications Reviewed: (see below) Medications:  
 
Current Facility-Administered Medications Medication Dose Route Frequency  Warfarin: No Warfarin on 6/21/2019 (INR 9.7)   Other ONCE  Warfarin pharmacy to dose   Other Rx Dosing/Monitoring  guaiFENesin ER (MUCINEX) tablet 1,200 mg  1,200 mg Oral Q12H  
 doxycycline (VIBRA-TABS) tablet 100 mg  100 mg Oral Q12H  
 oxyCODONE IR (ROXICODONE) tablet 2.5 mg  2.5 mg Oral Q6H PRN  
 sodium chloride (NS) flush 5-40 mL  5-40 mL IntraVENous Q8H  
 sodium chloride (NS) flush 5-40 mL  5-40 mL IntraVENous PRN  
 acetaminophen (TYLENOL) tablet 650 mg  650 mg Oral Q4H PRN Or  
 acetaminophen (TYLENOL) solution 650 mg  650 mg Per NG tube Q4H PRN Or  
 acetaminophen (TYLENOL) suppository 650 mg  650 mg Rectal Q4H PRN  
 atorvastatin (LIPITOR) tablet 80 mg  80 mg Oral QPM  
 buPROPion SR (WELLBUTRIN SR) tablet 150 mg  150 mg Oral BID  clonazePAM (KlonoPIN) tablet 0.5 mg  0.5 mg Oral TID  escitalopram oxalate (LEXAPRO) tablet 10 mg  10 mg Oral DAILY  isosorbide mononitrate (ISMO, MONOKET) tablet 10 mg  10 mg Oral TID  latanoprost (XALATAN) 0.005 % ophthalmic solution 1 Drop  1 Drop Both Eyes QHS  albuterol-ipratropium (DUO-NEB) 2.5 MG-0.5 MG/3 ML  3 mL Nebulization Q4H RT  
 albuterol (PROVENTIL VENTOLIN) nebulizer solution 2.5 mg  2.5 mg Nebulization Q2H PRN  
 arformoterol (BROVANA) neb solution 15 mcg  15 mcg Nebulization BID RT  
 budesonide (PULMICORT) 500 mcg/2 ml nebulizer suspension  500 mcg Nebulization BID RT Lab Data Reviewed: (see below) Lab Review:  
 
Recent Labs  
  06/21/19 0506 06/19/19 1915 WBC 8.2 7.3 HGB 12.5 12.9 HCT 38.0 39.4  259 Recent Labs  
  06/21/19 
1158 06/21/19 
0506 06/20/19 
3223  06/19/19 1915 NA  --  142  --   --  140 K  --  3.4*  --   --  4.3 CL  --  112*  --   --  109* CO2  --  26  --   --  28  
GLU  --  73  --   --  87 BUN  --  9  --   --  13  
CREA  --  0.84  --   --  1.06  
CA  --  7.9*  --   --  8.9 MG  --  1.7  --   --   --   
PHOS  --  2.9  --   --   --   
ALB  --   --   --   --  3.8 TBILI  --   --   --   --  0.6 SGOT  --   --   --   --  26 ALT  --   --   --   --  23 INR 8.1* 9.7* 7.7*   < > 7.3*  
 < > = values in this interval not displayed. Lab Results Component Value Date/Time Glucose (POC) 97 06/19/2019 06:57 PM  
 Glucose (POC) 96 08/28/2017 12:11 PM  
 Glucose (POC) 120 (H) 08/28/2017 08:20 AM  
 Glucose (POC) 87 06/12/2016 02:07 PM  
 
No results for input(s): PH, PCO2, PO2, HCO3, FIO2 in the last 72 hours. Recent Labs  
  06/21/19 
1158 06/21/19 
0506 06/20/19 
3697 INR 8.1* 9.7* 7.7* All Micro Results Procedure Component Value Units Date/Time CULTURE, RESPIRATORY/SPUTUM/BRONCH Christine Rodríguez [[id number]] Collected:  06/20/19 2108 Order Status:  Completed Specimen:  Sputum Updated:  06/21/19 1023 Special Requests: NO SPECIAL REQUESTS GRAM STAIN OCCASIONAL WBCS SEEN     
      
  OCCASIONAL EPITHELIAL CELLS SEEN 3+ GRAM POSITIVE COCCI IN CLUSTERS 4+ GRAM NEGATIVE RODS Culture result: PENDING Other pertinent lab: None Total time spent with patient: 35 min I reviewed chart, notes, data and current medications in the medical record. I have examined and treated the patient at bedside during this period. Care Plan discussed with: Patient, Care Manager, Nursing Staff and Consultant/Specialist 
 
Discussed:  Care Plan and D/C Planning Prophylaxis:  Coumadin Disposition:   PT, OT, RN 
        
___________________________________________________ Attending Physician: Dominic Parra DO

## 2019-06-21 NOTE — PROGRESS NOTES
1900 Bedside and Verbal shift change report given to  Maricarmen Clayton RN  (oncoming nurse) by Eliazar Teixeira RN  (offgoing nurse). Report included the following information SBAR, Kardex, ED Summary, Procedure Summary, Intake/Output, MAR, Accordion, Recent Results, Med Rec Status, Cardiac Rhythm normal sinus to sinus thao, Alarm Parameters , Pre Procedure Checklist and Procedure Verification. Shift Summary:  Patient had uneventful shift. No pain or distress throughout the shift, patient remain on nasal cannula at 2/liters with his scheduled breathing tx. Sputum culture sent on this shift. Patient had bowel movement this shift, up to bedside commode with assist of one.

## 2019-06-21 NOTE — PROGRESS NOTES
Problem: Mobility Impaired (Adult and Pediatric) Goal: *Acute Goals and Plan of Care (Insert Text) Description Physical Therapy Goals Initiated 6/21/2019 1. Patient will move from supine to sit and sit to supine  in bed with modified independence within 7 day(s). 2.  Patient will transfer from bed to chair and chair to bed with modified independence using the least restrictive device within 7 day(s). 3.  Patient will perform sit to stand with modified independence within 7 day(s). 4.  Patient will ambulate with supervision/set-up for 250 feet with the least restrictive device within 7 day(s). 5.  Patient will ascend/descend 5 stairs with 1 handrail(s) with modified independence within 7 day(s). 6.  Patient will improve Pierre Balance score by 7 points within 7 days. Outcome: Progressing Towards Goal 
 PHYSICAL THERAPY EVALUATION Patient: Javier Duffy (53 y.o. male) Date: 6/21/2019 Primary Diagnosis: CVA (cerebral vascular accident) ([facility] [address] [I63.9] CVA (cerebral vascular accident) ([facility] [address] [I63.9] TIA (transient ischemic attack) [G45.9] Precautions:   Fall ASSESSMENT : 
Based on the objective data described below, the patient presents with impaired balance, decreased safety awareness, minimally impaired coordination and high fall risk following admission for CVA workup. Pt with history of multiple CVAs and reported L sided weakness PTA that has now resolved. Pt difficult to understand d/t raspy quiet voice. Wife present in room to provide history and home setup. Pt cleared by nursing despite multiple days of elevated INR. Strength and ROM relatively intact and WNFL. Bed mobility performed at a CGA-SBA level. Min A to stand for initial stability and then ambulated 120ft with wide REGI, occasional ataxic gait and two moderate LOB when exiting pt's room. Balance mildly improved with RW and pt reports he can use his at home.  Returned to supine d/t risk of falling and elevated INR. Pt declines any need for HHPT/OP PT however recommending OP PT at this time d/t impaired balance and fall risk at home. Patient will benefit from skilled intervention to address the above impairments. Patient?s rehabilitation potential is considered to be Good Factors which may influence rehabilitation potential include:  
? None noted ? Mental ability/status ? Medical condition ? Home/family situation and support systems ? Safety awareness 
? Pain tolerance/management 
? Other: PLAN : 
Recommendations and Planned Interventions: 
?           Bed Mobility Training             ? Neuromuscular Re-Education ? Transfer Training                   ? Orthotic/Prosthetic Training 
? Gait Training                         ? Modalities ? Therapeutic Exercises           ? Edema Management/Control ? Therapeutic Activities            ? Patient and Family Training/Education ? Other (comment): Frequency/Duration: Patient will be followed by physical therapy  5 times a week to address goals. Discharge Recommendations: Outpatient Further Equipment Recommendations for Discharge: Use RW at home SUBJECTIVE:  
Patient stated ? I dont want to do rehab.? OBJECTIVE DATA SUMMARY:  
HISTORY:   
Past Medical History:  
Diagnosis Date CAD (coronary artery disease) Cancer ([facility] [address] \"10 years ago\" throat COPD (chronic obstructive pulmonary disease) ([facility] [address] CVA (cerebral vascular accident) [facility])   
 H/O mechanical aortic valve replacement CT surgery 1994 - CABG (LIMA to LAD) and AVR ([facility]) S/P CABG x 1   
 CT surgery 1994 - CABG (LIMA to LAD) and AVR ([facility]) Past Surgical History:  
Procedure Laterality Date HX HEART VALVE SURGERY    
 CT surgery 1994 - CABG (LIMA to LAD) and AVR ([facility]) Prior Level of Function/Home Situation: Independent with mobility though wife reports off balance at baseline Personal factors and/or comorbidities impacting plan of care: CAD, cancer, COPD, CVA Home Situation Home Environment: Private residence # Steps to Enter: 2 One/Two Story Residence: One story Living Alone: No 
Support Systems: Spouse/Significant Other/Partner Patient Expects to be Discharged to[de-identified] Private residence Current DME Used/Available at Home: Namon Panning, rollator Tub or Shower Type: Shower(built in seat) EXAMINATION/PRESENTATION/DECISION MAKING:  
Critical Behavior: 
Neurologic State: Alert Orientation Level: Oriented X4 Cognition: (improved langauge today. no aphasia. his voice remains hoarse from throat CA with radiation) Safety/Judgement: Decreased insight into deficits Hearing: Auditory Auditory Impairment: None Skin:   
Edema:  
Range Of Motion: 
AROM: Within functional limits(L shoulder slightly impaired) Strength:   
Strength: Within functional limits(L ankle PF/DF slightly impaired) Tone & Sensation:  
Tone: Normal 
  
  
  
  
  
  
  
  
   
Coordination: 
Coordination: Within functional limits(LEs, LUE ataxic) Vision:  
  
Functional Mobility: 
Bed Mobility: 
Rolling: Stand-by assistance Supine to Sit: Stand-by assistance Sit to Supine: Stand-by assistance Transfers: 
Sit to Stand: Minimum assistance Stand to Sit: Minimum assistance Balance:  
Sitting: Intact Standing: Impaired Standing - Static: Fair;Constant support Standing - Dynamic : Poor Ambulation/Gait Training: 
Distance (ft): 120 Feet (ft) Assistive Device: Gait belt;Walker, rolling(RW last 60ft) Ambulation - Level of Assistance: Minimal assistance Gait Abnormalities: Ataxic;Decreased step clearance; Path deviations;Trunk sway increased Base of Support: Widened Speed/Kamini: Pace decreased (<100 feet/min) Step Length: Right shortened;Left shortened Swing Pattern: Left asymmetrical 
  
  
  
  
  
 Stairs: Therapeutic Exercises:  
 
 
Functional Measure: 
Pierre Balance Test: 
 
Sitting to Standin Standing Unsupported: 2 Sitting with Back Unsupported: 4 Standing to Sittin Transfers: 2 Standing Unsupported with Eyes Closed: 2 Standing Unsupported with Feet Together: 0 Reach Forward with Outstretched Arm: 1  Object: 0 Turn to Look Over Shoulders: 1 Turn 360 Degrees: 1 Alternate Foot on Step/Stool: 1 Standing Unsupported One Foot in Front: 0 Stand on One Le Total: 20 
 
 
56=Maximum possible score;  
0-20=High fall risk 21-40=Moderate fall risk 41-56=Low fall risk Physical Therapy Evaluation Charge Determination History Examination Presentation Decision-Making MEDIUM  Complexity : 1-2 comorbidities / personal factors will impact the outcome/ POC  MEDIUM Complexity : 3 Standardized tests and measures addressing body structure, function, activity limitation and / or participation in recreation  LOW Complexity : Stable, uncomplicated  Other outcome measures Pierre  LOW Based on the above components, the patient evaluation is determined to be of the following complexity level: LOW Pain: 
Pain Scale 1: Numeric (0 - 10) Pain Intensity 1: 0 Activity Tolerance:  
Good Please refer to the flowsheet for vital signs taken during this treatment. After treatment:  
?         Patient left in no apparent distress sitting up in chair ? Patient left in no apparent distress in bed 
? Call bell left within reach ? Nursing notified ? Caregiver present ? Bed alarm activated COMMUNICATION/EDUCATION:  
The patient?s plan of care was discussed with: Registered Nurse. ?         Fall prevention education was provided and the patient/caregiver indicated understanding. ?         Patient/family have participated as able in goal setting and plan of care. ?         Patient/family agree to work toward stated goals and plan of care. ?         Patient understands intent and goals of therapy, but is neutral about his/her participation. ? Patient is unable to participate in goal setting and plan of care. Thank you for this referral. 
Cate Schroeder, PT Time Calculation: 28 mins

## 2019-06-21 NOTE — PROGRESS NOTES
[facility] Name: Mike Walters MRN: [id number] : 1945 Hospital: [address] Date: 2019 Impression Plan 1. Left sided weakness 2. COPD- FEV 34% 3. Tobacco abuse · Duonebs · Pulmicort/Brovana · Hold on any steroids · Wean O2 as tolerated, keeping sats above 90% · Repeat chest CT and follow up as outpatient · Follow up with [facility] Radiology ( personally reviewed) CXR reviewed: MICHAEL chronic changes ABG No results for input(s): PHI, PO2I, PCO2I in the last 72 hours. Subjective Cc: weakness 77 yo with PMHX of severe COPD (FEV1 of 34%) presenting with complaints of weakness and facial droop. MRI negative for acute event. Nursing noticed significant dyspnea with short amounts of walking. Pt use to see [facility] and was bronched in the past. He was lost to follow up in 2017. He remains on Advair 500/50 and uses resque inhaler every 4 hrs. Pt denies any LEE but states he remains congested. Weakness has overall improved. Overnight events: 
Feeling stronger overall. On RA, At baseline breathing- very limited. Past Medical History:  
Diagnosis Date  CAD (coronary artery disease)  Cancer ([facility] [address] \"10 years ago\" throat  COPD (chronic obstructive pulmonary disease) ([facility])  CVA (cerebral vascular accident) ([facility] [address]  H/O mechanical aortic valve replacement CT surgery  - CABG (LIMA to LAD) and AVR ([facility])  S/P CABG x 1   
 CT surgery  - CABG (LIMA to LAD) and AVR ([facility]) Past Surgical History:  
Procedure Laterality Date  HX HEART VALVE SURGERY    
 CT surgery  - CABG (LIMA to LAD) and AVR ([facility]) Prior to Admission medications Medication Sig Start Date End Date Taking? Authorizing Provider buPROPion SR (WELLBUTRIN SR) 150 mg SR tablet Take 150 mg by mouth two (2) times a day.    Yes Provider, Historical  
 latanoprost (XALATAN) 0.005 % ophthalmic solution Administer 1 Drop to both eyes nightly. Yes Provider, Historical  
amLODIPine (NORVASC) 10 mg tablet Take 10 mg by mouth daily. Yes Provider, Historical  
albuterol (PROVENTIL VENTOLIN) 2.5 mg /3 mL (0.083 %) nebulizer solution 3 mL by Nebulization route every four (4) hours as needed for Wheezing. 3/17/19  Yes Noel Peralta MD  
promethazine (PHENERGAN) 12.5 mg tablet Take 12.5 mg by mouth daily. Indications: Nausea   Yes Provider, Historical  
atorvastatin (LIPITOR) 80 mg tablet Take 80 mg by mouth every evening. Yes Provider, Historical  
warfarin (COUMADIN) 5 mg tablet Take 5 mg by mouth every evening. Yes Provider, Historical  
aspirin delayed-release 81 mg tablet Take 81 mg by mouth daily. Yes Provider, Historical  
albuterol (PROVENTIL HFA, VENTOLIN HFA, PROAIR HFA) 90 mcg/actuation inhaler Take 2 Puffs by inhalation every six (6) hours as needed. Yes Other, MD Kallie  
fluticasone-salmeterol (ADVAIR) 500-50 mcg/dose diskus inhaler Take 1 Puff by inhalation every twelve (12) hours. Yes Other, MD Kallie  
clonazepam (KLONOPIN) 0.5 mg tablet Take 0.5 mg by mouth three (3) times daily. 10/14/14  Yes Lily, MD Kallie  
escitalopram oxalate (LEXAPRO) 10 mg tablet Take 10 mg by mouth daily. 10/21/14  Yes Lily, MD Kallie  
isosorbide mononitrate (ISMO, MONOKET) 10 mg tablet Take 10 mg by mouth three (3) times daily. Patient takes at [address] [place] [address], noon, and 5pm 10/21/14  Yes Lily, MD Kallie  
 
Current Facility-Administered Medications Medication Dose Route Frequency  guaiFENesin ER (MUCINEX) tablet 1,200 mg  1,200 mg Oral Q12H  
 doxycycline (VIBRA-TABS) tablet 100 mg  100 mg Oral Q12H  
 sodium chloride (NS) flush 5-40 mL  5-40 mL IntraVENous Q8H  
 atorvastatin (LIPITOR) tablet 80 mg  80 mg Oral QPM  
 buPROPion SR (WELLBUTRIN SR) tablet 150 mg  150 mg Oral BID  clonazePAM (KlonoPIN) tablet 0.5 mg  0.5 mg Oral TID  escitalopram oxalate (LEXAPRO) tablet 10 mg  10 mg Oral DAILY  isosorbide mononitrate (ISMO, MONOKET) tablet 10 mg  10 mg Oral TID  latanoprost (XALATAN) 0.005 % ophthalmic solution 1 Drop  1 Drop Both Eyes QHS  albuterol-ipratropium (DUO-NEB) 2.5 MG-0.5 MG/3 ML  3 mL Nebulization Q4H RT  
 arformoterol (BROVANA) neb solution 15 mcg  15 mcg Nebulization BID RT  
 budesonide (PULMICORT) 500 mcg/2 ml nebulizer suspension  500 mcg Nebulization BID RT Allergies Allergen Reactions  Adenosine Shortness of Breath  Iodinated Contrast- Oral And Iv Dye Shortness of Breath  Pcn [Penicillins] Shortness of Breath Patient reports to have tolerated a 10 day course of cefdinir started 8/24/17 Social History Tobacco Use  Smoking status: Current Every Day Smoker  Smokeless tobacco: Never Used  Tobacco comment: pipe Substance Use Topics  Alcohol use: No  
  
Family History Problem Relation Age of Onset  Heart Disease Mother  Heart Disease Sister Laboratory: I have personally reviewed the critical care flowsheet and labs. Recent Labs  
  06/21/19 
0506 06/19/19 1915 WBC 8.2 7.3 HGB 12.5 12.9 HCT 38.0 39.4  259 Recent Labs  
  06/21/19 
0506 06/20/19 
0624 06/19/19 1919 06/19/19 1915   --   --  140  
K 3.4*  --   --  4.3 *  --   --  109* CO2 26  --   --  28  
GLU 73  --   --  87  
BUN 9  --   --  13  
CREA 0.84  --   --  1.06  
CA 7.9*  --   --  8.9 MG 1.7  --   --   --   
PHOS 2.9  --   --   --   
ALB  --   --   --  3.8 SGOT  --   --   --  26 ALT  --   --   --  23 INR 9.7* 7.7* 5.4* 7.3* Objective: Mode Rate Tidal Volume Pressure FiO2 PEEP Vital Signs:   
 TMAX(24) Intake/Output:  
Last shift:        
Last 3 shifts: 06/21 0701 - 06/21 1900 In: 120 [P.O.:120] Out: - [id number] Intake/Output Summary (Last 24 hours) at 6/21/2019 1235 Last data filed at 6/21/2019 1000 Gross per 24 hour Intake 240 ml Output 1550 ml Net -1310 ml EXAM: 
GENERAL: well developed, barrel chested, on RA, HEENT:  PERRL, EOMI, no alar flaring or epistaxis, oral mucosa moist without cyanosis, NECK:  no jugular vein distention, no retractions, no thyromegaly or masses, LUNGS: scant rhonci and wheezes , HEART:  Regular rate and rhythm with no MGR; no edema is present, ABDOMEN:  soft with no tenderness, bowel sounds present, EXTREMITIES:  warm with no cyanosis, SKIN:  no jaundice or ecchymosis and NEUROLOGIC:  alert and oriented, grossly non-focal 
 
Bc Munoz MD 
[facility] [address]

## 2019-06-21 NOTE — PROGRESS NOTES
Problem: Dysphagia (Adult) Goal: *Acute Goals and Plan of Care (Insert Text) Description Swallowing goals initaited 6-20-19: 
1)  Tolerate mech soft, thins wihtout s/s aspiration by 6-23-19-met 2) MBS if any increased coughing at meals. -discontinued 3) full speech-language eval -discotninued Outcome: Progressing Towards Goal 
 SPEECH LANGUAGE PATHOLOGY DYSPHAGIA TREATMENT/DISCHARGE Patient: Ruth Krishna (73 y.o. male) Date: 6/21/2019 Diagnosis: CVA (cerebral vascular accident) ([facility] [address] [I63.9] CVA (cerebral vascular accident) ([facility] [address] [I63.9] TIA (transient ischemic attack) [G45.9] <principal problem not specified> Precautions: aspiration ASSESSMENT: 
Patient tolerating mech soft, thins withoutu s/s aspiration. No Need for MBS Aphasia resolved. 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 
?           Improving appropriately and progressing toward goals ? Improving slowly and progressing toward goals ? Not making progress toward goals and plan of care will be adjusted PLAN: 
Continue mech soft, thins Patient will be discharged from acute skilled speech therapy at this time. Rationale for discharge: 
?      Goals Achieved ? [place] Decree ? Patient not participating in therapy ? Other: 
Discharge Recommendations:  None SUBJECTIVE:  
Patient stated ? My cat Armando Marshall wouldn't eat this food? .- b/c it was too minced up by kitchen OBJECTIVE:  
Cognitive and Communication Status: 
Neurologic State: Alert Orientation Level: Oriented X4 Cognition: (improved langauge today. no aphasia. his voice remains hoarse from throat CA with radiation) Perception: Appears intact Perseveration: No perseveration noted Safety/Judgement: Decreased insight into deficits Dysphagia Treatment: 
Oral Assessment: P.O. Trials: 
Patient Position: uprigh tin bed Vocal quality prior to P.O.: Hoarse Consistency Presented: (D2, thins) Bolus Acceptance: No impairment Bolus Formation/Control: No impairment Propulsion: No impairment Oral Residue: None Initiation of Swallow: (not palpated) Aspiration Signs/Symptoms: None Exercises: 
Laryngeal Exercises: 
  
  
  
  
  
  
  
  
  
  
  
  
  
  
  
  
  
  
  
  
  
  
  
  
  
  
  
  
  
  
  
  
  
  
  
  
  
  
  
  
  
  
  
 
NOMS:  
The NOMS functional outcome measure was used to quantify this patient's level of swallowing impairment. Based on the NOMS, the patient was determined to be at level 6 for swallow function NOMS Swallowing Levels: 
Level 1 (CN): NPO Level 2 (CM): NPO but takes consistency in therapy Level 3 (CL): Takes less than 50% of nutrition p.o. and continues with nonoral feedings; and/or safe with mod cues; and/or max diet restriction Level 4 (CK): Safe swallow but needs mod cues; and/or mod diet restriction; and/or still requires some nonoral feeding/supplements Level 5 (CJ): Safe swallow with min diet restriction; and/or needs min cues Level 6 (CI): Independent with p.o.; rare cues; usually self cues; may need to avoid some foods or needs extra time Level 7 (CH): Independent for all p.o. SHARAD. (2003). National Outcomes Measurement System (NOMS): Adult Speech-Language Pathology User's Guide. Pain: 
Pain Scale 1: Numeric (0 - 10) Pain Intensity 1: 0 After treatment:  
?                Patient left in no apparent distress sitting up in chair ? Patient left in no apparent distress in bed 
? Call bell left within reach ? Nursing notified ? Caregiver present ? Bed alarm activated COMMUNICATION/EDUCATION:  
Patient was educated regarding His deficit(s) of potential dysphagia from CVAS and throat CA as this relates to His diagnosis of CVA. He demonstrated Fair understanding as evidenced by discussion. Allison Redgranite The patient?s plan of care including recommendations, planned interventions, and recommended diet changes were discussed with: Registered Nurse. ? Posted safety precautions in patient's room. MANUEL Self Time Calculation: 15 mins

## 2019-06-22 NOTE — PROGRESS NOTES
TRANSFER - IN REPORT: 
 
Verbal report received from [address] ([facility]  [facility] ICU (unit) for routine progression of care Report consisted of patients Situation, Background, Assessment and  
Recommendations(SBAR). Information from the following report(s) SBAR, Kardex, MAR, Recent Results and Med Rec Status was reviewed with the receiving nurse. Opportunity for questions and clarification was provided. Assessment completed upon patients arrival to unit and care assumed.

## 2019-06-22 NOTE — PROGRESS NOTES
[facility] 
[address] [facility], [place], [id number] [place] [address] 
[phone] Medical Progress Note NAME: Sanchez Mcrae :  1945 MRM:  [id number] Date/Time: 2019  10:38 AM 
 
  
Assessment and Plan: 1.  Hx of CVA (cerebral vascular accident) / Left sided weakness. MRI/MRA brain without acute infarct but notes old ones. TTE with LVEF 51-55%. Carotid dopplers 0-49% bilaterally. On ASA and coumadin. Neurology input appreciated 
  
2.  COPD (chronic obstructive pulmonary disease) ([facility] [address] Continue nebs  
  
3.  S/P MVR (mitral valve replacement) (10/22/2014)/ S/P AVR (aortic valve replacement) (2017). On coumadin. hold coumadin as INR is supra therapeutic. Echo unrevealing. 
 Overview: CT surgery  - CABG (LIMA to LAD) and AVR ([facility]) 
  
4.  CAD (coronary artery disease) (2017)/ S/P CABG x 1 (2017). Continue isosorbide dinitrate and ASA 
  
5.  Supratherapeutic INR (2019). No bleeding. Continue to hold coumadin. Will give 1.25 mg of vit K and check his INR in 2 days with his PCP. Advised to be cautious not to fall or injury. check INR with PCP in 2 days. Pharmacy consulted to dose coumadin once INR become therapeutic. INR goal is 2.5-3.5 
  
6.  Depression (10/22/2014). Continue home anti depressants.  
  
7.  HTN. Resume amlodipine   
  
 
 
  
  
Subjective: Chief Complaint:  Follow up of pt who was admitted with LT side weakness. Fells well ROS: 
(bold if positive, if negative) Tolerating PT  Tolerating Diet Objective:  
 
Last 24hrs VS reviewed since prior progress note. Most recent are: 
 
Visit Vitals /59 (BP 1 Location: Left arm) Pulse 71 Temp 98.5 °F (36.9 °C) Resp 20 Ht 5' 6\" (1.676 m) Wt 69.4 kg (153 lb) SpO2 95% BMI 24.69 kg/m² SpO2 Readings from Last 6 Encounters:  
19 95% 19 96% 18 98% 17 96% 17 96% 16 94% O2 Flow Rate (L/min): 2 l/min Intake/Output Summary (Last 24 hours) at 6/22/2019 1038 Last data filed at 6/22/2019 3970 Gross per 24 hour Intake  Output 200 ml Net -200 ml Physical Exam: 
 
Gen:  Well-developed, well-nourished, in no acute distress HEENT:  Pink conjunctivae, PERRL, hearing intact to voice, moist mucous membranes Neck:  Supple, without masses, thyroid non-tender Resp:  No accessory muscle use, clear breath sounds without wheezes rales or rhonchi 
Card:  No murmurs, normal S1, S2 without thrills, bruits or peripheral edema Abd:  Soft, non-tender, non-distended, normoactive bowel sounds are present, no palpable organomegaly and no detectable hernias Lymph:  No cervical or inguinal adenopathy Musc:  No cyanosis or clubbing Skin:  No rashes or ulcers, skin turgor is good Neuro:  Cranial nerves are grossly intact, no focal motor weakness, follows commands appropriately Psych:  Good insight, oriented to person, place and time, alert 
__________________________________________________________________ Medications Reviewed: (see below) Medications:  
 
Current Facility-Administered Medications Medication Dose Route Frequency  [facility] pharmacy to dose   Other Rx Dosing/Monitoring  guaiFENesin ER (MUCINEX) tablet 1,200 mg  1,200 mg Oral Q12H  
 doxycycline (VIBRA-TABS) tablet 100 mg  100 mg Oral Q12H  
 oxyCODONE IR (ROXICODONE) tablet 2.5 mg  2.5 mg Oral Q6H PRN  
 sodium chloride (NS) flush 5-40 mL  5-40 mL IntraVENous Q8H  
 sodium chloride (NS) flush 5-40 mL  5-40 mL IntraVENous PRN  
 acetaminophen (TYLENOL) tablet 650 mg  650 mg Oral Q4H PRN Or  
 acetaminophen (TYLENOL) solution 650 mg  650 mg Per NG tube Q4H PRN Or  
 acetaminophen (TYLENOL) suppository 650 mg  650 mg Rectal Q4H PRN  
 atorvastatin (LIPITOR) tablet 80 mg  80 mg Oral QPM  
 buPROPion SR (WELLBUTRIN SR) tablet 150 mg  150 mg Oral BID  clonazePAM (KlonoPIN) tablet 0.5 mg  0.5 mg Oral TID  escitalopram oxalate (LEXAPRO) tablet 10 mg  10 mg Oral DAILY  isosorbide mononitrate (ISMO, MONOKET) tablet 10 mg  10 mg Oral TID  latanoprost (XALATAN) 0.005 % ophthalmic solution 1 Drop  1 Drop Both Eyes QHS  albuterol-ipratropium (DUO-NEB) 2.5 MG-0.5 MG/3 ML  3 mL Nebulization Q4H RT  
 albuterol (PROVENTIL VENTOLIN) nebulizer solution 2.5 mg  2.5 mg Nebulization Q2H PRN  
 arformoterol (BROVANA) neb solution 15 mcg  15 mcg Nebulization BID RT  
 budesonide (PULMICORT) 500 mcg/2 ml nebulizer suspension  500 mcg Nebulization BID RT Lab Data Reviewed: (see below) Lab Review:  
 
Recent Labs  
  06/22/19 
0204 06/21/19 
0506 06/19/19 1915 WBC 13.7* 8.2 7.3 HGB 12.6 12.5 12.9 HCT 38.2 38.0 39.4  250 259 Recent Labs  
  06/22/19 
0204 06/21/19 
1158 06/21/19 
0506  06/19/19 1915   --  142  --  140  
K 3.9  --  3.4*  --  4.3   --  112*  --  109* CO2 26  --  26  --  28  
GLU 95  --  73  --  87 BUN 10  --  9  --  13  
CREA 0.98  --  0.84  --  1.06  
CA 8.9  --  7.9*  --  8.9 MG 2.0  --  1.7  --   --   
PHOS 2.3*  --  2.9  --   --   
ALB  --   --   --   --  3.8 TBILI  --   --   --   --  0.6 SGOT  --   --   --   --  26 ALT  --   --   --   --  23 INR 8.1* 8.1* 9.7*   < > 7.3*  
 < > = values in this interval not displayed. Lab Results Component Value Date/Time Glucose (POC) 97 06/19/2019 06:57 PM  
 Glucose (POC) 96 08/28/2017 12:11 PM  
 Glucose (POC) 120 (H) 08/28/2017 08:20 AM  
 Glucose (POC) 87 06/12/2016 02:07 PM  
 
No results for input(s): PH, PCO2, PO2, HCO3, FIO2 in the last 72 hours. Recent Labs  
  06/22/19 
0204 06/21/19 
1158 06/21/19 
0506 INR 8.1* 8.1* 9.7* All Micro Results Procedure Component Value Units Date/Time CULTURE, RESPIRATORY/SPUTUM/BRONCH Gerlean Tree [[id number]] Collected:  06/20/19 2108 Order Status:  Completed Specimen:  Sputum Updated:  06/21/19 1023 Special Requests: NO SPECIAL REQUESTS     
  GRAM STAIN OCCASIONAL WBCS SEEN     
      
  OCCASIONAL EPITHELIAL CELLS SEEN 3+ GRAM POSITIVE COCCI IN CLUSTERS 4+ GRAM NEGATIVE RODS Culture result: PENDING I have reviewed notes of prior 24hr. Other pertinent lab: Total time spent with patient: 28 Care Plan discussed with: Patient, Nursing Staff and >50% of time spent in counseling and coordination of care Discussed:  Care Plan Prophylaxis:  Coumadin Disposition:  Home w/Family 
        
___________________________________________________ Attending Physician: Awais Georges MD

## 2019-06-22 NOTE — PROGRESS NOTES
Bedside and Verbal shift change report given to [address] [place] [address]  (oncoming nurse) by Estefani Wang RN Hubert Scott RN  (offgoing nurse). Report included the following information SBAR, Kardex, Intake/Output, MAR and Recent Results.

## 2019-06-22 NOTE — PROGRESS NOTES
Care plan documented. Bedside and Verbal shift change report given to Evie Mejia RN (oncoming nurse) by Deepak Horvath RN (offgoing nurse). Report included the following information SBAR, Kardex, ED Summary, Procedure Summary, Intake/Output, MAR and Recent Results. Primary Nurse Calvin Cervantes RN and Deepak Horvath RN performed a dual skin assessment on this patient No impairment noted Devonte score is 20.  
2330 Report called to Santiago Tierney RN on 5th floor for patient transferring to [facility]. Called using [employer]. Patient transferred to 5th floor.

## 2019-06-22 NOTE — PROGRESS NOTES
I have reviewed discharge instructions with the patient. The patient verbalized understanding. Opportunity for questions provided, none at this time. IV removed and pt discharged in wheelchair with family member and volunteer services.

## 2019-06-22 NOTE — DISCHARGE INSTRUCTIONS
ACUTE DIAGNOSES:  CVA (cerebral vascular accident) ([facility] [address] [I63.9]  CVA (cerebral vascular accident) ([facility] [address] [I63.9]  TIA (transient ischemic attack) [G45.9]    CHRONIC MEDICAL DIAGNOSES:  Problem List as of 6/22/2019 Date Reviewed: 8/25/2017          Codes Class Noted - Resolved    TIA (transient ischemic attack) ICD-10-CM: G45.9  ICD-9-CM: 435.9  6/20/2019 - Present        Supratherapeutic INR ICD-10-CM: R79.1  ICD-9-CM: 790.92  6/19/2019 - Present        S/P CABG x 1 ICD-10-CM: Z95.1  ICD-9-CM: V45.81  8/25/2017 - Present    Overview Signed 8/25/2017  3:34 PM by Kiya Lynn MD     CT surgery 1994 - CABG (LIMA to LAD) and AVR ([facility])             Pneumonia ICD-10-CM: J18.9  ICD-9-CM: 486  8/25/2017 - Present        S/P AVR (aortic valve replacement) ICD-10-CM: Z95.2  ICD-9-CM: V43.3  8/25/2017 - Present        CAD (coronary artery disease) ICD-10-CM: I25.10  ICD-9-CM: 414.00  8/25/2017 - Present        CVA (cerebral vascular accident) [facility]) ICD-10-CM: I63.9  ICD-9-CM: 434.91  6/12/2016 - Present    Overview Signed 6/13/2016  8:52 AM by Brooklyn Currie CVA-- refer to MRI brain 6-12-16             Aortic stenosis ICD-10-CM: I35.0  ICD-9-CM: 424.1  6/12/2016 - Present        Anticoagulated ICD-10-CM: Z79.01  ICD-9-CM: V58.61  6/12/2016 - Present        Respiratory failure ([facility] [address] ICD-10-CM: J96.90  ICD-9-CM: 518.81  10/22/2014 - Present        COPD (chronic obstructive pulmonary disease) ([facility] [address] ICD-10-CM: J44.9  ICD-9-CM: 555  Unknown - Present        COPD exacerbation ([facility] ICD-10-CM: J44.1  ICD-9-CM: 491.21  10/22/2014 - Present        S/P MVR (mitral valve replacement) ICD-10-CM: Z95.2  ICD-9-CM: V43.3  10/22/2014 - Present        Tobacco abuse ICD-10-CM: Z72.0  ICD-9-CM: 305.1  10/22/2014 - Present        Throat cancer ([facility] ICD-10-CM: C14.0  ICD-9-CM: 149.0  10/22/2014 - Present        Depression ICD-10-CM: F32.9  ICD-9-CM: 110  10/22/2014 - Present              DISCHARGE MEDICATIONS:          · It is important that you take the medication exactly as they are prescribed. · Keep your medication in the bottles provided by the pharmacist and keep a list of the medication names, dosages, and times to be taken in your wallet. · Do not take other medications without consulting your doctor. DIET:  Cardiac Diet    ACTIVITY: Activity as tolerated    ADDITIONAL INFORMATION: If you experience any of the following symptoms then please call your primary care physician or return to the emergency room if you cannot get hold of your doctor: Fever, chills, nausea, vomiting, diarrhea, change in mentation, falling, bleeding, shortness of breath. Do not take warfarin( coumadin) until INR is checked by your primary care physician on 6/24/19. Avoid injuries and fall. FOLLOW UP CARE:  Dr. Violet Javed MD  you are to call and set up an appointment to see them on 6/24/19 to check INR      Follow-up with neurology in 4 weeks       Information obtained by :  I understand that if any problems occur once I am at home I am to contact my physician. I understand and acknowledge receipt of the instructions indicated above.                                                                                                                                            Physician's or R.N.'s Signature                                                                  Date/Time                                                                                                                                              Patient or Representative Signature                                                          Date/Time

## 2019-06-22 NOTE — PROGRESS NOTES
Problem: TIA/CVA Stroke: 0-24 hours Goal: Off Pathway (Use only if patient is Off Pathway) Outcome: Progressing Towards Goal 
Goal: Activity/Safety Outcome: Progressing Towards Goal 
Goal: Consults, if ordered Outcome: Progressing Towards Goal 
Goal: Nutrition/Diet Outcome: Progressing Towards Goal 
Goal: Medications Outcome: Progressing Towards Goal

## 2019-06-22 NOTE — DISCHARGE SUMMARY
Hospitalist Discharge Summary Patient ID: Cuco Isbell [id number] 
35 y.o. 
1945 Admit date: 6/19/2019 Discharge date and time: 6/22/2019 Admission Diagnoses: CVA (cerebral vascular accident) ([facility] [address] [I63.9] CVA (cerebral vascular accident) ([address] [I63.9] TIA (transient ischemic attack) [G45.9] Chronic Diagnoses:   
Problem List as of 6/22/2019 Date Reviewed: 8/25/2017 Codes Class Noted - Resolved TIA (transient ischemic attack) ICD-10-CM: G45.9 ICD-9-CM: 435.9  6/20/2019 - Present Supratherapeutic INR ICD-10-CM: R79.1 ICD-9-CM: 790.92  6/19/2019 - Present S/P CABG x 1 ICD-10-CM: Z95.1 ICD-9-CM: V45.81  8/25/2017 - Present Overview Signed 8/25/2017  3:34 PM by Kiya Lynn MD  
  CT surgery 1994 - CABG (LIMA to LAD) and AVR ([facility]) Pneumonia ICD-10-CM: J18.9 ICD-9-CM: 318  8/25/2017 - Present S/P AVR (aortic valve replacement) ICD-10-CM: W14.1 ICD-9-CM: V43.3  8/25/2017 - Present CAD (coronary artery disease) ICD-10-CM: I25.10 ICD-9-CM: 414.00  8/25/2017 - Present CVA (cerebral vascular accident) [facility]) ICD-10-CM: I63.9 ICD-9-CM: 434.91  6/12/2016 - Present Overview Signed 6/13/2016  8:52 AM by Ana López Acute CVA-- refer to MRI brain 6-12-16 Aortic stenosis ICD-10-CM: I35.0 ICD-9-CM: 424.1  6/12/2016 - Present Anticoagulated ICD-10-CM: Z79.01 
ICD-9-CM: V58.61  6/12/2016 - Present Respiratory failure ([facility] [address] ICD-10-CM: J96.90 ICD-9-CM: 518.81  10/22/2014 - Present COPD (chronic obstructive pulmonary disease) (HCC) ICD-10-CM: J44.9 ICD-9-CM: 496  Unknown - Present COPD exacerbation ([address] ICD-10-CM: J44.1 ICD-9-CM: 491.21  10/22/2014 - Present S/P MVR (mitral valve replacement) ICD-10-CM: S47.2 ICD-9-CM: V43.3  10/22/2014 - Present Tobacco abuse ICD-10-CM: Z72.0 ICD-9-CM: 305.1  10/22/2014 - Present Throat cancer [facility]) ICD-10-CM: C14.0 ICD-9-CM: 149.0  10/22/2014 - Present Depression ICD-10-CM: F32.9 ICD-9-CM: 312  10/22/2014 - Present Discharge Medications:  
Current Discharge Medication List  
  
CONTINUE these medications which have NOT CHANGED Details buPROPion SR (WELLBUTRIN SR) 150 mg SR tablet Take 150 mg by mouth two (2) times a day. latanoprost (XALATAN) 0.005 % ophthalmic solution Administer 1 Drop to both eyes nightly. amLODIPine (NORVASC) 10 mg tablet Take 10 mg by mouth daily. albuterol (PROVENTIL VENTOLIN) 2.5 mg /3 mL (0.083 %) nebulizer solution 3 mL by Nebulization route every four (4) hours as needed for Wheezing. Qty: 30 Each, Refills: 0  
  
promethazine (PHENERGAN) 12.5 mg tablet Take 12.5 mg by mouth daily. Indications: Nausea  
  
atorvastatin (LIPITOR) 80 mg tablet Take 80 mg by mouth every evening. warfarin (COUMADIN) 5 mg tablet Take 5 mg by mouth every evening. aspirin delayed-release 81 mg tablet Take 81 mg by mouth daily. albuterol (PROVENTIL HFA, VENTOLIN HFA, PROAIR HFA) 90 mcg/actuation inhaler Take 2 Puffs by inhalation every six (6) hours as needed. clonazepam (KLONOPIN) 0.5 mg tablet Take 0.5 mg by mouth three (3) times daily. escitalopram oxalate (LEXAPRO) 10 mg tablet Take 10 mg by mouth daily. isosorbide mononitrate (ISMO, MONOKET) 10 mg tablet Take 10 mg by mouth three (3) times daily. Patient takes at 9am, noon, and 5pm  
  
  
STOP taking these medications  
  
 fluticasone-salmeterol (ADVAIR) 500-50 mcg/dose diskus inhaler Comments:  
Reason for Stopping: Follow up Care: 1. Ibeth Neri MD in 1-2 weeks 2. Neurology Diet:  Cardiac Diet Disposition: 
Home. Advanced Directive: 
 
Discharge Exam: 
See today's note. CONSULTATIONS: Neurology Significant Diagnostic Studies:  
Recent Labs  
  06/22/19 
0204 06/21/19 
0506 WBC 13.7* 8.2 HGB 12.6 12.5 HCT 38.2 38.0  
 250 Recent Labs 06/22/19 
1161 06/21/19 
0506 06/19/19 1915  142 140  
K 3.9 3.4* 4.3  
 112* 109* CO2 26 26 28 BUN 10 9 13 CREA 0.98 0.84 1.06  
GLU 95 73 87  
CA 8.9 7.9* 8.9 MG 2.0 1.7  --   
PHOS 2.3* 2.9  --   
 
Recent Labs  
  06/19/19 1915 SGOT 26 ALT 23 * TBILI 0.6 TP 7.5 ALB 3.8 GLOB 3.7 Recent Labs  
  06/22/19 
0204 06/21/19 
1158 06/21/19 
0506  06/19/19 1915 INR 8.1* 8.1* 9.7*   < > 7.3* PTP 73.3* 73.2* 87.3*   < > 66.9* APTT  --   --   --   --  54.9*  
 < > = values in this interval not displayed. No results for input(s): FE, TIBC, PSAT, FERR in the last 72 hours. No results for input(s): PH, PCO2, PO2 in the last 72 hours. No results for input(s): CPK, CKMB in the last 72 hours. No lab exists for component: TROPONINI Lab Results Component Value Date/Time Glucose (POC) 97 06/19/2019 06:57 PM  
 Glucose (POC) 96 08/28/2017 12:11 PM  
 Glucose (POC) 120 (H) 08/28/2017 08:20 AM  
 Glucose (POC) 87 06/12/2016 02:07 PM  
 
 
 
 
 
HOSPITAL COURSE & TREATMENT RENDERED: 1.   Hx of CVA (cerebral vascular accident) / Left sided weakness. MRI/MRA brain without acute infarct but notes old ones. TTE with LVEF 51-55%. Carotid dopplers 0-49% bilaterally. On ASA and coumadin. Neurology input appreciated 
  
2.  COPD (chronic obstructive pulmonary disease) ([address] Continue nebs  
  
3.  S/P MVR (mitral valve replacement) (10/22/2014)/ S/P AVR (aortic valve replacement) (8/25/2017). On coumadin. hold coumadin as INR is supra therapeutic. Echo unrevealing. 
 Overview: CT surgery 1994 - CABG (LIMA to LAD) and AVR ([facility]) 
  
4.  CAD (coronary artery disease) (8/25/2017)/ S/P CABG x 1 (8/25/2017). Continue isosorbide dinitrate and ASA 
  
5.  Supratherapeutic INR (6/19/2019). No bleeding. Continue to hold coumadin. Will give 1.25 mg of vit K and check his INR in 2 days with his PCP. Advised to be cautious not to fall or injury.  check INR with PCP in 2 days. Pharmacy consulted to dose coumadin once INR become therapeutic. INR goal is 2.5-3.5 
  
6.  Depression (10/22/2014). Continue home anti depressants.  
  
7.  HTN. Resume amlodipine   
  
Discharged in improved condition Signed: 
Adrian Perez MD 
6/22/2019 10:55 AM

## 2019-07-22 NOTE — PROGRESS NOTES
[address] [facility],5Th [address]  [address]   [place] [address] [facility]   [phone] Office   [phone] Fax           Date:  19     Name:  Augustin Qureshi  :  1945  MRN:  [id number]     PCP:  Gricelda Vazquez MD    Chief Complaint   Patient presents with    Neurologic Problem     hospital follow up from stroke        HISTORY OF PRESENT ILLNESS: Mert Duffy a 76 y. o. male who  has a past medical history of CAD (coronary artery disease), Cancer ([facility] [address] (\"10 years ago\"), COPD (chronic obstructive pulmonary disease) ([facility] [address] CVA (cerebral vascular accident) ([facility] [address] H/O mechanical aortic valve replacement, and S/P CABG x 1. who had prior strokes with residual L sided mild weakness. He was recently hospitalized due to the development of increased L sided weakness lasting for 1 hr. MRI brain did not show a new stroke but did show old R occipital and R thalamic strokes. Symptoms only lasted about an hour and resolved. From previous CVA, he has residual left sided weakness, slurred speech, and drooling at times. He is currently on coumadin due to cardiac conditions. He is on Lipitor. Except as noted above, denies  fever, chills, cough. No CP or SOB. No dysuria, loss of bowel or bladder control. No Weight loss. Appetite good. Sleeping well. No sweats. No edema. No bruising or bleeding. No nausea or vomit. No diarrhea. No frequency, urgency, No depressive sxs. No anxiety. Denies sore throat, nasal congestion, nasal discharge, epistaxis, tinnitus, hearing loss, back pain, muscle pain, or joint pain. Current Outpatient Medications   Medication Sig    buPROPion SR (WELLBUTRIN SR) 150 mg SR tablet Take 150 mg by mouth two (2) times a day.  latanoprost (XALATAN) 0.005 % ophthalmic solution Administer 1 Drop to both eyes nightly.  amLODIPine (NORVASC) 10 mg tablet Take 10 mg by mouth daily.     albuterol (PROVENTIL VENTOLIN) 2.5 mg /3 mL (0.083 %) nebulizer solution 3 mL by Nebulization route every four (4) hours as needed for Wheezing.  promethazine (PHENERGAN) 12.5 mg tablet Take 12.5 mg by mouth daily. Indications: Nausea    atorvastatin (LIPITOR) 80 mg tablet Take 80 mg by mouth every evening.  warfarin (COUMADIN) 5 mg tablet Take 5 mg by mouth every evening.  aspirin delayed-release 81 mg tablet Take 81 mg by mouth daily.  albuterol (PROVENTIL HFA, VENTOLIN HFA, PROAIR HFA) 90 mcg/actuation inhaler Take 2 Puffs by inhalation every six (6) hours as needed.  fluticasone-salmeterol (ADVAIR) 500-50 mcg/dose diskus inhaler Take 1 Puff by inhalation every twelve (12) hours.  clonazepam (KLONOPIN) 0.5 mg tablet Take 0.5 mg by mouth three (3) times daily.  escitalopram oxalate (LEXAPRO) 10 mg tablet Take 10 mg by mouth daily.  isosorbide mononitrate (ISMO, MONOKET) 10 mg tablet Take 10 mg by mouth three (3) times daily. Patient takes at [facility], noon, and 5pm     No current facility-administered medications for this visit.       Allergies   Allergen Reactions    Adenosine Shortness of Breath    Iodinated Contrast- Oral And Iv Dye Shortness of Breath    Pcn [Penicillins] Shortness of Breath     Patient reports to have tolerated a 10 day course of cefdinir started 8/24/17     Past Medical History:   Diagnosis Date    CAD (coronary artery disease)     Cancer ([facility] [address] \"10 years ago\"    throat    COPD (chronic obstructive pulmonary disease) ([facility] [address]     CVA (cerebral vascular accident) ([facility] [address]     H/O mechanical aortic valve replacement     CT surgery 1994 - CABG (LIMA to LAD) and AVR ([facility])    S/P CABG x 1     CT surgery 1994 - CABG (LIMA to LAD) and AVR ([facility])     Past Surgical History:   Procedure Laterality Date    HX HEART VALVE SURGERY      CT surgery 1994 - CABG (LIMA to LAD) and AVR ([facility])     Social History     Socioeconomic History    Marital status:      Spouse name: Not on file    Number of children: Not on file    Years of education: Not on file    Highest education level: Not on file   Occupational History    Not on file   Social Needs    Financial resource strain: Not on file    Food insecurity:     Worry: Not on file     Inability: Not on file    Transportation needs:     Medical: Not on file     Non-medical: Not on file   Tobacco Use    Smoking status: Current Every Day Smoker    Smokeless tobacco: Never Used    Tobacco comment: pipe   Substance and Sexual Activity    Alcohol use: No    Drug use: No    Sexual activity: Not on file   Lifestyle    Physical activity:     Days per week: Not on file     Minutes per session: Not on file    Stress: Not on file   Relationships    Social connections:     Talks on phone: Not on file     Gets together: Not on file     Attends Zoroastrianism service: Not on file     Active member of club or organization: Not on file     Attends meetings of clubs or organizations: Not on file     Relationship status: Not on file    Intimate partner violence:     Fear of current or ex partner: Not on file     Emotionally abused: Not on file     Physically abused: Not on file     Forced sexual activity: Not on file   Other Topics Concern    Not on file   Social History Narrative    Not on file     Family History   Problem Relation Age of Onset    Heart Disease Mother     Heart Disease Sister        PHYSICAL EXAMINATION:    Visit Vitals  /67   Pulse 67   Resp 20   Ht 5' 6\" (1.676 m)   Wt 65.8 kg (145 lb)   SpO2 95%   BMI 23.40 kg/m²     General:  Well defined, nourished, and groomed individual in no acute distress. Neck: Supple, nontender, no bruits, no pain with resistance to active range of motion. Heart: Regular rate and rhythm, no murmurs, rub, or gallop. Normal S1S2.   Lungs:  Clear to auscultation bilaterally with equal chest expansion, no cough, no wheeze  Musculoskeletal:  Extremities revealed no edema and had full range of motion of joints. Psych:  Good mood and bright affect    NEUROLOGICAL EXAMINATION:     Mental Status:   Alert and oriented to person, place, and time with recent and remote memory intact. Attention span and concentration are normal. Speech is fluent with a full fund of knowledge. Cranial Nerves:  I: smell Not tested   II: visual fields Full to confrontation   II: pupils Equal, round, reactive to light   II: optic disc No papilledema   III,VII: ptosis none   III,IV,VI: extraocular muscles  Full ROM   V: mastication normal   V: facial light touch sensation  normal   VII: facial muscle function   symmetric   VIII: hearing symmetric   IX: soft palate elevation  normal   XI: trapezius strength  5/5   XI: sternocleidomastoid strength 5/5   XI: neck flexion strength  5/5   XII: tongue  midline     Motor Examination: Normal tone, bulk, and strength, 5/5 muscle strength throughout with only very mild left-sided weakness    Coordination:  Finger to nose and rapid arm movement testing was normal.   No resting or intention tremor    Gait and Station:  Steady while walking. Normal arm swing. No pronator drift. No muscle wasting or fasiculations noted. Reflexes:  DTRs 2+ throughout. ASSESSMENT AND PLAN    ICD-10-CM ICD-9-CM    1. TIA (transient ischemic attack) G45.9 435.9    2. Late effects of CVA (cerebrovascular accident) I69.90 438.9      Discussed secondary stroke prevention as well as signs and symptoms of stroke, BE-FAST, and when to call for EMS. Stressed importance of recognition and early intervention. LDL goal less than 70 per secondary stroke guidelines. Continue with Lipitor. Continue with Coumadin for secondary stroke prevention. Discussed lifestyle modification and importance of exercise and appropriate diet, weight management, and management of comorbid disease with appropriate follow up visits with primary care and other healthcare providers. Della Siegel

## 2019-07-22 NOTE — PROGRESS NOTES
Admitted to the hosp.  Sometime in June 19-22 diagnosed with a stroke   Having symptoms- left sided weakness and slurred speech   This will be the 3rd one he has had   Oct 2018 was his last one the one prior to that one was years ago   Still having some left sided weakness- seems to come and go all throughout the day   Still having some of the slurred speech at times he may have some drooling along with it   No new symptoms have come to light since being discharged from the hospital

## 2019-11-04 NOTE — DISCHARGE INSTRUCTIONS
We hope that we have addressed all of your medical concerns. The examination and treatment you received in the Emergency Department were for an emergent problem and were not intended as complete care. It is important that you follow up with your healthcare provider(s) for ongoing care. If your symptoms worsen or do not improve as expected, and you are unable to reach your usual health care provider(s), you should return to the Emergency Department. Today's healthcare is undergoing tremendous change, and patient satisfaction surveys are one of the many tools to assess the quality of medical care. You may receive a survey from the [employer] regarding your experience in the Emergency Department. I hope that your experience has been completely positive, particularly the medical care that I provided. As such, please participate in the survey; anything less than excellent does not meet my expectations or intentions. [facility] and [employer] participate in nationally recognized quality of care measures. If your blood pressure is greater than 120/80, as reported below, we urge that you seek medical care to address the potential of high blood pressure, commonly known as hypertension. Hypertension can be hereditary or can be caused by certain medical conditions, pain, stress, or \"white coat syndrome. \"       Please make an appointment with your health care provider(s) for follow up of your Emergency Department visit. VITALS:   Patient Vitals for the past 8 hrs:   Temp Pulse Resp BP SpO2   11/03/19 2130 -- 61 17 138/64 97 %   11/03/19 1846 97.5 °F (36.4 °C) 75 18 114/63 96 %          Thank you for allowing us to provide you with medical care today. We realize that you have many choices for your emergency care needs. Please choose us in the future for any continued health care needs. Marly Hoover, [address]   Office: [phone]            Recent Results (from the past 24 hour(s))   CBC WITH AUTOMATED DIFF    Collection Time: 11/03/19  6:57 PM   Result Value Ref Range    WBC 7.4 4.1 - 11.1 K/uL    RBC 4.41 4.10 - 5.70 M/uL    HGB 13.0 12.1 - 17.0 g/dL    HCT 39.9 36.6 - 50.3 %    MCV 90.5 80.0 - 99.0 FL    MCH 29.5 26.0 - 34.0 PG    MCHC 32.6 30.0 - 36.5 g/dL    RDW 15.0 (H) 11.5 - 14.5 %    PLATELET 481 040 - 522 K/uL    MPV 8.8 (L) 8.9 - 12.9 FL    NRBC 0.0 0  WBC    ABSOLUTE NRBC 0.00 0.00 - 0.01 K/uL    NEUTROPHILS 73 32 - 75 %    LYMPHOCYTES 13 12 - 49 %    MONOCYTES 11 5 - 13 %    EOSINOPHILS 2 0 - 7 %    BASOPHILS 0 0 - 1 %    IMMATURE GRANULOCYTES 1 (H) 0.0 - 0.5 %    ABS. NEUTROPHILS 5.4 1.8 - 8.0 K/UL    ABS. LYMPHOCYTES 1.0 0.8 - 3.5 K/UL    ABS. MONOCYTES 0.8 0.0 - 1.0 K/UL    ABS. EOSINOPHILS 0.2 0.0 - 0.4 K/UL    ABS. BASOPHILS 0.0 0.0 - 0.1 K/UL    ABS. IMM. GRANS. 0.0 0.00 - 0.04 K/UL    DF AUTOMATED     METABOLIC PANEL, COMPREHENSIVE    Collection Time: 11/03/19  6:57 PM   Result Value Ref Range    Sodium 141 136 - 145 mmol/L    Potassium 4.1 3.5 - 5.1 mmol/L    Chloride 106 97 - 108 mmol/L    CO2 30 21 - 32 mmol/L    Anion gap 5 5 - 15 mmol/L    Glucose 82 65 - 100 mg/dL    BUN 12 6 - 20 MG/DL    Creatinine 1.06 0.70 - 1.30 MG/DL    BUN/Creatinine ratio 11 (L) 12 - 20      GFR est AA >60 >60 ml/min/1.73m2    GFR est non-AA >60 >60 ml/min/1.73m2    Calcium 9.2 8.5 - 10.1 MG/DL    Bilirubin, total 0.6 0.2 - 1.0 MG/DL    ALT (SGPT) 20 12 - 78 U/L    AST (SGOT) 17 15 - 37 U/L    Alk.  phosphatase 150 (H) 45 - 117 U/L    Protein, total 7.5 6.4 - 8.2 g/dL    Albumin 3.6 3.5 - 5.0 g/dL    Globulin 3.9 2.0 - 4.0 g/dL    A-G Ratio 0.9 (L) 1.1 - 2.2     LIPASE    Collection Time: 11/03/19  6:57 PM   Result Value Ref Range    Lipase 169 73 - 393 U/L   PROTHROMBIN TIME + INR    Collection Time: 11/03/19  6:57 PM   Result Value Ref Range    INR 1.3 (H) 0.9 - 1.1 Prothrombin time 12.9 (H) 9.0 - 11.1 sec   PTT    Collection Time: 11/03/19  6:57 PM   Result Value Ref Range    aPTT 26.5 22.1 - 32.0 sec    aPTT, therapeutic range     58.0 - 77.0 SECS   SAMPLES BEING HELD    Collection Time: 11/03/19  6:57 PM   Result Value Ref Range    SAMPLES BEING HELD 1SST,1RED     COMMENT        Add-on orders for these samples will be processed based on acceptable specimen integrity and analyte stability, which may vary by analyte. URINALYSIS W/ REFLEX CULTURE    Collection Time: 11/03/19 10:04 PM   Result Value Ref Range    Color YELLOW/STRAW      Appearance CLEAR CLEAR      Specific gravity 1.016 1.003 - 1.030      pH (UA) 6.0 5.0 - 8.0      Protein NEGATIVE  NEG mg/dL    Glucose NEGATIVE  NEG mg/dL    Ketone NEGATIVE  NEG mg/dL    Bilirubin NEGATIVE  NEG      Blood SMALL (A) NEG      Urobilinogen 1.0 0.2 - 1.0 EU/dL    Nitrites NEGATIVE  NEG      Leukocyte Esterase NEGATIVE  NEG      WBC 0-4 0 - 4 /hpf    RBC 5-10 0 - 5 /hpf    Epithelial cells FEW FEW /lpf    Bacteria NEGATIVE  NEG /hpf    UA:UC IF INDICATED PENDING     Hyaline cast 0-2 0 - 5 /lpf       Ct Abd Pelv Wo Cont    Result Date: 11/3/2019  INDICATION: lower abdominal pain EXAM: CT Abdomen and Pelvis without IV contrast. No oral contrast. CT dose reduction was achieved through use of a standardized protocol tailored for this examination and automatic exposure control for dose modulation. FINDINGS: There is a new lytic lesion in T11 since 6/21/2019, likely a metastasis, without compression fracture or canal compromise. No urinary tract stones are seen. There is no hydroureteronephrosis. The kidneys are normal in size. There is no perirenal fluid or ascites. Liver shows no apparent significant finding without contrast. Pancreas, adrenal glands, spleen and aorta show no significant enlargement. There is aortoiliac calcification and stenting. There are colonic diverticula. No inflammation is seen.  There is no pneumoperitoneum or significant adenopathy. The bladder is unremarkable. The distal ureters are not dilated. There is no apparent pelvic mass. IMPRESSION: 1. New T11 metastasis. 2. Colonic diverticulosis. Patient Education        Abdominal Pain: Care Instructions  Your Care Instructions    Abdominal pain has many possible causes. Some aren't serious and get better on their own in a few days. Others need more testing and treatment. If your pain continues or gets worse, you need to be rechecked and may need more tests to find out what is wrong. You may need surgery to correct the problem. Don't ignore new symptoms, such as fever, nausea and vomiting, urination problems, pain that gets worse, and dizziness. These may be signs of a more serious problem. Your doctor may have recommended a follow-up visit in the next 8 to 12 hours. If you are not getting better, you may need more tests or treatment. The doctor has checked you carefully, but problems can develop later. If you notice any problems or new symptoms, get medical treatment right away. Follow-up care is a key part of your treatment and safety. Be sure to make and go to all appointments, and call your doctor if you are having problems. It's also a good idea to know your test results and keep a list of the medicines you take. How can you care for yourself at home? · Rest until you feel better. · To prevent dehydration, drink plenty of fluids, enough so that your urine is light yellow or clear like water. Choose water and other caffeine-free clear liquids until you feel better. If you have kidney, heart, or liver disease and have to limit fluids, talk with your doctor before you increase the amount of fluids you drink. · If your stomach is upset, eat mild foods, such as rice, dry toast or crackers, bananas, and applesauce. Try eating several small meals instead of two or three large ones.   · Wait until 48 hours after all symptoms have gone away before you have spicy foods, alcohol, and drinks that contain caffeine. · Do not eat foods that are high in fat. · Avoid anti-inflammatory medicines such as aspirin, ibuprofen (Advil, Motrin), and naproxen (Aleve). These can cause stomach upset. Talk to your doctor if you take daily aspirin for another health problem. When should you call for help? Call 911 anytime you think you may need emergency care. For example, call if:    · You passed out (lost consciousness).     · You pass maroon or very bloody stools.     · You vomit blood or what looks like coffee grounds.     · You have new, severe belly pain.    Call your doctor now or seek immediate medical care if:    · Your pain gets worse, especially if it becomes focused in one area of your belly.     · You have a new or higher fever.     · Your stools are black and look like tar, or they have streaks of blood.     · You have unexpected vaginal bleeding.     · You have symptoms of a urinary tract infection. These may include:  ? Pain when you urinate. ? Urinating more often than usual.  ? Blood in your urine.     · You are dizzy or lightheaded, or you feel like you may faint.    Watch closely for changes in your health, and be sure to contact your doctor if:    · You are not getting better after 1 day (24 hours). Where can you learn more? Go to http://rigoberto-marcos.info/. Enter E264 in the search box to learn more about \"Abdominal Pain: Care Instructions. \"  Current as of: June 26, 2019  Content Version: 12.2  © 8268-8723 [employer]. Care instructions adapted under license by [employer] (which disclaims liability or warranty for this information). If you have questions about a medical condition or this instruction, always ask your healthcare professional. [place] [address] any warranty or liability for your use of this information.          Patient Education        Learning About Metastatic Bone Disease  What is metastatic bone disease? Metastatic bone disease is the spread of a malignant tumor from one part of the body to the bone. A tumor is a growth of abnormal cells. When the growth is out of control and spreads or damages other cells, the tumor is called malignant. That means it's a type of cancer. When cancer starts somewhere in the body--like in the prostate, breast, lungs, thyroid, or kidney--and spreads to another area, such as the bone, it is called metastatic. These tumors aren't the same as bone cancer. They are made of cancer cells from somewhere else in the body. What are the symptoms? The main symptom that cancer may have spread to your bones is pain or pressure in the bones. Metastatic cancer cells in your bone can weaken the bone and may cause it to break. You may not have any symptoms. A doctor may find a tumor when taking X-rays or other images for another reason. How is it diagnosed? If you have symptoms that cancer has spread to your bones, you will get some tests. · You may have one or more imaging tests to get a look at the tumor. These may include:  ? X-rays. ? CT scan.  ? MRI scan.  ? PET scan.  ? Bone scan. · You may need blood tests and other lab work. This might include testing for substances in your body (biomarkers) that can help guide treatment. · You may need a biopsy so a sample of the tumor can be looked at under a microscope. · Doctors may also examine other parts of your body to see if the tumor has spread anywhere else. How is it treated? You may have already had treatments for the main type of cancer you have. These treatments will continue. They may help stop or slow the growth of the cancer in your bones. You may need surgery if a bone tumor causes symptoms. Surgery may be done to make a bone more stable and to keep it from breaking. It can also help with pain. Radiation therapy is used for some bone cancers.  It uses high-energy rays, such as X-rays, to destroy cancer cells and shrink tumors in the body. You may have chemotherapy (chemo). Chemo is medicine that destroys cancer cells. You may also get medicine to protect your bones, treat any bone pain, and make you more comfortable. Follow-up care is a key part of your treatment and safety. Be sure to make and go to all appointments, and call your doctor if you are having problems. It's also a good idea to know your test results and keep a list of the medicines you take. Where can you learn more? Go to http://rigoberto-marcos.info/. Enter M125 in the search box to learn more about \"Learning About Metastatic Bone Disease. \"  Current as of: December 19, 2018  Content Version: 12.2  © 2049-8624 [employer]. Care instructions adapted under license by [employer] (which disclaims liability or warranty for this information). If you have questions about a medical condition or this instruction, always ask your healthcare professional. [place] [address] any warranty or liability for your use of this information.

## 2019-11-04 NOTE — ED NOTES
I have reviewed discharge instructions with the patient and spouse. The patient and spouse verbalized understanding. Pt confirmed understanding of need for follow up with primary care provider. Pt is not in any current distress and shows no evidence of clinical instability. Pt left ambulatory  Pt family/friends are present. Pt left with all personal belongings. Pt states they are not driving. Pt states chief complaint has  improved with treatment provided Paperwork given by provider and reviewed with patient, opportunity for questions/clarification given. Patient Vitals for the past 4 hrs: 
 Temp Pulse Resp BP SpO2  
11/03/19 2230    147/62 96 % 11/03/19 2215    133/73 98 % 11/03/19 2200    132/72 97 % 11/03/19 2145    146/79 96 % 11/03/19 2130  61 17 138/64 97 % 11/03/19 2115    123/66 97 % 11/03/19 2100    119/63 98 % 11/03/19 1846 97.5 °F (36.4 °C) 75 18 114/63 96 %

## 2019-11-04 NOTE — ED PROVIDER NOTES
This 68-year-old gentle man comes emergency room with chief complaint of lower abdominal pain. Patient states that the pain started approximately Thursday last week. Patient states he has had no diarrhea or constipation. Patient denies any nausea vomiting. Patient states that the pain is sharp and crampy in nature. Patient denies any urinary symptoms. Patient denies any fever. The history is provided by the patient and the spouse. No  was used. Abdominal Pain This is a new problem. The current episode started more than 2 days ago. The problem occurs daily. The problem has not changed since onset. The pain is located in the generalized abdominal region. The quality of the pain is aching and sharp. The pain is at a severity of 6/10. The pain is moderate. Pertinent negatives include no fever, no diarrhea, no nausea, no vomiting, no constipation, no dysuria, no frequency, no hematuria, no myalgias, no chest pain and no back pain. The pain is worsened by palpation. The pain is relieved by nothing. Past workup includes CT scan. His past medical history is significant for cancer (Throat). His past medical history does not include DM. Past Medical History:  
Diagnosis Date  CAD (coronary artery disease)  Cancer ([facility] \"10 years ago\" throat  COPD (chronic obstructive pulmonary disease) (HCC)  CVA (cerebral vascular accident) ([facility] [address]  H/O mechanical aortic valve replacement CT surgery 1994 - CABG ([facility] to [facility]) and AVR ([facility])  S/P CABG x 1   
 CT surgery 1994 - CABG ([facility] to [facility]) and AVR ([facility]) Past Surgical History:  
Procedure Laterality Date  HX HEART VALVE SURGERY    
 CT surgery 1994 - CABG ([facility] to [facility]) and AVR ([facility]) Family History:  
Problem Relation Age of Onset  Heart Disease Mother  Heart Disease Sister Social History Socioeconomic History  Marital status:  Spouse name: Not on file  Number of children: Not on file  Years of education: Not on file  Highest education level: Not on file Occupational History  Not on file Social Needs  Financial resource strain: Not on file  Food insecurity:  
  Worry: Not on file Inability: Not on file  Transportation needs:  
  Medical: Not on file Non-medical: Not on file Tobacco Use  Smoking status: Current Every Day Smoker  Smokeless tobacco: Never Used  Tobacco comment: pipe Substance and Sexual Activity  Alcohol use: No  
 Drug use: No  
 Sexual activity: Not on file Lifestyle  Physical activity:  
  Days per week: Not on file Minutes per session: Not on file  Stress: Not on file Relationships  Social connections:  
  Talks on phone: Not on file Gets together: Not on file Attends Religion service: Not on file Active member of club or organization: Not on file Attends meetings of clubs or organizations: Not on file Relationship status: Not on file  Intimate partner violence:  
  Fear of current or ex partner: Not on file Emotionally abused: Not on file Physically abused: Not on file Forced sexual activity: Not on file Other Topics Concern  Not on file Social History Narrative  Not on file ALLERGIES: Adenosine; Iodinated contrast media; and Pcn [penicillins] Review of Systems Constitutional: Negative for appetite change, chills, fever and unexpected weight change. HENT: Negative for ear pain, hearing loss, rhinorrhea and trouble swallowing. Eyes: Negative for pain and visual disturbance. Respiratory: Negative for cough, chest tightness and shortness of breath. Cardiovascular: Negative for chest pain and palpitations. Gastrointestinal: Positive for abdominal pain. Negative for abdominal distention, blood in stool, constipation, diarrhea, nausea and vomiting. Genitourinary: Negative for dysuria, frequency, hematuria and urgency. Musculoskeletal: Negative for back pain and myalgias. Skin: Negative for rash. Neurological: Negative for dizziness, syncope, weakness and numbness. Psychiatric/Behavioral: Negative for confusion and suicidal ideas. All other systems reviewed and are negative. Vitals:  
 11/03/19 2145 11/03/19 2200 11/03/19 2215 11/03/19 2230 BP: 146/79 132/72 133/73 147/62 Pulse:      
Resp:      
Temp:      
SpO2: 96% 97% 98% 96% Weight:      
Height:      
      
 
Physical Exam  
Constitutional: He is oriented to person, place, and time. He appears well-developed and well-nourished. No distress. HENT:  
Head: Normocephalic and atraumatic. Right Ear: External ear normal.  
Left Ear: External ear normal.  
Nose: Nose normal.  
Mouth/Throat: Oropharynx is clear and moist. No oropharyngeal exudate. Eyes: Pupils are equal, round, and reactive to light. Conjunctivae and EOM are normal. Right eye exhibits no discharge. Left eye exhibits no discharge. No scleral icterus. Neck: Normal range of motion. Neck supple. No JVD present. No tracheal deviation present. Cardiovascular: Normal rate, regular rhythm and intact distal pulses. Exam reveals no gallop and no friction rub. Murmur heard. Systolic murmur is present with a grade of 2/6. Pulmonary/Chest: Effort normal and breath sounds normal. No stridor. No respiratory distress. He has no decreased breath sounds. He has no wheezes. He has no rhonchi. He has no rales. He exhibits no tenderness. Abdominal: Soft. Bowel sounds are normal. He exhibits no distension. There is generalized tenderness. There is no rebound and no guarding. Musculoskeletal: Normal range of motion. He exhibits no edema or tenderness. Neurological: He is alert and oriented to person, place, and time. He has normal strength and normal reflexes. He displays normal reflexes.  No cranial nerve deficit or sensory deficit. He exhibits normal muscle tone. Coordination normal. GCS eye subscore is 4. GCS verbal subscore is 5. GCS motor subscore is 6. Skin: Skin is warm and dry. Capillary refill takes less than 2 seconds. No rash noted. He is not diaphoretic. No erythema. No pallor. Psychiatric: He has a normal mood and affect. His behavior is normal. Judgment and thought content normal.  
Nursing note and vitals reviewed. MDM Number of Diagnoses or Management Options Abdominal pain, generalized:  
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 I have encouraged them to ask questions as they arise throughout their visit. MEDICATIONS GIVEN: 
Medications  
sodium chloride 0.9 % bolus infusion 500 mL (0 mL IntraVENous IV Completed 11/3/19 2229) morphine injection 2 mg (2 mg IntraVENous Given 11/3/19 2129) LABS REVIEWED: 
Recent Results (from the past 24 hour(s)) CBC WITH AUTOMATED DIFF Collection Time: 11/03/19  6:57 PM  
Result Value Ref Range WBC 7.4 4.1 - 11.1 K/uL  
 RBC 4.41 4.10 - 5.70 M/uL  
 HGB 13.0 12.1 - 17.0 g/dL HCT 39.9 36.6 - 50.3 % MCV 90.5 80.0 - 99.0 FL  
 MCH 29.5 26.0 - 34.0 PG  
 MCHC 32.6 30.0 - 36.5 g/dL  
 RDW 15.0 (H) 11.5 - 14.5 % PLATELET 931 692 - 183 K/uL MPV 8.8 (L) 8.9 - 12.9 FL  
 NRBC 0.0 0  WBC ABSOLUTE NRBC 0.00 0.00 - 0.01 K/uL NEUTROPHILS 73 32 - 75 % LYMPHOCYTES 13 12 - 49 % MONOCYTES 11 5 - 13 % EOSINOPHILS 2 0 - 7 % BASOPHILS 0 0 - 1 % IMMATURE GRANULOCYTES 1 (H) 0.0 - 0.5 % ABS. NEUTROPHILS 5.4 1.8 - 8.0 K/UL  
 ABS. LYMPHOCYTES 1.0 0.8 - 3.5 K/UL  
 ABS. MONOCYTES 0.8 0.0 - 1.0 K/UL  
 ABS. EOSINOPHILS 0.2 0.0 - 0.4 K/UL  
 ABS. BASOPHILS 0.0 0.0 - 0.1 K/UL  
 ABS. IMM. GRANS. 0.0 0.00 - 0.04 K/UL  
 DF AUTOMATED METABOLIC PANEL, COMPREHENSIVE Collection Time: 11/03/19  6:57 PM  
Result Value Ref Range Sodium 141 136 - 145 mmol/L Potassium 4.1 3.5 - 5.1 mmol/L Chloride 106 97 - 108 mmol/L  
 CO2 30 21 - 32 mmol/L Anion gap 5 5 - 15 mmol/L Glucose 82 65 - 100 mg/dL BUN 12 6 - 20 MG/DL Creatinine 1.06 0.70 - 1.30 MG/DL  
 BUN/Creatinine ratio 11 (L) 12 - 20 GFR est AA >60 >60 ml/min/1.73m2 GFR est non-AA >60 >60 ml/min/1.73m2 Calcium 9.2 8.5 - 10.1 MG/DL Bilirubin, total 0.6 0.2 - 1.0 MG/DL  
 ALT (SGPT) 20 12 - 78 U/L  
 AST (SGOT) 17 15 - 37 U/L Alk. phosphatase 150 (H) 45 - 117 U/L Protein, total 7.5 6.4 - 8.2 g/dL Albumin 3.6 3.5 - 5.0 g/dL Globulin 3.9 2.0 - 4.0 g/dL A-G Ratio 0.9 (L) 1.1 - 2.2 LIPASE Collection Time: 11/03/19  6:57 PM  
Result Value Ref Range Lipase 169 73 - 393 U/L  
PROTHROMBIN TIME + INR Collection Time: 11/03/19  6:57 PM  
Result Value Ref Range INR 1.3 (H) 0.9 - 1.1 Prothrombin time 12.9 (H) 9.0 - 11.1 sec PTT Collection Time: 11/03/19  6:57 PM  
Result Value Ref Range aPTT 26.5 22.1 - 32.0 sec  
 aPTT, therapeutic range     58.0 - 77.0 SECS  
SAMPLES BEING HELD Collection Time: 11/03/19  6:57 PM  
Result Value Ref Range SAMPLES BEING HELD 1SST,1RED COMMENT Add-on orders for these samples will be processed based on acceptable specimen integrity and analyte stability, which may vary by analyte. URINALYSIS W/ REFLEX CULTURE Collection Time: 11/03/19 10:04 PM  
Result Value Ref Range Color YELLOW/STRAW Appearance CLEAR CLEAR Specific gravity 1.016 1.003 - 1.030    
 pH (UA) 6.0 5.0 - 8.0 Protein NEGATIVE  NEG mg/dL Glucose NEGATIVE  NEG mg/dL Ketone NEGATIVE  NEG mg/dL Bilirubin NEGATIVE  NEG Blood SMALL (A) NEG Urobilinogen 1.0 0.2 - 1.0 EU/dL Nitrites NEGATIVE  NEG Leukocyte Esterase NEGATIVE  NEG    
 WBC 0-4 0 - 4 /hpf  
 RBC 5-10 0 - 5 /hpf Epithelial cells FEW FEW /lpf Bacteria NEGATIVE  NEG /hpf  
 UA:UC IF INDICATED CULTURE NOT INDICATED BY UA RESULT CNI Hyaline cast 0-2 0 - 5 /lpf VITAL SIGNS: 
Patient Vitals for the past 24 hrs: 
 Temp Pulse Resp BP SpO2  
11/03/19 2230    147/62 96 % 11/03/19 2215    133/73 98 % 11/03/19 2200    132/72 97 % 11/03/19 2145    146/79 96 % 11/03/19 2130  61 17 138/64 97 % 11/03/19 2115    123/66 97 % 11/03/19 2100    119/63 98 % 11/03/19 1846 97.5 °F (36.4 °C) 75 18 114/63 96 % RADIOLOGY RESULTS: 
The following have been ordered and reviewed: 
Ct Abd Pelv Wo Cont Result Date: 11/3/2019 INDICATION: lower abdominal pain EXAM: CT Abdomen and Pelvis without IV contrast. No oral contrast. CT dose reduction was achieved through use of a standardized protocol tailored for this examination and automatic exposure control for dose modulation. FINDINGS: There is a new lytic lesion in T11 since 6/21/2019, likely a metastasis, without compression fracture or canal compromise. No urinary tract stones are seen. There is no hydroureteronephrosis. The kidneys are normal in size. There is no perirenal fluid or ascites. Liver shows no apparent significant finding without contrast. Pancreas, adrenal glands, spleen and aorta show no significant enlargement. There is aortoiliac calcification and stenting. There are colonic diverticula. No inflammation is seen. There is no pneumoperitoneum or significant adenopathy. The bladder is unremarkable. The distal ureters are not dilated. There is no apparent pelvic mass. IMPRESSION: 1. New T11 metastasis. 2. Colonic diverticulosis. PROGRESS NOTES: 
Discussed the lytic lesion at T11 with patient. Patient instructed to follow-up with PCP. Discussed results and plan with patient and spouse. Patient will be discharged home with PCP follow up. Patient instructed to return to the emergency room for any worsening symptoms or any other concerns. DIAGNOSIS: 
 
1. Abdominal pain, generalized 2. 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 
Follow-up Information Follow up With Specialties Details Why Contact Info Deniz Morales MD Family Practice Schedule an appointment as soon as possible for a visit  [facility] [address] [facility] 
[phone] [facility] EMERGENCY DEPT Emergency Medicine  If symptoms worsen [address] [facility] 
[phone] Discharge Medication List as of 11/3/2019 10:26 PM  
  
CONTINUE these medications which have NOT CHANGED Details buPROPion SR (WELLBUTRIN SR) 150 mg SR tablet Take 150 mg by mouth two (2) times a day., Historical Med  
  
latanoprost (XALATAN) 0.005 % ophthalmic solution Administer 1 Drop to both eyes nightly., Historical Med  
  
amLODIPine (NORVASC) 10 mg tablet Take 10 mg by mouth daily. , Historical Med  
  
albuterol (PROVENTIL VENTOLIN) 2.5 mg /3 mL (0.083 %) nebulizer solution 3 mL by Nebulization route every four (4) hours as needed for Wheezing., Normal, Disp-30 Each, R-0  
  
promethazine (PHENERGAN) 12.5 mg tablet Take 12.5 mg by mouth daily. Indications: Nausea, Historical Med  
  
atorvastatin (LIPITOR) 80 mg tablet Take 80 mg by mouth every evening., Historical Med  
  
warfarin (COUMADIN) 5 mg tablet Take 5 mg by mouth every evening., Historical Med  
  
aspirin delayed-release 81 mg tablet Take 81 mg by mouth daily. , Historical Med  
  
 albuterol (PROVENTIL HFA, VENTOLIN HFA, PROAIR HFA) 90 mcg/actuation inhaler Take 2 Puffs by inhalation every six (6) hours as needed., Historical Med  
  
fluticasone-salmeterol (ADVAIR) 500-50 mcg/dose diskus inhaler Take 1 Puff by inhalation every twelve (12) hours. , Historical Med  
  
clonazepam (KLONOPIN) 0.5 mg tablet Take 0.5 mg by mouth three (3) times daily. , Historical Med  
  
escitalopram oxalate (LEXAPRO) 10 mg tablet Take 10 mg by mouth daily. , Historical Med  
  
isosorbide mononitrate (ISMO, MONOKET) 10 mg tablet Take 10 mg by mouth three (3) times daily. Patient takes at [address] [facility], noon, and 5pm, Historical Med  
  
  
 
 
ED COURSE: The patient's hospital course has been uncomplicated.

## 2019-11-17 NOTE — ED TRIAGE NOTES
Facial droop, hemiparalysis intermittently, episodes lasting approx 10 mins, started yesterday AM. Today pt's sx started at 0800 and have not resolved. PMH of TIAs. No numbness or weakness of extremities or on face.

## 2019-11-17 NOTE — ED PROVIDER NOTES
76 y.o. male with past medical history significant for CVA, throat cancer, COPD, CAD who presents ambulatory to ED with chief complaint of facial droop. Pt states \"i'm having a mini stroke\". Has hx of CVA. States he started with intermittent left sided facial droop yesterday morning 11/16/19 around 0800. Also reportedly having speech troubles since yesterday. Family states that he would have 10 minute episodes of facial droop yesterday 11/16/19 that would go away and then return for same duration. Happened multiple times yesterday. Current episode of facial droop began this morning 11/17/19 around 0800. States he was normal when waking up this morning at 0700, started to drink something, began drooling, and then started with facial droop that hasn't gone away today. Also has left eye visual field loss since 0800. States that he has not had similar presentations during his old strokes. Takes coumadin daily. Pt denies any extremity weakness, numbness. PCP: Giuliana Bloom MD 
 
I have evaluated the patient as the Provider in Triage. I have reviewed His vital signs and the triage nurse assessment. I have talked with the patient and any available family and advised that I am the provider in triage and have ordered the appropriate study to initiate their work up based on the clinical presentation during my assessment. I have advised that the patient will be accommodated in the Main ED as soon as possible. I have also requested to contact the triage nurse or myself immediately if the patient experiences any changes in their condition during this brief waiting period.  
 
Note written by Tee Javier, as dictated by Kirk Rutherford PA-C 11:52 AM.  
------------------------------------------------------------------- 
 
76 y.o. male with past medical history significant for CVA, throat cancer, CAD, COPD, and CABG who presents from home with chief complaint of facial droop. Pt complains of an intermittent right sided facial droop and speech difficulty that started yesterday morning at 8:00 AM. Pt states symptoms would last about 10 minutes at a time and then resolve. Pt states he woke up this morning at 7:00 AM at baseline and had onset of symptoms again at 8:00 AM including right sided facial droop, speech difficulty, and right eye blurred vision. Pt states symptoms have been constant since onset this morning. Pt is anticoagulated on coumadin. 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 Pt denies weakness, numbness, or headache. There are no other acute medical concerns at this time. Social hx: Current Smoker. Denies EtOH Use. PCP: Corby Pollack MD 
 
Note written by Duran Diane, as dictated by Iza Diggs MD 12:04 PM 
 
 
 
 
The history is provided by the patient and the spouse. No  was used. Past Medical History:  
Diagnosis Date  CAD (coronary artery disease)  Cancer ([facility] \"10 years ago\" throat  COPD (chronic obstructive pulmonary disease) ([facility])  CVA (cerebral vascular accident) ([facility]  H/O mechanical aortic valve replacement CT surgery 1994 - CABG ([place] to LAD) and AVR ([facility])  S/P CABG x 1   
 CT surgery 1994 - CABG ([facility] to LAD) and AVR ([facility]) Past Surgical History:  
Procedure Laterality Date  HX HEART VALVE SURGERY    
 CT surgery 1994 - CABG (LIMA to LAD) and AVR ([facility]) Family History:  
Problem Relation Age of Onset  Heart Disease Mother  Heart Disease Sister Social History Socioeconomic History  Marital status:  Spouse name: Not on file  Number of children: Not on file  Years of education: Not on file  Highest education level: Not on file Occupational History  Not on file Social Needs  Financial resource strain: Not on file  Food insecurity:  
  Worry: Not on file Inability: Not on file  Transportation needs:  
  Medical: Not on file Non-medical: Not on file Tobacco Use  Smoking status: Current Every Day Smoker  Smokeless tobacco: Never Used  Tobacco comment: pipe Substance and Sexual Activity  Alcohol use: No  
 Drug use: No  
 Sexual activity: Not on file Lifestyle  Physical activity:  
  Days per week: Not on file Minutes per session: Not on file  Stress: Not on file Relationships  Social connections:  
  Talks on phone: Not on file Gets together: Not on file Attends Hinduism service: Not on file Active member of club or organization: Not on file Attends meetings of clubs or organizations: Not on file Relationship status: Not on file  Intimate partner violence:  
  Fear of current or ex partner: Not on file Emotionally abused: Not on file Physically abused: Not on file Forced sexual activity: Not on file Other Topics Concern  Not on file Social History Narrative  Not on file ALLERGIES: Adenosine; Iodinated contrast media; and Pcn [penicillins] Review of Systems Constitutional: Negative for chills and fever. HENT: Negative for congestion. Eyes: Positive for visual disturbance. Respiratory: Negative for cough and shortness of breath. Cardiovascular: Negative for chest pain and palpitations. Gastrointestinal: Negative for abdominal pain, nausea and vomiting. Genitourinary: Negative for dysuria, frequency and hematuria. Musculoskeletal: Negative for arthralgias, neck pain and neck stiffness. Skin: Negative for color change and rash. Neurological: Positive for facial asymmetry and speech difficulty. Negative for weakness and numbness. All other systems reviewed and are negative. Vitals:  
 11/17/19 1330 11/17/19 1400 11/17/19 1430 11/17/19 1445 BP: 128/69 144/69 147/67 139/87 Pulse: (!) 58 (!) 59 (!) 58 (!) 59 Resp: 18 26 20 19 Temp:      
 SpO2: 99% 97% 99% 99% Weight: 62.6 kg (138 lb) Height: 5' 6\" (1.676 m) Physical Exam  
Constitutional: He is oriented to person, place, and time. He appears well-developed and well-nourished. HENT:  
Head: Normocephalic and atraumatic. Mouth/Throat: Oropharynx is clear and moist. No oropharyngeal exudate. Eyes: Pupils are equal, round, and reactive to light. Conjunctivae are normal. Right eye exhibits no discharge. Left eye exhibits no discharge. No scleral icterus. Neck: Normal range of motion. Neck supple. Cardiovascular: Normal rate, regular rhythm and normal heart sounds. Exam reveals no gallop and no friction rub. No murmur heard. Pulmonary/Chest: Effort normal and breath sounds normal. No respiratory distress. He has no wheezes. He has no rales. Abdominal: Soft. Bowel sounds are normal. He exhibits no distension. There is no tenderness. There is no guarding. Musculoskeletal: Normal range of motion. He exhibits no edema or tenderness. Lymphadenopathy:  
  He has no cervical adenopathy. Neurological: He is alert and oriented to person, place, and time. A cranial nerve deficit (right facial droop and unable to close right eye fully.) is present. Coordination normal.  
No arm drift. Normal speech. Skin: Skin is warm and dry. No rash noted. No pallor. Nursing note and vitals reviewed. MDM Patient with last known well of 8 AM yesterday given his symptoms wax and wane throughout the day yesterday. History of metastatic head and neck cancer. Patient would not be a candidate for TPA given that he is on Coumadin. Patient also not a candidate for intervention given he has a history of metastatic cancer as well as symptoms greater than 24 hours ago. Will check noncontrast head CT, MRI if head CT negative. Labs. Artificial tears in the right eye. Procedures CONSULT NOTE: 
12:08 PM Raffy Keller MD spoke with Dr. Phoebe Morse, Consult for Teleneurology. Discussed available diagnostic tests and clinical findings. Dr. Leti Still recommends dry scan and then MRI afterwards. Agrees patient not TPA or other stroke intervention candidate. ED EKG interpretation: 
Rhythm: normal sinus rhythm; and regular . Rate (approx.): 66; Axis: normal; ST/T wave: non-specific T changes; Intraventricular block.; Intervals: Prolonged QRS. Note written by Evan Rodriguez. Lupe Yasirort, as dictated by Joe Palacios MD 12:38 PM 
 
 
 3:56 PM 
Asked nurse to track down artifical tears and give them to the patient as ordered. Recent Results (from the past 24 hour(s)) GLUCOSE, POC Collection Time: 11/17/19 12:01 PM  
Result Value Ref Range Glucose (POC) 88 65 - 100 mg/dL Performed by Mariah Garcia GLUCOSE, POC Collection Time: 11/17/19 12:13 PM  
Result Value Ref Range Glucose (POC) 94 65 - 100 mg/dL Performed by Aicha Lemus (PCT) CBC WITH AUTOMATED DIFF Collection Time: 11/17/19 12:15 PM  
Result Value Ref Range WBC 5.8 4.1 - 11.1 K/uL  
 RBC 4.44 4.10 - 5.70 M/uL  
 HGB 13.1 12.1 - 17.0 g/dL HCT 40.0 36.6 - 50.3 % MCV 90.1 80.0 - 99.0 FL  
 MCH 29.5 26.0 - 34.0 PG  
 MCHC 32.8 30.0 - 36.5 g/dL  
 RDW 15.0 (H) 11.5 - 14.5 % PLATELET 106 366 - 638 K/uL MPV 8.7 (L) 8.9 - 12.9 FL  
 NRBC 0.0 0  WBC ABSOLUTE NRBC 0.00 0.00 - 0.01 K/uL NEUTROPHILS 71 32 - 75 % LYMPHOCYTES 18 12 - 49 % MONOCYTES 9 5 - 13 % EOSINOPHILS 1 0 - 7 % BASOPHILS 1 0 - 1 % IMMATURE GRANULOCYTES 0 0.0 - 0.5 % ABS. NEUTROPHILS 4.1 1.8 - 8.0 K/UL  
 ABS. LYMPHOCYTES 1.0 0.8 - 3.5 K/UL  
 ABS. MONOCYTES 0.5 0.0 - 1.0 K/UL  
 ABS. EOSINOPHILS 0.1 0.0 - 0.4 K/UL  
 ABS. BASOPHILS 0.0 0.0 - 0.1 K/UL  
 ABS. IMM. GRANS. 0.0 0.00 - 0.04 K/UL  
 DF AUTOMATED METABOLIC PANEL, COMPREHENSIVE Collection Time: 11/17/19 12:15 PM  
Result Value Ref Range  Sodium 139 136 - 145 mmol/L  
 Potassium 3.9 3.5 - 5.1 mmol/L Chloride 106 97 - 108 mmol/L  
 CO2 29 21 - 32 mmol/L Anion gap 4 (L) 5 - 15 mmol/L Glucose 95 65 - 100 mg/dL BUN 12 6 - 20 MG/DL Creatinine 0.95 0.70 - 1.30 MG/DL  
 BUN/Creatinine ratio 13 12 - 20 GFR est AA >60 >60 ml/min/1.73m2 GFR est non-AA >60 >60 ml/min/1.73m2 Calcium 9.1 8.5 - 10.1 MG/DL Bilirubin, total 0.6 0.2 - 1.0 MG/DL  
 ALT (SGPT) 25 12 - 78 U/L  
 AST (SGOT) 18 15 - 37 U/L Alk. phosphatase 180 (H) 45 - 117 U/L Protein, total 7.6 6.4 - 8.2 g/dL Albumin 3.8 3.5 - 5.0 g/dL Globulin 3.8 2.0 - 4.0 g/dL A-G Ratio 1.0 (L) 1.1 - 2.2 PROTHROMBIN TIME + INR Collection Time: 11/17/19 12:15 PM  
Result Value Ref Range INR 2.6 (H) 0.9 - 1.1 Prothrombin time 25.2 (H) 9.0 - 11.1 sec SAMPLES BEING HELD Collection Time: 11/17/19 12:18 PM  
Result Value Ref Range SAMPLES BEING HELD 1SST,1RED,1DRK GRN   
 COMMENT Add-on orders for these samples will be processed based on acceptable specimen integrity and analyte stability, which may vary by analyte. EKG, 12 LEAD, INITIAL Collection Time: 11/17/19 12:34 PM  
Result Value Ref Range Ventricular Rate 66 BPM  
 Atrial Rate 66 BPM  
 P-R Interval 148 ms QRS Duration 140 ms  
 Q-T Interval 458 ms QTC Calculation (Bezet) 480 ms Calculated P Axis 48 degrees Calculated R Axis 8 degrees Calculated T Axis 84 degrees Diagnosis Normal sinus rhythm Nonspecific intraventricular block Nonspecific T wave abnormality Abnormal ECG When compared with ECG of 19-JUN-2019 19:30, No significant change was found Mri Brain W Wo Cont Result Date: 11/17/2019 PRELIMINARY REPORT: No acute infarct Extensive encephalomalacia right temporal occipital lobe Prominent white matter changes No acute hemorrhage Preliminary report was provided by Dr. Rahul Cordero, the on-call radiologist, at 1072 Final report to follow. Ct Head Wo Cont Result Date: 11/17/2019 EXAM: CT HEAD WO CONT INDICATION: right facial droop and unable to close right eyelid. h/o head and neck cancer met to bone. lkw of 8 am yesterday. COMPARISON: June 19, 2019. CONTRAST: None. TECHNIQUE: Unenhanced CT of the head was performed using 5 mm images. Brain and bone windows were generated. CT dose reduction was achieved through use of a standardized protocol tailored for this examination and automatic exposure control for dose modulation. FINDINGS: The ventricles and sulci are normal in size, shape and configuration and midline. There is unchanged diffuse periventricular white matter disease. Old right occipital and right frontoparietal infarcts are not significantly changed. Old lacunar infarct in the left internal capsule is unchanged. Old lacunar infarct in the right thalamus is unchanged. There is no intracranial hemorrhage, extra-axial collection, mass, mass effect or midline shift. The basilar cisterns are open. No acute infarct is identified. The bone windows demonstrate no abnormalities. There is mild mucoperiosteal thickening in the maxillary sinuses. Benedict Fruit IMPRESSION: No acute intracranial process. Diffuse periventricular white matter disease and old infarcts are not significantly changed.

## 2019-11-17 NOTE — DISCHARGE INSTRUCTIONS
Patient Education        Bell's Palsy: Care Instructions  Your Care Instructions    Bell's palsy is paralysis or weakness of the muscles on one side of the face. Often people with Bell's palsy have a droop on one side of the mouth and have trouble completely shutting the eye on the same side. Bell's palsy can also interfere with the sense of taste. These things happen when a nerve in your face becomes inflamed. Bell's palsy is not caused by a stroke. The cause of the nerve inflammation is not known. But some experts think that a virus may cause it. Because of this, doctors sometimes prescribe antiviral medicine to treat it. You also may get medicine to reduce swelling. Bell's palsy usually gets better on its own in a few weeks or months. Follow-up care is a key part of your treatment and safety. Be sure to make and go to all appointments, and call your doctor if you are having problems. It's also a good idea to know your test results and keep a list of the medicines you take. How can you care for yourself at home? · Take your medicines exactly as prescribed. Call your doctor if you think you are having a problem with your medicine. You will get more details on the specific medicines your doctor prescribes. · Use artificial tears or ointment if your eyes are too dry. Bell's palsy can make your lower eyelid droop, causing a dry eye. · If you cannot completely close your eye, consider using an eye patch while you sleep. · Help yourself blink by using your finger to close and open your eyelid. This may help keep your eye moist.  · Wear glasses or goggles to keep dust and dirt out of your eye. · As feeling comes back to your face, massage your forehead, cheeks, and lips. Massage may make the muscles in your face stronger. · Brush and floss your teeth often to help prevent tooth decay. Bell's palsy can dry up the spit on one side of your mouth. This increases the risk of tooth decay.   When should you call for help?  Call 911 anytime you think you may need emergency care. For example, call if:    · You have symptoms of a stroke. These may include:  ? Sudden numbness, tingling, weakness, or loss of movement in your face, arm, or leg, especially on only one side of your body. ? Sudden vision changes. ? Sudden trouble speaking. ? Sudden confusion or trouble understanding simple statements. ? Sudden problems with walking or balance. ? A sudden, severe headache that is different from past headaches.    Call your doctor now or seek immediate medical care if:    · You have numbness or weakness that spreads beyond one side of your face.     · You have a skin rash or eye pain or redness, or light bothers your eyes.     · You have a new or worse headache.    Watch closely for changes in your health, and be sure to contact your doctor if:    · You do not get better as expected. Where can you learn more? Go to http://rigoberto-marcos.info/. Enter P168 in the search box to learn more about \"Bell's Palsy: Care Instructions. \"  Current as of: March 28, 2019  Content Version: 12.2  © 7344-5580 "[employer] Incorporated. Care instructions adapted under license by [employer] (which disclaims liability or warranty for this information). If you have questions about a medical condition or this instruction, always ask your healthcare professional. [address] any warranty or liability for your use of this information.

## 2020-01-01 ENCOUNTER — OFFICE VISIT (OUTPATIENT)
Dept: PALLATIVE CARE | Age: 75
End: 2020-01-01

## 2020-01-01 ENCOUNTER — TELEPHONE (OUTPATIENT)
Dept: PALLATIVE CARE | Age: 75
End: 2020-01-01

## 2020-01-01 ENCOUNTER — DOCUMENTATION ONLY (OUTPATIENT)
Dept: PALLATIVE CARE | Age: 75
End: 2020-01-01

## 2020-01-01 ENCOUNTER — OFFICE VISIT (OUTPATIENT)
Dept: ONCOLOGY | Age: 75
End: 2020-01-01

## 2020-01-01 ENCOUNTER — HOME CARE VISIT (OUTPATIENT)
Dept: HOSPICE | Facility: HOSPICE | Age: 75
End: 2020-01-01
Payer: MEDICARE

## 2020-01-01 ENCOUNTER — HOSPITAL ENCOUNTER (OUTPATIENT)
Age: 75
Setting detail: OBSERVATION
Discharge: HOME HEALTH | End: 2020-03-10
Attending: EMERGENCY MEDICINE | Admitting: HOSPITALIST
Payer: MEDICARE

## 2020-01-01 ENCOUNTER — NURSE NAVIGATOR (OUTPATIENT)
Dept: PALLATIVE CARE | Age: 75
End: 2020-01-01

## 2020-01-01 ENCOUNTER — APPOINTMENT (OUTPATIENT)
Dept: GENERAL RADIOLOGY | Age: 75
End: 2020-01-01
Attending: INTERNAL MEDICINE
Payer: MEDICARE

## 2020-01-01 ENCOUNTER — HOSPITAL ENCOUNTER (OUTPATIENT)
Dept: INFUSION THERAPY | Age: 75
Discharge: HOME | End: 2020-02-26
Payer: MEDICARE

## 2020-01-01 ENCOUNTER — APPOINTMENT (OUTPATIENT)
Dept: GENERAL RADIOLOGY | Age: 75
DRG: 189 | End: 2020-01-01
Attending: EMERGENCY MEDICINE
Payer: MEDICARE

## 2020-01-01 ENCOUNTER — APPOINTMENT (OUTPATIENT)
Dept: GENERAL RADIOLOGY | Age: 75
DRG: 189 | End: 2020-01-01
Attending: PHYSICIAN ASSISTANT
Payer: MEDICARE

## 2020-01-01 ENCOUNTER — TELEPHONE (OUTPATIENT)
Dept: ONCOLOGY | Age: 75
End: 2020-01-01

## 2020-01-01 ENCOUNTER — RESEARCH ENCOUNTER (OUTPATIENT)
Dept: ONCOLOGY | Age: 75
End: 2020-01-01

## 2020-01-01 ENCOUNTER — HOSPITAL ENCOUNTER (INPATIENT)
Age: 75
LOS: 1 days | DRG: 951 | End: 2020-03-26
Attending: EMERGENCY MEDICINE | Admitting: FAMILY MEDICINE
Payer: OTHER MISCELLANEOUS

## 2020-01-01 ENCOUNTER — HOSPICE ADMISSION (OUTPATIENT)
Dept: HOSPICE | Facility: HOSPICE | Age: 75
End: 2020-01-01
Payer: MEDICARE

## 2020-01-01 ENCOUNTER — HOME CARE VISIT (OUTPATIENT)
Dept: SCHEDULING | Facility: HOME HEALTH | Age: 75
End: 2020-01-01
Payer: MEDICARE

## 2020-01-01 ENCOUNTER — HOSPITAL ENCOUNTER (INPATIENT)
Age: 75
LOS: 4 days | Discharge: HOSPICE | DRG: 189 | End: 2020-03-24
Attending: EMERGENCY MEDICINE | Admitting: INTERNAL MEDICINE
Payer: MEDICARE

## 2020-01-01 ENCOUNTER — HOME HEALTH ADMISSION (OUTPATIENT)
Dept: HOME HEALTH SERVICES | Facility: HOME HEALTH | Age: 75
End: 2020-01-01

## 2020-01-01 ENCOUNTER — HOSPITAL ENCOUNTER (OUTPATIENT)
Dept: INFUSION THERAPY | Age: 75
Discharge: HOME | End: 2020-03-18
Payer: MEDICARE

## 2020-01-01 ENCOUNTER — HOSPITAL ENCOUNTER (OUTPATIENT)
Dept: INFUSION THERAPY | Age: 75
Discharge: HOME | End: 2020-02-05
Payer: MEDICARE

## 2020-01-01 ENCOUNTER — APPOINTMENT (OUTPATIENT)
Dept: CT IMAGING | Age: 75
DRG: 189 | End: 2020-01-01
Attending: PHYSICIAN ASSISTANT
Payer: MEDICARE

## 2020-01-01 ENCOUNTER — APPOINTMENT (OUTPATIENT)
Dept: NON INVASIVE DIAGNOSTICS | Age: 75
DRG: 189 | End: 2020-01-01
Attending: INTERNAL MEDICINE
Payer: MEDICARE

## 2020-01-01 ENCOUNTER — HOSPITAL ENCOUNTER (OUTPATIENT)
Dept: CT IMAGING | Age: 75
Discharge: HOME | End: 2020-02-04
Attending: INTERNAL MEDICINE
Payer: MEDICARE

## 2020-01-01 ENCOUNTER — DOCUMENTATION ONLY (OUTPATIENT)
Dept: ONCOLOGY | Age: 75
End: 2020-01-01

## 2020-01-01 ENCOUNTER — APPOINTMENT (OUTPATIENT)
Dept: GENERAL RADIOLOGY | Age: 75
End: 2020-01-01
Attending: EMERGENCY MEDICINE
Payer: MEDICARE

## 2020-01-01 ENCOUNTER — HOSPITAL ENCOUNTER (OUTPATIENT)
Dept: RADIATION THERAPY | Age: 75
Discharge: HOME | End: 2020-02-06

## 2020-01-01 ENCOUNTER — HOSPITAL ENCOUNTER (OUTPATIENT)
Dept: INTERVENTIONAL RADIOLOGY/VASCULAR | Age: 75
Discharge: HOME | End: 2020-02-07
Attending: INTERNAL MEDICINE | Admitting: INTERNAL MEDICINE
Payer: MEDICARE

## 2020-01-01 ENCOUNTER — HOME CARE VISIT (OUTPATIENT)
Dept: SCHEDULING | Facility: HOME HEALTH | Age: 75
End: 2020-01-01

## 2020-01-01 ENCOUNTER — APPOINTMENT (OUTPATIENT)
Dept: CT IMAGING | Age: 75
DRG: 189 | End: 2020-01-01
Attending: INTERNAL MEDICINE
Payer: MEDICARE

## 2020-01-01 VITALS
WEIGHT: 125 LBS | DIASTOLIC BLOOD PRESSURE: 69 MMHG | RESPIRATION RATE: 18 BRPM | TEMPERATURE: 97.9 F | OXYGEN SATURATION: 95 % | HEIGHT: 66 IN | BODY MASS INDEX: 20.09 KG/M2 | HEART RATE: 79 BPM | SYSTOLIC BLOOD PRESSURE: 155 MMHG

## 2020-01-01 VITALS
BODY MASS INDEX: 21.88 KG/M2 | OXYGEN SATURATION: 98 % | SYSTOLIC BLOOD PRESSURE: 109 MMHG | RESPIRATION RATE: 16 BRPM | DIASTOLIC BLOOD PRESSURE: 77 MMHG | HEART RATE: 101 BPM | TEMPERATURE: 97.1 F | HEIGHT: 66 IN

## 2020-01-01 VITALS
TEMPERATURE: 98.7 F | WEIGHT: 135.58 LBS | SYSTOLIC BLOOD PRESSURE: 133 MMHG | RESPIRATION RATE: 18 BRPM | DIASTOLIC BLOOD PRESSURE: 70 MMHG | HEART RATE: 62 BPM | OXYGEN SATURATION: 100 % | BODY MASS INDEX: 21.79 KG/M2 | HEIGHT: 66 IN

## 2020-01-01 VITALS
HEIGHT: 66 IN | OXYGEN SATURATION: 90 % | SYSTOLIC BLOOD PRESSURE: 127 MMHG | TEMPERATURE: 97.3 F | BODY MASS INDEX: 22.98 KG/M2 | DIASTOLIC BLOOD PRESSURE: 59 MMHG | WEIGHT: 143 LBS | HEART RATE: 86 BPM | RESPIRATION RATE: 16 BRPM

## 2020-01-01 VITALS
HEART RATE: 92 BPM | SYSTOLIC BLOOD PRESSURE: 127 MMHG | HEIGHT: 66 IN | BODY MASS INDEX: 22.27 KG/M2 | OXYGEN SATURATION: 88 % | TEMPERATURE: 97.6 F | DIASTOLIC BLOOD PRESSURE: 68 MMHG | RESPIRATION RATE: 16 BRPM

## 2020-01-01 VITALS
HEART RATE: 78 BPM | DIASTOLIC BLOOD PRESSURE: 70 MMHG | HEIGHT: 66 IN | BODY MASS INDEX: 20.09 KG/M2 | OXYGEN SATURATION: 95 % | WEIGHT: 125 LBS | RESPIRATION RATE: 20 BRPM | SYSTOLIC BLOOD PRESSURE: 140 MMHG

## 2020-01-01 VITALS
DIASTOLIC BLOOD PRESSURE: 64 MMHG | RESPIRATION RATE: 18 BRPM | TEMPERATURE: 97 F | HEIGHT: 66 IN | BODY MASS INDEX: 22.82 KG/M2 | HEART RATE: 67 BPM | OXYGEN SATURATION: 94 % | WEIGHT: 142 LBS | SYSTOLIC BLOOD PRESSURE: 121 MMHG

## 2020-01-01 VITALS
OXYGEN SATURATION: 94 % | HEIGHT: 66 IN | WEIGHT: 138 LBS | DIASTOLIC BLOOD PRESSURE: 63 MMHG | RESPIRATION RATE: 18 BRPM | TEMPERATURE: 97.3 F | BODY MASS INDEX: 22.18 KG/M2 | HEART RATE: 74 BPM | SYSTOLIC BLOOD PRESSURE: 129 MMHG

## 2020-01-01 VITALS
OXYGEN SATURATION: 99 % | SYSTOLIC BLOOD PRESSURE: 142 MMHG | HEART RATE: 80 BPM | TEMPERATURE: 97.8 F | DIASTOLIC BLOOD PRESSURE: 68 MMHG | RESPIRATION RATE: 16 BRPM

## 2020-01-01 VITALS
WEIGHT: 125 LBS | RESPIRATION RATE: 16 BRPM | SYSTOLIC BLOOD PRESSURE: 124 MMHG | HEIGHT: 67 IN | DIASTOLIC BLOOD PRESSURE: 81 MMHG | HEART RATE: 104 BPM | TEMPERATURE: 99.8 F | BODY MASS INDEX: 19.62 KG/M2 | OXYGEN SATURATION: 93 %

## 2020-01-01 VITALS
TEMPERATURE: 97 F | SYSTOLIC BLOOD PRESSURE: 113 MMHG | DIASTOLIC BLOOD PRESSURE: 64 MMHG | HEIGHT: 66 IN | HEART RATE: 78 BPM | RESPIRATION RATE: 14 BRPM | WEIGHT: 138 LBS | BODY MASS INDEX: 22.18 KG/M2

## 2020-01-01 VITALS
WEIGHT: 141.09 LBS | DIASTOLIC BLOOD PRESSURE: 58 MMHG | BODY MASS INDEX: 22.68 KG/M2 | TEMPERATURE: 98.5 F | SYSTOLIC BLOOD PRESSURE: 121 MMHG | HEIGHT: 66 IN | RESPIRATION RATE: 17 BRPM | HEART RATE: 65 BPM | OXYGEN SATURATION: 98 %

## 2020-01-01 VITALS
SYSTOLIC BLOOD PRESSURE: 120 MMHG | DIASTOLIC BLOOD PRESSURE: 59 MMHG | RESPIRATION RATE: 18 BRPM | HEART RATE: 63 BPM | OXYGEN SATURATION: 94 % | TEMPERATURE: 96.8 F

## 2020-01-01 VITALS
HEART RATE: 82 BPM | OXYGEN SATURATION: 95 % | TEMPERATURE: 96.9 F | HEIGHT: 66 IN | WEIGHT: 142.6 LBS | BODY MASS INDEX: 22.92 KG/M2 | RESPIRATION RATE: 16 BRPM | DIASTOLIC BLOOD PRESSURE: 74 MMHG | SYSTOLIC BLOOD PRESSURE: 132 MMHG

## 2020-01-01 VITALS
OXYGEN SATURATION: 94 % | WEIGHT: 135 LBS | SYSTOLIC BLOOD PRESSURE: 127 MMHG | BODY MASS INDEX: 21.69 KG/M2 | RESPIRATION RATE: 18 BRPM | TEMPERATURE: 96 F | HEART RATE: 88 BPM | DIASTOLIC BLOOD PRESSURE: 70 MMHG | HEIGHT: 66 IN

## 2020-01-01 DIAGNOSIS — Z71.89 ADVANCED CARE PLANNING/COUNSELING DISCUSSION: ICD-10-CM

## 2020-01-01 DIAGNOSIS — C34.90 LUNG CANCER METASTATIC TO BONE (HCC): ICD-10-CM

## 2020-01-01 DIAGNOSIS — F32.A DEPRESSION, UNSPECIFIED DEPRESSION TYPE: ICD-10-CM

## 2020-01-01 DIAGNOSIS — J44.9 CHRONIC OBSTRUCTIVE PULMONARY DISEASE, UNSPECIFIED COPD TYPE (HCC): ICD-10-CM

## 2020-01-01 DIAGNOSIS — C79.51 LUNG CANCER METASTATIC TO BONE (HCC): Primary | ICD-10-CM

## 2020-01-01 DIAGNOSIS — R65.10 SIRS (SYSTEMIC INFLAMMATORY RESPONSE SYNDROME) (HCC): ICD-10-CM

## 2020-01-01 DIAGNOSIS — Z71.89 DNR (DO NOT RESUSCITATE) DISCUSSION: ICD-10-CM

## 2020-01-01 DIAGNOSIS — C79.51 LUNG CANCER METASTATIC TO BONE (HCC): ICD-10-CM

## 2020-01-01 DIAGNOSIS — M54.50 RIGHT-SIDED LOW BACK PAIN WITHOUT SCIATICA, UNSPECIFIED CHRONICITY: Primary | ICD-10-CM

## 2020-01-01 DIAGNOSIS — R53.0 NEOPLASTIC MALIGNANT RELATED FATIGUE: ICD-10-CM

## 2020-01-01 DIAGNOSIS — S12.9XXD CLOSED FRACTURE OF MULTIPLE CERVICAL VERTEBRAE, SUBSEQUENT ENCOUNTER: ICD-10-CM

## 2020-01-01 DIAGNOSIS — C34.90 LUNG CANCER METASTATIC TO BONE (HCC): Primary | ICD-10-CM

## 2020-01-01 DIAGNOSIS — R06.02 SHORTNESS OF BREATH: ICD-10-CM

## 2020-01-01 DIAGNOSIS — R53.81 DEBILITY: ICD-10-CM

## 2020-01-01 DIAGNOSIS — J39.8 INCREASED TRACHEAL SECRETIONS: ICD-10-CM

## 2020-01-01 DIAGNOSIS — Z63.6 CAREGIVER STRESS: ICD-10-CM

## 2020-01-01 DIAGNOSIS — J44.1 ACUTE EXACERBATION OF CHRONIC OBSTRUCTIVE PULMONARY DISEASE (COPD) (HCC): Primary | ICD-10-CM

## 2020-01-01 DIAGNOSIS — I48.91 ATRIAL FIBRILLATION WITH RVR (HCC): ICD-10-CM

## 2020-01-01 DIAGNOSIS — C34.90 NONSQUAMOUS NONSMALL CELL NEOPLASM OF LUNG, UNSPECIFIED LATERALITY (HCC): ICD-10-CM

## 2020-01-01 DIAGNOSIS — C61 PROSTATE CANCER METASTATIC TO BONE (HCC): ICD-10-CM

## 2020-01-01 DIAGNOSIS — K59.03 DRUG-INDUCED CONSTIPATION: ICD-10-CM

## 2020-01-01 DIAGNOSIS — R11.0 NAUSEA WITHOUT VOMITING: ICD-10-CM

## 2020-01-01 DIAGNOSIS — J96.01 ACUTE RESPIRATORY FAILURE WITH HYPOXIA AND HYPERCAPNIA (HCC): ICD-10-CM

## 2020-01-01 DIAGNOSIS — J96.20 ACUTE ON CHRONIC RESPIRATORY FAILURE, UNSPECIFIED WHETHER WITH HYPOXIA OR HYPERCAPNIA (HCC): Primary | ICD-10-CM

## 2020-01-01 DIAGNOSIS — J96.21 ACUTE ON CHRONIC RESPIRATORY FAILURE WITH HYPOXIA (HCC): Primary | ICD-10-CM

## 2020-01-01 DIAGNOSIS — J96.02 ACUTE RESPIRATORY FAILURE WITH HYPOXIA AND HYPERCAPNIA (HCC): ICD-10-CM

## 2020-01-01 DIAGNOSIS — R79.1 SUBTHERAPEUTIC INTERNATIONAL NORMALIZED RATIO (INR): ICD-10-CM

## 2020-01-01 DIAGNOSIS — J96.01 ACUTE RESPIRATORY FAILURE WITH HYPOXIA (HCC): ICD-10-CM

## 2020-01-01 DIAGNOSIS — Z71.89 GOALS OF CARE, COUNSELING/DISCUSSION: ICD-10-CM

## 2020-01-01 DIAGNOSIS — C79.51 PROSTATE CANCER METASTATIC TO BONE (HCC): ICD-10-CM

## 2020-01-01 DIAGNOSIS — Z91.041 ALLERGY TO INTRAVENOUS CONTRAST: ICD-10-CM

## 2020-01-01 LAB
ALBUMIN SERPL-MCNC: 2.4 G/DL (ref 3.5–5)
ALBUMIN SERPL-MCNC: 2.9 G/DL (ref 3.5–5)
ALBUMIN SERPL-MCNC: 3.1 G/DL (ref 3.5–5)
ALBUMIN SERPL-MCNC: 3.2 G/DL (ref 3.5–5)
ALBUMIN SERPL-MCNC: 3.4 G/DL (ref 3.5–5)
ALBUMIN SERPL-MCNC: 3.5 G/DL (ref 3.5–5)
ALBUMIN/GLOB SERPL: 0.8 {RATIO} (ref 1.1–2.2)
ALBUMIN/GLOB SERPL: 0.9 {RATIO} (ref 1.1–2.2)
ALBUMIN/GLOB SERPL: 1 {RATIO} (ref 1.1–2.2)
ALBUMIN/GLOB SERPL: 1 {RATIO} (ref 1.1–2.2)
ALP SERPL-CCNC: 126 U/L (ref 45–117)
ALP SERPL-CCNC: 129 U/L (ref 45–117)
ALP SERPL-CCNC: 131 U/L (ref 45–117)
ALP SERPL-CCNC: 168 U/L (ref 45–117)
ALP SERPL-CCNC: 172 U/L (ref 45–117)
ALP SERPL-CCNC: 184 U/L (ref 45–117)
ALT SERPL-CCNC: 119 U/L (ref 12–78)
ALT SERPL-CCNC: 124 U/L (ref 12–78)
ALT SERPL-CCNC: 151 U/L (ref 12–78)
ALT SERPL-CCNC: 158 U/L (ref 12–78)
ALT SERPL-CCNC: 23 U/L (ref 12–78)
ALT SERPL-CCNC: 51 U/L (ref 12–78)
ANION GAP SERPL CALC-SCNC: 1 MMOL/L (ref 5–15)
ANION GAP SERPL CALC-SCNC: 4 MMOL/L (ref 5–15)
ANION GAP SERPL CALC-SCNC: 5 MMOL/L (ref 5–15)
ANION GAP SERPL CALC-SCNC: 6 MMOL/L (ref 5–15)
ANION GAP SERPL CALC-SCNC: 6 MMOL/L (ref 5–15)
ARTERIAL PATENCY WRIST A: YES
AST SERPL-CCNC: 24 U/L (ref 15–37)
AST SERPL-CCNC: 29 U/L (ref 15–37)
AST SERPL-CCNC: 40 U/L (ref 15–37)
AST SERPL-CCNC: 48 U/L (ref 15–37)
AST SERPL-CCNC: 50 U/L (ref 15–37)
AST SERPL-CCNC: 59 U/L (ref 15–37)
ATRIAL RATE: 101 BPM
ATRIAL RATE: 106 BPM
ATRIAL RATE: 125 BPM
AV VELOCITY RATIO: 0.29
AV VTI RATIO: 0.3
B PERT DNA SPEC QL NAA+PROBE: NOT DETECTED
B PERT DNA SPEC QL NAA+PROBE: NOT DETECTED
BACTERIA SPEC CULT: NORMAL
BACTERIA SPEC CULT: NORMAL
BASE DEFICIT BLDA-SCNC: 0.3 MMOL/L
BASO+EOS+MONOS # BLD AUTO: 0.3 K/UL (ref 0.2–1.2)
BASO+EOS+MONOS # BLD AUTO: 0.9 K/UL (ref 0.2–1.2)
BASO+EOS+MONOS # BLD AUTO: 1.2 K/UL (ref 0.2–1.2)
BASO+EOS+MONOS NFR BLD AUTO: 13 % (ref 3.2–16.9)
BASO+EOS+MONOS NFR BLD AUTO: 4 % (ref 3.2–16.9)
BASO+EOS+MONOS NFR BLD AUTO: 6 % (ref 3.2–16.9)
BASOPHILS # BLD: 0 K/UL (ref 0–0.1)
BASOPHILS NFR BLD: 0 % (ref 0–1)
BDY SITE: ABNORMAL
BILIRUB DIRECT SERPL-MCNC: 0.3 MG/DL (ref 0–0.2)
BILIRUB SERPL-MCNC: 0.7 MG/DL (ref 0.2–1)
BILIRUB SERPL-MCNC: 0.7 MG/DL (ref 0.2–1)
BILIRUB SERPL-MCNC: 0.8 MG/DL (ref 0.2–1)
BILIRUB SERPL-MCNC: 1.1 MG/DL (ref 0.2–1)
BILIRUB SERPL-MCNC: 1.2 MG/DL (ref 0.2–1)
BILIRUB SERPL-MCNC: 1.3 MG/DL (ref 0.2–1)
BNP SERPL-MCNC: 334 PG/ML
BNP SERPL-MCNC: 373 PG/ML
BORDETELLA PARAPERTUSSIS PCR, BORPAR: NOT DETECTED
BORDETELLA PARAPERTUSSIS PCR, BORPAR: NOT DETECTED
BREATHS.SPONTANEOUS ON VENT: 8
BUN SERPL-MCNC: 14 MG/DL (ref 6–20)
BUN SERPL-MCNC: 16 MG/DL (ref 6–20)
BUN SERPL-MCNC: 16 MG/DL (ref 6–20)
BUN SERPL-MCNC: 19 MG/DL (ref 6–20)
BUN SERPL-MCNC: 19 MG/DL (ref 6–20)
BUN SERPL-MCNC: 20 MG/DL (ref 6–20)
BUN SERPL-MCNC: 22 MG/DL (ref 6–20)
BUN/CREAT SERPL: 16 (ref 12–20)
BUN/CREAT SERPL: 18 (ref 12–20)
BUN/CREAT SERPL: 20 (ref 12–20)
BUN/CREAT SERPL: 24 (ref 12–20)
BUN/CREAT SERPL: 25 (ref 12–20)
BUN/CREAT SERPL: 26 (ref 12–20)
BUN/CREAT SERPL: 31 (ref 12–20)
C PNEUM DNA SPEC QL NAA+PROBE: NOT DETECTED
C PNEUM DNA SPEC QL NAA+PROBE: NOT DETECTED
CALCIUM SERPL-MCNC: 8 MG/DL (ref 8.5–10.1)
CALCIUM SERPL-MCNC: 8 MG/DL (ref 8.5–10.1)
CALCIUM SERPL-MCNC: 8.2 MG/DL (ref 8.5–10.1)
CALCIUM SERPL-MCNC: 8.6 MG/DL (ref 8.5–10.1)
CALCIUM SERPL-MCNC: 8.6 MG/DL (ref 8.5–10.1)
CALCIUM SERPL-MCNC: 8.7 MG/DL (ref 8.5–10.1)
CALCIUM SERPL-MCNC: 9.1 MG/DL (ref 8.5–10.1)
CALCULATED P AXIS, ECG09: 70 DEGREES
CALCULATED P AXIS, ECG09: 72 DEGREES
CALCULATED R AXIS, ECG10: 13 DEGREES
CALCULATED R AXIS, ECG10: 2 DEGREES
CALCULATED R AXIS, ECG10: 25 DEGREES
CALCULATED T AXIS, ECG11: 109 DEGREES
CALCULATED T AXIS, ECG11: 86 DEGREES
CALCULATED T AXIS, ECG11: 98 DEGREES
CHLORIDE SERPL-SCNC: 101 MMOL/L (ref 97–108)
CHLORIDE SERPL-SCNC: 105 MMOL/L (ref 97–108)
CHLORIDE SERPL-SCNC: 95 MMOL/L (ref 97–108)
CHLORIDE SERPL-SCNC: 95 MMOL/L (ref 97–108)
CHLORIDE SERPL-SCNC: 99 MMOL/L (ref 97–108)
CO2 SERPL-SCNC: 27 MMOL/L (ref 21–32)
CO2 SERPL-SCNC: 28 MMOL/L (ref 21–32)
CO2 SERPL-SCNC: 29 MMOL/L (ref 21–32)
CO2 SERPL-SCNC: 30 MMOL/L (ref 21–32)
CO2 SERPL-SCNC: 32 MMOL/L (ref 21–32)
COMMENT, HOLDF: NORMAL
COMMENT, HOLDF: NORMAL
CREAT BLD-MCNC: 1 MG/DL (ref 0.6–1.3)
CREAT SERPL-MCNC: 0.61 MG/DL (ref 0.7–1.3)
CREAT SERPL-MCNC: 0.66 MG/DL (ref 0.7–1.3)
CREAT SERPL-MCNC: 0.7 MG/DL (ref 0.7–1.3)
CREAT SERPL-MCNC: 0.75 MG/DL (ref 0.7–1.3)
CREAT SERPL-MCNC: 0.86 MG/DL (ref 0.7–1.3)
CREAT SERPL-MCNC: 0.99 MG/DL (ref 0.7–1.3)
CREAT SERPL-MCNC: 1.05 MG/DL (ref 0.7–1.3)
DIAGNOSIS, 93000: NORMAL
DIFFERENTIAL METHOD BLD: ABNORMAL
ECHO AO ROOT DIAM: 3.29 CM
ECHO AV AREA PEAK VELOCITY: 0.8 CM2
ECHO AV AREA VTI: 0.9 CM2
ECHO AV MEAN GRADIENT: 30.3 MMHG
ECHO AV MEAN VELOCITY: 2.63 M/S
ECHO AV PEAK GRADIENT: 44.5 MMHG
ECHO AV PEAK VELOCITY: 333.59 CM/S
ECHO AV VTI: 64.53 CM
ECHO EST RA PRESSURE: 3 MMHG
ECHO LA MAJOR AXIS: 3.21 CM
ECHO LA TO AORTIC ROOT RATIO: 0.98
ECHO LA VOL 2C: 41.99 ML (ref 18–58)
ECHO LA VOL 4C: 40.36 ML (ref 18–58)
ECHO LA VOL BP: 45.97 ML (ref 18–58)
ECHO LA VOL/BSA BIPLANE: 27.08 ML/M2 (ref 16–28)
ECHO LA VOLUME INDEX A2C: 24.73 ML/M2 (ref 16–28)
ECHO LA VOLUME INDEX A4C: 23.77 ML/M2 (ref 16–28)
ECHO LV INTERNAL DIMENSION DIASTOLIC: 3.5 CM (ref 4.2–5.9)
ECHO LV INTERNAL DIMENSION SYSTOLIC: 2.44 CM
ECHO LV IVSD: 1.53 CM (ref 0.6–1)
ECHO LV MASS 2D: 178 G (ref 88–224)
ECHO LV MASS INDEX 2D: 104.8 G/M2 (ref 49–115)
ECHO LV POSTERIOR WALL DIASTOLIC: 1.07 CM (ref 0.6–1)
ECHO LVOT DIAM: 1.92 CM
ECHO LVOT PEAK GRADIENT: 3.7 MMHG
ECHO LVOT PEAK VELOCITY: 96.71 CM/S
ECHO LVOT SV: 59.2 ML
ECHO LVOT VTI: 20.39 CM
ECHO MV A VELOCITY: 126.56 CM/S
ECHO MV E DECELERATION TIME (DT): 223.7 MS
ECHO MV E VELOCITY: 96.81 CM/S
ECHO MV E/A RATIO: 0.76
ECHO MV REGURGITANT PEAK GRADIENT: 43.4 MMHG
ECHO MV REGURGITANT PEAK VELOCITY: 329.25 CM/S
ECHO PULMONARY ARTERY SYSTOLIC PRESSURE (PASP): 29.3 MMHG
ECHO PV MAX VELOCITY: 119.55 CM/S
ECHO PV PEAK GRADIENT: 5.7 MMHG
ECHO RIGHT VENTRICULAR SYSTOLIC PRESSURE (RVSP): 29.3 MMHG
ECHO RV INTERNAL DIMENSION: 2.91 CM
ECHO TV REGURGITANT MAX VELOCITY: 256.57 CM/S
ECHO TV REGURGITANT PEAK GRADIENT: 26.3 MMHG
EOSINOPHIL # BLD: 0 K/UL (ref 0–0.4)
EOSINOPHIL NFR BLD: 0 % (ref 0–7)
EPAP/CPAP/PEEP, PAPEEP: 5
ERYTHROCYTE [DISTWIDTH] IN BLOOD BY AUTOMATED COUNT: 14.8 % (ref 11.5–14.5)
ERYTHROCYTE [DISTWIDTH] IN BLOOD BY AUTOMATED COUNT: 15.1 % (ref 11.5–14.5)
ERYTHROCYTE [DISTWIDTH] IN BLOOD BY AUTOMATED COUNT: 15.7 % (ref 11.8–15.8)
ERYTHROCYTE [DISTWIDTH] IN BLOOD BY AUTOMATED COUNT: 15.9 % (ref 11.5–14.5)
ERYTHROCYTE [DISTWIDTH] IN BLOOD BY AUTOMATED COUNT: 16 % (ref 11.5–14.5)
ERYTHROCYTE [DISTWIDTH] IN BLOOD BY AUTOMATED COUNT: 16 % (ref 11.8–15.8)
ERYTHROCYTE [DISTWIDTH] IN BLOOD BY AUTOMATED COUNT: 16.3 % (ref 11.8–15.8)
EST. AVERAGE GLUCOSE BLD GHB EST-MCNC: 137 MG/DL
FERRITIN SERPL-MCNC: 362 NG/ML (ref 26–388)
FIO2 ON VENT: 35 %
FLUAV AG NPH QL IA: NEGATIVE
FLUAV H1 2009 PAND RNA SPEC QL NAA+PROBE: NOT DETECTED
FLUAV H1 2009 PAND RNA SPEC QL NAA+PROBE: NOT DETECTED
FLUAV H1 RNA SPEC QL NAA+PROBE: NOT DETECTED
FLUAV H1 RNA SPEC QL NAA+PROBE: NOT DETECTED
FLUAV H3 RNA SPEC QL NAA+PROBE: NOT DETECTED
FLUAV H3 RNA SPEC QL NAA+PROBE: NOT DETECTED
FLUAV SUBTYP SPEC NAA+PROBE: NOT DETECTED
FLUAV SUBTYP SPEC NAA+PROBE: NOT DETECTED
FLUBV AG NOSE QL IA: NEGATIVE
FLUBV RNA SPEC QL NAA+PROBE: NOT DETECTED
FLUBV RNA SPEC QL NAA+PROBE: NOT DETECTED
FLUID CULTURE, SPNG2: NORMAL
FOLATE SERPL-MCNC: 8.9 NG/ML (ref 5–21)
GAS FLOW.O2 SETTING OXYMISER: 10 L/MIN
GLOBULIN SER CALC-MCNC: 2.9 G/DL (ref 2–4)
GLOBULIN SER CALC-MCNC: 3.1 G/DL (ref 2–4)
GLOBULIN SER CALC-MCNC: 3.3 G/DL (ref 2–4)
GLOBULIN SER CALC-MCNC: 3.4 G/DL (ref 2–4)
GLOBULIN SER CALC-MCNC: 3.5 G/DL (ref 2–4)
GLOBULIN SER CALC-MCNC: 3.6 G/DL (ref 2–4)
GLUCOSE SERPL-MCNC: 137 MG/DL (ref 65–100)
GLUCOSE SERPL-MCNC: 137 MG/DL (ref 65–100)
GLUCOSE SERPL-MCNC: 168 MG/DL (ref 65–100)
GLUCOSE SERPL-MCNC: 174 MG/DL (ref 65–100)
GLUCOSE SERPL-MCNC: 186 MG/DL (ref 65–100)
GLUCOSE SERPL-MCNC: 208 MG/DL (ref 65–100)
GLUCOSE SERPL-MCNC: 85 MG/DL (ref 65–100)
HADV DNA SPEC QL NAA+PROBE: NOT DETECTED
HADV DNA SPEC QL NAA+PROBE: NOT DETECTED
HAPTOGLOB SERPL-MCNC: 35 MG/DL (ref 30–200)
HBA1C MFR BLD: 6.4 % (ref 4–5.6)
HCO3 BLDA-SCNC: 26 MMOL/L (ref 22–26)
HCOV 229E RNA SPEC QL NAA+PROBE: NOT DETECTED
HCOV 229E RNA SPEC QL NAA+PROBE: NOT DETECTED
HCOV HKU1 RNA SPEC QL NAA+PROBE: NOT DETECTED
HCOV HKU1 RNA SPEC QL NAA+PROBE: NOT DETECTED
HCOV NL63 RNA SPEC QL NAA+PROBE: NOT DETECTED
HCOV NL63 RNA SPEC QL NAA+PROBE: NOT DETECTED
HCOV OC43 RNA SPEC QL NAA+PROBE: NOT DETECTED
HCOV OC43 RNA SPEC QL NAA+PROBE: NOT DETECTED
HCT VFR BLD AUTO: 29.8 % (ref 36.6–50.3)
HCT VFR BLD AUTO: 30.3 % (ref 36.6–50.3)
HCT VFR BLD AUTO: 34.8 % (ref 36.6–50.3)
HCT VFR BLD AUTO: 35.1 % (ref 36.6–50.3)
HCT VFR BLD AUTO: 35.5 % (ref 36.6–50.3)
HCT VFR BLD AUTO: 35.9 % (ref 36.6–50.3)
HCT VFR BLD AUTO: 38.7 % (ref 36.6–50.3)
HGB BLD-MCNC: 10.2 G/DL (ref 12.1–17)
HGB BLD-MCNC: 11.8 G/DL (ref 12.1–17)
HGB BLD-MCNC: 11.9 G/DL (ref 12.1–17)
HGB BLD-MCNC: 11.9 G/DL (ref 12.1–17)
HGB BLD-MCNC: 12 G/DL (ref 12.1–17)
HGB BLD-MCNC: 12.4 G/DL (ref 12.1–17)
HGB BLD-MCNC: 9.8 G/DL (ref 12.1–17)
HMPV RNA SPEC QL NAA+PROBE: NOT DETECTED
HMPV RNA SPEC QL NAA+PROBE: NOT DETECTED
HPIV1 RNA SPEC QL NAA+PROBE: NOT DETECTED
HPIV1 RNA SPEC QL NAA+PROBE: NOT DETECTED
HPIV2 RNA SPEC QL NAA+PROBE: NOT DETECTED
HPIV2 RNA SPEC QL NAA+PROBE: NOT DETECTED
HPIV3 RNA SPEC QL NAA+PROBE: NOT DETECTED
HPIV3 RNA SPEC QL NAA+PROBE: NOT DETECTED
HPIV4 RNA SPEC QL NAA+PROBE: NOT DETECTED
HPIV4 RNA SPEC QL NAA+PROBE: NOT DETECTED
IMM GRANULOCYTES # BLD AUTO: 0 K/UL (ref 0–0.04)
IMM GRANULOCYTES # BLD AUTO: 0 K/UL (ref 0–0.04)
IMM GRANULOCYTES # BLD AUTO: 0.1 K/UL (ref 0–0.04)
IMM GRANULOCYTES NFR BLD AUTO: 0 % (ref 0–0.5)
IMM GRANULOCYTES NFR BLD AUTO: 0 % (ref 0–0.5)
IMM GRANULOCYTES NFR BLD AUTO: 1 % (ref 0–0.5)
INR BLD: 1.2 (ref 0.9–1.2)
INR PPP: 1.1 (ref 0.9–1.1)
INR PPP: 1.3 (ref 0.9–1.1)
INR PPP: 1.3 (ref 0.9–1.1)
INR PPP: 1.5 (ref 0.9–1.1)
INR PPP: 1.8 (ref 0.9–1.1)
INR PPP: 2.3 (ref 0.9–1.1)
INR PPP: 2.5 (ref 0.9–1.1)
INR PPP: 3.1 (ref 0.9–1.1)
INR PPP: 3.2 (ref 0.9–1.1)
IPAP/PIP, IPAPIP: 14
IRON SATN MFR SERPL: 11 % (ref 20–50)
IRON SERPL-MCNC: 27 UG/DL (ref 35–150)
L PNEUMO1 AG UR QL IA: NEGATIVE
LACTATE SERPL-SCNC: 1.3 MMOL/L (ref 0.4–2)
LDH SERPL L TO P-CCNC: 519 U/L (ref 85–241)
LVFS 2D: 30.33 %
LVOT MG: 2.22 MMHG
LVOT MV: 0.69 CM/S
LYMPHOCYTES # BLD: 0.1 K/UL (ref 0.8–3.5)
LYMPHOCYTES # BLD: 0.1 K/UL (ref 0.8–3.5)
LYMPHOCYTES # BLD: 0.2 K/UL (ref 0.8–3.5)
LYMPHOCYTES # BLD: 0.2 K/UL (ref 0.8–3.5)
LYMPHOCYTES # BLD: 0.3 K/UL (ref 0.8–3.5)
LYMPHOCYTES # BLD: 0.7 K/UL (ref 0.8–3.5)
LYMPHOCYTES NFR BLD: 1 % (ref 12–49)
LYMPHOCYTES NFR BLD: 2 % (ref 12–49)
LYMPHOCYTES NFR BLD: 2 % (ref 12–49)
LYMPHOCYTES NFR BLD: 8 % (ref 12–49)
M PNEUMO DNA SPEC QL NAA+PROBE: NOT DETECTED
M PNEUMO DNA SPEC QL NAA+PROBE: NOT DETECTED
MAGNESIUM SERPL-MCNC: 2 MG/DL (ref 1.6–2.4)
MAGNESIUM SERPL-MCNC: 2.3 MG/DL (ref 1.6–2.4)
MCH RBC QN AUTO: 30 PG (ref 26–34)
MCH RBC QN AUTO: 30 PG (ref 26–34)
MCH RBC QN AUTO: 30.2 PG (ref 26–34)
MCH RBC QN AUTO: 30.4 PG (ref 26–34)
MCH RBC QN AUTO: 30.4 PG (ref 26–34)
MCH RBC QN AUTO: 30.5 PG (ref 26–34)
MCH RBC QN AUTO: 30.6 PG (ref 26–34)
MCHC RBC AUTO-ENTMCNC: 32 G/DL (ref 30–36.5)
MCHC RBC AUTO-ENTMCNC: 32.9 G/DL (ref 30–36.5)
MCHC RBC AUTO-ENTMCNC: 33.2 G/DL (ref 30–36.5)
MCHC RBC AUTO-ENTMCNC: 33.4 G/DL (ref 30–36.5)
MCHC RBC AUTO-ENTMCNC: 33.7 G/DL (ref 30–36.5)
MCHC RBC AUTO-ENTMCNC: 33.9 G/DL (ref 30–36.5)
MCHC RBC AUTO-ENTMCNC: 34.2 G/DL (ref 30–36.5)
MCV RBC AUTO: 89.5 FL (ref 80–99)
MCV RBC AUTO: 89.8 FL (ref 80–99)
MCV RBC AUTO: 89.8 FL (ref 80–99)
MCV RBC AUTO: 90.2 FL (ref 80–99)
MCV RBC AUTO: 90.8 FL (ref 80–99)
MCV RBC AUTO: 92.8 FL (ref 80–99)
MCV RBC AUTO: 93.7 FL (ref 80–99)
MONOCYTES # BLD: 0.2 K/UL (ref 0–1)
MONOCYTES # BLD: 0.8 K/UL (ref 0–1)
MONOCYTES # BLD: 0.8 K/UL (ref 0–1)
MONOCYTES NFR BLD: 2 % (ref 5–13)
MONOCYTES NFR BLD: 5 % (ref 5–13)
MONOCYTES NFR BLD: 6 % (ref 5–13)
MV DEC SLOPE: 4.33
NEUTS BAND NFR BLD MANUAL: 9 %
NEUTS SEG # BLD: 11.3 K/UL (ref 1.8–8)
NEUTS SEG # BLD: 15.1 K/UL (ref 1.8–8)
NEUTS SEG # BLD: 15.6 K/UL (ref 1.8–8)
NEUTS SEG # BLD: 7.5 K/UL (ref 1.8–8)
NEUTS SEG # BLD: 8.3 K/UL (ref 1.8–8)
NEUTS SEG # BLD: 9.2 K/UL (ref 1.8–8)
NEUTS SEG NFR BLD: 79 % (ref 32–75)
NEUTS SEG NFR BLD: 85 % (ref 32–75)
NEUTS SEG NFR BLD: 91 % (ref 32–75)
NEUTS SEG NFR BLD: 94 % (ref 32–75)
NEUTS SEG NFR BLD: 95 % (ref 32–75)
NEUTS SEG NFR BLD: 97 % (ref 32–75)
NRBC # BLD: 0 K/UL (ref 0–0.01)
NRBC BLD-RTO: 0 PER 100 WBC
ORGANISM ID, SPNG3: NORMAL
P-R INTERVAL, ECG05: 144 MS
P-R INTERVAL, ECG05: 146 MS
PCO2 BLDA: 46 MMHG (ref 35–45)
PH BLDA: 7.36 [PH] (ref 7.35–7.45)
PHOSPHATE SERPL-MCNC: 1.9 MG/DL (ref 2.6–4.7)
PLATELET # BLD AUTO: 158 K/UL (ref 150–400)
PLATELET # BLD AUTO: 198 K/UL (ref 150–400)
PLATELET # BLD AUTO: 232 K/UL (ref 150–400)
PLATELET # BLD AUTO: 241 K/UL (ref 150–400)
PLATELET # BLD AUTO: 258 K/UL (ref 150–400)
PLATELET # BLD AUTO: 351 K/UL (ref 150–400)
PLATELET # BLD AUTO: 383 K/UL (ref 150–400)
PLEASE NOTE, SPNG4: NORMAL
PMV BLD AUTO: 8.8 FL (ref 8.9–12.9)
PMV BLD AUTO: 9 FL (ref 8.9–12.9)
PMV BLD AUTO: 9 FL (ref 8.9–12.9)
PMV BLD AUTO: 9.4 FL (ref 8.9–12.9)
PO2 BLDA: 60 MMHG (ref 80–100)
POTASSIUM SERPL-SCNC: 3.7 MMOL/L (ref 3.5–5.1)
POTASSIUM SERPL-SCNC: 3.8 MMOL/L (ref 3.5–5.1)
POTASSIUM SERPL-SCNC: 3.9 MMOL/L (ref 3.5–5.1)
POTASSIUM SERPL-SCNC: 4.2 MMOL/L (ref 3.5–5.1)
POTASSIUM SERPL-SCNC: 4.3 MMOL/L (ref 3.5–5.1)
PROT SERPL-MCNC: 5.3 G/DL (ref 6.4–8.2)
PROT SERPL-MCNC: 6 G/DL (ref 6.4–8.2)
PROT SERPL-MCNC: 6.4 G/DL (ref 6.4–8.2)
PROT SERPL-MCNC: 6.7 G/DL (ref 6.4–8.2)
PROT SERPL-MCNC: 6.8 G/DL (ref 6.4–8.2)
PROT SERPL-MCNC: 7.1 G/DL (ref 6.4–8.2)
PROTHROMBIN TIME: 11.5 SEC (ref 9–11.1)
PROTHROMBIN TIME: 13 SEC (ref 9–11.1)
PROTHROMBIN TIME: 13.6 SEC (ref 9–11.1)
PROTHROMBIN TIME: 15 SEC (ref 9–11.1)
PROTHROMBIN TIME: 17.4 SEC (ref 9–11.1)
PROTHROMBIN TIME: 22.7 SEC (ref 9–11.1)
PROTHROMBIN TIME: 24.3 SEC (ref 9–11.1)
PROTHROMBIN TIME: 29.4 SEC (ref 9–11.1)
PROTHROMBIN TIME: 30.4 SEC (ref 9–11.1)
PULMONARY ARTERY END DIASTOLIC PRESSURE: 9.1 MMHG
PULMONARY ARTERY MEAN PRESURE: 15.8 MMHG
PV END DIASTOLIC VELOCITY: 1.2 MMHG
Q-T INTERVAL, ECG07: 304 MS
Q-T INTERVAL, ECG07: 364 MS
Q-T INTERVAL, ECG07: 386 MS
QRS DURATION, ECG06: 102 MS
QRS DURATION, ECG06: 110 MS
QRS DURATION, ECG06: 110 MS
QTC CALCULATION (BEZET), ECG08: 483 MS
QTC CALCULATION (BEZET), ECG08: 492 MS
QTC CALCULATION (BEZET), ECG08: 500 MS
RBC # BLD AUTO: 3.21 M/UL (ref 4.1–5.7)
RBC # BLD AUTO: 3.36 M/UL (ref 4.1–5.7)
RBC # BLD AUTO: 3.89 M/UL (ref 4.1–5.7)
RBC # BLD AUTO: 3.91 M/UL (ref 4.1–5.7)
RBC # BLD AUTO: 3.91 M/UL (ref 4.1–5.7)
RBC # BLD AUTO: 4 M/UL (ref 4.1–5.7)
RBC # BLD AUTO: 4.13 M/UL (ref 4.1–5.7)
RBC MORPH BLD: ABNORMAL
RETICS # AUTO: 0.06 M/UL (ref 0.03–0.1)
RETICS/RBC NFR AUTO: 1.7 % (ref 0.7–2.1)
RSV RNA SPEC QL NAA+PROBE: NOT DETECTED
RSV RNA SPEC QL NAA+PROBE: NOT DETECTED
RV+EV RNA SPEC QL NAA+PROBE: NOT DETECTED
RV+EV RNA SPEC QL NAA+PROBE: NOT DETECTED
S PNEUM AG SPEC QL LA: NEGATIVE
SAMPLES BEING HELD,HOLD: NORMAL
SAMPLES BEING HELD,HOLD: NORMAL
SAO2 % BLD: 90 % (ref 92–97)
SAO2% DEVICE SAO2% SENSOR NAME: ABNORMAL
SERVICE CMNT-IMP: NORMAL
SERVICE CMNT-IMP: NORMAL
SODIUM SERPL-SCNC: 130 MMOL/L (ref 136–145)
SODIUM SERPL-SCNC: 131 MMOL/L (ref 136–145)
SODIUM SERPL-SCNC: 132 MMOL/L (ref 136–145)
SODIUM SERPL-SCNC: 133 MMOL/L (ref 136–145)
SODIUM SERPL-SCNC: 134 MMOL/L (ref 136–145)
SODIUM SERPL-SCNC: 137 MMOL/L (ref 136–145)
SODIUM SERPL-SCNC: 138 MMOL/L (ref 136–145)
SPECIMEN SITE: ABNORMAL
SPECIMEN SOURCE: NORMAL
SPECIMEN SOURCE: NORMAL
SPECIMEN, SPNG1: NORMAL
TIBC SERPL-MCNC: 240 UG/DL (ref 250–450)
TROPONIN I SERPL-MCNC: <0.05 NG/ML
TROPONIN I SERPL-MCNC: <0.05 NG/ML
TSH SERPL DL<=0.05 MIU/L-ACNC: 0.66 UIU/ML (ref 0.36–3.74)
TSH SERPL DL<=0.05 MIU/L-ACNC: 1.29 UIU/ML (ref 0.36–3.74)
VENTILATION MODE VENT: ABNORMAL
VENTRICULAR RATE, ECG03: 101 BPM
VENTRICULAR RATE, ECG03: 106 BPM
VENTRICULAR RATE, ECG03: 158 BPM
VIT B12 SERPL-MCNC: 545 PG/ML (ref 193–986)
WBC # BLD AUTO: 12.5 K/UL (ref 4.1–11.1)
WBC # BLD AUTO: 12.6 K/UL (ref 4.1–11.1)
WBC # BLD AUTO: 16.2 K/UL (ref 4.1–11.1)
WBC # BLD AUTO: 16.6 K/UL (ref 4.1–11.1)
WBC # BLD AUTO: 8.7 K/UL (ref 4.1–11.1)
WBC # BLD AUTO: 9.4 K/UL (ref 4.1–11.1)
WBC # BLD AUTO: 9.5 K/UL (ref 4.1–11.1)
WBC MORPH BLD: ABNORMAL

## 2020-01-01 PROCEDURE — 85025 COMPLETE CBC W/AUTO DIFF WBC: CPT

## 2020-01-01 PROCEDURE — 93005 ELECTROCARDIOGRAM TRACING: CPT

## 2020-01-01 PROCEDURE — 74011250637 HC RX REV CODE- 250/637: Performed by: HOSPITALIST

## 2020-01-01 PROCEDURE — 65660000000 HC RM CCU STEPDOWN

## 2020-01-01 PROCEDURE — A9270 NON-COVERED ITEM OR SERVICE: HCPCS

## 2020-01-01 PROCEDURE — 74011250636 HC RX REV CODE- 250/636: Performed by: INTERNAL MEDICINE

## 2020-01-01 PROCEDURE — 36415 COLL VENOUS BLD VENIPUNCTURE: CPT

## 2020-01-01 PROCEDURE — 65270000029 HC RM PRIVATE

## 2020-01-01 PROCEDURE — 74011250636 HC RX REV CODE- 250/636: Performed by: EMERGENCY MEDICINE

## 2020-01-01 PROCEDURE — 77010033678 HC OXYGEN DAILY

## 2020-01-01 PROCEDURE — 99153 MOD SED SAME PHYS/QHP EA: CPT

## 2020-01-01 PROCEDURE — 94760 N-INVAS EAR/PLS OXIMETRY 1: CPT

## 2020-01-01 PROCEDURE — 74177 CT ABD & PELVIS W/CONTRAST: CPT

## 2020-01-01 PROCEDURE — 80053 COMPREHEN METABOLIC PANEL: CPT

## 2020-01-01 PROCEDURE — 92610 EVALUATE SWALLOWING FUNCTION: CPT

## 2020-01-01 PROCEDURE — 74011000258 HC RX REV CODE- 258: Performed by: NURSE PRACTITIONER

## 2020-01-01 PROCEDURE — 82728 ASSAY OF FERRITIN: CPT

## 2020-01-01 PROCEDURE — 74011250636 HC RX REV CODE- 250/636: Performed by: HOSPITALIST

## 2020-01-01 PROCEDURE — 0656 HSPC GENERAL INPATIENT

## 2020-01-01 PROCEDURE — 99218 HC RM OBSERVATION: CPT

## 2020-01-01 PROCEDURE — 83010 ASSAY OF HAPTOGLOBIN QUANT: CPT

## 2020-01-01 PROCEDURE — 77030012965 HC NDL HUBR BBMI -A

## 2020-01-01 PROCEDURE — 96366 THER/PROPH/DIAG IV INF ADDON: CPT

## 2020-01-01 PROCEDURE — 3336500001 HSPC ELECTION

## 2020-01-01 PROCEDURE — 87804 INFLUENZA ASSAY W/OPTIC: CPT

## 2020-01-01 PROCEDURE — 83615 LACTATE (LD) (LDH) ENZYME: CPT

## 2020-01-01 PROCEDURE — 36600 WITHDRAWAL OF ARTERIAL BLOOD: CPT

## 2020-01-01 PROCEDURE — 74011000250 HC RX REV CODE- 250: Performed by: INTERNAL MEDICINE

## 2020-01-01 PROCEDURE — L0172 CERV COL SR FOAM 2PC PRE OTS: HCPCS

## 2020-01-01 PROCEDURE — 76937 US GUIDE VASCULAR ACCESS: CPT

## 2020-01-01 PROCEDURE — 85610 PROTHROMBIN TIME: CPT

## 2020-01-01 PROCEDURE — 97162 PT EVAL MOD COMPLEX 30 MIN: CPT

## 2020-01-01 PROCEDURE — 74011250637 HC RX REV CODE- 250/637: Performed by: INTERNAL MEDICINE

## 2020-01-01 PROCEDURE — 99233 SBSQ HOSP IP/OBS HIGH 50: CPT | Performed by: FAMILY MEDICINE

## 2020-01-01 PROCEDURE — A4927 NON-STERILE GLOVES: HCPCS

## 2020-01-01 PROCEDURE — 94640 AIRWAY INHALATION TREATMENT: CPT

## 2020-01-01 PROCEDURE — 83036 HEMOGLOBIN GLYCOSYLATED A1C: CPT

## 2020-01-01 PROCEDURE — A4335 INCONTINENCE SUPPLY: HCPCS

## 2020-01-01 PROCEDURE — 94761 N-INVAS EAR/PLS OXIMETRY MLT: CPT

## 2020-01-01 PROCEDURE — 99285 EMERGENCY DEPT VISIT HI MDM: CPT

## 2020-01-01 PROCEDURE — 82607 VITAMIN B-12: CPT

## 2020-01-01 PROCEDURE — 74011250636 HC RX REV CODE- 250/636: Performed by: RADIOLOGY

## 2020-01-01 PROCEDURE — 80076 HEPATIC FUNCTION PANEL: CPT

## 2020-01-01 PROCEDURE — 74011636320 HC RX REV CODE- 636/320: Performed by: RADIOLOGY

## 2020-01-01 PROCEDURE — E0325 URINAL MALE JUG-TYPE: HCPCS

## 2020-01-01 PROCEDURE — 84100 ASSAY OF PHOSPHORUS: CPT

## 2020-01-01 PROCEDURE — 77030010507 HC ADH SKN DERMBND J&J -B

## 2020-01-01 PROCEDURE — 87040 BLOOD CULTURE FOR BACTERIA: CPT

## 2020-01-01 PROCEDURE — 96375 TX/PRO/DX INJ NEW DRUG ADDON: CPT

## 2020-01-01 PROCEDURE — 80048 BASIC METABOLIC PNL TOTAL CA: CPT

## 2020-01-01 PROCEDURE — 84484 ASSAY OF TROPONIN QUANT: CPT

## 2020-01-01 PROCEDURE — 74011636637 HC RX REV CODE- 636/637: Performed by: INTERNAL MEDICINE

## 2020-01-01 PROCEDURE — 74011636637 HC RX REV CODE- 636/637: Performed by: NURSE PRACTITIONER

## 2020-01-01 PROCEDURE — 74011000250 HC RX REV CODE- 250: Performed by: EMERGENCY MEDICINE

## 2020-01-01 PROCEDURE — 3331090004 HSPC SERVICE INTENSITY ADD-ON

## 2020-01-01 PROCEDURE — 83880 ASSAY OF NATRIURETIC PEPTIDE: CPT

## 2020-01-01 PROCEDURE — 74011250636 HC RX REV CODE- 250/636: Performed by: NURSE PRACTITIONER

## 2020-01-01 PROCEDURE — 94660 CPAP INITIATION&MGMT: CPT

## 2020-01-01 PROCEDURE — 87899 AGENT NOS ASSAY W/OPTIC: CPT

## 2020-01-01 PROCEDURE — 77030003560 HC NDL HUBR BARD -A

## 2020-01-01 PROCEDURE — 83540 ASSAY OF IRON: CPT

## 2020-01-01 PROCEDURE — 71045 X-RAY EXAM CHEST 1 VIEW: CPT

## 2020-01-01 PROCEDURE — 99152 MOD SED SAME PHYS/QHP 5/>YRS: CPT

## 2020-01-01 PROCEDURE — 0100U RESPIRATORY PANEL,PCR,NASOPHARYNGEAL: CPT

## 2020-01-01 PROCEDURE — G0299 HHS/HOSPICE OF RN EA 15 MIN: HCPCS

## 2020-01-01 PROCEDURE — 94664 DEMO&/EVAL PT USE INHALER: CPT

## 2020-01-01 PROCEDURE — 65390000012 HC CONDITION CODE 44 OBSERVATION

## 2020-01-01 PROCEDURE — C1894 INTRO/SHEATH, NON-LASER: HCPCS

## 2020-01-01 PROCEDURE — 74011000258 HC RX REV CODE- 258: Performed by: INTERNAL MEDICINE

## 2020-01-01 PROCEDURE — 72040 X-RAY EXAM NECK SPINE 2-3 VW: CPT

## 2020-01-01 PROCEDURE — 74011000250 HC RX REV CODE- 250: Performed by: HOSPITALIST

## 2020-01-01 PROCEDURE — 96376 TX/PRO/DX INJ SAME DRUG ADON: CPT

## 2020-01-01 PROCEDURE — C8929 TTE W OR WO FOL WCON,DOPPLER: HCPCS

## 2020-01-01 PROCEDURE — 94762 N-INVAS EAR/PLS OXIMTRY CONT: CPT

## 2020-01-01 PROCEDURE — 83735 ASSAY OF MAGNESIUM: CPT

## 2020-01-01 PROCEDURE — 96374 THER/PROPH/DIAG INJ IV PUSH: CPT

## 2020-01-01 PROCEDURE — 74011000250 HC RX REV CODE- 250: Performed by: FAMILY MEDICINE

## 2020-01-01 PROCEDURE — 71046 X-RAY EXAM CHEST 2 VIEWS: CPT

## 2020-01-01 PROCEDURE — 36561 INSERT TUNNELED CV CATH: CPT

## 2020-01-01 PROCEDURE — T4541 LARGE DISPOSABLE UNDERPAD: HCPCS

## 2020-01-01 PROCEDURE — HHS10554 SHAMPOO/BODY WASH 8 OZ ALOE VESTA

## 2020-01-01 PROCEDURE — 82803 BLOOD GASES ANY COMBINATION: CPT

## 2020-01-01 PROCEDURE — 97116 GAIT TRAINING THERAPY: CPT

## 2020-01-01 PROCEDURE — 96372 THER/PROPH/DIAG INJ SC/IM: CPT

## 2020-01-01 PROCEDURE — 77001 FLUOROGUIDE FOR VEIN DEVICE: CPT

## 2020-01-01 PROCEDURE — 77030040831 HC BAG URINE DRNG MDII -A

## 2020-01-01 PROCEDURE — 72125 CT NECK SPINE W/O DYE: CPT

## 2020-01-01 PROCEDURE — T4523 ADULT SIZE BRIEF/DIAPER LG: HCPCS

## 2020-01-01 PROCEDURE — 74011250637 HC RX REV CODE- 250/637: Performed by: NURSE PRACTITIONER

## 2020-01-01 PROCEDURE — 96413 CHEMO IV INFUSION 1 HR: CPT

## 2020-01-01 PROCEDURE — 83605 ASSAY OF LACTIC ACID: CPT

## 2020-01-01 PROCEDURE — 77030013038 HC MSK CPAP FISP -A

## 2020-01-01 PROCEDURE — 74011250637 HC RX REV CODE- 250/637: Performed by: FAMILY MEDICINE

## 2020-01-01 PROCEDURE — 77030031139 HC SUT VCRL2 J&J -A

## 2020-01-01 PROCEDURE — C1788 PORT, INDWELLING, IMP: HCPCS

## 2020-01-01 PROCEDURE — 74011250636 HC RX REV CODE- 250/636: Performed by: FAMILY MEDICINE

## 2020-01-01 PROCEDURE — G0155 HHCP-SVS OF CSW,EA 15 MIN: HCPCS

## 2020-01-01 PROCEDURE — 71250 CT THORAX DX C-: CPT

## 2020-01-01 PROCEDURE — 82565 ASSAY OF CREATININE: CPT

## 2020-01-01 PROCEDURE — 87449 NOS EACH ORGANISM AG IA: CPT

## 2020-01-01 PROCEDURE — 85027 COMPLETE CBC AUTOMATED: CPT

## 2020-01-01 PROCEDURE — 74011000250 HC RX REV CODE- 250: Performed by: NURSE PRACTITIONER

## 2020-01-01 PROCEDURE — 84443 ASSAY THYROID STIM HORMONE: CPT

## 2020-01-01 PROCEDURE — 74011000250 HC RX REV CODE- 250: Performed by: RADIOLOGY

## 2020-01-01 PROCEDURE — 82746 ASSAY OF FOLIC ACID SERUM: CPT

## 2020-01-01 PROCEDURE — 99223 1ST HOSP IP/OBS HIGH 75: CPT | Performed by: FAMILY MEDICINE

## 2020-01-01 PROCEDURE — 0651 HSPC ROUTINE HOME CARE

## 2020-01-01 PROCEDURE — 96365 THER/PROPH/DIAG IV INF INIT: CPT

## 2020-01-01 PROCEDURE — 85045 AUTOMATED RETICULOCYTE COUNT: CPT

## 2020-01-01 PROCEDURE — A6250 SKIN SEAL PROTECT MOISTURIZR: HCPCS

## 2020-01-01 RX ORDER — BENZONATATE 100 MG/1
100 CAPSULE ORAL
Status: DISCONTINUED | OUTPATIENT
Start: 2020-01-01 | End: 2020-01-01 | Stop reason: HOSPADM

## 2020-01-01 RX ORDER — ACETAMINOPHEN 650 MG/1
650 SUPPOSITORY RECTAL
Status: DISCONTINUED | OUTPATIENT
Start: 2020-01-01 | End: 2020-01-01

## 2020-01-01 RX ORDER — ARFORMOTEROL TARTRATE 15 UG/2ML
15 SOLUTION RESPIRATORY (INHALATION)
Status: DISCONTINUED | OUTPATIENT
Start: 2020-01-01 | End: 2020-01-01 | Stop reason: HOSPADM

## 2020-01-01 RX ORDER — MORPHINE SULFATE 15 MG/1
TABLET, FILM COATED, EXTENDED RELEASE ORAL
Status: DISPENSED
Start: 2020-01-01 | End: 2020-01-01

## 2020-01-01 RX ORDER — HEPARIN SODIUM 200 [USP'U]/100ML
500 INJECTION, SOLUTION INTRAVENOUS ONCE
Status: COMPLETED | OUTPATIENT
Start: 2020-01-01 | End: 2020-01-01

## 2020-01-01 RX ORDER — GLYCOPYRROLATE 0.2 MG/ML
0.2 INJECTION INTRAMUSCULAR; INTRAVENOUS
Status: DISCONTINUED | OUTPATIENT
Start: 2020-01-01 | End: 2020-01-01

## 2020-01-01 RX ORDER — HYDROMORPHONE HCL/0.9% NACL/PF 0.5 MG/ML
PLASTIC BAG, INJECTION (ML) INTRAVENOUS CONTINUOUS
Status: DISCONTINUED | OUTPATIENT
Start: 2020-01-01 | End: 2020-03-27 | Stop reason: HOSPADM

## 2020-01-01 RX ORDER — SODIUM CHLORIDE 0.9 % (FLUSH) 0.9 %
5-10 SYRINGE (ML) INJECTION AS NEEDED
Status: DISCONTINUED | OUTPATIENT
Start: 2020-01-01 | End: 2020-01-01 | Stop reason: HOSPADM

## 2020-01-01 RX ORDER — CLONAZEPAM 0.5 MG/1
0.5 TABLET ORAL 3 TIMES DAILY
Status: DISCONTINUED | OUTPATIENT
Start: 2020-01-01 | End: 2020-01-01 | Stop reason: HOSPADM

## 2020-01-01 RX ORDER — EPINEPHRINE 1 MG/ML
0.3 INJECTION, SOLUTION, CONCENTRATE INTRAVENOUS AS NEEDED
Status: CANCELLED | OUTPATIENT
Start: 2020-04-29

## 2020-01-01 RX ORDER — ASPIRIN 81 MG/1
81 TABLET ORAL DAILY
Status: DISCONTINUED | OUTPATIENT
Start: 2020-01-01 | End: 2020-01-01 | Stop reason: HOSPADM

## 2020-01-01 RX ORDER — SODIUM CHLORIDE 9 MG/ML
25 INJECTION, SOLUTION INTRAVENOUS CONTINUOUS
Status: CANCELLED | OUTPATIENT
Start: 2020-01-01

## 2020-01-01 RX ORDER — ONDANSETRON 2 MG/ML
8 INJECTION INTRAMUSCULAR; INTRAVENOUS AS NEEDED
Status: CANCELLED | OUTPATIENT
Start: 2020-01-01

## 2020-01-01 RX ORDER — WARFARIN 1 MG/1
1 TABLET ORAL EVERY EVENING
Status: COMPLETED | OUTPATIENT
Start: 2020-01-01 | End: 2020-01-01

## 2020-01-01 RX ORDER — EPINEPHRINE 1 MG/ML
0.3 INJECTION, SOLUTION, CONCENTRATE INTRAVENOUS AS NEEDED
Status: CANCELLED | OUTPATIENT
Start: 2020-01-01

## 2020-01-01 RX ORDER — DIPHENHYDRAMINE HYDROCHLORIDE 50 MG/ML
25 INJECTION, SOLUTION INTRAMUSCULAR; INTRAVENOUS AS NEEDED
Status: CANCELLED
Start: 2020-01-01

## 2020-01-01 RX ORDER — GABAPENTIN 300 MG/1
300 CAPSULE ORAL 3 TIMES DAILY
Status: DISCONTINUED | OUTPATIENT
Start: 2020-01-01 | End: 2020-01-01

## 2020-01-01 RX ORDER — MORPHINE SULFATE 15 MG/1
30 TABLET, FILM COATED, EXTENDED RELEASE ORAL EVERY 8 HOURS
Status: DISCONTINUED | OUTPATIENT
Start: 2020-01-01 | End: 2020-01-01 | Stop reason: HOSPADM

## 2020-01-01 RX ORDER — AMLODIPINE BESYLATE 5 MG/1
10 TABLET ORAL DAILY
Status: DISCONTINUED | OUTPATIENT
Start: 2020-01-01 | End: 2020-01-01

## 2020-01-01 RX ORDER — MORPHINE SULFATE 30 MG/1
30 TABLET, FILM COATED, EXTENDED RELEASE ORAL EVERY 12 HOURS
Qty: 60 TAB | Refills: 0 | Status: ON HOLD | OUTPATIENT
Start: 2020-01-01 | End: 2020-01-01

## 2020-01-01 RX ORDER — HEPARIN 100 UNIT/ML
300-500 SYRINGE INTRAVENOUS AS NEEDED
Status: CANCELLED
Start: 2020-01-01

## 2020-01-01 RX ORDER — HYDROCORTISONE SODIUM SUCCINATE 100 MG/2ML
100 INJECTION, POWDER, FOR SOLUTION INTRAMUSCULAR; INTRAVENOUS AS NEEDED
Status: CANCELLED | OUTPATIENT
Start: 2020-01-01

## 2020-01-01 RX ORDER — SODIUM CHLORIDE 9 MG/ML
25 INJECTION, SOLUTION INTRAVENOUS CONTINUOUS
Status: DISCONTINUED | OUTPATIENT
Start: 2020-01-01 | End: 2020-01-01 | Stop reason: HOSPADM

## 2020-01-01 RX ORDER — LEVOFLOXACIN 5 MG/ML
750 INJECTION, SOLUTION INTRAVENOUS
Status: COMPLETED | OUTPATIENT
Start: 2020-01-01 | End: 2020-01-01

## 2020-01-01 RX ORDER — ALBUTEROL SULFATE 5 MG/ML
10 SOLUTION RESPIRATORY (INHALATION)
Status: DISCONTINUED | OUTPATIENT
Start: 2020-01-01 | End: 2020-01-01

## 2020-01-01 RX ORDER — ACETAMINOPHEN 325 MG/1
650 TABLET ORAL AS NEEDED
Status: CANCELLED
Start: 2020-01-01

## 2020-01-01 RX ORDER — EPINEPHRINE 1 MG/ML
0.3 INJECTION, SOLUTION, CONCENTRATE INTRAVENOUS AS NEEDED
Status: CANCELLED | OUTPATIENT
Start: 2020-04-08

## 2020-01-01 RX ORDER — FENTANYL 12.5 UG/1
1 PATCH TRANSDERMAL
COMMUNITY

## 2020-01-01 RX ORDER — FENTANYL CITRATE 50 UG/ML
25-200 INJECTION, SOLUTION INTRAMUSCULAR; INTRAVENOUS
Status: DISCONTINUED | OUTPATIENT
Start: 2020-01-01 | End: 2020-01-01 | Stop reason: HOSPADM

## 2020-01-01 RX ORDER — FENTANYL 12.5 UG/1
1 PATCH TRANSDERMAL
Qty: 10 PATCH | Refills: 0 | Status: SHIPPED | OUTPATIENT
Start: 2020-01-01 | End: 2020-01-01

## 2020-01-01 RX ORDER — WARFARIN 2 MG/1
2 TABLET ORAL ONCE
Status: COMPLETED | OUTPATIENT
Start: 2020-01-01 | End: 2020-01-01

## 2020-01-01 RX ORDER — PREDNISONE 20 MG/1
20 TABLET ORAL 2 TIMES DAILY WITH MEALS
Status: DISCONTINUED | OUTPATIENT
Start: 2020-01-01 | End: 2020-01-01

## 2020-01-01 RX ORDER — ACETAMINOPHEN 325 MG/1
650 TABLET ORAL
Status: DISCONTINUED | OUTPATIENT
Start: 2020-01-01 | End: 2020-01-01 | Stop reason: HOSPADM

## 2020-01-01 RX ORDER — ONDANSETRON 2 MG/ML
4 INJECTION INTRAMUSCULAR; INTRAVENOUS
Status: DISCONTINUED | OUTPATIENT
Start: 2020-01-01 | End: 2020-01-01 | Stop reason: HOSPADM

## 2020-01-01 RX ORDER — NALOXONE HYDROCHLORIDE 0.4 MG/ML
0.4 INJECTION, SOLUTION INTRAMUSCULAR; INTRAVENOUS; SUBCUTANEOUS AS NEEDED
Status: DISCONTINUED | OUTPATIENT
Start: 2020-01-01 | End: 2020-01-01 | Stop reason: HOSPADM

## 2020-01-01 RX ORDER — PREDNISONE 20 MG/1
40 TABLET ORAL
Status: DISCONTINUED | OUTPATIENT
Start: 2020-01-01 | End: 2020-01-01 | Stop reason: HOSPADM

## 2020-01-01 RX ORDER — SODIUM CHLORIDE 0.9 % (FLUSH) 0.9 %
10 SYRINGE (ML) INJECTION AS NEEDED
Status: DISCONTINUED | OUTPATIENT
Start: 2020-01-01 | End: 2020-01-01 | Stop reason: HOSPADM

## 2020-01-01 RX ORDER — SODIUM CHLORIDE 0.9 % (FLUSH) 0.9 %
5-40 SYRINGE (ML) INJECTION EVERY 8 HOURS
Status: DISCONTINUED | OUTPATIENT
Start: 2020-01-01 | End: 2020-01-01 | Stop reason: HOSPADM

## 2020-01-01 RX ORDER — DIPHENHYDRAMINE HYDROCHLORIDE 50 MG/ML
25 INJECTION, SOLUTION INTRAMUSCULAR; INTRAVENOUS AS NEEDED
Status: CANCELLED
Start: 2020-04-29

## 2020-01-01 RX ORDER — LORAZEPAM 2 MG/ML
1 INJECTION INTRAMUSCULAR
Status: DISCONTINUED | OUTPATIENT
Start: 2020-01-01 | End: 2020-03-27 | Stop reason: HOSPADM

## 2020-01-01 RX ORDER — ACETAMINOPHEN 325 MG/1
650 TABLET ORAL AS NEEDED
Status: CANCELLED
Start: 2020-04-29

## 2020-01-01 RX ORDER — CLONAZEPAM 0.5 MG/1
TABLET ORAL
Status: DISPENSED
Start: 2020-01-01 | End: 2020-01-01

## 2020-01-01 RX ORDER — ATORVASTATIN CALCIUM 20 MG/1
80 TABLET, FILM COATED ORAL EVERY EVENING
Status: DISCONTINUED | OUTPATIENT
Start: 2020-01-01 | End: 2020-01-01 | Stop reason: HOSPADM

## 2020-01-01 RX ORDER — SODIUM CHLORIDE 9 MG/ML
10 INJECTION INTRAMUSCULAR; INTRAVENOUS; SUBCUTANEOUS AS NEEDED
Status: CANCELLED | OUTPATIENT
Start: 2020-04-08

## 2020-01-01 RX ORDER — LEVALBUTEROL INHALATION SOLUTION 0.63 MG/3ML
0.63 SOLUTION RESPIRATORY (INHALATION)
Status: DISCONTINUED | OUTPATIENT
Start: 2020-01-01 | End: 2020-01-01 | Stop reason: HOSPADM

## 2020-01-01 RX ORDER — OXYCODONE HYDROCHLORIDE 5 MG/1
15 TABLET ORAL
Status: DISCONTINUED | OUTPATIENT
Start: 2020-01-01 | End: 2020-01-01 | Stop reason: HOSPADM

## 2020-01-01 RX ORDER — OXYCODONE HYDROCHLORIDE 5 MG/1
10 TABLET ORAL
Qty: 180 TAB | Refills: 0 | Status: SHIPPED | OUTPATIENT
Start: 2020-01-01 | End: 2020-01-01 | Stop reason: DRUGHIGH

## 2020-01-01 RX ORDER — DIPHENHYDRAMINE HYDROCHLORIDE 50 MG/ML
50 INJECTION, SOLUTION INTRAMUSCULAR; INTRAVENOUS AS NEEDED
Status: CANCELLED
Start: 2020-01-01

## 2020-01-01 RX ORDER — ACETAMINOPHEN 325 MG/1
650 TABLET ORAL AS NEEDED
Status: CANCELLED
Start: 2020-04-08

## 2020-01-01 RX ORDER — MORPHINE SULFATE 30 MG/1
30 TABLET, FILM COATED, EXTENDED RELEASE ORAL 3 TIMES DAILY
COMMUNITY
End: 2020-01-01

## 2020-01-01 RX ORDER — ADENOSINE 3 MG/ML
INJECTION, SOLUTION INTRAVENOUS
Status: DISPENSED
Start: 2020-01-01 | End: 2020-01-01

## 2020-01-01 RX ORDER — SODIUM CHLORIDE 0.9 % (FLUSH) 0.9 %
5-40 SYRINGE (ML) INJECTION AS NEEDED
Status: DISCONTINUED | OUTPATIENT
Start: 2020-01-01 | End: 2020-01-01 | Stop reason: HOSPADM

## 2020-01-01 RX ORDER — HEPARIN 100 UNIT/ML
300-500 SYRINGE INTRAVENOUS AS NEEDED
Status: DISCONTINUED | OUTPATIENT
Start: 2020-01-01 | End: 2020-01-01 | Stop reason: HOSPADM

## 2020-01-01 RX ORDER — LATANOPROST 50 UG/ML
1 SOLUTION/ DROPS OPHTHALMIC
Status: DISCONTINUED | OUTPATIENT
Start: 2020-01-01 | End: 2020-01-01 | Stop reason: HOSPADM

## 2020-01-01 RX ORDER — SODIUM CHLORIDE 0.9 % (FLUSH) 0.9 %
10 SYRINGE (ML) INJECTION AS NEEDED
Status: CANCELLED
Start: 2020-01-01

## 2020-01-01 RX ORDER — MORPHINE SULFATE 4 MG/ML
4 INJECTION INTRAVENOUS
Status: COMPLETED | OUTPATIENT
Start: 2020-01-01 | End: 2020-01-01

## 2020-01-01 RX ORDER — LEVOFLOXACIN 5 MG/ML
750 INJECTION, SOLUTION INTRAVENOUS EVERY 24 HOURS
Status: DISCONTINUED | OUTPATIENT
Start: 2020-01-01 | End: 2020-01-01

## 2020-01-01 RX ORDER — WARFARIN 2.5 MG/1
2.5 TABLET ORAL ONCE
Status: COMPLETED | OUTPATIENT
Start: 2020-01-01 | End: 2020-01-01

## 2020-01-01 RX ORDER — LORAZEPAM 2 MG/ML
1 INJECTION INTRAMUSCULAR ONCE
Status: COMPLETED | OUTPATIENT
Start: 2020-01-01 | End: 2020-01-01

## 2020-01-01 RX ORDER — PREDNISONE 20 MG/1
20 TABLET ORAL
Status: DISCONTINUED | OUTPATIENT
Start: 2020-01-01 | End: 2020-01-01 | Stop reason: HOSPADM

## 2020-01-01 RX ORDER — MORPHINE SULFATE 2 MG/ML
1 INJECTION, SOLUTION INTRAMUSCULAR; INTRAVENOUS
Status: COMPLETED | OUTPATIENT
Start: 2020-01-01 | End: 2020-01-01

## 2020-01-01 RX ORDER — SODIUM CHLORIDE 9 MG/ML
10 INJECTION INTRAMUSCULAR; INTRAVENOUS; SUBCUTANEOUS AS NEEDED
Status: DISCONTINUED | OUTPATIENT
Start: 2020-01-01 | End: 2020-01-01 | Stop reason: HOSPADM

## 2020-01-01 RX ORDER — FENTANYL 25 UG/1
1 PATCH TRANSDERMAL
Qty: 10 PATCH | Refills: 0 | Status: ON HOLD | OUTPATIENT
Start: 2020-01-01 | End: 2020-01-01

## 2020-01-01 RX ORDER — ATORVASTATIN CALCIUM 20 MG/1
80 TABLET, FILM COATED ORAL
Status: DISCONTINUED | OUTPATIENT
Start: 2020-01-01 | End: 2020-01-01 | Stop reason: HOSPADM

## 2020-01-01 RX ORDER — HEPARIN 100 UNIT/ML
300-500 SYRINGE INTRAVENOUS AS NEEDED
Status: CANCELLED
Start: 2020-04-08

## 2020-01-01 RX ORDER — GUAIFENESIN 600 MG/1
1200 TABLET, EXTENDED RELEASE ORAL EVERY 12 HOURS
Status: DISCONTINUED | OUTPATIENT
Start: 2020-01-01 | End: 2020-01-01 | Stop reason: HOSPADM

## 2020-01-01 RX ORDER — SODIUM CHLORIDE 9 MG/ML
10 INJECTION INTRAMUSCULAR; INTRAVENOUS; SUBCUTANEOUS AS NEEDED
Status: CANCELLED | OUTPATIENT
Start: 2020-04-29

## 2020-01-01 RX ORDER — SODIUM CHLORIDE 9 MG/ML
75 INJECTION, SOLUTION INTRAVENOUS CONTINUOUS
Status: DISCONTINUED | OUTPATIENT
Start: 2020-01-01 | End: 2020-01-01 | Stop reason: HOSPADM

## 2020-01-01 RX ORDER — FENTANYL 25 UG/1
1 PATCH TRANSDERMAL
Status: DISCONTINUED | OUTPATIENT
Start: 2020-01-01 | End: 2020-01-01

## 2020-01-01 RX ORDER — ALBUTEROL SULFATE 0.83 MG/ML
SOLUTION RESPIRATORY (INHALATION)
Status: DISPENSED
Start: 2020-01-01 | End: 2020-01-01

## 2020-01-01 RX ORDER — BISACODYL 5 MG
5 TABLET, DELAYED RELEASE (ENTERIC COATED) ORAL DAILY PRN
Status: DISCONTINUED | OUTPATIENT
Start: 2020-01-01 | End: 2020-01-01 | Stop reason: HOSPADM

## 2020-01-01 RX ORDER — OXYCODONE HYDROCHLORIDE 5 MG/1
10 TABLET ORAL
Status: DISCONTINUED | OUTPATIENT
Start: 2020-01-01 | End: 2020-01-01 | Stop reason: HOSPADM

## 2020-01-01 RX ORDER — POLYETHYLENE GLYCOL 3350 17 G/17G
17 POWDER, FOR SOLUTION ORAL DAILY
Status: DISCONTINUED | OUTPATIENT
Start: 2020-01-01 | End: 2020-01-01 | Stop reason: HOSPADM

## 2020-01-01 RX ORDER — DIPHENHYDRAMINE HYDROCHLORIDE 50 MG/ML
50 INJECTION, SOLUTION INTRAMUSCULAR; INTRAVENOUS AS NEEDED
Status: CANCELLED
Start: 2020-04-08

## 2020-01-01 RX ORDER — FENTANYL 12.5 UG/1
1 PATCH TRANSDERMAL
Qty: 10 PATCH | Refills: 0 | Status: SHIPPED | OUTPATIENT
Start: 2020-01-01 | End: 2020-01-01 | Stop reason: DRUGHIGH

## 2020-01-01 RX ORDER — PREDNISONE 20 MG/1
TABLET ORAL
Qty: 18 TAB | Refills: 0 | Status: SHIPPED | OUTPATIENT
Start: 2020-01-01 | End: 2020-01-01

## 2020-01-01 RX ORDER — MORPHINE SULFATE 15 MG/1
15 TABLET, FILM COATED, EXTENDED RELEASE ORAL EVERY 12 HOURS
Qty: 60 TAB | Refills: 0 | Status: SHIPPED | OUTPATIENT
Start: 2020-01-01 | End: 2020-01-01 | Stop reason: DRUGHIGH

## 2020-01-01 RX ORDER — PROMETHAZINE HYDROCHLORIDE 25 MG/1
12.5 TABLET ORAL DAILY PRN
Status: DISCONTINUED | OUTPATIENT
Start: 2020-01-01 | End: 2020-01-01 | Stop reason: HOSPADM

## 2020-01-01 RX ORDER — ENOXAPARIN SODIUM 100 MG/ML
70 INJECTION SUBCUTANEOUS EVERY 12 HOURS
Status: DISCONTINUED | OUTPATIENT
Start: 2020-01-01 | End: 2020-01-01

## 2020-01-01 RX ORDER — SODIUM CHLORIDE 9 MG/ML
25 INJECTION, SOLUTION INTRAVENOUS CONTINUOUS
Status: CANCELLED | OUTPATIENT
Start: 2020-04-29

## 2020-01-01 RX ORDER — ONDANSETRON 2 MG/ML
4 INJECTION INTRAMUSCULAR; INTRAVENOUS
Status: DISCONTINUED | OUTPATIENT
Start: 2020-01-01 | End: 2020-03-27 | Stop reason: HOSPADM

## 2020-01-01 RX ORDER — IPRATROPIUM BROMIDE AND ALBUTEROL SULFATE 2.5; .5 MG/3ML; MG/3ML
3 SOLUTION RESPIRATORY (INHALATION)
Status: CANCELLED | OUTPATIENT
Start: 2020-01-01

## 2020-01-01 RX ORDER — DEXAMETHASONE 4 MG/1
4 TABLET ORAL DAILY
Qty: 30 TAB | Refills: 1 | Status: SHIPPED | OUTPATIENT
Start: 2020-01-01 | End: 2020-01-01

## 2020-01-01 RX ORDER — SENNOSIDES 8.6 MG/1
2 TABLET ORAL
Status: DISCONTINUED | OUTPATIENT
Start: 2020-01-01 | End: 2020-01-01 | Stop reason: HOSPADM

## 2020-01-01 RX ORDER — BUPROPION HYDROCHLORIDE 150 MG/1
150 TABLET, EXTENDED RELEASE ORAL 2 TIMES DAILY
Status: DISCONTINUED | OUTPATIENT
Start: 2020-01-01 | End: 2020-01-01

## 2020-01-01 RX ORDER — OXYCODONE HYDROCHLORIDE 5 MG/1
10 TABLET ORAL
Qty: 180 TAB | Refills: 0 | Status: SHIPPED | OUTPATIENT
Start: 2020-01-01 | End: 2020-01-01 | Stop reason: SDUPTHER

## 2020-01-01 RX ORDER — ONDANSETRON 2 MG/ML
8 INJECTION INTRAMUSCULAR; INTRAVENOUS AS NEEDED
Status: DISCONTINUED | OUTPATIENT
Start: 2020-01-01 | End: 2020-01-01 | Stop reason: HOSPADM

## 2020-01-01 RX ORDER — MORPHINE SULFATE 2 MG/ML
4 INJECTION, SOLUTION INTRAMUSCULAR; INTRAVENOUS ONCE
Status: DISCONTINUED | OUTPATIENT
Start: 2020-01-01 | End: 2020-01-01

## 2020-01-01 RX ORDER — POLYETHYLENE GLYCOL 3350 17 G/17G
17 POWDER, FOR SOLUTION ORAL DAILY
COMMUNITY

## 2020-01-01 RX ORDER — WARFARIN SODIUM 5 MG/1
5 TABLET ORAL ONCE
Status: COMPLETED | OUTPATIENT
Start: 2020-01-01 | End: 2020-01-01

## 2020-01-01 RX ORDER — ONDANSETRON 2 MG/ML
8 INJECTION INTRAMUSCULAR; INTRAVENOUS AS NEEDED
Status: CANCELLED | OUTPATIENT
Start: 2020-04-29

## 2020-01-01 RX ORDER — BUDESONIDE 0.5 MG/2ML
INHALANT ORAL
Status: DISPENSED
Start: 2020-01-01 | End: 2020-01-01

## 2020-01-01 RX ORDER — DIPHENHYDRAMINE HYDROCHLORIDE 50 MG/ML
25 INJECTION, SOLUTION INTRAMUSCULAR; INTRAVENOUS AS NEEDED
Status: CANCELLED
Start: 2020-04-08

## 2020-01-01 RX ORDER — HYDROCORTISONE SODIUM SUCCINATE 100 MG/2ML
100 INJECTION, POWDER, FOR SOLUTION INTRAMUSCULAR; INTRAVENOUS AS NEEDED
Status: DISCONTINUED | OUTPATIENT
Start: 2020-01-01 | End: 2020-01-01 | Stop reason: HOSPADM

## 2020-01-01 RX ORDER — PROCHLORPERAZINE EDISYLATE 5 MG/ML
10 INJECTION INTRAMUSCULAR; INTRAVENOUS
Status: DISCONTINUED | OUTPATIENT
Start: 2020-01-01 | End: 2020-01-01 | Stop reason: CLARIF

## 2020-01-01 RX ORDER — OXYCODONE HYDROCHLORIDE 15 MG/1
15 TABLET ORAL
Qty: 80 TAB | Refills: 0 | Status: SHIPPED | OUTPATIENT
Start: 2020-01-01 | End: 2020-01-01 | Stop reason: SDUPTHER

## 2020-01-01 RX ORDER — LEVOFLOXACIN 5 MG/ML
INJECTION, SOLUTION INTRAVENOUS
Status: DISPENSED
Start: 2020-01-01 | End: 2020-01-01

## 2020-01-01 RX ORDER — DILTIAZEM HYDROCHLORIDE 240 MG/1
240 CAPSULE, COATED, EXTENDED RELEASE ORAL DAILY
Status: DISCONTINUED | OUTPATIENT
Start: 2020-01-01 | End: 2020-01-01 | Stop reason: HOSPADM

## 2020-01-01 RX ORDER — FUROSEMIDE 10 MG/ML
40 INJECTION INTRAMUSCULAR; INTRAVENOUS
Status: COMPLETED | OUTPATIENT
Start: 2020-01-01 | End: 2020-01-01

## 2020-01-01 RX ORDER — LIDOCAINE HYDROCHLORIDE AND EPINEPHRINE 10; 10 MG/ML; UG/ML
1.5 INJECTION, SOLUTION INFILTRATION; PERINEURAL ONCE
Status: COMPLETED | OUTPATIENT
Start: 2020-01-01 | End: 2020-01-01

## 2020-01-01 RX ORDER — GABAPENTIN 300 MG/1
CAPSULE ORAL
Qty: 90 CAP | Refills: 1 | Status: ON HOLD | OUTPATIENT
Start: 2020-01-01 | End: 2020-01-01

## 2020-01-01 RX ORDER — ALBUTEROL SULFATE 0.83 MG/ML
2.5 SOLUTION RESPIRATORY (INHALATION) AS NEEDED
Status: CANCELLED
Start: 2020-01-01

## 2020-01-01 RX ORDER — IPRATROPIUM BROMIDE AND ALBUTEROL SULFATE 2.5; .5 MG/3ML; MG/3ML
3 SOLUTION RESPIRATORY (INHALATION)
Status: DISCONTINUED | OUTPATIENT
Start: 2020-01-01 | End: 2020-01-01 | Stop reason: HOSPADM

## 2020-01-01 RX ORDER — MORPHINE SULFATE 4 MG/ML
INJECTION INTRAVENOUS
Status: DISPENSED
Start: 2020-01-01 | End: 2020-01-01

## 2020-01-01 RX ORDER — OXYCODONE HYDROCHLORIDE 5 MG/1
5 TABLET ORAL
Qty: 60 TAB | Refills: 0 | Status: SHIPPED | OUTPATIENT
Start: 2020-01-01 | End: 2020-01-01 | Stop reason: SDUPTHER

## 2020-01-01 RX ORDER — DILTIAZEM HYDROCHLORIDE 240 MG/1
240 CAPSULE, COATED, EXTENDED RELEASE ORAL DAILY
Qty: 30 CAP | Refills: 0 | Status: SHIPPED | OUTPATIENT
Start: 2020-01-01 | End: 2020-04-23

## 2020-01-01 RX ORDER — MORPHINE SULFATE 2 MG/ML
4 INJECTION, SOLUTION INTRAMUSCULAR; INTRAVENOUS
Status: DISCONTINUED | OUTPATIENT
Start: 2020-01-01 | End: 2020-03-27 | Stop reason: HOSPADM

## 2020-01-01 RX ORDER — OXYCODONE HYDROCHLORIDE 5 MG/1
5 TABLET ORAL
COMMUNITY

## 2020-01-01 RX ORDER — HALOPERIDOL 5 MG/ML
2 INJECTION INTRAMUSCULAR
Status: DISCONTINUED | OUTPATIENT
Start: 2020-01-01 | End: 2020-03-27 | Stop reason: HOSPADM

## 2020-01-01 RX ORDER — DOCUSATE SODIUM 100 MG/1
100 CAPSULE, LIQUID FILLED ORAL 2 TIMES DAILY
Status: DISCONTINUED | OUTPATIENT
Start: 2020-01-01 | End: 2020-01-01 | Stop reason: HOSPADM

## 2020-01-01 RX ORDER — MIDAZOLAM HYDROCHLORIDE 1 MG/ML
.5-1 INJECTION, SOLUTION INTRAMUSCULAR; INTRAVENOUS
Status: DISCONTINUED | OUTPATIENT
Start: 2020-01-01 | End: 2020-01-01 | Stop reason: HOSPADM

## 2020-01-01 RX ORDER — HEPARIN 100 UNIT/ML
500 SYRINGE INTRAVENOUS
Status: DISCONTINUED | OUTPATIENT
Start: 2020-01-01 | End: 2020-01-01 | Stop reason: HOSPADM

## 2020-01-01 RX ORDER — LEVALBUTEROL INHALATION SOLUTION 1.25 MG/3ML
SOLUTION RESPIRATORY (INHALATION)
Status: DISPENSED
Start: 2020-01-01 | End: 2020-01-01

## 2020-01-01 RX ORDER — BUDESONIDE 0.5 MG/2ML
500 INHALANT ORAL
Status: DISCONTINUED | OUTPATIENT
Start: 2020-01-01 | End: 2020-01-01 | Stop reason: HOSPADM

## 2020-01-01 RX ORDER — SODIUM CHLORIDE 9 MG/ML
10 INJECTION INTRAMUSCULAR; INTRAVENOUS; SUBCUTANEOUS AS NEEDED
Status: CANCELLED | OUTPATIENT
Start: 2020-01-01

## 2020-01-01 RX ORDER — SODIUM CHLORIDE 0.9 % (FLUSH) 0.9 %
10 SYRINGE (ML) INJECTION AS NEEDED
Status: CANCELLED
Start: 2020-04-08

## 2020-01-01 RX ORDER — ESCITALOPRAM OXALATE 10 MG/1
10 TABLET ORAL DAILY
Status: DISCONTINUED | OUTPATIENT
Start: 2020-01-01 | End: 2020-01-01 | Stop reason: HOSPADM

## 2020-01-01 RX ORDER — LIDOCAINE HYDROCHLORIDE 10 MG/ML
20 INJECTION INFILTRATION; PERINEURAL
Status: DISCONTINUED | OUTPATIENT
Start: 2020-01-01 | End: 2020-01-01 | Stop reason: HOSPADM

## 2020-01-01 RX ORDER — IPRATROPIUM BROMIDE 0.5 MG/2.5ML
SOLUTION RESPIRATORY (INHALATION)
Status: DISPENSED
Start: 2020-01-01 | End: 2020-01-01

## 2020-01-01 RX ORDER — BRIMONIDINE TARTRATE 2 MG/ML
1 SOLUTION/ DROPS OPHTHALMIC 2 TIMES DAILY
Status: DISCONTINUED | OUTPATIENT
Start: 2020-01-01 | End: 2020-01-01 | Stop reason: HOSPADM

## 2020-01-01 RX ORDER — ADENOSINE 3 MG/ML
12 INJECTION, SOLUTION INTRAVENOUS
Status: COMPLETED | OUTPATIENT
Start: 2020-01-01 | End: 2020-01-01

## 2020-01-01 RX ORDER — PREDNISONE 20 MG/1
TABLET ORAL
Qty: 6 TAB | Refills: 0 | Status: ON HOLD | OUTPATIENT
Start: 2020-01-01 | End: 2020-01-01

## 2020-01-01 RX ORDER — IPRATROPIUM BROMIDE 0.5 MG/2.5ML
0.5 SOLUTION RESPIRATORY (INHALATION)
Status: DISCONTINUED | OUTPATIENT
Start: 2020-01-01 | End: 2020-01-01 | Stop reason: HOSPADM

## 2020-01-01 RX ORDER — LEVALBUTEROL INHALATION SOLUTION 0.63 MG/3ML
SOLUTION RESPIRATORY (INHALATION)
Status: DISPENSED
Start: 2020-01-01 | End: 2020-01-01

## 2020-01-01 RX ORDER — HYDROMORPHONE HYDROCHLORIDE 2 MG/ML
1 INJECTION, SOLUTION INTRAMUSCULAR; INTRAVENOUS; SUBCUTANEOUS
Status: DISCONTINUED | OUTPATIENT
Start: 2020-01-01 | End: 2020-01-01 | Stop reason: HOSPADM

## 2020-01-01 RX ORDER — OXYCODONE HYDROCHLORIDE 5 MG/1
5 TABLET ORAL
Qty: 140 TAB | Refills: 0 | Status: SHIPPED | OUTPATIENT
Start: 2020-01-01 | End: 2020-01-01

## 2020-01-01 RX ORDER — OXYCODONE HYDROCHLORIDE 15 MG/1
15 TABLET ORAL
Qty: 120 TAB | Refills: 0 | Status: SHIPPED | OUTPATIENT
Start: 2020-01-01 | End: 2020-04-02

## 2020-01-01 RX ORDER — PREDNISONE 20 MG/1
60 TABLET ORAL
Status: DISCONTINUED | OUTPATIENT
Start: 2020-01-01 | End: 2020-01-01 | Stop reason: HOSPADM

## 2020-01-01 RX ORDER — ISOSORBIDE MONONITRATE 10 MG/1
10 TABLET ORAL 3 TIMES DAILY
Status: DISCONTINUED | OUTPATIENT
Start: 2020-01-01 | End: 2020-01-01 | Stop reason: HOSPADM

## 2020-01-01 RX ORDER — ALBUTEROL SULFATE 0.83 MG/ML
2.5 SOLUTION RESPIRATORY (INHALATION) AS NEEDED
Status: CANCELLED
Start: 2020-04-08

## 2020-01-01 RX ORDER — ENOXAPARIN SODIUM 100 MG/ML
1.5 INJECTION SUBCUTANEOUS
Status: COMPLETED | OUTPATIENT
Start: 2020-01-01 | End: 2020-01-01

## 2020-01-01 RX ORDER — CARBOXYMETHYLCELLULOSE SODIUM 10 MG/ML
2 GEL OPHTHALMIC AS NEEDED
Status: DISCONTINUED | OUTPATIENT
Start: 2020-01-01 | End: 2020-01-01 | Stop reason: HOSPADM

## 2020-01-01 RX ORDER — HALOPERIDOL 2 MG/ML
2 SOLUTION ORAL
Status: DISCONTINUED | OUTPATIENT
Start: 2020-01-01 | End: 2020-01-01

## 2020-01-01 RX ORDER — SODIUM CHLORIDE 9 MG/ML
25 INJECTION, SOLUTION INTRAVENOUS CONTINUOUS
Status: CANCELLED | OUTPATIENT
Start: 2020-04-08

## 2020-01-01 RX ORDER — ALBUTEROL SULFATE 0.83 MG/ML
2.5 SOLUTION RESPIRATORY (INHALATION) AS NEEDED
Status: CANCELLED
Start: 2020-04-29

## 2020-01-01 RX ORDER — KETOROLAC TROMETHAMINE 30 MG/ML
30 INJECTION, SOLUTION INTRAMUSCULAR; INTRAVENOUS
Status: DISCONTINUED | OUTPATIENT
Start: 2020-01-01 | End: 2020-03-27 | Stop reason: HOSPADM

## 2020-01-01 RX ORDER — DOCUSATE SODIUM 100 MG/1
CAPSULE, LIQUID FILLED ORAL
Status: DISPENSED
Start: 2020-01-01 | End: 2020-01-01

## 2020-01-01 RX ORDER — DIPHENHYDRAMINE HYDROCHLORIDE 50 MG/ML
25 INJECTION, SOLUTION INTRAMUSCULAR; INTRAVENOUS AS NEEDED
Status: DISCONTINUED | OUTPATIENT
Start: 2020-01-01 | End: 2020-01-01 | Stop reason: HOSPADM

## 2020-01-01 RX ORDER — DIPHENHYDRAMINE HYDROCHLORIDE 50 MG/ML
25 INJECTION, SOLUTION INTRAMUSCULAR; INTRAVENOUS ONCE
Status: COMPLETED | OUTPATIENT
Start: 2020-01-01 | End: 2020-01-01

## 2020-01-01 RX ORDER — FENTANYL 12.5 UG/1
1 PATCH TRANSDERMAL
Status: DISCONTINUED | OUTPATIENT
Start: 2020-01-01 | End: 2020-01-01

## 2020-01-01 RX ORDER — OXYCODONE HCL 10 MG/1
10 TABLET, FILM COATED, EXTENDED RELEASE ORAL EVERY 12 HOURS
Qty: 60 TAB | Refills: 0 | Status: SHIPPED | OUTPATIENT
Start: 2020-01-01 | End: 2020-01-01

## 2020-01-01 RX ORDER — LIDOCAINE HYDROCHLORIDE AND EPINEPHRINE 5; 5 MG/ML; UG/ML
1.5 INJECTION, SOLUTION INFILTRATION; PERINEURAL ONCE
Status: DISCONTINUED | OUTPATIENT
Start: 2020-01-01 | End: 2020-01-01

## 2020-01-01 RX ORDER — HYDROCORTISONE SODIUM SUCCINATE 100 MG/2ML
100 INJECTION, POWDER, FOR SOLUTION INTRAMUSCULAR; INTRAVENOUS AS NEEDED
Status: CANCELLED | OUTPATIENT
Start: 2020-04-29

## 2020-01-01 RX ORDER — WARFARIN SODIUM 5 MG/1
10 TABLET ORAL ONCE
Status: COMPLETED | OUTPATIENT
Start: 2020-01-01 | End: 2020-01-01

## 2020-01-01 RX ORDER — GLYCOPYRROLATE 0.2 MG/ML
0.2 INJECTION INTRAMUSCULAR; INTRAVENOUS EVERY 4 HOURS
Status: DISCONTINUED | OUTPATIENT
Start: 2020-01-01 | End: 2020-03-27 | Stop reason: HOSPADM

## 2020-01-01 RX ORDER — CLONAZEPAM 1 MG/1
0.5 TABLET ORAL 3 TIMES DAILY
Status: DISCONTINUED | OUTPATIENT
Start: 2020-01-01 | End: 2020-01-01 | Stop reason: HOSPADM

## 2020-01-01 RX ORDER — SODIUM CHLORIDE 0.9 % (FLUSH) 0.9 %
10 SYRINGE (ML) INJECTION AS NEEDED
Status: CANCELLED
Start: 2020-04-29

## 2020-01-01 RX ORDER — SCOLOPAMINE TRANSDERMAL SYSTEM 1 MG/1
1 PATCH, EXTENDED RELEASE TRANSDERMAL
Status: DISCONTINUED | OUTPATIENT
Start: 2020-01-01 | End: 2020-03-27 | Stop reason: HOSPADM

## 2020-01-01 RX ORDER — SENNOSIDES 8.6 MG/1
2 TABLET ORAL
COMMUNITY
End: 2020-01-01

## 2020-01-01 RX ORDER — IPRATROPIUM BROMIDE 0.5 MG/2.5ML
1 SOLUTION RESPIRATORY (INHALATION)
Status: DISCONTINUED | OUTPATIENT
Start: 2020-01-01 | End: 2020-01-01

## 2020-01-01 RX ORDER — BRIMONIDINE TARTRATE 2 MG/ML
1 SOLUTION/ DROPS OPHTHALMIC 2 TIMES DAILY
COMMUNITY
Start: 2020-01-01

## 2020-01-01 RX ORDER — DIPHENHYDRAMINE HYDROCHLORIDE 50 MG/ML
12.5 INJECTION, SOLUTION INTRAMUSCULAR; INTRAVENOUS
Status: DISCONTINUED | OUTPATIENT
Start: 2020-01-01 | End: 2020-01-01 | Stop reason: HOSPADM

## 2020-01-01 RX ORDER — HYDRALAZINE HYDROCHLORIDE 20 MG/ML
INJECTION INTRAMUSCULAR; INTRAVENOUS
Status: DISPENSED
Start: 2020-01-01 | End: 2020-01-01

## 2020-01-01 RX ORDER — WARFARIN 1 MG/1
1 TABLET ORAL EVERY OTHER DAY
COMMUNITY
End: 2020-01-01

## 2020-01-01 RX ORDER — ASPIRIN 81 MG/1
TABLET ORAL
Status: DISPENSED
Start: 2020-01-01 | End: 2020-01-01

## 2020-01-01 RX ORDER — AMLODIPINE BESYLATE 5 MG/1
TABLET ORAL
Status: DISPENSED
Start: 2020-01-01 | End: 2020-01-01

## 2020-01-01 RX ORDER — NALOXONE HYDROCHLORIDE 4 MG/.1ML
SPRAY NASAL
Qty: 1 EACH | Refills: 0 | Status: SHIPPED | OUTPATIENT
Start: 2020-01-01

## 2020-01-01 RX ORDER — CEFAZOLIN SODIUM/WATER 2 G/20 ML
2 SYRINGE (ML) INTRAVENOUS ONCE
Status: DISCONTINUED | OUTPATIENT
Start: 2020-01-01 | End: 2020-01-01

## 2020-01-01 RX ORDER — OXYCODONE HYDROCHLORIDE 5 MG/1
5 TABLET ORAL
COMMUNITY
End: 2020-01-01

## 2020-01-01 RX ORDER — MORPHINE SULFATE 2 MG/ML
4 INJECTION, SOLUTION INTRAMUSCULAR; INTRAVENOUS ONCE
Status: COMPLETED | OUTPATIENT
Start: 2020-01-01 | End: 2020-01-01

## 2020-01-01 RX ORDER — HYDROCORTISONE SODIUM SUCCINATE 100 MG/2ML
100 INJECTION, POWDER, FOR SOLUTION INTRAMUSCULAR; INTRAVENOUS AS NEEDED
Status: CANCELLED | OUTPATIENT
Start: 2020-04-08

## 2020-01-01 RX ORDER — PREDNISONE 20 MG/1
40 TABLET ORAL
Status: DISCONTINUED | OUTPATIENT
Start: 2020-01-01 | End: 2020-01-01

## 2020-01-01 RX ORDER — ONDANSETRON 2 MG/ML
8 INJECTION INTRAMUSCULAR; INTRAVENOUS AS NEEDED
Status: CANCELLED | OUTPATIENT
Start: 2020-04-08

## 2020-01-01 RX ORDER — DIPHENHYDRAMINE HYDROCHLORIDE 50 MG/ML
50 INJECTION, SOLUTION INTRAMUSCULAR; INTRAVENOUS AS NEEDED
Status: CANCELLED
Start: 2020-04-29

## 2020-01-01 RX ORDER — ACETAMINOPHEN 325 MG/1
650 TABLET ORAL
Status: DISCONTINUED | OUTPATIENT
Start: 2020-01-01 | End: 2020-01-01

## 2020-01-01 RX ORDER — HYDRALAZINE HYDROCHLORIDE 20 MG/ML
20 INJECTION INTRAMUSCULAR; INTRAVENOUS
Status: ACTIVE | OUTPATIENT
Start: 2020-01-01 | End: 2020-01-01

## 2020-01-01 RX ORDER — ESCITALOPRAM OXALATE 10 MG/1
TABLET ORAL
Status: DISPENSED
Start: 2020-01-01 | End: 2020-01-01

## 2020-01-01 RX ORDER — DILTIAZEM HYDROCHLORIDE 5 MG/ML
15 INJECTION INTRAVENOUS
Status: COMPLETED | OUTPATIENT
Start: 2020-01-01 | End: 2020-01-01

## 2020-01-01 RX ORDER — HEPARIN 100 UNIT/ML
300-500 SYRINGE INTRAVENOUS AS NEEDED
Status: CANCELLED
Start: 2020-04-29

## 2020-01-01 RX ORDER — POLYETHYLENE GLYCOL 3350 17 G/17G
POWDER, FOR SOLUTION ORAL
Status: DISPENSED
Start: 2020-01-01 | End: 2020-01-01

## 2020-01-01 RX ORDER — AMLODIPINE BESYLATE 5 MG/1
10 TABLET ORAL DAILY
Status: DISCONTINUED | OUTPATIENT
Start: 2020-01-01 | End: 2020-01-01 | Stop reason: HOSPADM

## 2020-01-01 RX ADMIN — Medication 10 ML: at 10:24

## 2020-01-01 RX ADMIN — GUAIFENESIN 1200 MG: 600 TABLET, EXTENDED RELEASE ORAL at 22:40

## 2020-01-01 RX ADMIN — SODIUM CHLORIDE 200 MG: 9 INJECTION, SOLUTION INTRAVENOUS at 15:23

## 2020-01-01 RX ADMIN — IPRATROPIUM BROMIDE AND ALBUTEROL SULFATE 3 ML: .5; 3 SOLUTION RESPIRATORY (INHALATION) at 19:55

## 2020-01-01 RX ADMIN — BUDESONIDE 500 MCG: 0.5 INHALANT RESPIRATORY (INHALATION) at 20:10

## 2020-01-01 RX ADMIN — SENNOSIDES 17.2 MG: 8.6 TABLET, FILM COATED ORAL at 23:10

## 2020-01-01 RX ADMIN — METHYLPREDNISOLONE SODIUM SUCCINATE 40 MG: 40 INJECTION, POWDER, FOR SOLUTION INTRAMUSCULAR; INTRAVENOUS at 10:13

## 2020-01-01 RX ADMIN — SODIUM CHLORIDE 25 ML/HR: 900 INJECTION, SOLUTION INTRAVENOUS at 15:23

## 2020-01-01 RX ADMIN — Medication 10 ML: at 22:00

## 2020-01-01 RX ADMIN — CLONAZEPAM 0.5 MG: 1 TABLET ORAL at 23:10

## 2020-01-01 RX ADMIN — IPRATROPIUM BROMIDE 0.5 MG: 0.5 SOLUTION RESPIRATORY (INHALATION) at 15:34

## 2020-01-01 RX ADMIN — ISOSORBIDE MONONITRATE 10 MG: 10 TABLET ORAL at 22:40

## 2020-01-01 RX ADMIN — LEVALBUTEROL HYDROCHLORIDE 0.63 MG: 0.63 SOLUTION RESPIRATORY (INHALATION) at 11:54

## 2020-01-01 RX ADMIN — CLONAZEPAM 0.5 MG: 0.5 TABLET ORAL at 09:02

## 2020-01-01 RX ADMIN — IPRATROPIUM BROMIDE 0.5 MG: 0.5 SOLUTION RESPIRATORY (INHALATION) at 23:42

## 2020-01-01 RX ADMIN — BRIMONIDINE TARTRATE 1 DROP: 2 SOLUTION OPHTHALMIC at 17:22

## 2020-01-01 RX ADMIN — SODIUM CHLORIDE 75 ML/HR: 900 INJECTION, SOLUTION INTRAVENOUS at 12:32

## 2020-01-01 RX ADMIN — Medication 10 ML: at 22:42

## 2020-01-01 RX ADMIN — IPRATROPIUM BROMIDE 0.5 MG: 0.5 SOLUTION RESPIRATORY (INHALATION) at 16:01

## 2020-01-01 RX ADMIN — ARFORMOTEROL TARTRATE 15 MCG: 15 SOLUTION RESPIRATORY (INHALATION) at 19:58

## 2020-01-01 RX ADMIN — LIDOCAINE HYDROCHLORIDE 20 ML: 10 INJECTION, SOLUTION INFILTRATION; PERINEURAL at 08:31

## 2020-01-01 RX ADMIN — MORPHINE SULFATE 30 MG: 15 TABLET, FILM COATED, EXTENDED RELEASE ORAL at 21:39

## 2020-01-01 RX ADMIN — BRIMONIDINE TARTRATE 1 DROP: 2 SOLUTION OPHTHALMIC at 17:32

## 2020-01-01 RX ADMIN — ENOXAPARIN SODIUM 100 MG: 100 INJECTION SUBCUTANEOUS at 06:56

## 2020-01-01 RX ADMIN — DOCUSATE SODIUM 100 MG: 100 CAPSULE, LIQUID FILLED ORAL at 18:58

## 2020-01-01 RX ADMIN — Medication: at 14:40

## 2020-01-01 RX ADMIN — LEVALBUTEROL HYDROCHLORIDE 0.63 MG: 0.63 SOLUTION RESPIRATORY (INHALATION) at 16:01

## 2020-01-01 RX ADMIN — BUDESONIDE 500 MCG: 0.5 INHALANT RESPIRATORY (INHALATION) at 08:25

## 2020-01-01 RX ADMIN — LEVOFLOXACIN 750 MG: 5 INJECTION, SOLUTION INTRAVENOUS at 09:00

## 2020-01-01 RX ADMIN — Medication 10 ML: at 18:21

## 2020-01-01 RX ADMIN — POLYETHYLENE GLYCOL 3350 17 G: 17 POWDER, FOR SOLUTION ORAL at 09:33

## 2020-01-01 RX ADMIN — Medication 20 ML: at 06:00

## 2020-01-01 RX ADMIN — SENNOSIDES 17.2 MG: 8.6 TABLET, FILM COATED ORAL at 22:40

## 2020-01-01 RX ADMIN — DILTIAZEM HYDROCHLORIDE 240 MG: 240 CAPSULE, COATED, EXTENDED RELEASE ORAL at 09:32

## 2020-01-01 RX ADMIN — WARFARIN SODIUM 2.5 MG: 2.5 TABLET ORAL at 17:02

## 2020-01-01 RX ADMIN — ARFORMOTEROL TARTRATE 15 MCG: 15 SOLUTION RESPIRATORY (INHALATION) at 07:54

## 2020-01-01 RX ADMIN — DOCUSATE SODIUM 100 MG: 100 CAPSULE, LIQUID FILLED ORAL at 18:56

## 2020-01-01 RX ADMIN — IPRATROPIUM BROMIDE 0.5 MG: 0.5 SOLUTION RESPIRATORY (INHALATION) at 11:38

## 2020-01-01 RX ADMIN — IPRATROPIUM BROMIDE AND ALBUTEROL SULFATE 3 ML: .5; 3 SOLUTION RESPIRATORY (INHALATION) at 15:21

## 2020-01-01 RX ADMIN — MORPHINE SULFATE 30 MG: 15 TABLET, FILM COATED, EXTENDED RELEASE ORAL at 22:55

## 2020-01-01 RX ADMIN — LATANOPROST 1 DROP: 50 SOLUTION OPHTHALMIC at 22:41

## 2020-01-01 RX ADMIN — DILTIAZEM HYDROCHLORIDE 240 MG: 240 CAPSULE, COATED, EXTENDED RELEASE ORAL at 10:07

## 2020-01-01 RX ADMIN — ISOSORBIDE MONONITRATE 10 MG: 10 TABLET ORAL at 13:16

## 2020-01-01 RX ADMIN — FENTANYL CITRATE 25 MCG: 50 INJECTION, SOLUTION INTRAMUSCULAR; INTRAVENOUS at 08:30

## 2020-01-01 RX ADMIN — IPRATROPIUM BROMIDE AND ALBUTEROL SULFATE 3 ML: .5; 3 SOLUTION RESPIRATORY (INHALATION) at 14:31

## 2020-01-01 RX ADMIN — SODIUM CHLORIDE 10 ML: 9 INJECTION INTRAMUSCULAR; INTRAVENOUS; SUBCUTANEOUS at 13:18

## 2020-01-01 RX ADMIN — GUAIFENESIN 1200 MG: 600 TABLET, EXTENDED RELEASE ORAL at 23:10

## 2020-01-01 RX ADMIN — ISOSORBIDE MONONITRATE 10 MG: 10 TABLET ORAL at 17:02

## 2020-01-01 RX ADMIN — AMLODIPINE BESYLATE 10 MG: 5 TABLET ORAL at 09:01

## 2020-01-01 RX ADMIN — ISOSORBIDE MONONITRATE 10 MG: 10 TABLET ORAL at 18:20

## 2020-01-01 RX ADMIN — BUDESONIDE 500 MCG: 0.5 INHALANT RESPIRATORY (INHALATION) at 08:20

## 2020-01-01 RX ADMIN — GUAIFENESIN 1200 MG: 600 TABLET, EXTENDED RELEASE ORAL at 08:28

## 2020-01-01 RX ADMIN — OXYCODONE 15 MG: 5 TABLET ORAL at 22:41

## 2020-01-01 RX ADMIN — MORPHINE SULFATE 30 MG: 15 TABLET, FILM COATED, EXTENDED RELEASE ORAL at 12:00

## 2020-01-01 RX ADMIN — ESCITALOPRAM OXALATE 10 MG: 10 TABLET ORAL at 08:28

## 2020-01-01 RX ADMIN — WARFARIN SODIUM 2 MG: 2 TABLET ORAL at 17:32

## 2020-01-01 RX ADMIN — DOCUSATE SODIUM 100 MG: 100 CAPSULE, LIQUID FILLED ORAL at 10:06

## 2020-01-01 RX ADMIN — LATANOPROST 1 DROP: 50 SOLUTION OPHTHALMIC at 09:32

## 2020-01-01 RX ADMIN — DILTIAZEM HYDROCHLORIDE 240 MG: 240 CAPSULE, COATED, EXTENDED RELEASE ORAL at 09:33

## 2020-01-01 RX ADMIN — DOCUSATE SODIUM 100 MG: 100 CAPSULE, LIQUID FILLED ORAL at 09:33

## 2020-01-01 RX ADMIN — GUAIFENESIN 1200 MG: 600 TABLET, EXTENDED RELEASE ORAL at 01:54

## 2020-01-01 RX ADMIN — IPRATROPIUM BROMIDE 1 MG: 0.5 SOLUTION RESPIRATORY (INHALATION) at 05:49

## 2020-01-01 RX ADMIN — ISOSORBIDE MONONITRATE 10 MG: 10 TABLET ORAL at 11:58

## 2020-01-01 RX ADMIN — ISOSORBIDE MONONITRATE 10 MG: 10 TABLET ORAL at 09:02

## 2020-01-01 RX ADMIN — OXYCODONE 15 MG: 5 TABLET ORAL at 08:28

## 2020-01-01 RX ADMIN — Medication 10 ML: at 13:26

## 2020-01-01 RX ADMIN — POLYETHYLENE GLYCOL 3350 17 G: 17 POWDER, FOR SOLUTION ORAL at 09:01

## 2020-01-01 RX ADMIN — IPRATROPIUM BROMIDE AND ALBUTEROL SULFATE 3 ML: .5; 3 SOLUTION RESPIRATORY (INHALATION) at 08:14

## 2020-01-01 RX ADMIN — Medication 10 ML: at 15:55

## 2020-01-01 RX ADMIN — GUAIFENESIN 1200 MG: 600 TABLET, EXTENDED RELEASE ORAL at 09:33

## 2020-01-01 RX ADMIN — OXYCODONE 15 MG: 5 TABLET ORAL at 17:41

## 2020-01-01 RX ADMIN — SODIUM CHLORIDE 200 MG: 9 INJECTION, SOLUTION INTRAVENOUS at 14:47

## 2020-01-01 RX ADMIN — DOCUSATE SODIUM 100 MG: 100 CAPSULE, LIQUID FILLED ORAL at 18:20

## 2020-01-01 RX ADMIN — HEPARIN SODIUM 1000 UNITS: 200 INJECTION, SOLUTION INTRAVENOUS at 08:31

## 2020-01-01 RX ADMIN — ARFORMOTEROL TARTRATE 15 MCG: 15 SOLUTION RESPIRATORY (INHALATION) at 08:05

## 2020-01-01 RX ADMIN — Medication 500 UNITS: at 15:34

## 2020-01-01 RX ADMIN — IPRATROPIUM BROMIDE AND ALBUTEROL SULFATE 3 ML: .5; 3 SOLUTION RESPIRATORY (INHALATION) at 07:49

## 2020-01-01 RX ADMIN — CLONAZEPAM 0.5 MG: 1 TABLET ORAL at 16:15

## 2020-01-01 RX ADMIN — IPRATROPIUM BROMIDE AND ALBUTEROL SULFATE 3 ML: .5; 3 SOLUTION RESPIRATORY (INHALATION) at 07:48

## 2020-01-01 RX ADMIN — LEVALBUTEROL HYDROCHLORIDE 0.63 MG: 0.63 SOLUTION RESPIRATORY (INHALATION) at 07:55

## 2020-01-01 RX ADMIN — GLYCOPYRROLATE 0.2 MG: 0.2 INJECTION, SOLUTION INTRAMUSCULAR; INTRAVENOUS at 12:03

## 2020-01-01 RX ADMIN — DILTIAZEM HYDROCHLORIDE 10 MG: 5 INJECTION INTRAVENOUS at 02:15

## 2020-01-01 RX ADMIN — ADENOSINE 6 MG: 3 INJECTION, SOLUTION INTRAVENOUS at 07:34

## 2020-01-01 RX ADMIN — METHYLPREDNISOLONE SODIUM SUCCINATE 40 MG: 40 INJECTION, POWDER, FOR SOLUTION INTRAMUSCULAR; INTRAVENOUS at 02:35

## 2020-01-01 RX ADMIN — MORPHINE SULFATE 4 MG: 2 INJECTION, SOLUTION INTRAMUSCULAR; INTRAVENOUS at 13:53

## 2020-01-01 RX ADMIN — METHYLPREDNISOLONE SODIUM SUCCINATE 40 MG: 40 INJECTION, POWDER, FOR SOLUTION INTRAMUSCULAR; INTRAVENOUS at 22:09

## 2020-01-01 RX ADMIN — AMLODIPINE BESYLATE 10 MG: 5 TABLET ORAL at 09:07

## 2020-01-01 RX ADMIN — METHYLPREDNISOLONE SODIUM SUCCINATE 60 MG: 125 INJECTION, POWDER, FOR SOLUTION INTRAMUSCULAR; INTRAVENOUS at 13:16

## 2020-01-01 RX ADMIN — LEVALBUTEROL HYDROCHLORIDE 0.63 MG: 0.63 SOLUTION RESPIRATORY (INHALATION) at 04:04

## 2020-01-01 RX ADMIN — IPRATROPIUM BROMIDE 0.5 MG: 0.5 SOLUTION RESPIRATORY (INHALATION) at 08:35

## 2020-01-01 RX ADMIN — CLONAZEPAM 0.5 MG: 0.5 TABLET ORAL at 18:20

## 2020-01-01 RX ADMIN — Medication 10 ML: at 23:30

## 2020-01-01 RX ADMIN — AMLODIPINE BESYLATE 10 MG: 5 TABLET ORAL at 09:02

## 2020-01-01 RX ADMIN — ATORVASTATIN CALCIUM 80 MG: 20 TABLET, FILM COATED ORAL at 22:55

## 2020-01-01 RX ADMIN — LIDOCAINE HYDROCHLORIDE,EPINEPHRINE BITARTRATE 3 ML: 10; .01 INJECTION, SOLUTION INFILTRATION; PERINEURAL at 09:04

## 2020-01-01 RX ADMIN — IPRATROPIUM BROMIDE 0.5 MG: 0.5 SOLUTION RESPIRATORY (INHALATION) at 03:55

## 2020-01-01 RX ADMIN — Medication 10 ML: at 16:02

## 2020-01-01 RX ADMIN — METHYLPREDNISOLONE SODIUM SUCCINATE 60 MG: 125 INJECTION, POWDER, FOR SOLUTION INTRAMUSCULAR; INTRAVENOUS at 05:51

## 2020-01-01 RX ADMIN — Medication 10 ML: at 22:55

## 2020-01-01 RX ADMIN — Medication 10 ML: at 15:23

## 2020-01-01 RX ADMIN — DOCUSATE SODIUM 100 MG: 100 CAPSULE, LIQUID FILLED ORAL at 09:01

## 2020-01-01 RX ADMIN — Medication 500 UNITS: at 15:23

## 2020-01-01 RX ADMIN — FUROSEMIDE 40 MG: 10 INJECTION, SOLUTION INTRAMUSCULAR; INTRAVENOUS at 12:20

## 2020-01-01 RX ADMIN — CLONAZEPAM 0.5 MG: 1 TABLET ORAL at 22:41

## 2020-01-01 RX ADMIN — ASPIRIN 81 MG: 81 TABLET, COATED ORAL at 09:02

## 2020-01-01 RX ADMIN — BRIMONIDINE TARTRATE 1 DROP: 2 SOLUTION OPHTHALMIC at 18:56

## 2020-01-01 RX ADMIN — DIPHENHYDRAMINE HYDROCHLORIDE 25 MG: 50 INJECTION, SOLUTION INTRAMUSCULAR; INTRAVENOUS at 08:19

## 2020-01-01 RX ADMIN — METHYLPREDNISOLONE SODIUM SUCCINATE 40 MG: 40 INJECTION, POWDER, FOR SOLUTION INTRAMUSCULAR; INTRAVENOUS at 02:49

## 2020-01-01 RX ADMIN — WARFARIN SODIUM 10 MG: 5 TABLET ORAL at 18:20

## 2020-01-01 RX ADMIN — DOCUSATE SODIUM 100 MG: 100 CAPSULE, LIQUID FILLED ORAL at 09:07

## 2020-01-01 RX ADMIN — LEVALBUTEROL HYDROCHLORIDE 0.63 MG: 0.63 SOLUTION RESPIRATORY (INHALATION) at 08:00

## 2020-01-01 RX ADMIN — ARFORMOTEROL TARTRATE 15 MCG: 15 SOLUTION RESPIRATORY (INHALATION) at 08:21

## 2020-01-01 RX ADMIN — MORPHINE SULFATE 30 MG: 15 TABLET, FILM COATED, EXTENDED RELEASE ORAL at 05:35

## 2020-01-01 RX ADMIN — METHYLPREDNISOLONE SODIUM SUCCINATE 40 MG: 40 INJECTION, POWDER, FOR SOLUTION INTRAMUSCULAR; INTRAVENOUS at 09:21

## 2020-01-01 RX ADMIN — IPRATROPIUM BROMIDE AND ALBUTEROL SULFATE 3 ML: .5; 3 SOLUTION RESPIRATORY (INHALATION) at 07:17

## 2020-01-01 RX ADMIN — PREDNISONE 60 MG: 20 TABLET ORAL at 13:18

## 2020-01-01 RX ADMIN — ISOSORBIDE MONONITRATE 10 MG: 10 TABLET ORAL at 08:28

## 2020-01-01 RX ADMIN — LATANOPROST 1 DROP: 50 SOLUTION OPHTHALMIC at 23:20

## 2020-01-01 RX ADMIN — BUDESONIDE 500 MCG: 0.5 INHALANT RESPIRATORY (INHALATION) at 08:18

## 2020-01-01 RX ADMIN — IPRATROPIUM BROMIDE AND ALBUTEROL SULFATE 3 ML: .5; 3 SOLUTION RESPIRATORY (INHALATION) at 08:17

## 2020-01-01 RX ADMIN — SODIUM CHLORIDE 75 ML/HR: 900 INJECTION, SOLUTION INTRAVENOUS at 17:11

## 2020-01-01 RX ADMIN — DILTIAZEM HYDROCHLORIDE 240 MG: 240 CAPSULE, COATED, EXTENDED RELEASE ORAL at 08:28

## 2020-01-01 RX ADMIN — ESCITALOPRAM OXALATE 10 MG: 10 TABLET ORAL at 09:02

## 2020-01-01 RX ADMIN — LORAZEPAM 1 MG: 2 INJECTION INTRAMUSCULAR; INTRAVENOUS at 14:17

## 2020-01-01 RX ADMIN — OXYCODONE 15 MG: 5 TABLET ORAL at 08:26

## 2020-01-01 RX ADMIN — DOCUSATE SODIUM 100 MG: 100 CAPSULE, LIQUID FILLED ORAL at 09:32

## 2020-01-01 RX ADMIN — GLYCOPYRROLATE 0.2 MG: 0.2 INJECTION, SOLUTION INTRAMUSCULAR; INTRAVENOUS at 21:35

## 2020-01-01 RX ADMIN — POLYETHYLENE GLYCOL 3350 17 G: 17 POWDER, FOR SOLUTION ORAL at 09:08

## 2020-01-01 RX ADMIN — LEVALBUTEROL HYDROCHLORIDE 0.63 MG: 0.63 SOLUTION RESPIRATORY (INHALATION) at 11:38

## 2020-01-01 RX ADMIN — ISOSORBIDE MONONITRATE 10 MG: 10 TABLET ORAL at 23:29

## 2020-01-01 RX ADMIN — METHYLPREDNISOLONE SODIUM SUCCINATE 40 MG: 40 INJECTION, POWDER, FOR SOLUTION INTRAMUSCULAR; INTRAVENOUS at 02:04

## 2020-01-01 RX ADMIN — GUAIFENESIN 1200 MG: 600 TABLET, EXTENDED RELEASE ORAL at 22:08

## 2020-01-01 RX ADMIN — ENOXAPARIN SODIUM 70 MG: 80 INJECTION SUBCUTANEOUS at 06:44

## 2020-01-01 RX ADMIN — ISOSORBIDE MONONITRATE 10 MG: 10 TABLET ORAL at 09:07

## 2020-01-01 RX ADMIN — GLYCOPYRROLATE 0.2 MG: 0.2 INJECTION, SOLUTION INTRAMUSCULAR; INTRAVENOUS at 15:55

## 2020-01-01 RX ADMIN — SODIUM CHLORIDE 75 ML/HR: 900 INJECTION, SOLUTION INTRAVENOUS at 06:38

## 2020-01-01 RX ADMIN — IPRATROPIUM BROMIDE AND ALBUTEROL SULFATE 3 ML: .5; 3 SOLUTION RESPIRATORY (INHALATION) at 20:09

## 2020-01-01 RX ADMIN — ESCITALOPRAM OXALATE 10 MG: 10 TABLET ORAL at 09:07

## 2020-01-01 RX ADMIN — SENNOSIDES 17.2 MG: 8.6 TABLET, FILM COATED ORAL at 22:08

## 2020-01-01 RX ADMIN — CLONAZEPAM 0.5 MG: 0.5 TABLET ORAL at 21:39

## 2020-01-01 RX ADMIN — ESCITALOPRAM OXALATE 10 MG: 10 TABLET ORAL at 09:32

## 2020-01-01 RX ADMIN — BUDESONIDE 500 MCG: 0.5 INHALANT RESPIRATORY (INHALATION) at 20:09

## 2020-01-01 RX ADMIN — IPRATROPIUM BROMIDE AND ALBUTEROL SULFATE 3 ML: .5; 3 SOLUTION RESPIRATORY (INHALATION) at 01:29

## 2020-01-01 RX ADMIN — SODIUM CHLORIDE 25 ML/HR: 900 INJECTION, SOLUTION INTRAVENOUS at 15:16

## 2020-01-01 RX ADMIN — BUDESONIDE 500 MCG: 0.5 INHALANT RESPIRATORY (INHALATION) at 08:44

## 2020-01-01 RX ADMIN — IPRATROPIUM BROMIDE 0.5 MG: 0.5 SOLUTION RESPIRATORY (INHALATION) at 11:49

## 2020-01-01 RX ADMIN — LATANOPROST 1 DROP: 50 SOLUTION OPHTHALMIC at 22:10

## 2020-01-01 RX ADMIN — DOCUSATE SODIUM 100 MG: 100 CAPSULE, LIQUID FILLED ORAL at 17:32

## 2020-01-01 RX ADMIN — ATORVASTATIN CALCIUM 80 MG: 20 TABLET, FILM COATED ORAL at 18:56

## 2020-01-01 RX ADMIN — CLONAZEPAM 0.5 MG: 1 TABLET ORAL at 16:02

## 2020-01-01 RX ADMIN — CLONAZEPAM 0.5 MG: 0.5 TABLET ORAL at 09:07

## 2020-01-01 RX ADMIN — METHYLPREDNISOLONE SODIUM SUCCINATE 60 MG: 125 INJECTION, POWDER, FOR SOLUTION INTRAMUSCULAR; INTRAVENOUS at 06:36

## 2020-01-01 RX ADMIN — Medication 10 ML: at 21:44

## 2020-01-01 RX ADMIN — IPRATROPIUM BROMIDE AND ALBUTEROL SULFATE 3 ML: .5; 3 SOLUTION RESPIRATORY (INHALATION) at 19:52

## 2020-01-01 RX ADMIN — BRIMONIDINE TARTRATE 1 DROP: 2 SOLUTION OPHTHALMIC at 10:20

## 2020-01-01 RX ADMIN — ARFORMOTEROL TARTRATE 15 MCG: 15 SOLUTION RESPIRATORY (INHALATION) at 08:25

## 2020-01-01 RX ADMIN — BRIMONIDINE TARTRATE 1 DROP: 2 SOLUTION OPHTHALMIC at 18:58

## 2020-01-01 RX ADMIN — Medication 10 ML: at 14:00

## 2020-01-01 RX ADMIN — Medication 10 ML: at 23:21

## 2020-01-01 RX ADMIN — IPRATROPIUM BROMIDE AND ALBUTEROL SULFATE 3 ML: .5; 3 SOLUTION RESPIRATORY (INHALATION) at 13:19

## 2020-01-01 RX ADMIN — FENTANYL CITRATE 25 MCG: 50 INJECTION, SOLUTION INTRAMUSCULAR; INTRAVENOUS at 08:23

## 2020-01-01 RX ADMIN — METHYLPREDNISOLONE SODIUM SUCCINATE 60 MG: 125 INJECTION, POWDER, FOR SOLUTION INTRAMUSCULAR; INTRAVENOUS at 05:35

## 2020-01-01 RX ADMIN — ENOXAPARIN SODIUM 70 MG: 80 INJECTION SUBCUTANEOUS at 18:59

## 2020-01-01 RX ADMIN — DOCUSATE SODIUM 100 MG: 100 CAPSULE, LIQUID FILLED ORAL at 17:22

## 2020-01-01 RX ADMIN — WATER 2 G: 1 INJECTION INTRAMUSCULAR; INTRAVENOUS; SUBCUTANEOUS at 08:26

## 2020-01-01 RX ADMIN — ISOSORBIDE MONONITRATE 10 MG: 10 TABLET ORAL at 09:32

## 2020-01-01 RX ADMIN — BRIMONIDINE TARTRATE 1 DROP: 2 SOLUTION OPHTHALMIC at 08:27

## 2020-01-01 RX ADMIN — BUDESONIDE 500 MCG: 0.5 INHALANT RESPIRATORY (INHALATION) at 19:58

## 2020-01-01 RX ADMIN — METHYLPREDNISOLONE SODIUM SUCCINATE 40 MG: 40 INJECTION, POWDER, FOR SOLUTION INTRAMUSCULAR; INTRAVENOUS at 18:59

## 2020-01-01 RX ADMIN — MORPHINE SULFATE 30 MG: 15 TABLET, FILM COATED, EXTENDED RELEASE ORAL at 06:36

## 2020-01-01 RX ADMIN — MIDAZOLAM 1 MG: 1 INJECTION INTRAMUSCULAR; INTRAVENOUS at 08:23

## 2020-01-01 RX ADMIN — IPRATROPIUM BROMIDE AND ALBUTEROL SULFATE 3 ML: .5; 3 SOLUTION RESPIRATORY (INHALATION) at 02:55

## 2020-01-01 RX ADMIN — METHYLPREDNISOLONE SODIUM SUCCINATE 60 MG: 125 INJECTION, POWDER, FOR SOLUTION INTRAMUSCULAR; INTRAVENOUS at 18:20

## 2020-01-01 RX ADMIN — ATORVASTATIN CALCIUM 80 MG: 20 TABLET, FILM COATED ORAL at 17:22

## 2020-01-01 RX ADMIN — CLONAZEPAM 0.5 MG: 0.5 TABLET ORAL at 10:23

## 2020-01-01 RX ADMIN — Medication 10 ML: at 06:38

## 2020-01-01 RX ADMIN — IPRATROPIUM BROMIDE 0.5 MG: 0.5 SOLUTION RESPIRATORY (INHALATION) at 19:29

## 2020-01-01 RX ADMIN — SODIUM CHLORIDE 200 MG: 9 INJECTION, SOLUTION INTRAVENOUS at 15:16

## 2020-01-01 RX ADMIN — LORAZEPAM 1 MG: 2 INJECTION, SOLUTION INTRAMUSCULAR; INTRAVENOUS at 12:42

## 2020-01-01 RX ADMIN — POLYETHYLENE GLYCOL 3350 17 G: 17 POWDER, FOR SOLUTION ORAL at 09:32

## 2020-01-01 RX ADMIN — METHYLPREDNISOLONE SODIUM SUCCINATE 60 MG: 125 INJECTION, POWDER, FOR SOLUTION INTRAMUSCULAR; INTRAVENOUS at 18:21

## 2020-01-01 RX ADMIN — MORPHINE SULFATE 4 MG: 4 INJECTION INTRAVENOUS at 07:50

## 2020-01-01 RX ADMIN — ASPIRIN 81 MG: 81 TABLET, COATED ORAL at 09:00

## 2020-01-01 RX ADMIN — SODIUM CHLORIDE 1000 ML: 900 INJECTION, SOLUTION INTRAVENOUS at 07:25

## 2020-01-01 RX ADMIN — BUDESONIDE 500 MCG: 0.5 INHALANT RESPIRATORY (INHALATION) at 07:57

## 2020-01-01 RX ADMIN — ALBUTEROL SULFATE 1 DOSE: 2.5 SOLUTION RESPIRATORY (INHALATION) at 05:48

## 2020-01-01 RX ADMIN — LEVOFLOXACIN 750 MG: 5 INJECTION, SOLUTION INTRAVENOUS at 07:20

## 2020-01-01 RX ADMIN — LORAZEPAM 1 MG: 2 INJECTION INTRAMUSCULAR; INTRAVENOUS at 13:53

## 2020-01-01 RX ADMIN — LEVALBUTEROL HYDROCHLORIDE 0.63 MG: 0.63 SOLUTION RESPIRATORY (INHALATION) at 15:35

## 2020-01-01 RX ADMIN — LEVALBUTEROL HYDROCHLORIDE 0.63 MG: 0.63 SOLUTION RESPIRATORY (INHALATION) at 03:55

## 2020-01-01 RX ADMIN — ISOSORBIDE MONONITRATE 10 MG: 10 TABLET ORAL at 10:07

## 2020-01-01 RX ADMIN — BUDESONIDE 500 MCG: 0.5 INHALANT RESPIRATORY (INHALATION) at 07:27

## 2020-01-01 RX ADMIN — BUDESONIDE 500 MCG: 0.5 INHALANT RESPIRATORY (INHALATION) at 19:55

## 2020-01-01 RX ADMIN — IPRATROPIUM BROMIDE 0.5 MG: 0.5 SOLUTION RESPIRATORY (INHALATION) at 19:45

## 2020-01-01 RX ADMIN — WARFARIN SODIUM 2 MG: 2 TABLET ORAL at 18:56

## 2020-01-01 RX ADMIN — Medication 10 ML: at 21:40

## 2020-01-01 RX ADMIN — BRIMONIDINE TARTRATE 1 DROP: 2 SOLUTION OPHTHALMIC at 09:33

## 2020-01-01 RX ADMIN — LEVALBUTEROL HYDROCHLORIDE 0.63 MG: 0.63 SOLUTION RESPIRATORY (INHALATION) at 11:49

## 2020-01-01 RX ADMIN — METHYLPREDNISOLONE SODIUM SUCCINATE 40 MG: 40 INJECTION, POWDER, FOR SOLUTION INTRAMUSCULAR; INTRAVENOUS at 17:32

## 2020-01-01 RX ADMIN — Medication 10 ML: at 18:22

## 2020-01-01 RX ADMIN — MIDAZOLAM 1 MG: 1 INJECTION INTRAMUSCULAR; INTRAVENOUS at 08:31

## 2020-01-01 RX ADMIN — LATANOPROST 1 DROP: 50 SOLUTION OPHTHALMIC at 23:30

## 2020-01-01 RX ADMIN — MORPHINE SULFATE 4 MG: 2 INJECTION, SOLUTION INTRAMUSCULAR; INTRAVENOUS at 13:14

## 2020-01-01 RX ADMIN — ARFORMOTEROL TARTRATE 15 MCG: 15 SOLUTION RESPIRATORY (INHALATION) at 07:27

## 2020-01-01 RX ADMIN — DOCUSATE SODIUM 100 MG: 100 CAPSULE, LIQUID FILLED ORAL at 09:04

## 2020-01-01 RX ADMIN — CLONAZEPAM 0.5 MG: 1 TABLET ORAL at 23:30

## 2020-01-01 RX ADMIN — BUDESONIDE 500 MCG: 0.5 INHALANT RESPIRATORY (INHALATION) at 07:54

## 2020-01-01 RX ADMIN — LEVALBUTEROL HYDROCHLORIDE 0.63 MG: 0.63 SOLUTION RESPIRATORY (INHALATION) at 23:41

## 2020-01-01 RX ADMIN — IPRATROPIUM BROMIDE 0.5 MG: 0.5 SOLUTION RESPIRATORY (INHALATION) at 08:00

## 2020-01-01 RX ADMIN — CLONAZEPAM 0.5 MG: 1 TABLET ORAL at 17:21

## 2020-01-01 RX ADMIN — IPRATROPIUM BROMIDE 0.5 MG: 0.5 SOLUTION RESPIRATORY (INHALATION) at 23:41

## 2020-01-01 RX ADMIN — CLONAZEPAM 0.5 MG: 1 TABLET ORAL at 09:33

## 2020-01-01 RX ADMIN — ARFORMOTEROL TARTRATE 15 MCG: 15 SOLUTION RESPIRATORY (INHALATION) at 20:04

## 2020-01-01 RX ADMIN — ALBUTEROL SULFATE 1 DOSE: 2.5 SOLUTION RESPIRATORY (INHALATION) at 01:54

## 2020-01-01 RX ADMIN — ATORVASTATIN CALCIUM 80 MG: 20 TABLET, FILM COATED ORAL at 18:57

## 2020-01-01 RX ADMIN — Medication 10 ML: at 06:00

## 2020-01-01 RX ADMIN — MORPHINE SULFATE 30 MG: 15 TABLET, FILM COATED, EXTENDED RELEASE ORAL at 13:16

## 2020-01-01 RX ADMIN — IPRATROPIUM BROMIDE 0.5 MG: 0.5 SOLUTION RESPIRATORY (INHALATION) at 04:04

## 2020-01-01 RX ADMIN — GLYCOPYRROLATE 0.2 MG: 0.2 INJECTION, SOLUTION INTRAMUSCULAR; INTRAVENOUS at 14:16

## 2020-01-01 RX ADMIN — CLONAZEPAM 0.5 MG: 1 TABLET ORAL at 08:28

## 2020-01-01 RX ADMIN — LATANOPROST 1 DROP: 50 SOLUTION/ DROPS OPHTHALMIC at 23:25

## 2020-01-01 RX ADMIN — OXYCODONE 15 MG: 5 TABLET ORAL at 23:20

## 2020-01-01 RX ADMIN — WARFARIN SODIUM 1 MG: 1 TABLET ORAL at 17:22

## 2020-01-01 RX ADMIN — CLONAZEPAM 0.5 MG: 1 TABLET ORAL at 09:32

## 2020-01-01 RX ADMIN — ARFORMOTEROL TARTRATE 15 MCG: 15 SOLUTION RESPIRATORY (INHALATION) at 20:09

## 2020-01-01 RX ADMIN — SODIUM PHOSPHATE, MONOBASIC, MONOHYDRATE 15 MMOL: 276; 142 INJECTION, SOLUTION INTRAVENOUS at 10:32

## 2020-01-01 RX ADMIN — OXYCODONE 15 MG: 5 TABLET ORAL at 15:10

## 2020-01-01 RX ADMIN — Medication: at 09:42

## 2020-01-01 RX ADMIN — ISOSORBIDE MONONITRATE 10 MG: 10 TABLET ORAL at 17:21

## 2020-01-01 RX ADMIN — GUAIFENESIN 1200 MG: 600 TABLET, EXTENDED RELEASE ORAL at 23:29

## 2020-01-01 RX ADMIN — METHYLPREDNISOLONE SODIUM SUCCINATE 60 MG: 125 INJECTION, POWDER, FOR SOLUTION INTRAMUSCULAR; INTRAVENOUS at 12:01

## 2020-01-01 RX ADMIN — Medication 10 ML: at 08:18

## 2020-01-01 RX ADMIN — ISOSORBIDE MONONITRATE 10 MG: 10 TABLET ORAL at 22:08

## 2020-01-01 RX ADMIN — FENTANYL CITRATE 25 MCG: 50 INJECTION, SOLUTION INTRAMUSCULAR; INTRAVENOUS at 09:02

## 2020-01-01 RX ADMIN — ATORVASTATIN CALCIUM 80 MG: 20 TABLET, FILM COATED ORAL at 21:39

## 2020-01-01 RX ADMIN — IPRATROPIUM BROMIDE 0.5 MG: 0.5 SOLUTION RESPIRATORY (INHALATION) at 07:55

## 2020-01-01 RX ADMIN — Medication 10 ML: at 13:19

## 2020-01-01 RX ADMIN — Medication 10 ML: at 06:36

## 2020-01-01 RX ADMIN — ARFORMOTEROL TARTRATE 15 MCG: 15 SOLUTION RESPIRATORY (INHALATION) at 07:57

## 2020-01-01 RX ADMIN — ESCITALOPRAM OXALATE 10 MG: 10 TABLET ORAL at 09:04

## 2020-01-01 RX ADMIN — METHYLPREDNISOLONE SODIUM SUCCINATE 40 MG: 40 INJECTION, POWDER, FOR SOLUTION INTRAMUSCULAR; INTRAVENOUS at 09:32

## 2020-01-01 RX ADMIN — ATORVASTATIN CALCIUM 80 MG: 20 TABLET, FILM COATED ORAL at 17:32

## 2020-01-01 RX ADMIN — WARFARIN SODIUM 5 MG: 5 TABLET ORAL at 18:20

## 2020-01-01 RX ADMIN — Medication 10 ML: at 05:36

## 2020-01-01 RX ADMIN — CLONAZEPAM 0.5 MG: 1 TABLET ORAL at 22:09

## 2020-01-01 RX ADMIN — Medication 10 ML: at 05:25

## 2020-01-01 RX ADMIN — ASPIRIN 81 MG: 81 TABLET, COATED ORAL at 09:07

## 2020-01-01 RX ADMIN — METHYLPREDNISOLONE SODIUM SUCCINATE 60 MG: 125 INJECTION, POWDER, FOR SOLUTION INTRAMUSCULAR; INTRAVENOUS at 23:50

## 2020-01-01 RX ADMIN — ISOSORBIDE MONONITRATE 10 MG: 10 TABLET ORAL at 16:21

## 2020-01-01 RX ADMIN — BUDESONIDE 500 MCG: 0.5 INHALANT RESPIRATORY (INHALATION) at 19:44

## 2020-01-01 RX ADMIN — CLONAZEPAM 0.5 MG: 1 TABLET ORAL at 17:02

## 2020-01-01 RX ADMIN — SODIUM CHLORIDE 25 ML/HR: 900 INJECTION, SOLUTION INTRAVENOUS at 14:44

## 2020-01-01 RX ADMIN — Medication 10 ML: at 05:54

## 2020-01-01 RX ADMIN — IPRATROPIUM BROMIDE 0.5 MG: 0.5 SOLUTION RESPIRATORY (INHALATION) at 11:53

## 2020-01-01 RX ADMIN — Medication 500 UNITS: at 16:02

## 2020-01-01 RX ADMIN — Medication: at 19:19

## 2020-01-01 RX ADMIN — BRIMONIDINE TARTRATE 1 DROP: 2 SOLUTION OPHTHALMIC at 09:32

## 2020-01-01 RX ADMIN — CLONAZEPAM 0.5 MG: 1 TABLET ORAL at 10:06

## 2020-01-01 RX ADMIN — ESCITALOPRAM OXALATE 10 MG: 10 TABLET ORAL at 09:33

## 2020-01-01 RX ADMIN — ISOSORBIDE MONONITRATE 10 MG: 10 TABLET ORAL at 09:06

## 2020-01-01 RX ADMIN — FENTANYL CITRATE 25 MCG: 50 INJECTION, SOLUTION INTRAMUSCULAR; INTRAVENOUS at 09:14

## 2020-01-01 RX ADMIN — Medication 10 ML: at 09:07

## 2020-01-01 RX ADMIN — ISOSORBIDE MONONITRATE 10 MG: 10 TABLET ORAL at 23:10

## 2020-01-01 RX ADMIN — Medication 10 ML: at 05:35

## 2020-01-01 RX ADMIN — SODIUM CHLORIDE 75 ML/HR: 900 INJECTION, SOLUTION INTRAVENOUS at 22:09

## 2020-01-01 RX ADMIN — BUDESONIDE 500 MCG: 0.5 INHALANT RESPIRATORY (INHALATION) at 19:29

## 2020-01-01 RX ADMIN — ESCITALOPRAM OXALATE 10 MG: 10 TABLET ORAL at 10:07

## 2020-01-01 RX ADMIN — IPRATROPIUM BROMIDE AND ALBUTEROL SULFATE 3 ML: .5; 3 SOLUTION RESPIRATORY (INHALATION) at 13:31

## 2020-01-01 RX ADMIN — BUDESONIDE 500 MCG: 0.5 INHALANT RESPIRATORY (INHALATION) at 08:05

## 2020-01-01 RX ADMIN — LEVALBUTEROL HYDROCHLORIDE 0.63 MG: 0.63 SOLUTION RESPIRATORY (INHALATION) at 19:45

## 2020-01-01 RX ADMIN — IPRATROPIUM BROMIDE AND ALBUTEROL SULFATE 3 ML: .5; 3 SOLUTION RESPIRATORY (INHALATION) at 20:04

## 2020-01-01 RX ADMIN — ISOSORBIDE MONONITRATE 10 MG: 10 TABLET ORAL at 16:02

## 2020-01-01 RX ADMIN — Medication 10 ML: at 05:03

## 2020-01-01 RX ADMIN — LATANOPROST 1 DROP: 50 SOLUTION/ DROPS OPHTHALMIC at 22:59

## 2020-01-01 RX ADMIN — PREDNISONE 20 MG: 20 TABLET ORAL at 09:07

## 2020-01-01 RX ADMIN — FENTANYL 1 PATCH: 12.5 PATCH TRANSDERMAL at 11:30

## 2020-01-01 RX ADMIN — IPRATROPIUM BROMIDE AND ALBUTEROL SULFATE 3 ML: .5; 3 SOLUTION RESPIRATORY (INHALATION) at 00:13

## 2020-01-01 RX ADMIN — ISOSORBIDE MONONITRATE 10 MG: 10 TABLET ORAL at 13:18

## 2020-01-01 RX ADMIN — LEVALBUTEROL HYDROCHLORIDE 0.63 MG: 0.63 SOLUTION RESPIRATORY (INHALATION) at 23:42

## 2020-01-01 RX ADMIN — LEVALBUTEROL HYDROCHLORIDE 0.63 MG: 0.63 SOLUTION RESPIRATORY (INHALATION) at 19:29

## 2020-01-01 RX ADMIN — GUAIFENESIN 1200 MG: 600 TABLET, EXTENDED RELEASE ORAL at 10:07

## 2020-01-01 RX ADMIN — SODIUM CHLORIDE 974 ML: 900 INJECTION, SOLUTION INTRAVENOUS at 07:25

## 2020-01-01 RX ADMIN — POLYETHYLENE GLYCOL 3350 17 G: 17 POWDER, FOR SOLUTION ORAL at 10:20

## 2020-01-01 RX ADMIN — CLONAZEPAM 0.5 MG: 0.5 TABLET ORAL at 22:55

## 2020-01-01 RX ADMIN — PERFLUTREN 2 ML: 6.52 INJECTION, SUSPENSION INTRAVENOUS at 09:10

## 2020-01-01 RX ADMIN — METHYLPREDNISOLONE SODIUM SUCCINATE 40 MG: 40 INJECTION, POWDER, FOR SOLUTION INTRAMUSCULAR; INTRAVENOUS at 01:54

## 2020-01-01 RX ADMIN — IOPAMIDOL 100 ML: 755 INJECTION, SOLUTION INTRAVENOUS at 13:51

## 2020-01-01 RX ADMIN — METHYLPREDNISOLONE SODIUM SUCCINATE 40 MG: 40 INJECTION, POWDER, FOR SOLUTION INTRAMUSCULAR; INTRAVENOUS at 18:56

## 2020-01-01 RX ADMIN — MORPHINE SULFATE 1 MG: 2 INJECTION, SOLUTION INTRAMUSCULAR; INTRAVENOUS at 12:17

## 2020-01-01 RX ADMIN — METHYLPREDNISOLONE SODIUM SUCCINATE 60 MG: 125 INJECTION, POWDER, FOR SOLUTION INTRAMUSCULAR; INTRAVENOUS at 01:12

## 2020-01-01 RX ADMIN — IPRATROPIUM BROMIDE AND ALBUTEROL SULFATE 3 ML: .5; 3 SOLUTION RESPIRATORY (INHALATION) at 02:58

## 2020-01-01 SDOH — SOCIAL STABILITY - SOCIAL INSECURITY: DEPENDENT RELATIVE NEEDING CARE AT HOME: Z63.6

## 2020-01-27 NOTE — TELEPHONE ENCOUNTER
Critical access hospital [employer] at [place]  [phone]    01/27/20- Request PET from 12/2019 from [facility] to be faxed to our office and to have images pushed into our system for comparison. Images received via fax- see scanned in under media. Also received imaging electronically via power [employer]. Patient had a biopsy done at [facility] on 1/17/2020. Report- scanned in. Per  request for PDL1 be sent on the pathology specimen #[id number]. Add on testing request faxed to [facility] at [phone]- fax confirmation delivery successful. Phone call placed to [facility] path at [phone] spoke to Jorge Cobian who confirmed new request received and will be processed. No further actions needed from our office at this time. 01/28/20- [facility] path lab faxed over [address] results they had already ordered for patient. Results scanned in under media and MD informed. 12:02 PM- Confirmed with Silvia at [facility] radiology [phone] that PET scan was pushed into our system for review. 01/29/20-  inquiring if [facility] ordered molecular studies, like EGFR and ALK and ROS1 and BRAF. Phone call placed to [facility] path lab [phone] spoke to Jorge Coiban- she confirmed test have been ran but results are back yet. She will faxed over updated reports once in.

## 2020-01-31 PROBLEM — C34.90 LUNG CANCER METASTATIC TO BONE (HCC): Status: ACTIVE | Noted: 2020-01-01

## 2020-01-31 PROBLEM — C79.51 LUNG CANCER METASTATIC TO BONE (HCC): Status: ACTIVE | Noted: 2020-01-01

## 2020-01-31 NOTE — PROGRESS NOTES
Oncology Navigator  Psychosocial Assessment    Reason for Assessment:    []Depression  []Anxiety  []Caregiver [place]  []Maladaptive Coping with Serious Illness   [x] Social Work Referral [x] Initial Assessment  [] Other     Sources of Information:    [x]Patient  [x]Family  [x]Staff  [x]Medical Record    Advance Care Planning:  Advance Care Planning 6/19/2019   Patient's Healthcare Decision Maker is: Legal Next of Kin   Primary Decision Maker Name -   Secondary Decision Maker Name -   Secondary Decision [facility] Phone Number -   Secondary Decision Maker Relationship to Patient -   Confirm Advance Directive None   Patient Would Like to Complete Advance Directive -       Mental Status:    [x]Alert  []Lethargic  []Unresponsive   [] Unable to assess   Oriented to:  [x]Person  [x]Place  [x]Time  [x]Situation      Barriers to Learning:    []Language  []Developmental  []Cognitive  []Altered Mental Status  []Visual/Hearing Impairment  []Unable to Read/Write  []Motivational   [x]No Barriers Identified  []Other:    Relationship Status:  []Single  [x]  []Significant Other/Life Partner  []  []  []      Living Circumstances:  []Lives Alone  [x]Family/Significant Other in Household  []Roommates  []Children in the Home  []Paid Caregivers  []Assisted Living Facility/Group Home  []Skilled [address]  []Homeless  []Incarcerated  []Environmental/Care Concerns  []other:    Employment Status:  []Employed Full-time []Employed Part-time []Homemaker [] Disabled  [x] Retired []Other:    Support System:    [x]Strong  []Fair  []Limited    Financial/Legal Concerns:    []Uninsured  []Limited Income/Resources  []Non-Citizen  []No Concerns Identified  []Financial POA:    []Other:    Gnosticism/Spiritual/Existential:  [x]Strong Sense of Spirituality  []Involved in [employer]  []Request  Visit  []Expressing Spiritual/Existential Angst  []No Concerns Identified    Coping with Illness:         Patient: Family/Caregiver:   Understanding and Acceptance of Illness/Prognosis  [x] [x]   Strong Sense of Resilience [] []   Self Reflection [x] []   Engaged Support System [x] []   Does not Readily Discuss Illness [] []   Denial of Terminal Status [] []   Anger [] []   Depression [] []   Anxiety/Fear [] []   Bargaining [] []   Recent Diagnosis/Prognosis [x] []   Difficulties with Body Image [] []   Loss of Identity [] []   Excessive Substance Use [] []   Mental Health History [] []   Enmeshed Relationships [] []   History of Loss [x] []   Anticipatory Grief [] []   Concern for Complicated Grief [] [x]   Suicidal Ideation or Plan [] []   Unable to assess [] []            Narrative: Met with patient, his wife Cole Stovall and daughter Bryan Simmonds) to introduce social work role and supports. Patient lives with his wife in their private residence. They have three adult children who live locally. Patient is well supported with practical and emotional needs by family. He has adequate transportation to appointments. He expressed concerns about the financial burden of treatment. Reviewed [facility] financial assistance program. Patient's daughter has several questions regarding legal/financial planning for the future after her father passes. Referred them to [facility] for support. Offered supportive listening as patient ventilates feelings regarding diagnosis and treatment. Patient presents as active and engaged. He tells me he no longer attends [employer] but \"believes in god and prayer\". He voiced her biggest regrets are smoking and when he stopped attending V[employer]. Patient denies feelings of depression or anxiety. His family feels he is still grieving the loss of family as he is the last living member of his parents and siblings. Patient endorsed a readiness to start treatment. [facility] along with support groups.  Encouraged patient and family to contact me as needed for psychosocial support. Assessment/Action:   1. Patient is actively coping with diagnosis and is well supported by family. 2. Provided patient with FA application as financial barrier identified. 3. Referred patient to [facility] for financial planning  4. Provided list of support resources  5. Ongoing psychosocial support including assessment, support counseling, and resource linkage.      Plan/Referral:   Complementary therapies referral  Financial/Medication assistance referral   Support Groups referral   Legal referral ([facility])  Palliative referral      Thank you,  David Lynn LCSW

## 2020-01-31 NOTE — PROGRESS NOTES
Spoke with patient and family regarding interest in participation on clinical trial Quilt 2.023. Answered all questions. Will touch base Monday for a decision.

## 2020-01-31 NOTE — PROGRESS NOTES
[employer] at [facility] 
[phone] 
 
01/31/20- New referral placed to Radiation Oncology, appointment scheduled with Dr. Celia Trejo on 2/6 at 1:15 pm.   
 
Voicemail left for patient requesting a return call to review appointment information.

## 2020-01-31 NOTE — PROGRESS NOTES
[facility] [place] at [facility] [address] [facility], [address] W: [phone]  F: [phone] Reason for Visit:  
Javier Ruvalcaba is a 76 y.o. male who is seen in consultation at the request of Dr. Jen Rios for evaluation of lung cancer. Treatment History: · CT A/P wo contrast 11/3/2019: New lytic lesion at T11 
· MRI Brain 11/17/2019: no intracranial metastatic disease · PET/CT 12/20/2019: RUL spiculated nodule, 1.4cm, SUV 3.2. Multifocal lytic osseous metastases in spine, ribs, sacrum, and iliac bones. Notably large lytic lesions involving cervical spine and T11. T11 lesion extends into spinal canal.  Suspicious precarinal mediastinal lymph node. 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 · CT guided biopsy of osseous metastasis 1/17/2020: metastatic adenocarcinoma, favor lung primary (TTF1+). PDL1 68%. EGFR/ALK/ROS1/BRAF negative. · Stage IVB (cT1b cN0 M1c) Non-small cell lung cancer History of Present Illness:  
Javier Ruvalcaba is a pleasant 76 y.o. male who presents today for evaluation of lung cancer. He reports having a CT of his abdomen back in November after presenting with some abdominal complaints. This revealed an osseous lesion, and ultimately it led to additional imaging concerning for metastatic cancer. He had a CT guided bone biopsy earlier this month and was referred to see me for further evaluation. He reports that he has been doing quite well, up until his biopsy. After his biopsy he has had increasing lower back pain, not relieved with tylenol. Pain is significant, at times 9/10 and limiting his walking. He reports a mild cough that he attributes to a URI. He denies hemoptysis. Some chronic dyspnea. Appetite ok, no weight loss. He quit smoking in 9/2019, previously smoked up to 3ppd. He is accompanied by his wife and daughter. He lives nearby. Past Medical History:  
Diagnosis Date  Asthma  CAD (coronary artery disease)  Cancer ([facility] \"10 years ago\" throat  COPD (chronic obstructive pulmonary disease) ([facility])  CVA (cerebral vascular accident) ([facility]  H/O mechanical aortic valve replacement CT surgery 1994 - CABG (LIM[place] to LAD) and AVR ([facility])  Hypertension  S/P CABG x 1   
 CT surgery 1994 - CABG (LIM[place] to LAD) and AVR ([facility]) Past Surgical History:  
Procedure Laterality Date  CARDIAC SURG PROCEDURE UNLIST  HX HEART VALVE SURGERY    
 CT surgery 1994 - CABG (LIMA to LAD) and AVR ([facility])  HX HEMORRHOIDECTOMY Social History Tobacco Use  Smoking status: Former Smoker Packs/day: 3.00 Years: 40.00 Pack years: 120.00  Smokeless tobacco: Never Used Substance Use Topics  Alcohol use: No  
  
Family History Problem Relation Age of Onset  Heart Disease Mother  Diabetes Mother  Hypertension Mother  Heart Disease Sister  Hypertension Sister  Hypertension Father  Cancer Brother   
     lung  Diabetes Brother  Heart Disease Brother  Hypertension Brother  Stroke Brother Current Outpatient Medications Medication Sig  
 oxyCODONE IR (ROXICODONE) 5 mg immediate release tablet Take 1 Tab by mouth every four (4) hours as needed for Pain for up to 14 days. Max Daily Amount: 30 mg.  
 hydroxypropyl methylcellulose (GENTEAL) 0.2 % ophthalmic solution 2 drops right eye every 1-2 hours while awake  white petrolatum-mineral oil (LACRILUBE S.O.P.) 56.8-42.5 % ointment Apply 1 ribbon to right eye at bedtime  predniSONE (DELTASONE) 10 mg tablet 6 tabs po daily x 5 days then 5 tabs for 1 day, 4 tabs for 1 day, 3 tabs for 1 day, 2 tabs for 1 day then 1 tab for 1 day  buPROPion SR (WELLBUTRIN SR) 150 mg SR tablet Take 150 mg by mouth two (2) times a day.  latanoprost (XALATAN) 0.005 % ophthalmic solution Administer 1 Drop to both eyes nightly.  amLODIPine (NORVASC) 10 mg tablet Take 10 mg by mouth daily.  albuterol (PROVENTIL VENTOLIN) 2.5 mg /3 mL (0.083 %) nebulizer solution 3 mL by Nebulization route every four (4) hours as needed for Wheezing.  promethazine (PHENERGAN) 12.5 mg tablet Take 12.5 mg by mouth daily. Indications: Nausea  atorvastatin (LIPITOR) 80 mg tablet Take 80 mg by mouth every evening.  warfarin (COUMADIN) 5 mg tablet Take 5 mg by mouth every evening.  aspirin delayed-release 81 mg tablet Take 81 mg by mouth daily.  albuterol (PROVENTIL HFA, VENTOLIN HFA, PROAIR HFA) 90 mcg/actuation inhaler Take 2 Puffs by inhalation every six (6) hours as needed.  fluticasone-salmeterol (ADVAIR) 500-50 mcg/dose diskus inhaler Take 1 Puff by inhalation every twelve (12) hours.  clonazepam (KLONOPIN) 0.5 mg tablet Take 0.5 mg by mouth three (3) times daily.  escitalopram oxalate (LEXAPRO) 10 mg tablet Take 10 mg by mouth daily.  isosorbide mononitrate (ISMO, MONOKET) 10 mg tablet Take 10 mg by mouth three (3) times daily. Patient takes at [address] [place] [address], noon, and 5pm  
 
No current facility-administered medications for this visit. Allergies Allergen Reactions  Adenosine Shortness of Breath  Iodinated Contrast Media Shortness of Breath  Pcn [Penicillins] Shortness of Breath Patient reports to have tolerated a 10 day course of cefdinir started 8/24/17 Review of Systems: A complete review of systems was obtained, negative except as described above. Physical Exam:  
 
Visit Vitals /74 (BP 1 Location: Right arm, BP Patient Position: Sitting) Pulse 82 Temp 96.9 °F (36.1 °C) (Oral) Resp 16 Ht 5' 6\" (1.676 m) Wt 142 lb 9.6 oz (64.7 kg) SpO2 95% BMI 23.02 kg/m² ECOG PS: 1 General: No distress Eyes: PERRLA, anicteric sclerae HENT: Atraumatic, OP clear Neck: Supple Lymphatic: No cervical, supraclavicular, or inguinal adenopathy Respiratory: CTAB, normal respiratory effort CV: Normal rate, regular rhythm, no murmurs, no peripheral edema GI: Soft, nontender, nondistended, no masses, no hepatomegaly, no splenomegaly MS: Normal gait and station. Digits without clubbing or cyanosis. Skin: No rashes, ecchymoses, or petechiae. Normal temperature, turgor, and texture. Psych: Alert, oriented, appropriate affect, normal judgment/insight Results:  
 
Lab Results Component Value Date/Time WBC 5.8 11/17/2019 12:15 PM  
 HGB 13.1 11/17/2019 12:15 PM  
 HCT 40.0 11/17/2019 12:15 PM  
 PLATELET 196 78/55/6346 12:15 PM  
 MCV 90.1 11/17/2019 12:15 PM  
 ABS. NEUTROPHILS 4.1 11/17/2019 12:15 PM  
 
Lab Results Component Value Date/Time Sodium 139 11/17/2019 12:15 PM  
 Potassium 3.9 11/17/2019 12:15 PM  
 Chloride 106 11/17/2019 12:15 PM  
 CO2 29 11/17/2019 12:15 PM  
 Glucose 95 11/17/2019 12:15 PM  
 BUN 12 11/17/2019 12:15 PM  
 Creatinine 0.95 11/17/2019 12:15 PM  
 GFR est AA >60 11/17/2019 12:15 PM  
 GFR est non-AA >60 11/17/2019 12:15 PM  
 Calcium 9.1 11/17/2019 12:15 PM  
 Glucose (POC) 94 11/17/2019 12:13 PM  
 
Lab Results Component Value Date/Time Bilirubin, total 0.6 11/17/2019 12:15 PM  
 ALT (SGPT) 25 11/17/2019 12:15 PM  
 AST (SGOT) 18 11/17/2019 12:15 PM  
 Alk. phosphatase 180 (H) 11/17/2019 12:15 PM  
 Protein, total 7.6 11/17/2019 12:15 PM  
 Albumin 3.8 11/17/2019 12:15 PM  
 Globulin 3.8 11/17/2019 12:15 PM  
 
 
Records reviewed and summarized above. Pathology report(s) reviewed above. Radiology report(s) reviewed above. Assessment:  
1) Metastatic non-small cell lung cancer (adenocarcinoma) PDL1 68%, EGFR/ALK/ROS1/BRAF negative He has disease within his lungs and bones. His cancer is not curable and management is with palliative intent. While the overall burden of disease within his lungs is rather small, the pathology results from the bone biopsy are most consistent with a lung primary, and no other apparent primary is evident on imaging. We reviewed his diagnosis, prognosis, and management options at length. With his PDL1 >50%, my recommendation is to initiate palliative immunotherapy with single agent pembrolizumab. We discussed the risks and benefits of pembrolizumab therapy today. Potential side effects include, but are not limited to fatigue, rash, auto-immune issues, infertility, allergic reactions, and rarely, death. The patient has consented to beginning therapy. We discussed the alternative option of combination chemotherapy and immunotherapy. However, without significant visceral disease and with his high PDL1, we agreed to start with immunotherapy and reserve chemotherapy for progression. We additionally discussed the option of enrollment into the QUILT2 trial, which randomizes patients to receive pembrolizumab +/- ALT-803. The research nurses met with him today and provided him with some information. He is going to give this some thought this weekend. We discussed NGS with Strata testing, and he consented to having testing performed. He would benefit from port placement for therapy. However, if this can not be done timely, we can start therapy with a PIV. I will obtain a CT C/A/P with contrast just before starting therapy, to serve as a baseline. RECIST criteria, as he is considering trial enrollment. 2) Osseous metastases Large cervical and thoracic lesions. T11 lesion extends into spinal canal.  He reports having an MRI of his spine at [facility], we are requesting these records and images. 3) Back pain Secondary to his metastatic disease. Worsened since biopsy. Start oxycodone PRN. Refer to palliative medicine. Refer to radiation oncology. If he needs radiation, hopefully it can be done in just a few fractions so that we can quickly get him onto systemic therapy. 4) COPD Following with pulmonary, Dr. Michel Ellison. Plan:  
 
· CT C/A/P with RECIST criteria · Port placement with IR 
· Oxycodone PRN pain · Request outside MRI images · Referral to palliative medicine · Referral to radiation oncology · Strata testing on pathology · Tentative plan to proceed next week with Pembrolizumab (200mg) given every 3 weeks · Labs: CBC, CMP prior to each cycle, TSH every 6 weeks · Research team to meet with him about QUILT 2 trial 
·  to meet with patient today, provide resources and counseling · Return to clinic with the start of therapy I appreciate the opportunity to participate in Mr. Patricia Church doris.  
 
Signed By: Khang Bhardwaj MD

## 2020-01-31 NOTE — ONCOLOGY PATHWAY NOTE
CLINICAL TRIAL SELECTED - Non-Small Cell Lung Trial: Quilt 2.023 
 
**Trial eligibility and accrual should be confirmed by your research team** Patient Characteristics: 
Stage IV Metastatic, Nonsquamous, Initial Chemotherapy/Immunotherapy, PS = 0, 1, ALK Translocation Negative/Unknown and EGFR Mutation Negative/Non-Sensitizing/Unknown, PD-L1 Expression Positive  ? 50% (TPS) and Immunotherapy Candidate AJCC T Category: T1b Current Disease Status: Distant Metastases AJCC N Category: N0 AJCC M Category: M1c 
AJCC 8 Stage Grouping: IVB Histology: Nonsquamous Cell ROS1 Rearrangement Status: Awaiting Test Results T790M Mutation Status: Not Applicable - EGFR Mutation Negative/Unknown Other Mutations/Biomarkers: No Other Actionable Mutations NTRK Gene Fusion Status: Awaiting Test Results PD-L1 Expression Status: PD-L1 Positive ? 50% (TPS) Chemotherapy/Immunotherapy LOT: Initial Chemotherapy/Immunotherapy Molecular Targeted Therapy: Not Appropriate ALK Translocation Status: Negative EGFR Mutation Status: Negative/Wild Type BRAF V600E Mutation Status: Negative Performance Status: PS = 0, 1 Immunotherapy Candidate Status: Candidate for Immunotherapy Intent of Therapy: 
Non-Curative / Palliative Intent, Discussed with Patient

## 2020-01-31 NOTE — PROGRESS NOTES
[address] at [facility] 
[phone] Chemotherapy education folder given to patient. Consent form reviewed and signed by patient and provider. Pt has been screened for all eligibility/ineligibility criteria for Strata and has been determined eligible for this protocol. Informed consent was reviewed by RN with patient prior to signing. All questions were answered adequately by RN. Patient signed consent today for study strata. A copy of ICF given to patient. No additional questions at this time.

## 2020-02-03 NOTE — PROGRESS NOTES
Spoke with patient's wife regarding patient participating in clinical trial.  Patient's wife states patient is in a lot of pain. She says she wants to take him to ED due to severe pain when he's coughing. Patient declines participating in trial as he wants to start treatment ASAP and does not wish to fulfill screening procedures prior.

## 2020-02-03 NOTE — TELEPHONE ENCOUNTER
[facility] [employer] at [place]  [phone]    02/03/20- Patient's wife stated patient's having a lot of pain with coughing. Taking oxycodone 5 mg every 4 hours, only having relief for 1.5 hours. Discussed with [employer], okay to have patient increase oxycodone to 2 tabs every 4 hours as needed. Discussed starting miralax daily to prevent constipation. She verbalized understanding. Also, patient has an allergy to IV contrast, reviewed the need to premedicate with prednisone and benadryl prior to CT scan. Reviewed dosing instructions with patient's wife. Patient is currently on a prednisone taper, taking 10 mg daily. Discussed with [employer], will order prednisone 40 mg to take 13 hours, 7 hours, and 1 hour prior to scan.

## 2020-02-03 NOTE — TELEPHONE ENCOUNTER
[address] at [place] [address]  [phone]    02/03/20- Fax sent to [address] [place] [address] radiology [phone] requesting to have imaging pushed to our system, confirmation received. Simon Martinez from [address] [place] [address] radiology confirmed the following imaging was pushed. -MRI spine done 11/25/19  -bone scan done 12/9/19  -MRI brain done 10/7/18    Attempted to call Silvia, phone rang continuously with no answer. 02/04/20- Cynthia Hayward was notified of imaging being pushed. Stated she will work on this, but probably will not be until tomorrow. 02/06/20- Confirmed with [facility]via that images are in the process of being pushed.

## 2020-02-05 NOTE — PROGRESS NOTES
[address] [place] [address] DISCHARGE INSTRUCTIONS FOR:  CHEMOTHERAPY / BIOTHERAPY Chemotherapy has the potential to cause many side effects. The following are general precautions that chemo patients should take: 1. Practice good hand washing: * Use soap and water for at least 15 seconds, covering all areas of hands. * Always wash hands before eating. * Wash hands after contact with public surfaces such as door knobs and 
       handles, shopping carts, telephones and elevator buttons. 2. Get plenty of rest:   
* You will likely experience fatigue three to five days following your treatment. It may last as long as seven days. 3. Drink plenty of fluids. Water is best. 
 
4. Eat a well balanced diet: 
* Small frequent meals may help if you are having trouble with nausea or your  appetite. Some people also do well with nutritional supplements. 5. Pace yourself with daily activities: * Take frequent breaks and ask for help if you need it. 6. Exercise is very important: 
* It will increase circulation and will help the fatigue. Do what you can each day. 7. If your regimen results in hair loss: *  You will likely notice effects between two and three weeks following your first treatment. Some lose all hair while others only experience thinning. 8. Practice good oral hygiene: 
 *  Notify your M.D. immediately if any mouth sores or discomfort develop. 9. Protect yourself from the sun. Signs/Symptoms of an allergic reaction and/or some side effects may require immediate medical attention. Notify your physician if you develop one or more of the following:  
 
Temperature of 100.5 degrees or greater;  
Skin redness, itching, swelling, blistering, weeping, crusting, rash, or hives;   
Wheezing, chest tightness, cough, or shortness of breath;  
Swelling of the face, eyelids, lips, tongue, or throat; 
Severe, persistent headache; 
Stuffy nose, runny nose, sneezing;  
 Red (bloodshot), itchy, swollen, or watery eyes;  
Stomach  pain, nausea, vomiting, diarrhea, or bloody stools; 
Mouth sores Your physician should also be aware of the following symptoms: 
 
Persistent and unresolved nausea and/or vomiting;  
Persistent and unresolved diarrhea or constipation;  
Numbness/tingling/burning of the extremities, including the fingers and toes; Bleeding or unexplained bruising; Unexplained redness/swelling/pain in the arms or legs; Shortness of breath or fatigue that worsens;  
Pain with urination or blood in the urine; Chills; 
Cough, especially a productive cough; 
Mouth sores or a white coating of the tongue; Redness, swelling, pain or drainage at the port-a-cath or IV site; Increased feeling of bloating or water retention; Excessive weight loss or gain; 
Ringing in the ears; Difficulty swallowing; 
Dizziness, vertigo, lightheadedness or fainting. Anjel Hotter Signature: ____________________________ 2/5/2020 Torey Casiano

## 2020-02-05 NOTE — PROGRESS NOTES
[facility] Palliative Medicine Office  Nursing Note  [phone]  Fax [phone]      Name:  Roosevelt Ramos  YOB: 1945    Received outpatient Palliative Medicine referral from Dr. Will Aguayo to see pt for symptom management and supportive care. Chart  reviewed. Roosevelt Ramos is a 76 y.o. male with lung cancer mets to bone with. Pt's most recent office visit with Dr. Will Aguayo was on 1/31/2020. Please see office visit note from 1/31/2020 for HPI, treatment history and plan. ACP:   NOT ON FILE                                                                                                                                                        Nurse called pt and introduced Palliative Medicine services. Spoke to wife and patient. Patient states he has intermittent R sided chest pain that worsens to an 8-9 when he coughs. He takes pain medication that helps but sometimes has to take more. He always has some level of pain. Cough present. Patient and wife agree to appointment for 2/6/2020 at  with Dr. Sangeeta Arredondo at [facility].

## 2020-02-05 NOTE — PROGRESS NOTES
Kesha Sargent is a 76 y.o. male follow up for lung cancer. 1. Have you been to the ER, urgent care clinic since your last visit? Hospitalized since your last visit?no     2. Have you seen or consulted any other health care providers outside of the [address] [place] [address] since your last visit? Include any pap smears or colon screening.  no

## 2020-02-05 NOTE — PROGRESS NOTES
[facility] Progress Note Date: 2020 Name: Etienne Smith MRN: [id number] : 1945 Mr. Karey Gomez Arrived in wheelchair with spouse and daughter and in no distress for cycle 1 day 1 of Keytruda regimen. Assessment was completed, no acute issues at this time, patient has chronic pain in R side. Cough with mucus production. R arm PIV accessed without difficulty, labs drawn and in process. Discharge instructions given. Chemotherapy Flowsheet 2020 Cycle C1D1 Proceeded to appt with . Mr. Richard Arce vitals were reviewed. Visit Vitals /63 Pulse 81 Temp 98.3 °F (36.8 °C) Resp 16 SpO2 94% Lab results were obtained and reviewed. Recent Results (from the past 12 hour(s)) CBC WITH 3 PART DIFF Collection Time: 20  1:12 PM  
Result Value Ref Range WBC 9.4 4.1 - 11.1 K/uL  
 RBC 4.00 (L) 4.10 - 5.70 M/uL  
 HGB 12.0 (L) 12.1 - 17.0 g/dL HCT 35.9 (L) 36.6 - 50.3 % MCV 89.8 80.0 - 99.0 FL  
 MCH 30.0 26.0 - 34.0 PG  
 MCHC 33.4 30.0 - 36.5 g/dL  
 RDW 16.0 (H) 11.8 - 15.8 % PLATELET 375 918 - 327 K/uL NEUTROPHILS 79 (H) 32 - 75 % MIXED CELLS 13 3.2 - 16.9 % LYMPHOCYTES 8 (L) 12 - 49 % ABS. NEUTROPHILS 7.5 1.8 - 8.0 K/UL  
 ABS. MIXED CELLS 1.2 0.2 - 1.2 K/uL  
 ABS. LYMPHOCYTES 0.7 (L) 0.8 - 3.5 K/UL  
 DF AUTOMATED Pre-medications  were administered as ordered and chemotherapy was initiated. Medications Administered 0.9% sodium chloride infusion Admin Date 
2020 Action New Bag Dose 25 mL/hr Rate 25 mL/hr Route IntraVENous Administered By Rashawn Tovar  
  
  
 pembrolizumab [facility]) 200 mg in 0.9% sodium chloride 100 mL, overfill volume 10 mL IVPB Admin Date 
2020 Action New Bag Dose 
200 mg Rate 236 mL/hr Route IntraVENous Administered By Lennis Block Mr. Karey Gomez tolerated treatment well and was [facility] in [facility] condition. He is to return on 2/26/20 for his next appointment. [facility] February 5, 2020

## 2020-02-05 NOTE — PROGRESS NOTES
Spiritual Care Partner Volunteer visited patient in [facility] on 2/5/2020. Documented by: 
Deliah Boast Alline Jericho.  Paging Service: [phone])

## 2020-02-05 NOTE — PROGRESS NOTES
[facility] [place] [facility] [place] [address] [facility]  Alisa Carterd: [phone]  F: [phone]      Reason for Visit:   Tho Barnhart is a 76 y.o. male who is seen in follow up for evaluation of lung cancer. Treatment History:   · CT A/P wo contrast 11/3/2019: New lytic lesion at T11  · MRI Brain 11/17/2019: no intracranial metastatic disease  · PET/CT 12/20/2019: RUL spiculated nodule, 1.4cm, SUV 3.2. Multifocal lytic osseous metastases in spine, ribs, sacrum, and iliac bones. Notably large lytic lesions involving cervical spine and T11. T11 lesion extends into spinal canal.  Suspicious precarinal mediastinal lymph node. 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 · CT guided biopsy of osseous metastasis 1/17/2020: metastatic adenocarcinoma, favor lung primary (TTF1+). PDL1 68%. EGFR/ALK/ROS1/BRAF negative. · Stage IVB (cT1b cN0 M1c) Non-small cell lung cancer  · Initiated palliative therapy with Pembrolizumab on 2/5/2020    History of Present Illness:   Back pain is improved with Oxycodone 10mg every 4 hours. Pain worse with coughing. No pain down legs. No weakness in legs. He is walking in the home with assistance of wife. Appetite is great. Bowels are moving well. Has Miralax at home, but hasn't started taking yet. He quit smoking in 9/2019, previously smoked up to 3ppd. He is accompanied by his wife and daughter. He lives nearby. PAST HISTORY: The following sections were reviewed and updated in the EMR as appropriate: PMH, SH, FH, Medications, Allergies. Allergies   Allergen Reactions    Adenosine Shortness of Breath    Iodinated Contrast Media Shortness of Breath    Pcn [Penicillins] Shortness of Breath     Patient reports to have tolerated a 10 day course of cefdinir started 8/24/17      Review of Systems: A complete review of systems was obtained, reviewed, and scanned into the EMR.   Pertinent findings reviewed above.    Physical Exam:     Visit Vitals  /64 (BP 1 Location: Left arm, BP Patient Position: Sitting)   Pulse 67   Temp 97 °F (36.1 °C) (Temporal)   Resp 18   Ht 5' 6\" (1.676 m)   Wt 142 lb (64.4 kg)   SpO2 94%   BMI 22.92 kg/m²     ECOG PS: 1  General: No distress, garbled speech at times  Eyes: PERRLA, anicteric sclerae  HENT: Atraumatic, OP clear  Neck: Supple  Lymphatic: No cervical, supraclavicular, or inguinal adenopathy  Respiratory: CTAB, normal respiratory effort  CV: Normal rate, regular rhythm, no murmurs, no peripheral edema  GI: Soft, nontender, nondistended, no masses, no hepatomegaly, no splenomegaly  MS: In wheelchair today. Digits without clubbing or cyanosis. Skin: No rashes, ecchymoses, or petechiae. Normal temperature, turgor, and texture. Psych: Alert, oriented, appropriate affect, normal judgment/insight    Results:     Lab Results   Component Value Date/Time    WBC 9.4 02/05/2020 01:12 PM    HGB 12.0 (L) 02/05/2020 01:12 PM    HCT 35.9 (L) 02/05/2020 01:12 PM    PLATELET 507 18/71/4998 01:12 PM    MCV 89.8 02/05/2020 01:12 PM    ABS. NEUTROPHILS 7.5 02/05/2020 01:12 PM     Lab Results   Component Value Date/Time    Sodium 138 02/05/2020 01:12 PM    Potassium 3.9 02/05/2020 01:12 PM    Chloride 101 02/05/2020 01:12 PM    CO2 32 02/05/2020 01:12 PM    Glucose 85 02/05/2020 01:12 PM    BUN 20 02/05/2020 01:12 PM    Creatinine 0.99 02/05/2020 01:12 PM    GFR est AA >60 02/05/2020 01:12 PM    GFR est non-AA >60 02/05/2020 01:12 PM    Calcium 9.1 02/05/2020 01:12 PM    Glucose (POC) 94 11/17/2019 12:13 PM    Creatinine (POC) 1.0 02/04/2020 01:45 PM     Lab Results   Component Value Date/Time    Bilirubin, total 0.8 02/05/2020 01:12 PM    ALT (SGPT) 23 02/05/2020 01:12 PM    AST (SGOT) 24 02/05/2020 01:12 PM    Alk.  phosphatase 129 (H) 02/05/2020 01:12 PM    Protein, total 7.1 02/05/2020 01:12 PM    Albumin 3.5 02/05/2020 01:12 PM    Globulin 3.6 02/05/2020 01:12 PM       Records reviewed and summarized above. Pathology report(s) reviewed above. Radiology report(s) reviewed above. Bone scan at OSH 12/9/2019: Moderately increased uptake in the lower thoracic spine, T11. No additional suspicious focus elsewhere. MRI thoracic and lumbar spine at OSH on 11/25/2019:   Mild compression fracture of T11. Subcentimeter enhancing lesions within the T8 vertebral body worrisome for metastatic disease with pathologic fracture. Enhancing lesions involving L1 and L3 and marrow lesions iliac bones worrisome for metastatic disease  Multilevel degenerative changes of the lumbar spine    Assessment:   1) Metastatic non-small cell lung cancer (adenocarcinoma)  PDL1 68%, EGFR/ALK/ROS1/BRAF negative  He has disease within his lungs and bones. His cancer is not curable and management is with palliative intent. While the overall burden of disease within his lungs is rather small, the pathology results from the bone biopsy are most consistent with a lung primary, and no other apparent primary is evident on imaging. We reviewed his diagnosis, prognosis, and management options at length. With his PDL1 >50%, my recommendation is to initiate palliative immunotherapy with single agent pembrolizumab. We discussed the risks and benefits of pembrolizumab therapy today. Potential side effects include, but are not limited to fatigue, rash, auto-immune issues, infertility, allergic reactions, and rarely, death. The patient has consented to beginning therapy. We additionally discussed the option of enrollment into the QUILT2 trial, which randomizes patients to receive pembrolizumab +/- ALT-803. The research nurses met with him but he ultimately decided against clinical trial.     Will proceed with C1 of therapy with Pembrolizumab therapy today. He will be seen with each cycle of therapy. Repeat imaging to assess response after C4. At progression he may be a candidate for the Quilt3 or LungMAP trials.     NGS with Strata testing is pending. 2) Osseous metastases  Large cervical and thoracic lesions. T11 lesion extends into spinal canal. MRI at outside hospital reviewed. Scanned under media. CT scan done yesterday with evidence of progressive bone lesions, right rib lesions appear new from bone scan. 3) Back pain  Secondary to his metastatic disease. Worsened since biopsy. Currently on oxycodone PRN. Dose increased to 10mg since last visit. Referred to palliative medicine with appointment pending. [facility] 4) COPD  Following with pulmonary, Dr. Ulices Booker. 5) Emotional Well Being  No psychosocial concerns identified today. Patient has adequate support. Plan:     · Oxycodone PRN pain  · Referral to palliative medicine-appt tomorrow  · To see radiation oncology tomorrow  · Strata testing on pathology-pending  · Proceed today with Pembrolizumab (200mg) given every 3 weeks  · Labs: CBC, CMP prior to each cycle, TSH every 6 weeks  · To follow up every 3 weeks on therapy    Patient was seen in conjunction with Katie Whiting NP.     Signed By: Gerald Ramirez MD

## 2020-02-06 NOTE — LETTER
2/7/2020 2:39 PM 
 
Mr. Melania Figueredos [address] [facility] [address] I'm sending this letter to provide the information you requested in order to process our request for coverage of OxyContin ER 10-mg for this patient. He has night sweats related to his cancer which makes transdermal fentanyl an inappropriate choice. Although his most recent creatinine level is 0.99 and considered within normal range by our laboratory, he is 76years old and most likely has impaired renal clearance by virtue of his age alone as is typical of the geriatric population as a whole. He has a history of CVA and TIAs  which may place him at additional risk for developing the neurotoxic side-effects seem with impaired clearance of morphine. Unfortunately, Coral Drone is not widely available in our local pharmacies and this patient is in a pain crisis. I appreciate your ongoing consideration of matter. Please let us know if you require any additional information. Sincerely, Traci Grant MD

## 2020-02-06 NOTE — PROGRESS NOTES
[address] [facility] Palliative Medicine Outpatient Psychosocial Assessment [phone] Reason for Assessment:   
Initial visit to PM OP clinic. Sources of Information:   
[x]Patient  [x]Family  []Staff  [x]Medical Record Narrative: 
Mr. Maylin Herrera is a 75 y/o man whose PMH includes: Non-small cell lung cancer with mets to bone; COPD; CVA. He is accompanied today by his wife, Lillie and daughter, Karols Galvin. Also has 2 other children, Izabela Arellano and Tom Paulino, and 9 grandchildren as well as 6 great grandchildren. Wife is exhausted due to his interrupted sleep, which is 3 hours at the most, although he thinks hes sleeping through the night. 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 Assessment: 
1. Introduced Palliative Social Work as part of the PM supportive team by providing emotional support, resource referrals, advocacy, assistance with AMDs and counseling. 2. Assessed coping skills learning pt relies on his wife to help him through tough times. Wife said she relies on God and prays. She said she tries not to think about the bad things too much, but focuses on the good. Wife indicated stress as the Caregiver, which will be assessed during a subsequent visit. 3. Addressed completing an AMD, but pt does not wish to complete one at this point.  (see ACP note) Advance Care Planning: 
  Primary Decision Maker: Dipika Gonzales Spouse - [phone] Advance Care Planning 2/6/2020 Patient's Healthcare Decision Maker is: Verbal statement (Legal Next of Kin remains as decision maker) Primary Decision Maker Name - Secondary Decision Maker Name - Secondary Decision Maker Phone Number - Secondary Decision Maker Relationship to Patient -  
Confirm Advance Directive None Patient Would Like to Complete Advance Directive No  
 
 
Mental Status:   
[x]Alert  []Lethargic  []Unresponsive Oriented to:  [x]Person  [x]Place  [x]Time  [x]Situation Barriers to Learning:   
[]Language  []Developmental  []Cognitive  []Altered Mental Status  [x]Forgetful []Visual/Hearing Impairment  []Unable to Read/Write  []Motivational   []No Barriers Identified  []Other: 
 
Relationship Status: 
[]Single  [x]  []Significant Other/Life Partner  []  []  [] Living Circumstances: 
[]Lives Alone  [x]Family/Significant Other in Household: Wife and son, Gavino Burger  []Roommates  []Children in the Home  []Paid Caregivers  []Assisted Living Facility/Group Home  []Skilled [address]  []Homeless  []Incarcerated  []Environmental/Care Concerns  []Transportation Issues  [] Issues  []Domestic Violence []Other: 
 
Support System:   
[x]Strong  []Fair  []Limited: How often are you with people who are supportive? Daily What about communicating via phone? Often Are there organizations you are involved with? No 
 
 
Sleep Habits:  
[]Poor [x]Unsatisfactory []Satisfactory []Good []Very Good Specific sleep problems? Pain Financial/Legal Concerns:   
[]Uninsured  [x]Limited Income/Resources*  []Non-Citizen  []Utility Issues  []Food Insecurity []No Concerns Identified  []Financial POA:   
[]Other: *Co-payments are becoming too much; wife has filled out an application for assistance with LCSW at the [facility] Mormon/Spiritual/Existential: 
[]Strong Sense of Spirituality  []Involved in [employer] []Request  Visit  []Expressing Spiritual/Existential Angst  [x]No Concerns Identified Coping with Illness: Its natural to experience depression and stress with serious illness. Have you had this experience? Yes How do you manage depression or stress? \"My wife helps me through. \"   
 
  
 
 
 
 
Patient:  
 
 
 
 
Family/Caregiver:  
Understanding and Acceptance of Illness/Prognosis  [] [x] Strong Sense of Resilience [] [x] Self Reflection [x] [x] Engaged Support System [x] [x] Does not Readily Discuss Illness [] [] Denial of Terminal Status [] [] Anger [] [] Depression [] [] Anxiety/Fear [x] [] Bargaining [x] [] Recent Diagnosis/Prognosis [] [] Difficulties with Body Image [] [] Loss of Identity [] [] Excessive Substance Use [] [] Mental Health History [] []  
Enmeshed Relationships [] [] History of Loss [] [] Anticipatory Grief [] [x] Concern for Complicated Grief [] [] Suicidal Ideation or Plan [] [] Caregiver Stress [] [x] Unable to assess [] [] Goals/Plan:  
Ongoing psychosocial support including assessments, links with resources and counseling prn. Ivana Strickland, RIP, MSSW, LCSW Palliative  [address] [facility] 
[phone]

## 2020-02-06 NOTE — PATIENT INSTRUCTIONS
Dear Tho Barnhart , It was a pleasure seeing you today in [facility]. We will see you again in 2 weeks to coordinate with your return to the [facility]. If labs or imaging tests have been ordered for you today, please call the office  at [phone] 48 hours after completion to obtain the results. Your stated goal:  
 
Your described symptoms were: Fatigue: 8 Drowsiness: 7 Depression: 9 Pain: 10 Anxiety: 0 Nausea: 6 Anorexia: 0 Dyspnea: 8 Best Well-Being: 10 Constipation: No  
Other Problem (Comment): 0 This is the plan we talked about: 1. Right-sided back pain 
-Start extended-release oxycodone (OxyContin) 10-mg every 12 hours. 
-Start by taking 1 tab at bedtime for 3 days, then add the morning dose 
-Continue oxycodone 5-mg: take 2 pills every 3 hours as needed 
-You have an appointment today with the Radiation Oncologist about getting radiation therapy to the painful bone lesions 
-You've been on a prednisone taper for your COPD and didn't notice any improvement in your pain when taking this medication 2. Depression 
-Your cancer diagnosis was understandably a big shock which you and your family are coming to terms with 
-You shared that you developed anxiety and depression after your stroke 
-You've been taking Lexapro and clonazepam which helped a lot 
-Your doctor also prescribed Wellbutrin to help you stop smoking 
-You've been taking these medications for years and find then helpful 
-Today our , Lashon Luciano, sat in during our visit and met with you afterwards to describe the other supportive services available in our clinic 3. Fatigue 
-We talked today about the many factors affecting your energy, including your poor sleep due to your pain and coughing, the stress of your new diagnosis and the cancer itself 
-We're working together to get your pain under better control which may allow you to sleep more soundly at night 4. Shortness of breath/cough 
-This is related to your COPD 
-You have a pulmonologist who works with you 5. Lung cancer 
-You started cancer therapy yesterday with an immune agent called pembrolizumab Rod Lester 
-You will be coming to the infusion center every 3 weeks for your treatment This is what you have shared with us about Advance Care Planning: 
 
  Primary Decision Maker: Marilee Bills Spouse - [phone] Advance Care Planning 2/6/2020 Patient's Healthcare Decision Maker is: Verbal statement (Legal Next of Kin remains as decision maker) Primary Decision Maker Name - Secondary Decision Maker Name - Secondary Decision Maker Phone Number - Secondary Decision Maker Relationship to Patient -  
Confirm Advance Directive None Patient Would Like to Complete Advance Directive No  
 
 
 
 
The Palliative Medicine Team is here to support you and your family. Sincerely, Angel Cantu MD and the Palliative Medicine Team

## 2020-02-06 NOTE — PROGRESS NOTES
Call made to [address] [place] [address], with regards to the oxycodone Er 10 mg tab. I spoke with Ignacia the Pharmacist. There are 42 tabs in stock it will take a day or so before the remaining quantity is available. The Pharmacist tried to process the Rx the code rejection was non formulary. Call made to [facility] [phone] ID # [id number]. I requested an expedited PA. Please allow 24 hrs for review PA reference # [id number].

## 2020-02-06 NOTE — PROGRESS NOTES
Palliative Medicine Outpatient Services [place]: [phone] ([phone]) Patient Name: Francesca De La Torre YOB: 1945 Date of Current Visit: 20 Location of Current Visit:   
[] [facility] Office [x] [facility] Office [] [address] [place] [address] 
[] Home 
[] Other:   
 
Date of Initial Visit: 2020 Referral from: Sohan Guerrero MD 
Date of Initial Referral: 
Primary Care Physician: Soni Wakefield MD 
  
 SUMMARY:  
Francesca De La Torre is a 76y.o. 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 T11 lesion extends into spinal canal), ribs, sacrum and iliac bones, who was referred to Palliative Medicine by Dr. Jody Burr for symptom management. He was diagnosed in 2019. He is currently being treated with pembrolizumab. The patients social history includes: he is  to Lillie. He has 2 daughters, Lam Rodriguez and Lillie, and a son, Sherry Rodríguez. 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 Palliative Medicine is addressing the following current patient/family concerns: right low back pain due to bony metastatic disease; depression; fatigue; shortness of breath; cough; advanced care planning. Initial Referral Intake note reviewed PALLIATIVE DIAGNOSES:  
 
  ICD-10-CM ICD-9-CM 1. Right-sided low back pain without sciatica, unspecified chronicity M54.5 724.2 oxyCODONE IR (ROXICODONE) 5 mg immediate release tablet  
   oxyCODONE ER (OXYCONTIN) 10 mg ER tablet 2. Depression, unspecified depression type F32.9 311 3. Neoplastic malignant related fatigue R53.0 780.79   
4. Shortness of breath R06.02 786.05   
5. Lung cancer metastatic to bone (HCC) C34.90 162.9 oxyCODONE IR (ROXICODONE) 5 mg immediate release tablet  
 C79.51 198.5 oxyCODONE ER (OXYCONTIN) 10 mg ER tablet PLAN:  
Patient Instructions Dear Francesca De La Torre , 
 
 It was a pleasure seeing you today in [facility]. We will see you again in 2 weeks to coordinate with your return to the [facility]. If labs or imaging tests have been ordered for you today, please call the office  at [phone] 48 hours after completion to obtain the results. Your stated goal:  
 
Your described symptoms were: Fatigue: 8 Drowsiness: 7 Depression: 9 Pain: 10 Anxiety: 0 Nausea: 6 Anorexia: 0 Dyspnea: 8 Best Well-Being: 10 Constipation: No  
Other Problem (Comment): 0 This is the plan we talked about: 1. Right-sided back pain 
-Start extended-release oxycodone (OxyContin) 10-mg every 12 hours. 
-Start by taking 1 tab at bedtime for 3 days, then add the morning dose 
-Continue oxycodone 5-mg: take 2 pills every 3 hours as needed 
-You have an appointment today with the Radiation Oncologist about getting radiation therapy to the painful bone lesions 
-You've been on a prednisone taper for your COPD and didn't notice any improvement in your pain when taking this medication 2. Depression 
-Your cancer diagnosis was understandably a big shock which you and your family are coming to terms with 
-You shared that you developed anxiety and depression after your stroke 
-You've been taking Lexapro and clonazepam which helped a lot 
-Your doctor also prescribed Wellbutrin to help you stop smoking 
-You've been taking these medications for years and find then helpful 
-Today our , Staci De La Cruz, sat in during our visit and met with you afterwards to describe the other supportive services available in our clinic 3. Fatigue 
-We talked today about the many factors affecting your energy, including your poor sleep due to your pain and coughing, the stress of your new diagnosis and the cancer itself 
-We're working together to get your pain under better control which may allow you to sleep more soundly at night 4. Shortness of breath/cough 
-This is related to your COPD 
-You have a pulmonologist who works with you 5. Lung cancer 
-You started cancer therapy yesterday with an immune agent called pembrolizumab Jaci Galeano 
-You will be coming to the infusion center every 3 weeks for your treatment This is what you have shared with us about Advance Care Planning: 
 
  Primary Decision Maker: Dasia Avalos Spouse - [phone] Advance Care Planning 2/6/2020 Patient's Healthcare Decision Maker is: Verbal statement (Legal Next of Kin remains as decision maker) Primary Decision Maker Name - Secondary Decision Maker Name - Secondary Decision Maker Phone Number - Secondary Decision Maker Relationship to Patient -  
Confirm Advance Directive None Patient Would Like to Complete Advance Directive No  
 
 
 
 
The Palliative Medicine Team is here to support you and your family. Sincerely, Dearl MD [place] and the Palliative Medicine Team 
 
 
 GOALS OF CARE / TREATMENT PREFERENCES:  
[====Goals of Care====] GOALS OF CARE: 
Patient / health care proxy stated goals: See Patient Instructions / Summary TREATMENT PREFERENCES:  
Code Status:  [x] Attempt Resuscitation       [] Do Not Attempt Resuscitation Advance Care Planning: 
[x] The [facility] Interdisciplinary Team has updated the ACP Navigator with Decision Maker and Patient Capacity Primary Decision Maker: Jose Fofana - Spouse - [phone] Advance Care Planning 2/6/2020 Patient's Healthcare Decision Maker is: Verbal statement (Legal Next of Kin remains as decision maker) Primary Decision Maker Name - Secondary Decision Maker Name - Secondary Decision Maker Phone Number - Secondary Decision Maker Relationship to Patient -  
Confirm Advance Directive None Patient Would Like to Complete Advance Directive No  
 
 
Other: 
(If patient appropriate for POST, consider using PALLPOST smart phrase here) The palliative care team has discussed with patient / health care proxy about goals of care / treatment preferences for patient. 
[====Goals of Care====] PRESCRIPTIONS GIVEN:  
 
Medications Ordered Today Medications  oxyCODONE IR (ROXICODONE) 5 mg immediate release tablet Sig: Take 1 Tab by mouth every four (4) hours as needed for Pain for up to 12 days. Max Daily Amount: 30 mg. Dispense:  140 Tab Refill:  0  
 oxyCODONE ER (OXYCONTIN) 10 mg ER tablet Sig: Take 1 Tab by mouth every twelve (12) hours for 30 days. Max Daily Amount: 20 mg. Dispense:  60 Tab Refill:  0  
  
 
 
 FOLLOW UP: Future Appointments Date Time Provider Carmen Venturai 2/26/2020  1:00 PM [facility]  
2/26/2020  1:45 PM Chino Dawkins NP [address] [place] [address], [address]  
3/18/2020  1:00 PM [facility]  
3/18/2020  1:45 PM Chino Dawkins NP [facility]  
4/8/2020  1:00 PM SS INF5 CH4 <1H [facility]  
  
 
 
 PHYSICIANS INVOLVED IN CARE:  
Patient Care Team: 
Nancy Lawson MD as PCP - General (Family Practice) Anila Newby MD (Hematology and Oncology) Radha Veloz MD (Pulmonary Disease) Juwan Hamm MD (Radiation Oncology) Logan Cortez MD (Palliative Medicine) Logan Cortez MD as Physician (Palliative Medicine) HISTORY:  
Reviewed patient-completed ESAS and advance care planning form. Reviewed patient record in prescription monitoring program. 
 
CHIEF COMPLAINT:  
Chief Complaint Patient presents with  Back Pain HPI/SUBJECTIVE: The patient is: [x] Verbal / [] Nonverbal  
 
He has a lot of pain. The pain is in his lower back, started about a month ago. He just can't get comfortable. He's been taking oxycodone every 4 hours around the clock, first 1 pill now 2 pills at a time for the past week. The higher dose helped for a few days but it's not helping anymore His pulmonologist started prednisone a few weeks ago, he's on his last dose today He didn't notice any difference in his pain when he started taking this. He has an appointment with radiation oncology today He isn't sleeping, only for a few hours at a time He's very tired He's doing OK, though It was hard learning he has cancer, a big shock He's still getting used to the idea He had a stroke years ago ,started taking medicine for anxiety then (clonazepam, lexapro) He started taking Wellbutrin to help him stop smoking His wife noticed a big difference in him after he started taking these medicines (for the good) He gets short of breath easily He's been coughing a lot but this is getting better His appetite is good He's had no nausea He's moving his bowels He started his first cancer treatment this week Clinical Pain Assessment (nonverbal scale for nonverbal patients):  
[++++ Clinical Pain Assessment++++] [++++Pain Severity++++]: Pain: 10 
[++++Pain Character++++]: hurts [++++Pain Duration++++]: about a month 
[++++Pain Effect++++]: staying in bed  
[++++Pain Factors++++]: coughing makes it worse 
[++++Pain Frequency++++]: constant with varying intensity [++++Pain Location++++]: right low back, non-radiating 
[++++ Clinical Pain Assessment++++] FUNCTIONAL ASSESSMENT:  
 
Palliative Performance Scale (PPS): PPS: 70 PSYCHOSOCIAL/SPIRITUAL SCREENING:  
 
Any spiritual / Religion concerns: 
[] Yes /  [x] No 
 
Caregiver Burnout: 
[] Yes /  [x] No /  [] No Caregiver Present Anticipatory grief assessment:  
[x] Normal  / [] Maladaptive ESAS Anxiety: Anxiety: 0 
 
ESAS Depression: Depression: 9 REVIEW OF SYSTEMS:  
 
The following systems were [x] reviewed / [] unable to be reviewed Systems: constitutional, ears/nose/mouth/throat, respiratory, gastrointestinal, genitourinary, musculoskeletal, integumentary, neurologic, psychiatric, endocrine. Positive findings noted below. Modified ESAS Completed by: provider Fatigue: 8 Drowsiness: 7 Depression: 9 Pain: 10 Anxiety: 0 Nausea: 6 Anorexia: 0 Dyspnea: 8 Best Well-Being: 10 Constipation: No  
Other Problem (Comment): 0 PHYSICAL EXAM:  
 
Wt Readings from Last 3 Encounters:  
02/07/20 141 lb 1.5 oz (64 kg) 02/06/20 143 lb (64.9 kg) 02/05/20 142 lb (64.4 kg) Blood pressure 127/59, pulse 86, temperature 97.3 °F (36.3 °C), temperature source Oral, resp. rate 16, height 5' 6\" (1.676 m), weight 143 lb (64.9 kg), SpO2 90 %. Last bowel movement: See Nursing Note Constitutional: sitting in wheelchair, appears uncomfortable and fatigued Eyes: pupils equal, anicteric ENMT: no nasal discharge, moist mucous membranes Cardiovascular: regular rhythm, distal pulses intact Respiratory: breathing not labored, symmetric Gastrointestinal: soft non-tender, +bowel sounds Musculoskeletal: no deformity, no tenderness to palpation Skin: warm, dry Neurologic: following commands, moving all extremities Psychiatric: full affect, no hallucinations Other: 
 
 
 HISTORY:  
 
Past Medical History:  
Diagnosis Date  Asthma  CAD (coronary artery disease)  Cancer ([facility] [address] \"10 years ago\" throat  COPD (chronic obstructive pulmonary disease) (HCC)  CVA (cerebral vascular accident) ([facility] [address]  H/O mechanical aortic valve replacement CT surgery 1994 - CABG (LIMA to LAD) and AVR ([facility])  Hypertension  S/P CABG x 1   
 CT surgery 1994 - CABG (LIMA to LAD) and AVR ([facility]) Past Surgical History:  
Procedure Laterality Date  CARDIAC SURG PROCEDURE UNLIST  HX HEART VALVE SURGERY    
 CT surgery 1994 - CABG (LIMA to LAD) and AVR ([facility])  HX HEMORRHOIDECTOMY Family History Problem Relation Age of Onset  Heart Disease Mother  Diabetes Mother  Hypertension Mother  Heart Disease Sister  Hypertension Sister  Hypertension Father  Cancer Brother   
     lung  Diabetes Brother  Heart Disease Brother  Hypertension Brother  Stroke Brother History reviewed, no pertinent family history. Social History Tobacco Use  Smoking status: Former Smoker Packs/day: 3.00 Years: 40.00 Pack years: 120.00  Smokeless tobacco: Never Used Substance Use Topics  Alcohol use: No  
 
Allergies Allergen Reactions  Adenosine Shortness of Breath  Iodinated Contrast Media Shortness of Breath  Pcn [Penicillins] Shortness of Breath Patient reports to have tolerated a 10 day course of cefdinir started 8/24/17 Current Outpatient Medications Medication Sig  
 oxyCODONE IR (ROXICODONE) 5 mg immediate release tablet Take 1 Tab by mouth every four (4) hours as needed for Pain for up to 12 days. Max Daily Amount: 30 mg.  
 hydroxypropyl methylcellulose (GENTEAL) 0.2 % ophthalmic solution 2 drops right eye every 1-2 hours while awake  predniSONE (DELTASONE) 10 mg tablet 6 tabs po daily x 5 days then 5 tabs for 1 day, 4 tabs for 1 day, 3 tabs for 1 day, 2 tabs for 1 day then 1 tab for 1 day  buPROPion SR (WELLBUTRIN SR) 150 mg SR tablet Take 150 mg by mouth two (2) times a day.  latanoprost (XALATAN) 0.005 % ophthalmic solution Administer 1 Drop to both eyes nightly.  amLODIPine (NORVASC) 10 mg tablet Take 10 mg by mouth daily.  albuterol (PROVENTIL VENTOLIN) 2.5 mg /3 mL (0.083 %) nebulizer solution 3 mL by Nebulization route every four (4) hours as needed for Wheezing.  atorvastatin (LIPITOR) 80 mg tablet Take 80 mg by mouth every evening.  warfarin (COUMADIN) 5 mg tablet Take 5 mg by mouth every evening.  aspirin delayed-release 81 mg tablet Take 81 mg by mouth daily.  albuterol (PROVENTIL HFA, VENTOLIN HFA, PROAIR HFA) 90 mcg/actuation inhaler Take 2 Puffs by inhalation every six (6) hours as needed.  fluticasone-salmeterol (ADVAIR) 500-50 mcg/dose diskus inhaler Take 1 Puff by inhalation every twelve (12) hours.  clonazepam (KLONOPIN) 0.5 mg tablet Take 0.5 mg by mouth three (3) times daily.  escitalopram oxalate (LEXAPRO) 10 mg tablet Take 10 mg by mouth daily.  isosorbide mononitrate (ISMO, MONOKET) 10 mg tablet Take 10 mg by mouth three (3) times daily. Patient takes at 9am, noon, and 5pm  
 oxyCODONE ER (OXYCONTIN) 10 mg ER tablet Take 1 Tab by mouth every twelve (12) hours for 30 days. Max Daily Amount: 20 mg.  promethazine (PHENERGAN) 12.5 mg tablet Take 12.5 mg by mouth daily. Indications: Nausea No current facility-administered medications for this visit. LAB DATA REVIEWED:  
 
Lab Results Component Value Date/Time WBC 9.4 02/05/2020 01:12 PM  
 HGB 12.0 (L) 02/05/2020 01:12 PM  
 PLATELET 467 54/30/7476 01:12 PM  
 
Lab Results Component Value Date/Time Sodium 138 02/05/2020 01:12 PM  
 Potassium 3.9 02/05/2020 01:12 PM  
 Chloride 101 02/05/2020 01:12 PM  
 CO2 32 02/05/2020 01:12 PM  
 BUN 20 02/05/2020 01:12 PM  
 Creatinine 0.99 02/05/2020 01:12 PM  
 Calcium 9.1 02/05/2020 01:12 PM  
 Magnesium 2.0 06/22/2019 02:04 AM  
 Phosphorus 2.3 (L) 06/22/2019 02:04 AM  
  
Lab Results Component Value Date/Time AST (SGOT) 24 02/05/2020 01:12 PM  
 Alk. phosphatase 129 (H) 02/05/2020 01:12 PM  
 Protein, total 7.1 02/05/2020 01:12 PM  
 Albumin 3.5 02/05/2020 01:12 PM  
 Globulin 3.6 02/05/2020 01:12 PM  
 
Lab Results Component Value Date/Time INR 2.6 (H) 11/17/2019 12:15 PM  
 INR (POC) 1.2 (H) 02/07/2020 07:45 AM  
 Prothrombin time 25.2 (H) 11/17/2019 12:15 PM  
 aPTT 26.5 11/03/2019 06:57 PM  
  
No results found for: IRON, FE, TIBC, IBCT, PSAT, FERR  
 
CT 2/4/2020: 
FINDINGS:  
  
THYROID: No nodule. MEDIASTINUM: No mass or lymphadenopathy. MAXINE: No mass or lymphadenopathy. THORACIC AORTA: Aortic atherosclerotic change. MAIN PULMONARY ARTERY: Normal in caliber. TRACHEA/BRONCHI: Patent. ESOPHAGUS: No wall thickening or dilatation. HEART: Post median sternotomy. Coronary artery disease. PLEURA: Chronic pleural thickening. Chronic basilar atelectatic change. Emphysematous changes are noted. Increase atelectasis/bronchiectatic change in 
the left upper lobe. LUNGS: No definite mass lesion. Nodular density in the right upper lobe is 
irregular. 3-13. 
  
LIVER: No mass or biliary dilatation. There is no intrahepatic duct dilatation. The CBD is not dilated. There is no hepatic parenchymal mass. Hepatic 
enhancement pattern is within normal limits. Portal vein is patent. GALLBLADDER:  No dilatation or wall thickening. SPLEEN/PANCREAS: No mass. There is no pancreatic duct dilatation. There is no 
evidence of splenomegaly. ADRENALS/KIDNEYS: Bilateral renal cysts and renal cortical thinning. There is 
no hydronephrosis. There is no renal or ureteral calculus. There is no renal 
mass. There is no perinephric mass. COLON AND SMALL BOWEL: Colonic diverticula without thyromegaly radiculitis. Fecal stasis. There is no free intraperitoneal air. There is no evidence of 
incarceration or obstruction. No mesenteric adenopathy. PERITONEUM: Unremarkable. APPENDIX: Unremarkable. BLADDER/REPRODUCTIVE ORGANS: No mass or calculus. OSSEOUS STRUCTURES: Lytic lesion along the posterior margin of the L3 vertebral 
body with epidural soft tissue component. Lytic focus at T11 and T8 likely 
osseous metastasis at T2 and L5 as well. Scattered lytic foci in the ribs on the 
right, T2-32, 2-41 with pathologic fractures. 2-33 2-28. RETROPERITONEUM: 
Unremarkable. The abdominal aorta is normal in caliber. No aneurysm.  No 
retroperitoneal adenopathy. 
  
IMPRESSION: 
Baseline research examination. 
  
Interval progression of osseous metastatic disease with new subtle lytic foci in 
 the ribs on the right. Pathologic deformities. Spinal osseous metastases 
increased in overall volume as well. Epidural soft tissue is noted at the L3 
level. Moderate emphysematous changes. Chronic parenchymal scarring. Chronic pleural thickening as well. Stable scarring/spiculated nodule in the right upper lobe. Peggyann Gang Coronary artery disease.  Status post median sternotomy. 
  
 CONTROLLED SUBSTANCES SAFETY ASSESSMENT (IF ON CONTROLLED SUBSTANCES):  
 
Reviewed opioid safety handout:  [x] Yes   [] No 
24 hour opioid dose >150mg morphine equivalent/day:  [] Yes   [x] No 
Benzodiazepines:  [] Yes   [] No 
Sleep apnea:  [] Yes   [] No 
Urine Toxicology Testing within last 6 months:  [] Yes   [x] No 
History of or new aberrant medication taking behaviors:  [] Yes   [] No 
Has Narcan been prescribed [] Yes   [x] No 
 
   
 
Total time:  
Counseling / coordination time:  
> 50% counseling / coordination?:

## 2020-02-06 NOTE — PROGRESS NOTES
Palliative Medicine Office Visit Palliative Medicine Nurse Check In 
[phone] Patient Name: Hola Frye YOB: 1945 Date of Office Visit:  2/6/2020 Patient states: States when he does have pain its in the right lower side. Also feels fatigued with SOB on exertion. From Check In Sheet (scanned in Media): 
Has a medical provider talked with you about cardiopulmonary resuscitation (CPR)? [x] Yes   [] No   [] Unable to obtain Nurse reminder to complete or update ACP FlowSheet: 
 
Is ACP on the Problem List?    [] Yes    [x] No 
IF ACP Document is ON FILE; Nurse to place ACP on Problem List  
 
Is there an ACP Note in Chart Review/Note? [] Yes    [x] No  
If NO: ALERT PROVIDER Advance Care Planning 2/5/2020 Patient's Healthcare Decision Maker is: Legal Next of Kin Primary Decision Maker Name - Secondary Decision Maker Name - Secondary Decision Maker Phone Number - Secondary Decision Maker Relationship to Patient -  
Confirm Advance Directive None Patient Would Like to Complete Advance Directive No  
  
  Primary Decision MakerAzell Day Spouse - [phone] Advance Care Planning 2/6/2020 Patient's Healthcare Decision Maker is: Verbal statement (Legal Next of Kin remains as decision maker) Primary Decision Maker Name - Secondary Decision Maker Name - Secondary Decision Maker Phone Number - Secondary Decision Maker Relationship to Patient -  
Confirm Advance Directive None Patient Would Like to Complete Advance Directive No  
 
 
 
Is there anything that we should know about you as a person in order to provide you the best care possible? Have you been to the ER, urgent care clinic since your last visit? [] Yes   [x] No   [] Unable to obtain Have you been hospitalized since your last visit? [] Yes   [x] No   [] Unable to obtain Have you seen or consulted any other health care providers outside of the [address] since your last visit? [] Yes   [x] No   [] Unable to obtain Functional status (describe):  Able to walk freely with rollator walker at home. Last BM:2/6/2020  accessed (date):  2/5/20 Bottle review (for opioid pain medication): 
Medication 1:  
Date filled:  
Directions:  
# filled: # left: # pills taking per day: 
Last dose taken: 
 
Medication 2:  
Date filled:  
Directions:  
# filled: # left: # pills taking per day: 
Last dose taken: 
 
Medication 3:  
Date filled:  
Directions:  
# filled: # left: # pills taking per day: 
Last dose taken: 
 
Medication 4:  
Date filled:  
Directions:  
# filled: # left: # pills taking per day: 
Last dose taken:

## 2020-02-06 NOTE — ACP (ADVANCE CARE PLANNING)
Advance Care Planning LCSW educated pt re: the purpose of an Advance Directive for naming a health care agent as well as stating medical wishes. Pt chose not to create an AMD today, saying his wife will be his medical decision-maker. Primary Decision Maker: Faith Hernandez - Spouse - [phone] Advance Care Planning 2/6/2020 Patient's Healthcare Decision Maker is: Verbal statement (Legal Next of Kin remains as decision maker) Primary Decision Maker Name - Secondary Decision Maker Name - Secondary Decision Maker Phone Number - Secondary Decision Maker Relationship to Patient -  
Confirm Advance Directive None Patient Would Like to Complete Advance Directive No  
 
 
Sterling Law, CCM, MSSW, LCSW Palliative  [address] [place] [address] 
[phone]

## 2020-02-07 NOTE — TELEPHONE ENCOUNTER
Appeal was sent times 2 to [facility]. Received approval start date 2/7/20 through 12/31/20. Call made to [facility] on [facility]. I was informed yesterday there were 42 tabs available. The cash price is $195. Call made to Mrs Sarah Lopez left a V/M advised to please call PM back.

## 2020-02-07 NOTE — H&P
Radiology History and Physical    Patient: Aakash Bundy 76 y.o. male       Chief Complaint: No chief complaint on file. History of Present Illness: port placement    History:    Past Medical History:   Diagnosis Date    Asthma     CAD (coronary artery disease)     Cancer ([facility] [address] \"10 years ago\"    throat    COPD (chronic obstructive pulmonary disease) ([facility]     CVA (cerebral vascular accident) ([facility] [address]     H/O mechanical aortic valve replacement     CT surgery 1994 - CABG (LIMA to LAD) and AVR ([facility])    Hypertension     S/P CABG x 1     CT surgery 1994 - CABG (LIMA to LAD) and AVR ([facility])     Family History   Problem Relation Age of Onset    Heart Disease Mother     Diabetes Mother     Hypertension Mother     Heart Disease Sister     Hypertension Sister     Hypertension Father     Cancer Brother         lung    Diabetes Brother     Heart Disease Brother     Hypertension Brother     Stroke Brother      Social History     Socioeconomic History    Marital status:      Spouse name: Dar Hutchins    Number of children: 3    Years of education: Not on file    Highest education level: Not on file   Occupational History    Not on file   Social Needs    Financial resource strain: Somewhat hard    Food insecurity:     Worry: Never true     Inability: Never true    Transportation needs:     Medical: No     Non-medical: No   Tobacco Use    Smoking status: Former Smoker     Packs/day: 3.00     Years: 40.00     Pack years: 120.00    Smokeless tobacco: Never Used   Substance and Sexual Activity    Alcohol use: No    Drug use: No    Sexual activity: Not Currently   Lifestyle    Physical activity:     Days per week: Patient refused     Minutes per session: Patient refused    Stress:  Only a little   Relationships    Social connections:     Talks on phone: More than three times a week     Gets together: More than three times a week     Attends Restoration service: Never     Active member of club or organization: No     Attends meetings of clubs or organizations: Never     Relationship status:     Intimate partner violence:     Fear of current or ex partner: No     Emotionally abused: No     Physically abused: No     Forced sexual activity: No   Other Topics Concern    Not on file   Social History Narrative    Not on file       Allergies: Allergies   Allergen Reactions    Adenosine Shortness of Breath    Iodinated Contrast Media Shortness of Breath    Pcn [Penicillins] Shortness of Breath     Patient reports to have tolerated a 10 day course of cefdinir started 8/24/17       Current Medications:  No current facility-administered medications for this encounter. Physical Exam:  Blood pressure 119/59, pulse 70, temperature 98.5 °F (36.9 °C), resp. rate 15, height 5' 6\" (1.676 m), weight 64 kg (141 lb 1.5 oz), SpO2 100 %. GENERAL: alert, cooperative, no distress, appears stated age, LUNG: clear to auscultation bilaterally, HEART: regular rate and rhythm, S1, S2 normal, no murmur, click, rub or gallop      Alerts:    Hospital Problems  Date Reviewed: 2/6/2020    None          Laboratory:      Recent Labs     02/07/20  0745 02/05/20  1312   HGB  --  12.0*   HCT  --  35.9*   WBC  --  9.4   PLT  --  351   INR 1.2*  --    BUN  --  20   CREA  --  0.99   K  --  3.9         Plan of Care/Planned Procedure:  Risks, benefits, and alternatives reviewed with patient and he agrees to proceed with the procedure.        Nico Chris MD

## 2020-02-07 NOTE — TELEPHONE ENCOUNTER
CHARLES from radiation oncology is calling for patient. Patient and patient's wife is calling asking to speak to nurse. Patient tried to get his oxycodone filled yesterday after appt and pharmacy told them it was 6 days too early to fill. Patient and wife are afraid that he will get in a crisis. CHARLES ask us to please call patient at either [phone] or [phone] until we reach patient or wife to discuss issue.

## 2020-02-07 NOTE — DISCHARGE INSTRUCTIONS
Patient Education        Implanted [facility] [employer] [facility] for Chemotherapy: Care Instructions  Your Care Instructions  An implanted port is a device placed, in most cases, under the skin of your chest below your collarbone. It is made of plastic, stainless steel, or titanium. The port is about the size of a quarter, but thicker. A thin, flexible tube called a catheter runs from the port under your skin into a large vein. A membrane (septum) similar to a pencil eraser is in the center of the port. A nurse uses a needle to put chemotherapy or other medicine and fluids through the septum into a blood vessel. The port may be used to draw blood for tests only if another vein, such as in the hand or arm, can't be used. An implanted port can be used for months. A special needle (called a Farley needle) may stay in the port for a short time. The port needs regular care to make sure it does not get blocked. Tell your doctor if you take aspirin or some other blood thinner. These medicines can increase the chance of bleeding inside your body. Follow-up care is a key part of your treatment and safety. Be sure to make and go to all appointments, and call your doctor if you are having problems. It's also a good idea to know your test results and keep a list of the medicines you take. How can you care for yourself at home? · You will probably need to take 1 day off from work and will be able return to normal activities shortly after. This depends on the type of work you do, why you have the port, and how you feel. · You probably will be able to bathe and swim. But you may need to avoid some activities if a Farley needle is left in the port. Talk to your doctor about any limits on your activity. · Some clothes may rub the skin over the port. Do not wear a bra or suspenders that irritate your skin near the port. · You will get a medical alert card with information about your port. Carry this with you.  It will tell health care workers you have a port in case you need emergency care. · Your port will need regular flushing to keep it open. A nurse or other health professional will do this for you. When should you call for help? Call your doctor now or seek immediate medical care if:    · You have signs of infection, such as:  ? Increased pain, swelling, warmth, or redness near the port. ? Red streaks leading from the port. ? Pus draining from the port. ? A fever.     · You have pain or swelling in your neck or arm.     · You have trouble breathing.    Watch closely for changes in your health, and be sure to contact your doctor if:    · You have any problems with your port. Where can you learn more? Go to http://rigoberto-marcos.info/. Enter [phone] in the search box to learn more about \"Implanted [employer] - [facility] [place] for Chemotherapy: Care Instructions. \"  Current as of: June 26, 2019  Content Version: 12.2  © 9340-1952 [employer]. Care instructions adapted under license by [employer] (which disclaims liability or warranty for this information). If you have questions about a medical condition or this instruction, always ask your healthcare professional. [place] [address] any warranty or liability for your use of this information.

## 2020-02-07 NOTE — PROGRESS NOTES
TRANSFER - IN REPORT:    Verbal report received from elsy Rebolledo Rosetta  [facility]      Report consisted of patients Situation, Background, Assessment and   Recommendations(SBAR). Information from the following report(s) SBAR was reviewed with the receiving nurse. Opportunity for questions and clarification was provided. Assessment completed upon patients arrival to unit and care assumed.

## 2020-02-07 NOTE — TELEPHONE ENCOUNTER
Palliative Medicine  Nursing Note  [phone]  Fax [phone]      Telephone Call  Patient Name: Kesha Sargent  YOB: [birth year]        Primary Decision Maker: Monik Kay - Spouse - [phone]   Advance Care Planning 2/6/2020   Patient's Healthcare Decision Maker is: Verbal statement (Legal Next of Kin remains as decision maker)   Primary Decision Maker Name -   Secondary Decision Maker Name -   Secondary Decision [address] Phone Number -   Secondary Decision Maker Relationship to Patient -   Confirm Advance Directive None   Patient Would Like to Complete Advance Directive No     Call placed to pharmacy to inquire if they received the faxed GoodRx coupon for patient, which they verified they did.     Mirtha Cruz RN  Palliative Medicine

## 2020-02-07 NOTE — PROGRESS NOTES
7:21 AM      Patient arrived. ID and allergies verified verbally with patient. Pt voices understanding of procedure to be performed. Consent obtained. Pt prepped for procedure. 9782    TRANSFER - OUT REPORT:    Verbal report given to James Galvan RN on Imani Martin  being transferred to [facility] for ordered procedure       Report consisted of patients Situation, Background, Assessment and   Recommendations(SBAR). Information from the following report(s) SBAR was reviewed with the receiving nurse. Lines:   Venous Access Device PORT 02/07/20 Upper chest (subclavicular area, right (Active)   Central Line Being Utilized Yes 2/7/2020  8:41 AM   Site Assessment Clean, dry, & intact 2/7/2020  8:41 AM   Date of Last Dressing Change 02/07/20 2/7/2020  8:41 AM   Dressing Status Clean, dry, & intact 2/7/2020  8:41 AM       Peripheral IV 02/07/20 Right Antecubital (Active)        Opportunity for questions and clarification was provided. Patient transported with:   Registered Nurse      2047    TRANSFER - IN REPORT:    Verbal report received from [facility], RN on Imani Martin  being received from [facility] for routine progression of care      Report consisted of patients Situation, Background, Assessment and   Recommendations(SBAR). Information from the following report(s) Procedure Summary was reviewed with the receiving nurse. Opportunity for questions and clarification was provided. Assessment completed upon patients arrival to unit and care assumed. 56    Patient's wife at bedside  Patient sitting up sipping coke    0950    Discharge instructions reviewed with patient and family. Voiced understanding. Patient given copy of discharge instructions to take home. 1035    Pt discharged via wheelchair with Anatoliy Jackson RN. Personal belongings with patient upon discharge.

## 2020-02-07 NOTE — TELEPHONE ENCOUNTER
Returned call to Mrs Dionisio Smyth. I informed her the PA was done yesterday on the Oxycodone Er 10 mg tab. It was denied. I faxed back an expedited appeal request with confirmation. I am waiting for a response back. I will call patient when I receive the determination.

## 2020-02-07 NOTE — TELEPHONE ENCOUNTER
Returned call to Mrs Jana Mullins. I informed her of all regarding the PA and appeals along with the cost of the medication. I informed Mrs Jana Mullins I faxed over a discount card for the medication for $74.66. Mrs Jana Mullins expressed the cost is too high. However, she was appreciative of all of our efforts.

## 2020-02-12 NOTE — TELEPHONE ENCOUNTER
Returned call to Mrs Richard Ann. She informed me that the Pharmacy did not process the Rx for the oxycodone Er 10 mg tab. She was told it would be 3 days before Rx could be filled. As per conversation on Friday 2/7/20 Pharmacy informed me the oxycodone Er 10 mg tab was available in the brand 60 tabs. The insurance approved the appeal. However, the out of pocket expense would be $195. Both myself and Nurse Lori Nelson RN faxed over a Good Rx coupon for $74.66 for oxycontin 10 mg Er tab quantity 60 tabs. The Pharmacist confirmed receipt of. There appeared to have been misinformation given to Mrs Richard Ann as to why she needed to wait 3 days to fill the Rx. There wasn't a call from either the patient nor the Pharmacy regarding patient not being able to fill the long acting Rx. There was a also a Rx for the oxycodone Ir 5 mg tab quantity 140 tabs. That was the Rx that needed to wait 3 days before it could be filled. Meanwhile the patient has only 1 tab of the Ir oxycodone left. I asked Mrs Richard Ann how many tabs do he take a day. She replied 2. She shared when he does have pain at it's worse its between 7-8 right sided. He currently does not have any increased pain or discomfort. He last took the Ir 5 mg tab 4 hrs ago. I was switching between calls between the Pharmacy and with Mrs Richard Ann. I called [facility] back asked them to verify they had the Rx for the oxycodone Ir 5 mg tab. The Pharmacist Kelly Edwards said he did. I advised him to please fill it. The Rx was processed for full quantity 140 tabs with a $14.00 copayment. The Pharmacist informed me that each time he tried processing the oxycodone Er 10 mg tab with the discount coupon it would come up over $200+. Again there wasn't any calls to PM to make aware of issue. Mrs Richard Ann was made aware I advised the Pharmacist to fill the Rx for the Ir oxycodone 5 mg tab.  The patient will at least have some pain medication until the issue with long acting opiod is resolved. Meanwhile message will be sent to Dr Christian Michael regarding all. I informed Mrs Sarah Lopez I will reach out to Dr Christian Michael and will call her back with any other suggestions. I extended my deepest apologies for any and all confusion. She expressed how stressful this all has been to her. She was appreciative of my time and efforts. Message forwarded to Dr Christian Michael.

## 2020-02-12 NOTE — TELEPHONE ENCOUNTER
MsBlank Leonela Chairez is calling to speak to nurse regarding script. She would like nurse to call her back as soon as she can. Advised nurse would return her call.

## 2020-02-13 NOTE — TELEPHONE ENCOUNTER
Call made to the pharmacy made aware Dr Rick Johnston sent a Rx for morphine Cr 15 mg tab 1 tab every 12 hrs as needed 60/30. Rx was processed with $17.00 copayment. Mrs Debra Wells made aware was advised to call PM with any questions or concerns.

## 2020-02-17 NOTE — TELEPHONE ENCOUNTER
Palliative Medicine  Nursing Note  [phone]  Fax [phone]      Telephone Call  Patient Name: Ruth Krishna  YOB: [birth year]        Primary Decision Maker: Christina Zhang - Spouse - [phone]   Advance Care Planning 2/6/2020   Patient's Healthcare Decision Maker is: Verbal statement (Legal Next of Kin remains as decision maker)   Primary Decision Maker Name -   Secondary Decision Maker Name -   Secondary Decision Maker Phone Number -   Secondary Decision Maker Relationship to Patient -   Confirm Advance Directive None   Patient Would Like to Complete Advance Directive No       Returned call to patient to Mrs. Lashaun Fisher and she stated that patient he is not having much pain control. Patient reported that Morphine only gives 2 hours of pain relief, and the oxycodone gives about 1 hour. He reports pain is on right side and he rates pain 5/10 on pain scale. He reports taking Tylenol 500 mg but still not much relief. He denies nausea, vomiting, chest pain, shortness of breath. Most relief comes when he lays on his back, but once he gets up pain returns. Advised I will discuss with Dr. Shelia Duron and call back with any additional recommendations.       Macy Lynn RN  Palliative Medicine

## 2020-02-17 NOTE — TELEPHONE ENCOUNTER
Returned call to  Diane Norton and advised per Dr. Linda Chapa she will start patient on Dexamethasone 4 mg every morning, and increase oxycodone from 1 tablet every 4 hours to 2 tablets every three hours. If still no pain relief ok to increase oxycodone 5 mg to 3 tabs every 3 hours. Wife reported that patient took last Oxycodone at 12 pm, and he is due for another one now - advised ok to give the 2 tablets now, and again in hours if needed. Mrs. Francisco verbalized understanding and appreciative of return call    Dexamethasone 4 mg sent to Marcus Fink, RN  Palliative Medicine

## 2020-02-17 NOTE — TELEPHONE ENCOUNTER
Rachel called regarding patient and said that his morphine and oxycodone are not lasting him the length of time that it is suppose to. The duration of pain relief he is suppose to be getting is not what he is getting.  She would like a call back regarding this at [phone]

## 2020-02-24 NOTE — PROGRESS NOTES
[facility] at [address] [facility] [address] [facility]  Valentino Veena: [phone]  F: [phone]      Reason for Visit:   Javier Ruvalcaba is a 76 y.o. male who is seen in follow up for evaluation of lung cancer. Treatment History:   · CT A/P wo contrast 11/3/2019: New lytic lesion at T11  · MRI Brain 11/17/2019: no intracranial metastatic disease  · PET/CT 12/20/2019: RUL spiculated nodule, 1.4cm, SUV 3.2. Multifocal lytic osseous metastases in spine, ribs, sacrum, and iliac bones. Notably large lytic lesions involving cervical spine and T11. T11 lesion extends into spinal canal.  Suspicious precarinal mediastinal lymph node. 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 · CT guided biopsy of osseous metastasis 1/17/2020: metastatic adenocarcinoma, favor lung primary (TTF1+). PDL1 68%. EGFR/ALK/ROS1/BRAF negative. · Stage IVB (cT1b cN0 M1c) Non-small cell lung cancer  · Initiated palliative therapy with Pembrolizumab on 2/5/2020  · Palliative radiation to T10-T12, completed 2/21/2020    History of Present Illness:   No issues since first treatment. Didn't notice any significant side effects. Some fatigue. He is staying active in the home. No recent falls. He completed radiation last week. He feels that this did improve pain. Taking Oxycodone every 3 hours still. No new aches or pains. No itching or rashes. No nausea or vomiting. No change in bowels. Some constipation from pain medications. Taking Miralax and prune juice with relief. He quit smoking in 9/2019, previously smoked up to 3ppd. He is accompanied by his wife and daughter. He lives nearby. PAST HISTORY: The following sections were reviewed and updated in the EMR as appropriate: PMH, SH, FH, Medications, Allergies.       Allergies   Allergen Reactions    Adenosine Shortness of Breath    Iodinated Contrast Media Shortness of Breath    Pcn [Penicillins] Shortness of Breath Patient reports to have tolerated a 10 day course of cefdinir started 8/24/17      Review of Systems: A complete review of systems was obtained, reviewed, and scanned into the EMR. Pertinent findings reviewed above. Physical Exam:     Visit Vitals  /64 (BP 1 Location: Left arm, BP Patient Position: Sitting)   Pulse 78   Temp 97 °F (36.1 °C) (Temporal)   Resp 14   Ht 5' 6\" (1.676 m)   Wt 138 lb (62.6 kg)   BMI 22.27 kg/m²     ECOG PS: 1  General: No distress, garbled speech at times  Eyes: PERRLA, anicteric sclerae  HENT: Atraumatic, OP clear  Neck: Supple  Lymphatic: No cervical, supraclavicular, or inguinal adenopathy  Respiratory: CTAB, normal respiratory effort  CV: Normal rate, regular rhythm, no murmurs, no peripheral edema  GI: Soft, nontender, nondistended, no masses, no hepatomegaly, no splenomegaly  MS: In wheelchair today. Digits without clubbing or cyanosis. Skin: No rashes, ecchymoses, or petechiae. Normal temperature, turgor, and texture. Psych: Alert, oriented, appropriate affect, normal judgment/insight    Results:     Lab Results   Component Value Date/Time    WBC 8.7 02/26/2020 01:19 PM    HGB 11.8 (L) 02/26/2020 01:19 PM    HCT 35.5 (L) 02/26/2020 01:19 PM    PLATELET 150 78/10/9979 01:19 PM    MCV 90.8 02/26/2020 01:19 PM    ABS.  NEUTROPHILS 8.3 (H) 02/26/2020 01:19 PM     Lab Results   Component Value Date/Time    Sodium 132 (L) 02/26/2020 01:19 PM    Potassium 4.2 02/26/2020 01:19 PM    Chloride 101 02/26/2020 01:19 PM    CO2 30 02/26/2020 01:19 PM    Glucose 168 (H) 02/26/2020 01:19 PM    BUN 14 02/26/2020 01:19 PM    Creatinine 0.86 02/26/2020 01:19 PM    GFR est AA >60 02/26/2020 01:19 PM    GFR est non-AA >60 02/26/2020 01:19 PM    Calcium 8.6 02/26/2020 01:19 PM    Glucose (POC) 94 11/17/2019 12:13 PM    Creatinine (POC) 1.0 02/04/2020 01:45 PM     Lab Results   Component Value Date/Time    Bilirubin, total 0.7 02/26/2020 01:19 PM    ALT (SGPT) 51 02/26/2020 01:19 PM    AST (SGOT) 29 02/26/2020 01:19 PM    Alk. phosphatase 126 (H) 02/26/2020 01:19 PM    Protein, total 6.7 02/26/2020 01:19 PM    Albumin 3.2 (L) 02/26/2020 01:19 PM    Globulin 3.5 02/26/2020 01:19 PM       Records reviewed and summarized above. Pathology report(s) reviewed above. Radiology report(s) reviewed above. Bone scan at OSH 12/9/2019: Moderately increased uptake in the lower thoracic spine, T11. No additional suspicious focus elsewhere. MRI thoracic and lumbar spine at OSH on 11/25/2019:   Mild compression fracture of T11. Subcentimeter enhancing lesions within the T8 vertebral body worrisome for metastatic disease with pathologic fracture. Enhancing lesions involving L1 and L3 and marrow lesions iliac bones worrisome for metastatic disease  Multilevel degenerative changes of the lumbar spine    Assessment:   1) Metastatic non-small cell lung cancer (adenocarcinoma)  PDL1 68%, EGFR/ALK/ROS1/BRAF negative  He has disease within his lungs and bones. His cancer is not curable and management is with palliative intent. While the overall burden of disease within his lungs is rather small, the pathology results from the bone biopsy are most consistent with a lung primary, and no other apparent primary is evident on imaging. Tolerated C1 of therapy with exception of grade 1 fatigue. Will proceed with C2 of therapy with Pembrolizumab therapy today. He will be seen with each cycle of therapy. Repeat imaging to assess response after C4. At progression he may be a candidate for the QUILT3 (if he experiences CR/SC on first line Keytruda) or LungMAP trials. NGS with Strata testing is pending. 2) Osseous metastases  Large cervical and thoracic lesions. T11 lesion extends into spinal canal. MRI at outside hospital reviewed. Scanned under media. 3) Back pain  Secondary to his metastatic disease. Worsened since biopsy. Currently on Oxycodone PRN and Oxycontin scheduled.  Palliative appt reviewed. Received palliative radiation to T10-T12, completed 2/21/2020.     4) COPD  Following with pulmonary, Dr. Mayra Lopez. 5) Emotional Well Being  No psychosocial concerns identified today. Patient has adequate support. Plan:     · Oxycodone PRN pain  · Strata testing on pathology-pending  · Proceed today with C2 Pembrolizumab (200mg) given every 3 weeks  · Labs: CBC, CMP prior to each cycle, TSH every 6 weeks  · To follow up every 3 weeks on therapy    Patient was seen in conjunction with Blanca Hill NP.     Signed By: Jaspreet Guzman MD

## 2020-02-25 NOTE — TELEPHONE ENCOUNTER
Palliative Medicine  Nursing Note  [phone]  Fax [phone]      Telephone Call  Patient Name: Nalini Wakefield  YOB: [birth year]        Primary Decision Maker: Daryl Castro - Spouse - [phone]   Advance Care Planning 2/6/2020   Patient's Healthcare Decision Maker is: Verbal statement (Legal Next of Kin remains as decision maker)   Primary Decision Maker Name -   Secondary Decision Maker Name -   Secondary Decision Maker Phone Number -   Secondary Decision Maker Relationship to Patient -   Confirm Advance Directive None   Patient Would Like to Complete Advance Directive No     Call placed to Mrs. Francisco. Discussed that Dr. Yasmeen Valiente wants to increase the MS Contin to 30mg (2 tabs) every 12 hours. He can continue to take 2 -3 tabs of Oxycodone 5mg every 3 hours as needed. Mrs. Francisco verbalized understanding and wrote it down, she said. She accepted an appt with Dr. Yasmeen Valiente for Tuesday 3/3/20 at [address] at 1:30pm.    Mr. Elke Hood only has 8 tabs of Oxycodone left, and will need a new prescription. Triage for Controlled Substance Refill Request    Pain Diagnosis:  NSCLC with mets    Last Outpatient Visit: 2/6/20    Next Outpatient Visit: 3/3/20    Reason for refill needed outside of office visit-   Appointment not scheduled prior to need for scheduled refill. Any Reported Side effects: none    Last dose taken: has 8 tabs    Pharmacy: [place] [facility] [id number] [facility]    Medication: Oxycodone  Dose and directions: 5mg take 1 tabs every 4 hours prn (changed to take 2-3 every 3 hours)  Number dispensed: 140  Date filled ( or Pharmacy): 2/12/20  # left: 8     reviewed: Yes    Date of Urine Drug Screen:      Opioid Safety Handout given:  Yes    Appropriate for refill:  Yes    Action:  Dr. Arana Gave aware.   Medication pended for approval.    Lucio Mann, RN  Palliative Medicine

## 2020-02-25 NOTE — TELEPHONE ENCOUNTER
Pt's wife called and wanted to let us know that yesterday the pt increased his oxycodone 5 mg to take 3 every 3 hours instead of taking 2 every 2 hours.

## 2020-02-25 NOTE — TELEPHONE ENCOUNTER
Palliative Medicine  Nursing Note  [phone]  Fax [phone]      Telephone Call  Patient Name: Evi Elkins  YOB: [birth year]        Primary Decision Maker: Mason Garces - Spouse - [phone]   Advance Care Planning 2/6/2020   Patient's Healthcare Decision Maker is: Verbal statement (Legal Next of Kin remains as decision maker)   Primary Decision Maker Name -   Secondary Decision Maker Name -   Secondary Decision Maker Phone Number -   Secondary Decision Maker Relationship to Patient -   Confirm Advance Directive None   Patient Would Like to Complete Advance Directive No     Returned call to Mr. Francisco. Spoke with Mr. Jovani Leyva, and his wife. She stated that he is taking Dexamethasone 4mg every morning, MS Contin 15mg every 12 hours, and has now increased his Oxycodone 5mg to 3 tabs every 3 hours prn, per Dr. Deneen Kovacs instructions. He is requiring the Oxycodone around the clock to help manage his pain. He and his wife stated that the increase, which was made last night, helped him feel better at 5-6/10, but only for a few hours. He describes his pain as a, \"shooting pain that goes up and down my back\"  and into his neck at times too. The pain is frequent, but, \"comes and goes. \"      He is only comfortable flat on his back, so when he moves in any direction, or sits, stands, walks etc, he can feel the pain. He is sleeping, at the most 4 hours at night, and wakes up in pain requiring Oxycodone. Once the pain has eased, he can go back to sleep for a few more hours. He states he is eating and drinking well. He drinks prune juice, and occasionally Miralax, to help move his bowels. He did not have any other questions or concerns. Shared that Dr. Deneen Kovacs will be made aware and Palliative will call them later today for an update. They both verbalized understanding and were appreciative.     Has Oncology visit tomorrow 2/26/20, but needs follow up with Palliative, as he does not return until mid March for next Oncology appt.     Karlos Meyers RN  Palliative Medicine

## 2020-02-26 NOTE — TELEPHONE ENCOUNTER
Luis Armando  is calling to discuss that pharmacy dispensed less on his medication Oxycodone. States insurance would not cover full amount. Advised nurse would call her back to discuss.

## 2020-02-26 NOTE — PROGRESS NOTES
Outpatient Infusion [facility] - Chemotherapy Progress Note 1310 Pt admit to [facility] for [address] ambulatory in stable condition. Assessment completed. No new concerns voiced. Port accessed with positive blood return. Labs drawn and sent for processing. Patient proceeded to scheduled MD appointment. Patient returned with no change in treatment. Results are within parameters to treat. Chemotherapy Flowsheet 2/26/2020 Cycle C2D1 Date 2/26/2020 Drug / Regimen Alexandra Siemens Notes - Visit Vitals /64 (BP 1 Location: Left arm, BP Patient Position: Sitting) Pulse 81 Temp 98.2 °F (36.8 °C) Resp 18 Ht 5' 6\" (1.676 m) Wt 62.6 kg (138 lb) SpO2 94% BMI 22.27 kg/m² Patient Vitals for the past 12 hrs: 
 Temp Pulse Resp BP SpO2  
02/26/20 1526 97.3 °F (36.3 °C) 74 18 129/63   
02/26/20 1311 98.2 °F (36.8 °C) 81 18 108/64 94 % Recent Results (from the past 12 hour(s)) CBC WITH 3 PART DIFF Collection Time: 02/26/20  1:19 PM  
Result Value Ref Range WBC 8.7 4.1 - 11.1 K/uL  
 RBC 3.91 (L) 4.10 - 5.70 M/uL  
 HGB 11.8 (L) 12.1 - 17.0 g/dL HCT 35.5 (L) 36.6 - 50.3 % MCV 90.8 80.0 - 99.0 FL  
 MCH 30.2 26.0 - 34.0 PG  
 MCHC 33.2 30.0 - 36.5 g/dL  
 RDW 16.3 (H) 11.8 - 15.8 % PLATELET 134 420 - 544 K/uL NEUTROPHILS 95 (H) 32 - 75 % MIXED CELLS 4 3.2 - 16.9 % LYMPHOCYTES 2 (L) 12 - 49 % ABS. NEUTROPHILS 8.3 (H) 1.8 - 8.0 K/UL  
 ABS. MIXED CELLS 0.3 0.2 - 1.2 K/uL  
 ABS. LYMPHOCYTES 0.1 (L) 0.8 - 3.5 K/UL  
 DF AUTOMATED METABOLIC PANEL, COMPREHENSIVE Collection Time: 02/26/20  1:19 PM  
Result Value Ref Range Sodium 132 (L) 136 - 145 mmol/L Potassium 4.2 3.5 - 5.1 mmol/L Chloride 101 97 - 108 mmol/L  
 CO2 30 21 - 32 mmol/L Anion gap 1 (L) 5 - 15 mmol/L Glucose 168 (H) 65 - 100 mg/dL BUN 14 6 - 20 MG/DL Creatinine 0.86 0.70 - 1.30 MG/DL  
 BUN/Creatinine ratio 16 12 - 20 GFR est AA >60 >60 ml/min/1.73m2 GFR est non-AA >60 >60 ml/min/1.73m2 Calcium 8.6 8.5 - 10.1 MG/DL Bilirubin, total 0.7 0.2 - 1.0 MG/DL  
 ALT (SGPT) 51 12 - 78 U/L  
 AST (SGOT) 29 15 - 37 U/L Alk. phosphatase 126 (H) 45 - 117 U/L Protein, total 6.7 6.4 - 8.2 g/dL Albumin 3.2 (L) 3.5 - 5.0 g/dL Globulin 3.5 2.0 - 4.0 g/dL A-G Ratio 0.9 (L) 1.1 - 2.2 Medications: 
Medications Administered 0.9% sodium chloride infusion Admin Date 
02/26/2020 Action New Bag Dose 25 mL/hr Rate 25 mL/hr Route IntraVENous Administered By 
Christal Calderon RN  
  
  
 0.9% sodium chloride injection 10 mL Admin Date 
02/26/2020 Action Given Dose 
10 mL Route IntraVENous Administered By 
Christal Calderon RN  
  
  
 heparin (porcine) pf 300-500 Units Admin Date 
02/26/2020 Action Given Dose 
500 Units Route InterCATHeter Administered By 
Christal Calderon RN  
  
  
 pembrolizumab [facility]) 200 mg in 0.9% sodium chloride 100 mL, overfill volume 10 mL IVPB Admin Date 
02/26/2020 Action New Bag Dose 
200 mg Rate 236 mL/hr Route IntraVENous Administered By 
Christal Calderon RN  
  
  
 saline peripheral flush soln 10 mL Admin Date 
02/26/2020 Action Given Dose 
10 mL Route InterCATHeter Administered By 
Christal Calderon RN Admin Date 
02/26/2020 Action Given Dose 
10 mL Route InterCATHeter Administered By 
Christal Calderon RN  
  
  
  
 
5808 Pt tolerated treatment well. Port maintained positive blood return throughout treatment, flushed with positive blood return at conclusion and throughout. Port flushed with positive blood return, heparinized, and rowland needle de-accessed per protocol. D/c home ambulatory in no distress. Pt aware of next appointment scheduled for 3/18/20.

## 2020-02-26 NOTE — PROGRESS NOTES
Javier Sadlertingham is a 76 y.o. male follow up for lung cancer. 1. Have you been to the ER, urgent care clinic since your last visit? Hospitalized since your last visit?no     2. Have you seen or consulted any other health care providers outside of the [address] [place] [address] since your last visit? Include any pap smears or colon screening.  no

## 2020-02-26 NOTE — TELEPHONE ENCOUNTER
Palliative Medicine  Nursing Note  [phone]  Fax [phone]      Telephone Call  Patient Name: Charanjit Lott  YOB: [birth year]        Primary Decision Maker: Tiffany Denton - Spouse - [phone]   Advance Care Planning 2/6/2020   Patient's Healthcare Decision Maker is: Verbal statement (Legal Next of Kin remains as decision maker)   Primary Decision Maker Name -   Secondary Decision Maker Name -   Secondary Decision Maker Phone Number -   Secondary Decision Maker Relationship to Patient -   Confirm Advance Directive None   Patient Would Like to Complete Advance Directive No     Returned call to Mrs. Francisco. She was concerned that the Pharmacy mentioned to her that the quantity of Oxycodone at #180 did not equal a 14 day supply, it is less. Explained the rationale for not providing #224 tablets per 14 days is due to the increase in MS Contin from 15mg every 12 hours to 30mg every 12 hours. The goal for the long acting is to decrease his pain enough that he will not require a short acting every 3 hours, and maybe need only 1 tab, not 2 or 3. Informed her that if the MS Contin is not successful over the next few days, and he is still requiring 2 -3 tabs of Oxycodone 5mg every 3 hours, that Palliative will address this, and he won't be without medication for his pain. She verbalized understanding. She shared that she just increased his MS Contin this morning to 2 tabs (30mg). He has had 2 doses of 2 tabs of Oxycodone this morning, which is every 3 hours. Discussed the difference between short and long acting medication, and why the 2 work well together to manage his pain. Encouraged her to call Palliative should he not find relief, or if they have other questions or concerns. She verbalized understanding and was appreciative.     Randell Arcos RN  Palliative Medicine

## 2020-03-03 NOTE — PATIENT INSTRUCTIONS
Dear Cuco Isbell ,    It was a pleasure seeing you today in [facility]. We will see you again in 2 to 3 weeks to coordinate with your return to the [facility]. If labs or imaging tests have been ordered for you today, please call the office  at [phone] 48 hours after completion to obtain the results. Your stated goal: to continue watching your soap operas! Your described symptoms were: Fatigue: 5 Drowsiness: 0   Depression: 5 Pain: 6   Anxiety: 5 Nausea: 6   Anorexia: 0 Dyspnea: 6   Best Well-Bein Constipation: Yes   Other Problem (Comment): 0       This is the plan we talked about:    1. Right-sided back pain  -Continue extended-release morphine 30-mg every 12 hours  -Continue oxycodone 5-mg: take 2 pills every 4 hours as needed  -We talked today about taking your oxycodone no more than 3 to 4 times a day to minimize the risk of adverse side effects  -Decrease dexamethasone 4-mg: take 1/2 pill every morning for 6 days, then take 1/2 pill every other day for 6 days, then stop  -Start extra-strength tylenol 500-mg: take 2 pills 3 times a day    2. Constipation  -Start senna 2 pills at bedtime  -Continue daily Miralax  -Stop docusate sodium  -If you go more than 2 days without a bowel movement or if you continue to have to strain to pass hard stool, increase senna to 3 pills at bedtime    3. Depression  -Continue escitalopram 10-mg daily Lexapro and   -Continue clonazepam 0.5-mg three times a day  -Continue Wellbutrin 150-mg twice a day  -You've been taking these medications for years and find then helpful    4. Fatigue  -You've been up and down at night frequently recently due to having to go to the bathroom (feeling like you need to have a bowel movement)  -We're working on finding the right combination of medicines for your constipation.     5. Shortness of breath/cough  -This is related to your COPD and is unchanged  -You have a pulmonologist who works with you    6. Lung cancer  -You continue to receive cancer therapy yesterday with an immune agent called pembrolizumab Rosariotejinder Roque)  -You will be coming to the [facility] center every 3 weeks for your treatment      This is what you have shared with us about Advance Care Planning:      Primary Decision Maker: Misbah Hitchcock - [phone]  Advance Care Planning 3/3/2020   Patient's [place] is: Verbal statement (Legal Next of Kin remains as decision maker)   Primary Decision Maker Name -   Secondary Decision [facility] Name -   Secondary Decision [facility] Phone Number -   Secondary Decision Maker Relationship to Patient -   Confirm Advance Directive None   Patient Would Like to Complete Advance Directive No           The Palliative Medicine Team is here to support you and your family.        Sincerely,      Lachelle Gongora MD and the Palliative Medicine Team

## 2020-03-03 NOTE — PROGRESS NOTES
Palliative Medicine Office Visit  Palliative Medicine Nurse Check In  ([phone]    Patient Name: Keyur Jack  YOB: 1945      Date of Office Visit: 3/3/2020      Patient states:  C/O shoulder, lower back and neck pain. From Check In Sheet (scanned in Media):  Has a medical provider talked with you about cardiopulmonary resuscitation (CPR)? [x] Yes   [] No   [] Unable to obtain    Nurse reminder to complete or update ACP FlowSheet:    Is ACP on the Problem List?    [] Yes    [x] No  IF ACP Document is ON FILE; Nurse to place ACP on Problem List     Is there an ACP Note in Chart Review/Note? [] Yes    [x] No   If NO: [address] Planning 2/6/2020   Patient's Healthcare Decision Maker is: Verbal statement (Legal Next of Kin remains as decision maker)   Primary Decision Maker Name -   Secondary Decision Maker Name -   Secondary Decision Maker Phone Number -   Secondary Decision Maker Relationship to Patient -   Confirm Advance Directive None   Patient Would Like to Complete Advance Directive No        Primary Decision MakeHumza [facility] [phone]  Advance Care Planning 3/3/2020   Patient's Healthcare Decision Maker is: Verbal statement (Legal Next of Kin remains as decision maker)   Primary Decision Maker Name -   Secondary Decision [address] Name -   Secondary Decision [address] Phone Number -   Secondary Decision Maker Relationship to Patient -   Confirm Advance Directive None   Patient Would Like to Complete Advance Directive No         Is there anything that we should know about you as a person in order to provide you the best care possible? Have you been to the ER, urgent care clinic since your last visit? [] Yes   [x] No   [] Unable to obtain    Have you been hospitalized since your last visit?    [] Yes   [x] No   [] Unable to obtain    Have you seen or consulted any other health care providers outside of the [address] [place] [address] since your last visit? [] Yes   [x] No   [] Unable to obtain    Functional status (describe):       Last BM:  yesterday     accessed (date):  3/2/20    Bottle review (for opioid pain medication):  Medication 1: oxyCODONE IR (ROXICODONE) 5 mg immediate release tablet       Date filled:  2/25/20  Directions: Take 2 Tabs by mouth every three (3) hours as needed for Pain for up to 14 days. # filled: 180  # left: 90  # pills taking per day:7  Last dose taken:3/3/2020      Medication 2:  morphine CR (MS CONTIN) 15 mg CR tablet     Date filled: 2/13/20  Directions: Take 1 Tab by mouth every twelve (12) hours for 30 days.  Max Daily Amount: 30 mg.            # filled: 60  # left: 8  # pills taking per day: 2  Last dose taken:3/3/2020   Verified/Returned    Medication 3:   Date filled:   Directions:   # filled:   # left:   # pills taking per day:  Last dose taken:    Medication 4:   Date filled:   Directions:   # filled:   # left:   # pills taking per day:  Last dose taken:

## 2020-03-03 NOTE — PROGRESS NOTES
Palliative Medicine Outpatient Services  [place]: [phone])    Patient Name: Aakash Bundy  YOB: 1945    Date of Current Visit: 03/03/20  Location of Current Visit:    [] [facility] Office  [x] [facility] Office  [] [id number] [facility] Office  [] Home  [] Other:      Date of Initial Visit: 2/6/2020   Referral from: Isaiah Bundy MD  Date of Initial Referral:  Primary Care Physician: Ibeth Neri MD      SUMMARY:   Aakash Bundy is a 76y.o. 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 T11 lesion extends into spinal canal), ribs, sacrum and iliac bones, who was referred to Palliative Medicine by Dr. Jen Berger for symptom management. He was diagnosed in 12/2019. He is currently being treated with pembrolizumab. The patients social history includes: he is  to Lillie. He has 2 daughters, Kyrie brandon and Lillie, and a son, Kami Dunne. 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 Palliative Medicine is addressing the following current patient/family concerns: right low back pain due to bony metastatic disease; depression; fatigue; shortness of breath; cough; advanced care planning. Initial Referral Intake note reviewed   PALLIATIVE DIAGNOSES:       ICD-10-CM ICD-9-CM    1. Right-sided low back pain without sciatica, unspecified chronicity M54.5 724.2 morphine CR (MS CONTIN) 30 mg CR tablet      oxyCODONE IR (ROXICODONE) 5 mg immediate release tablet   2. Drug-induced constipation K59.03 564.09      E980.5    3. Depression, unspecified depression type F32.9 311    4. Shortness of breath R06.02 786.05    5. Neoplastic malignant related fatigue R53.0 780.79    6. Caregiver stress Z63.6 V61.49    7.  Lung cancer metastatic to bone (HCC) C34.90 162.9 morphine CR (MS CONTIN) 30 mg CR tablet    C79.51 198.5 oxyCODONE IR (ROXICODONE) 5 mg immediate release tablet          PLAN: Patient Instructions     Dear Bebeto Parks ,    It was a pleasure seeing you today in [facility]. We will see you again in 2 to 3 weeks to coordinate with your return to the [facility]. If labs or imaging tests have been ordered for you today, please call the office  at [phone] 48 hours after completion to obtain the results. Your stated goal: to continue watching your soap operas! Your described symptoms were: Fatigue: 5 Drowsiness: 0   Depression: 5 Pain: 6   Anxiety: 5 Nausea: 6   Anorexia: 0 Dyspnea: 6   Best Well-Bein Constipation: Yes   Other Problem (Comment): 0       This is the plan we talked about:    1. Right-sided back pain  -Continue extended-release morphine 30-mg every 12 hours  -Continue oxycodone 5-mg: take 2 pills every 4 hours as needed  -We talked today about taking your oxycodone no more than 3 to 4 times a day to minimize the risk of adverse side effects  -Decrease dexamethasone 4-mg: take 1/2 pill every morning for 6 days, then take 1/2 pill every other day for 6 days, then stop  -Start extra-strength tylenol 500-mg: take 2 pills 3 times a day    2. Constipation  -Start senna 2 pills at bedtime  -Continue daily Miralax  -Stop docusate sodium  -If you go more than 2 days without a bowel movement or if you continue to have to strain to pass hard stool, increase senna to 3 pills at bedtime    3. Depression  -Continue escitalopram 10-mg daily Lexapro and   -Continue clonazepam 0.5-mg three times a day  -Continue Wellbutrin 150-mg twice a day  -You've been taking these medications for years and find then helpful    4. Fatigue  -You've been up and down at night frequently recently due to having to go to the bathroom (feeling like you need to have a bowel movement)  -We're working on finding the right combination of medicines for your constipation.     5. Shortness of breath/cough  -This is related to your COPD and is unchanged  -You have a pulmonologist who works with you    6. Lung cancer  -You continue to receive cancer therapy yesterday with an immune agent called pembrolizumab Shan Loser)  -You will be coming to the infusion center every 3 weeks for your treatment      This is what you have shared with us about Advance Care Planning:      Primary Decision Maker: Britta  - [phone]  Advance Care Planning 3/3/2020   Patient's [place] is: Verbal statement (Legal Next of Kin remains as decision maker)   Primary Decision Maker Name -   Secondary Decision [address] Name -   Secondary Decision Maker Phone Number -   Secondary Decision Maker Relationship to Patient -   Confirm Advance Directive None   Patient Would Like to Complete Advance Directive No           The Palliative Medicine Team is here to support you and your family. Sincerely,      Pedro Ferreira MD and the Palliative Medicine Team    CAREGIVER STRESS - patient's wife appears to be overwhelmed with caring for her . I've asked Ruslan Haji LCSW, to reach out to her by phone.      GOALS OF CARE / TREATMENT PREFERENCES:   [====Goals of Care====]  GOALS OF CARE:  Patient / health care proxy stated goals: See Patient Instructions / Summary    TREATMENT PREFERENCES:   Code Status:  [x] Attempt Resuscitation       [] Do Not Attempt Resuscitation    Advance Care Planning:  [x] [facility] Interdisciplinary Team has updated the ACP Navigator with Decision Maker and Patient Capacity      Primary Decision MakerArsherlyn  - [phone]  Advance Care Planning 3/3/2020   Patient's Healthcare Decision Maker is: Verbal statement (Legal Next of Kin remains as decision maker)   Primary Decision Maker Name -   Secondary Decision [address] Name -   Secondary Decision [address] Phone Number -   Secondary Decision Maker Relationship to Patient -   Confirm Advance Directive None   Patient Would Like to Complete Advance Directive No       Other:  (If patient appropriate for POST, consider using PALLPOST smart phrase here)    The palliative care team has discussed with patient / health care proxy about goals of care / treatment preferences for patient.  [====Goals of Care====]     PRESCRIPTIONS GIVEN:     Medications Ordered Today   Medications    morphine CR (MS CONTIN) 30 mg CR tablet     Sig: Take 1 Tab by mouth every twelve (12) hours for 30 days. Max Daily Amount: 60 mg. Dispense:  60 Tab     Refill:  0    oxyCODONE IR (ROXICODONE) 5 mg immediate release tablet     Sig: Take 2 Tabs by mouth every three (3) hours as needed for Pain for up to 14 days. Max Daily Amount: 80 mg. May take 3 tabs for severe pain. Dispense:  180 Tab     Refill:  0           FOLLOW UP:     Future Appointments   Date Time Provider [facility]   3/18/2020  1:00 PM SS INF1 CH4 <2H [address]   3/18/2020  1:30 PM Di Roberts MD [facility] [address]   3/18/2020  1:45 PM Jenna Brown NP [address] [place] [address], [facility] 1019   4/8/2020  1:00 PM [facility]   4/29/2020  1:00 PM [facility]   5/20/2020  1:00 PM SS INF5 CH4 <1H [facility]           PHYSICIANS INVOLVED IN CARE:   Patient Care Team:  Desirae Swenson MD as PCP - General (Family Practice)  Homer Sanz MD (Hematology and Oncology)  Yazmin Cordoba MD (Pulmonary Disease)  Trixie Garay MD (Radiation Oncology)  Brett Clinton MD (Palliative Medicine)  Brett Clinton MD as Physician (Palliative Medicine)       HISTORY:   Reviewed patient-completed ESAS and advance care planning form. Reviewed patient record in prescription monitoring program.    CHIEF COMPLAINT:   Chief Complaint   Patient presents with    Back Pain       HPI/SUBJECTIVE:    The patient is: [x] Verbal / [] Nonverbal     His back pain is better. The pain pills help but can wear off after awhile.   He's able to get up, he gets up every day, watches TV during the day - loves soap operas  He's been able to do this without having the pain he had a month ago  He's finished radiation therapy. He's been constipated. His wife had to give him an enema last week. He's been taking the stool softener (colace) [employer] recommended, and Miralax. He's moving his bowels every day now but has to strain and is passing hard stools. He's still be getting up about 2 times at night, feeling like he has to have a bowel movement. His spirits are good. He feels better now that the pain is better. His wife is tired. She sleeps when he sleeps. Clinical Pain Assessment (nonverbal scale for nonverbal patients):   [++++ Clinical Pain Assessment++++]  [++++Pain Severity++++]: Pain: 6  [++++Pain Character++++]: hurts  [++++Pain Duration++++]: about a month  [++++Pain Effect++++]: staying in bed   [++++Pain Factors++++]: coughing makes it worse  [++++Pain Frequency++++]: constant with varying intensity  [++++Pain Location++++]: right low back, non-radiating  [++++ Clinical Pain Assessment++++]       FUNCTIONAL ASSESSMENT:     Palliative Performance Scale (PPS):  PPS: 70       PSYCHOSOCIAL/SPIRITUAL SCREENING:     Any spiritual / Roman Catholic concerns:  [] Yes /  [x] No    Caregiver Burnout:  [] Yes /  [x] No /  [] No Caregiver Present      Anticipatory grief assessment:   [x] Normal  / [] Maladaptive       ESAS Anxiety: Anxiety: 5    ESAS Depression: Depression: 5       REVIEW OF SYSTEMS:     The following systems were [x] reviewed / [] unable to be reviewed  Systems: constitutional, ears/nose/mouth/throat, respiratory, gastrointestinal, genitourinary, musculoskeletal, integumentary, neurologic, psychiatric, endocrine. Positive findings noted below.   Modified ESAS Completed by: provider   Fatigue: 5 Drowsiness: 0   Depression: 5 Pain: 6   Anxiety: 5 Nausea: 6   Anorexia: 0 Dyspnea: 6   Best Well-Bein Constipation: Yes   Other Problem (Comment): 0          PHYSICAL EXAM:     Wt Readings from Last 3 Encounters:   02/26/20 138 lb (62.6 kg)   02/26/20 138 lb (62.6 kg)   02/07/20 141 lb 1.5 oz (64 kg)     Blood pressure 127/68, pulse 92, temperature 97.6 °F (36.4 °C), temperature source Oral, resp. rate 16, height 5' 6\" (1.676 m), SpO2 (!) 88 %. Last bowel movement: See Nursing Note    Constitutional: sitting in wheelchair, appears uncomfortable and fatigued  Eyes: pupils equal, anicteric  ENMT: no nasal discharge, moist mucous membranes  Cardiovascular: regular rhythm, distal pulses intact  Respiratory: breathing not labored, symmetric  Gastrointestinal: soft non-tender, +bowel sounds  Musculoskeletal: no deformity, no tenderness to palpation  Skin: warm, dry  Neurologic: following commands, moving all extremities  Psychiatric: full affect, no hallucinations  Other:       HISTORY:     Past Medical History:   Diagnosis Date    Asthma     CAD (coronary artery disease)     Cancer ([facility] [address] \"10 years ago\"    throat    COPD (chronic obstructive pulmonary disease) ([facility]     CVA (cerebral vascular accident) ([facility] [facility]     H/O mechanical aortic valve replacement     CT surgery 1994 - CABG (LIMA to LAD) and AVR ([facility])    Hypertension     S/P CABG x 1     CT surgery 1994 - CABG (LIMA to LAD) and AVR ([facility])      Past Surgical History:   Procedure Laterality Date    CARDIAC SURG PROCEDURE UNLIST      HX HEART VALVE SURGERY      CT surgery 1994 - CABG (LIMA to LAD) and AVR ([facility])    HX HEMORRHOIDECTOMY      IR INSERT TUNL CVC W PORT OVER 5 YEARS  2/7/2020      Family History   Problem Relation Age of Onset    Heart Disease Mother     Diabetes Mother     Hypertension Mother     Heart Disease Sister     Hypertension Sister     Hypertension Father     Cancer Brother         lung    Diabetes Brother     Heart Disease Brother     Hypertension Brother     Stroke Brother       History reviewed, no pertinent family history.   Social History     Tobacco Use    Smoking status: Former Smoker     Packs/day: 3.00     Years: 40.00     Pack years: 120.00     Last attempt to quit: 2019     Years since quittin.1    Smokeless tobacco: Never Used   Substance Use Topics    Alcohol use: No     Allergies   Allergen Reactions    Adenosine Shortness of Breath    Iodinated Contrast Media Shortness of Breath    Pcn [Penicillins] Shortness of Breath     Patient reports to have tolerated a 10 day course of cefdinir started 17      Current Outpatient Medications   Medication Sig    morphine CR (MS CONTIN) 30 mg CR tablet Take 1 Tab by mouth every twelve (12) hours for 30 days. Max Daily Amount: 60 mg.    [START ON 3/10/2020] oxyCODONE IR (ROXICODONE) 5 mg immediate release tablet Take 2 Tabs by mouth every three (3) hours as needed for Pain for up to 14 days. Max Daily Amount: 80 mg. May take 3 tabs for severe pain.  dexAMETHasone (DECADRON) 4 mg tablet Take 4 mg by mouth daily. Take 1 tab every morning    hydroxypropyl methylcellulose (GENTEAL) 0.2 % ophthalmic solution 2 drops right eye every 1-2 hours while awake    predniSONE (DELTASONE) 10 mg tablet 6 tabs po daily x 5 days then 5 tabs for 1 day, 4 tabs for 1 day, 3 tabs for 1 day, 2 tabs for 1 day then 1 tab for 1 day    buPROPion SR (WELLBUTRIN SR) 150 mg SR tablet Take 150 mg by mouth two (2) times a day.  latanoprost (XALATAN) 0.005 % ophthalmic solution Administer 1 Drop to both eyes nightly.  amLODIPine (NORVASC) 10 mg tablet Take 10 mg by mouth daily.  albuterol (PROVENTIL VENTOLIN) 2.5 mg /3 mL (0.083 %) nebulizer solution 3 mL by Nebulization route every four (4) hours as needed for Wheezing.  promethazine (PHENERGAN) 12.5 mg tablet Take 12.5 mg by mouth daily. Indications: Nausea    warfarin (COUMADIN) 5 mg tablet Take 5 mg by mouth every evening.  aspirin delayed-release 81 mg tablet Take 81 mg by mouth daily.     albuterol (PROVENTIL HFA, VENTOLIN HFA, PROAIR HFA) 90 mcg/actuation inhaler Take 2 Puffs by inhalation every six (6) hours as needed.  fluticasone-salmeterol (ADVAIR) 500-50 mcg/dose diskus inhaler Take 1 Puff by inhalation every twelve (12) hours.  clonazepam (KLONOPIN) 0.5 mg tablet Take 0.5 mg by mouth three (3) times daily.  escitalopram oxalate (LEXAPRO) 10 mg tablet Take 10 mg by mouth daily.  isosorbide mononitrate (ISMO, MONOKET) 10 mg tablet Take 10 mg by mouth three (3) times daily. Patient takes at 9am, noon, and 5pm    atorvastatin (LIPITOR) 80 mg tablet Take 80 mg by mouth every evening. No current facility-administered medications for this visit. LAB DATA REVIEWED:     Lab Results   Component Value Date/Time    WBC 8.7 02/26/2020 01:19 PM    HGB 11.8 (L) 02/26/2020 01:19 PM    PLATELET 743 36/54/0908 01:19 PM     Lab Results   Component Value Date/Time    Sodium 132 (L) 02/26/2020 01:19 PM    Potassium 4.2 02/26/2020 01:19 PM    Chloride 101 02/26/2020 01:19 PM    CO2 30 02/26/2020 01:19 PM    BUN 14 02/26/2020 01:19 PM    Creatinine 0.86 02/26/2020 01:19 PM    Calcium 8.6 02/26/2020 01:19 PM    Magnesium 2.0 06/22/2019 02:04 AM    Phosphorus 2.3 (L) 06/22/2019 02:04 AM      Lab Results   Component Value Date/Time    AST (SGOT) 29 02/26/2020 01:19 PM    Alk. phosphatase 126 (H) 02/26/2020 01:19 PM    Protein, total 6.7 02/26/2020 01:19 PM    Albumin 3.2 (L) 02/26/2020 01:19 PM    Globulin 3.5 02/26/2020 01:19 PM     Lab Results   Component Value Date/Time    INR 2.6 (H) 11/17/2019 12:15 PM    INR (POC) 1.2 (H) 02/07/2020 07:45 AM    Prothrombin time 25.2 (H) 11/17/2019 12:15 PM    aPTT 26.5 11/03/2019 06:57 PM      No results found for: IRON, FE, TIBC, IBCT, PSAT, FERR     CT 2/4/2020:  FINDINGS:      THYROID: No nodule. MEDIASTINUM: No mass or lymphadenopathy. MAXINE: No mass or lymphadenopathy. THORACIC AORTA: Aortic atherosclerotic change. MAIN PULMONARY ARTERY: Normal in caliber. TRACHEA/BRONCHI: Patent. ESOPHAGUS: No wall thickening or dilatation.   HEART: Post median sternotomy. Coronary artery disease. PLEURA: Chronic pleural thickening. Chronic basilar atelectatic change. Emphysematous changes are noted. Increase atelectasis/bronchiectatic change in  the left upper lobe. LUNGS: No definite mass lesion. Nodular density in the right upper lobe is  irregular. 3-13.     LIVER: No mass or biliary dilatation. There is no intrahepatic duct dilatation. The CBD is not dilated. There is no hepatic parenchymal mass. Hepatic  enhancement pattern is within normal limits. Portal vein is patent. GALLBLADDER:  No dilatation or wall thickening. SPLEEN/PANCREAS: No mass. There is no pancreatic duct dilatation. There is no  evidence of splenomegaly. ADRENALS/KIDNEYS: Bilateral renal cysts and renal cortical thinning. There is  no hydronephrosis. There is no renal or ureteral calculus. There is no renal  mass. There is no perinephric mass. COLON AND SMALL BOWEL: Colonic diverticula without thyromegaly radiculitis. Fecal stasis. There is no free intraperitoneal air. There is no evidence of  incarceration or obstruction. No mesenteric adenopathy. PERITONEUM: Unremarkable. APPENDIX: Unremarkable. BLADDER/REPRODUCTIVE ORGANS: No mass or calculus. OSSEOUS STRUCTURES: Lytic lesion along the posterior margin of the L3 vertebral  body with epidural soft tissue component. Lytic focus at T11 and T8 likely  osseous metastasis at T2 and L5 as well. Scattered lytic foci in the ribs on the  right, T2-32, 2-41 with pathologic fractures. 2-33 2-28. RETROPERITONEUM:  Unremarkable. The abdominal aorta is normal in caliber. No aneurysm. No  retroperitoneal adenopathy.     IMPRESSION:  Baseline research examination.     Interval progression of osseous metastatic disease with new subtle lytic foci in  the ribs on the right. Pathologic deformities. Spinal osseous metastases  increased in overall volume as well. Epidural soft tissue is noted at the L3  level. Moderate emphysematous changes.    Chronic parenchymal scarring. Chronic pleural thickening as well. Stable scarring/spiculated nodule in the right upper lobe. .  Coronary artery disease.  Status post median sternotomy.      CONTROLLED SUBSTANCES SAFETY ASSESSMENT (IF ON CONTROLLED SUBSTANCES):     Reviewed opioid safety handout:  [x] Yes   [] No  24 hour opioid dose >150mg morphine equivalent/day:  [] Yes   [x] No  Benzodiazepines:  [] Yes   [] No  Sleep apnea:  [] Yes   [] No  Urine Toxicology Testing within last 6 months:  [] Yes   [x] No  History of or new aberrant medication taking behaviors:  [] Yes   [] No  Has Narcan been prescribed [] Yes   [x] No          Total time:   Counseling / coordination time:   > 50% counseling / coordination?:

## 2020-03-04 NOTE — TELEPHONE ENCOUNTER
Rachel called and just wanted to inform us that she ended up giving the pt his oxycodone an hour early. . So instead of giving it every 4 hours she ended up giving it to him every 3 hours for that one dosage because the pt was complaining about pain. She wants to know should she continue to give it every 3 hours or go back to giving it every 4 hours. She would like a call back regarding this.

## 2020-03-04 NOTE — TELEPHONE ENCOUNTER
Palliative Medicine  Nursing Note  [phone]  Fax [phone]      Telephone Call  Patient Name: Keyur Jack  YOB: 1945    3/4/2020        Primary Decision Maker: Jose Fofana - Spouse - [phone]   Advance Care Planning 3/3/2020   Patient's Healthcare Decision Maker is: Verbal statement (Legal Next of Kin remains as decision maker)   Primary Decision Maker Name -   Secondary Decision Maker Name -   Secondary Decision [address] Phone Number -   Secondary Decision Maker Relationship to Patient -   Confirm Advance Directive None   Patient Would Like to Complete Advance Directive No     Call returned to Mrs. Francisco. She shared that her  has had increased pain today to his right arm and his right side. He has known metastatic lesions to his ribs. He has taken his MS Contin 30mg this morning, along with his just reduced Dexamethasone 2mg (1/2 tab), and has required his Oxycodone 10mg every 3 hours instead of every 4 hours prn, as it has not been able to manage the pain. He has had Tylenol 1000mg at 0900, 1200, and 3pm today. Mrs. Karol Dominguez said that he is asking for more pain medication at the 2-3 hour anthony and she doesn't know how to help him or what to do. She stated that he rests/naps a little after he takes his Oxycodone, about 45 minutes, but the pain returns quickly and he is very uncomfortable. She has tried \" rubbing him down with alcohol, and other lineaments\" but the odors affect his breathing. Suggested trying a heating pad which can provide physical comfort and relaxation to tense muscles, which can aide in pain management. Discussed that a provider in Palliative will be made aware of her concerns. Spoke with on-call provider, Lurdes Machuca NP, who recommended to increase the Oxycodone 5mg from 2 tabs(10mg) every 4 hours prn to 3 tabs(15mg) every 3-4 hours prn. If that doesn't provide relief, they can try increasing to 4 tabs (20mg) every 3-4 hours prn. Appreciate her assistance. Call placed to Mrs. Francisco and informed of the above. She verbalized understanding and was appreciative. Palliative to follow up with patient tomorrow 3/5/20.       Debi Patricia RN  Palliative Medicine

## 2020-03-05 NOTE — TELEPHONE ENCOUNTER
[facility]  Palliative Medicine  Social Work  Telephone Call      Note:  Reached out to wife to check on her due to stress mentioned during pt's PM OP Clinic visits. Wife said sleep is still an issue as she sleeps only when pt is sleeping. During the day, she tries to doze when he is dozing, but usually she has something to take care of in the house. SW acknowledged the exhausting nature of being the caregiver. Wife said she is finding a way to make it. Wife said pt had been dozing, but now awake so she could not talk, but appreciated the call. Plan:  Ongoing psychosocial support for pt and family as needed. Length of Call: 10 min.     Lucas Tam, CCM, MSSW, LCSW  Palliative   [facility]  [phone]

## 2020-03-06 NOTE — TELEPHONE ENCOUNTER
Patient's wife called. She said patient is still having pain. He is currently taking 4 Oxycodone every 3 hours and is still having a lot of pain in arms, shoulder and neck. Please call wife.

## 2020-03-06 NOTE — TELEPHONE ENCOUNTER
Palliative Medicine  Nursing Note  [phone]  Fax [phone]      Telephone Call  Patient Name: Cuco Isbell  YOB: 1945    3/6/2020        Primary Decision Maker: Mariann Vazquez - Spouse - [phone]   Advance Care Planning 3/3/2020   Patient's Healthcare Decision Maker is: Verbal statement (Legal Next of Kin remains as decision maker)   Primary Decision Maker Name -   Secondary Decision Maker Name -   Secondary Decision Maker Phone Number -   Secondary Decision Maker Relationship to Patient -   Confirm Advance Directive None   Patient Would Like to Complete Advance Directive No      Returned call Ms. Francisco. She clarified that her  is complaining of pain to both arms, his shoulders, neck, and low back. He is unable to describe the pain, or if it has changed. He is rating his pain at 6/10 an hour after taking Oxycodone 5mg 4 tabs. He states that it helps relieve the pain, but not for long, and it comes back. He is continuing to take MS Contin 30mg every 12 hours, and Oxycodone every 3 hours. Discussed that Dr. Reuben Duron would like for him to increase his MS Contin to every 8 hours, and increase his Dexamethasone back to 4mg daily. He can continue with Oxycodone 5mg (4 tabs) every 3-4 hours as needed. Instructed to call Palliative Medicine on-call if pain is not improved tomorrow. Mrs. Francisco verbalized understanding and was appreciative.       Debi Patricia RN  Palliative Medicine

## 2020-03-08 PROBLEM — J96.01 ACUTE RESPIRATORY FAILURE WITH HYPOXIA (HCC): Status: ACTIVE | Noted: 2020-01-01

## 2020-03-08 NOTE — PROGRESS NOTES
[facility] Pharmacy Dosing Services: 3/8/20 Consult for Warfarin Dosing by Pharmacy by Dr. Jorge Luis Daly Consult provided for this 76 y.o.  male , for indication of Mechanical Heart Valve. Day of Therapy: resumed from home (alternating between 2.5mg and 1mg)-patient preciously on 5mg daily but had INR >4 ~2 weeks ago and patient instructed hold warfarin x 2 days and then decrease dosing to current regimen. INR at admission=1.3 Dose to achieve an INR goal of 2.5- 3.5 Order entered for  Warfarin  10 (mg) ordered to be given today at 18:00 per Dr. Jorge Luis Daly. INR subtherapeutic at admission, likely due to decreasing home regimen too much outpatient. MD wanted larger initial dose, monitor INR closely with DDI with levaquin, can likely put patient on a combo of 5mg and 2.5mg alternating. Significant drug interactions: lovenox bridging, levaquin d1 Previous dose given 1mg taken PTA 3/7/20 PT/INR Lab Results Component Value Date/Time INR 1.3 (H) 03/08/2020 02:30 PM  
  
Platelets Lab Results Component Value Date/Time PLATELET 272 25/35/6706 05:30 AM  
  
H/H Lab Results Component Value Date/Time HGB 12.4 03/08/2020 05:30 AM  
  
 
Pharmacy to follow daily and will provide subsequent Warfarin dosing based on clinical status. Iram Leyva)  Contact information [phone]

## 2020-03-08 NOTE — PROGRESS NOTES
SHIFT CHANGE: 
1930 Bedside and Verbal shift change report given to Suly LOVE (oncoming nurse) by April (offgoing nurse). Report included the following information SBAR, Kardex, MAR and Recent Results. SHIFT SUMMARY: 
1940  Family bedside, state that they would like to have DNR status changed to full code. Dr. Mayra Hayes to change order. Day RN April present during conversation. Expressed to family that DNR does not imply no interventions, and that he will get supportive care. Wife expressed that episode of shortness of breath during day scared him and made him change his mind. 2000  Patient having episodes of incontinence, unable to get strict I&O's.  
 
0300  Patient having some confusion about time, tried to call wife at this hour. Patient easily reoriented. END OF SHIFT REPORT: 
0730  Bedside and Verbal shift change report given to Carolyn/April (oncoming nurse) by Johann Rangel (offgoing nurse). Report included the following information SBAR, Kardex, MAR and Recent Results.

## 2020-03-08 NOTE — ED NOTES
Patient's . Patient denies chest pain at this time. Adenosine 6mg given by Dr. Angel Burgess. Allergy to adenosine was reviewed by Dr. Angel Burgess prior to administration. Per patient and wife, patient does not have anaphylactic reaction to adenosine, rather shortness of breath. Dr. Angel Burgess advised benefit outweighs risk. Patient's HR: 94 after administration of adenosine, and patient tolerated well.

## 2020-03-08 NOTE — H&P
History & Physical 
 
Date of admission: 3/8/2020 Patient name: Slime Mckee MRN: [id number] YOB: 1945 Age: 76 y.o. Primary care provider:  Chapincito Cunningham MD  
 
Source of Information: patient, medical records Chief complain: SOB History of present illness Slime Mckee is a 76 y.o. 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 Pt states he woke up this AM and used bathroom and was coming back and started to have sudden SOB. Pt's wife states she tried nebs at home without any benefit and became concerned and brought him to hospital. On EMS arrival pt's o2 was 695 on RA. Pt was placed on NRB and nebs which improved his condition. Pt denies any chest pain, fever, chills, cough, headache, n/v, sick contacts, travels. In ER, patient had an episode of SVT which required Adenosine 6mg IV and converted to NSR. Past Medical History:  
Diagnosis Date  Asthma  CAD (coronary artery disease)  Cancer ([facility] [address] \"10 years ago\" throat  COPD (chronic obstructive pulmonary disease) (HCC)  CVA (cerebral vascular accident) ([facility] [address]  H/O mechanical aortic valve replacement CT surgery 1994 - CABG (LIMA to LAD) and AVR ([facility])  Hypertension  S/P CABG x 1   
 CT surgery 1994 - CABG (LIMA to LAD) and AVR ([facility]) Past Surgical History:  
Procedure Laterality Date  CARDIAC SURG PROCEDURE UNLIST  HX HEART VALVE SURGERY    
 CT surgery 1994 - CABG (LIMA to LAD) and AVR ([facility])  HX HEMORRHOIDECTOMY  IR INSERT TUNL CVC W PORT OVER 5 YEARS  2/7/2020 Prior to Admission medications Medication Sig Start Date End Date Taking? Authorizing Provider  
promethazine (PHENERGAN) 12.5 mg tablet Take 12.5 mg by mouth daily. Indications: Nausea   Yes Provider, Historical  
oxyCODONE IR (OXY-IR) 15 mg immediate release tablet Take 1 Tab by mouth every three (3) hours as needed for Pain for up to 10 days. Max Daily Amount: 120 mg. 3/6/20 3/16/20  Vani Roberts MD  
morphine CR (MS CONTIN) 30 mg CR tablet Take 1 Tab by mouth every twelve (12) hours for 30 days. Max Daily Amount: 60 mg. 3/3/20 4/2/20  Clary Roberts MD  
dexAMETHasone (DECADRON) 4 mg tablet Take 4 mg by mouth daily. Take 1 tab every morning 2/17/20   Clary Roberts MD  
hydroxypropyl methylcellulose (GENTEAL) 0.2 % ophthalmic solution 2 drops right eye every 1-2 hours while awake 11/17/19   Hugo Melendez MD  
predniSONE (DELTASONE) 10 mg tablet 6 tabs po daily x 5 days then 5 tabs for 1 day, 4 tabs for 1 day, 3 tabs for 1 day, 2 tabs for 1 day then 1 tab for 1 day 11/17/19   Hugo Melendez MD  
buPROPion SR [facility] SR) 150 mg SR tablet Take 150 mg by mouth two (2) times a day. Provider, Historical  
latanoprost (XALATAN) 0.005 % ophthalmic solution Administer 1 Drop to both eyes nightly. Provider, Historical  
amLODIPine (NORVASC) 10 mg tablet Take 10 mg by mouth daily. Provider, Historical  
albuterol (PROVENTIL VENTOLIN) 2.5 mg /3 mL (0.083 %) nebulizer solution 3 mL by Nebulization route every four (4) hours as needed for Wheezing. 3/17/19   Reyna Bobby MD  
atorvastatin (LIPITOR) 80 mg tablet Take 80 mg by mouth every evening. Provider, Historical  
warfarin (COUMADIN) 5 mg tablet Take 5 mg by mouth every evening. Provider, Historical  
aspirin delayed-release 81 mg tablet Take 81 mg by mouth daily. Provider, Historical  
albuterol (PROVENTIL HFA, VENTOLIN HFA, PROAIR HFA) 90 mcg/actuation inhaler Take 2 Puffs by inhalation every six (6) hours as needed.     Other, MD Kallie  
fluticasone-salmeterol (ADVAIR) 500-50 mcg/dose diskus inhaler Take 1 Puff by inhalation every twelve (12) hours. Lily, MD Kallie  
clonazepam (KLONOPIN) 0.5 mg tablet Take 0.5 mg by mouth three (3) times daily. 10/14/14   Kallie Bautista MD  
escitalopram oxalate (LEXAPRO) 10 mg tablet Take 10 mg by mouth daily. 10/21/14   Kallie Bautista MD  
isosorbide mononitrate (ISMO, MONOKET) 10 mg tablet Take 10 mg by mouth three (3) times daily. Patient takes at [address], noon, and 5pm 10/21/14   Kallie Bautista MD  
 
Allergies Allergen Reactions  Adenosine Shortness of Breath Tolerates well when given for SVT, per Dr. Tamie Kowalski. Given on 3/8/2020  Iodinated Contrast Media Shortness of Breath  Pcn [Penicillins] Shortness of Breath Patient reports to have tolerated a 10 day course of cefdinir started 17 Family History Problem Relation Age of Onset  Heart Disease Mother  Diabetes Mother  Hypertension Mother  Heart Disease Sister  Hypertension Sister  Hypertension Father  Cancer Brother   
     lung  Diabetes Brother  Heart Disease Brother  Hypertension Brother  Stroke Brother Family history reviewed and non-contributory. Social history Patient resides Independently X  With family care Assisted living SNF Ambulates X  Independently With cane Assisted walker Alcohol history X  None Social  
  Chronic Smoking history None X  Former smoker Current smoker Social History Tobacco Use Smoking Status Former Smoker  Packs/day: 3.00  Years: 40.00  Pack years: 120.00  Last attempt to quit: 2019  Years since quittin.1 Smokeless Tobacco Never Used Code status Full code X  DNR/DNI Code status discussed with the patient/caregivers. DNR Review of systems The patient denies any fever, chills, chest pain, cough, congestion, recent illness, palpitations, or dysuria. A comprehensive review of systems was negative except for that written in the History of Present Illness. The remainder of the review of systems was reviewed and is noncontributory. Physical Examination Visit Vitals /63 Pulse 82 Temp 98 °F (36.7 °C) Resp 22 Ht 5' 6\" (1.676 m) Wt 65.8 kg (145 lb) SpO2 98% BMI 23.40 kg/m² O2 Flow Rate (L/min): 3 l/min O2 Device: Nasal cannula General:  Alert, cooperative, no distress Head:  Normocephalic, without obvious abnormality, atraumatic Eyes:  Conjunctivae/corneas clear. PERRL, EOMs intact E/N/M/T: Teeth and gums normal 
Clear oropharynx Neck: No carotid bruit Normal JVP Lungs:   Fair air entry, no wheezing, mild rales at bases Chest wall:  No tenderness or deformity Heart:  Regular rhythm Sounds normal; no murmur, click, rub or gallop Abdomen:   Soft, no tenderness Bowel sounds normal 
No masses or hepatosplenomegaly No hernias present Back: No CVA tenderness Extremities: Extremities normal, atraumatic No cyanosis or edema No DVT signs Pulses 2+ and symmetric all extremities Skin: No rashes or ulcers Musculo- 
    skeletal: Gait not tested Normal symmetry, ROM, strength and tone Neuro: Normal cranial nerves Normal reflexes and sensation Psych: Alert, oriented x3 Normal affect, judgement and insight Geniturinary: deferred Data Review 24 Hour Results: 
Recent Results (from the past 24 hour(s)) METABOLIC PANEL, COMPREHENSIVE Collection Time: 03/08/20  5:30 AM  
Result Value Ref Range Sodium 134 (L) 136 - 145 mmol/L Potassium 3.9 3.5 - 5.1 mmol/L Chloride 99 97 - 108 mmol/L  
 CO2 29 21 - 32 mmol/L Anion gap 6 5 - 15 mmol/L Glucose 208 (H) 65 - 100 mg/dL BUN 19 6 - 20 MG/DL Creatinine 1.05 0.70 - 1.30 MG/DL  
 BUN/Creatinine ratio 18 12 - 20 GFR est AA >60 >60 ml/min/1.73m2 GFR est non-AA >60 >60 ml/min/1.73m2  Calcium 8.7 8.5 - 10.1 MG/DL  
 Bilirubin, total 0.7 0.2 - 1.0 MG/DL  
 ALT (SGPT) 119 (H) 12 - 78 U/L  
 AST (SGOT) 59 (H) 15 - 37 U/L Alk. phosphatase 184 (H) 45 - 117 U/L Protein, total 6.8 6.4 - 8.2 g/dL Albumin 3.4 (L) 3.5 - 5.0 g/dL Globulin 3.4 2.0 - 4.0 g/dL A-G Ratio 1.0 (L) 1.1 - 2.2    
TROPONIN I Collection Time: 03/08/20  5:30 AM  
Result Value Ref Range Troponin-I, Qt. <0.05 <0.05 ng/mL NT-PRO BNP Collection Time: 03/08/20  5:30 AM  
Result Value Ref Range NT pro- (H) <125 PG/ML  
MAGNESIUM Collection Time: 03/08/20  5:30 AM  
Result Value Ref Range Magnesium 2.0 1.6 - 2.4 mg/dL PROTHROMBIN TIME + INR Collection Time: 03/08/20  5:30 AM  
Result Value Ref Range INR 1.1 0.9 - 1.1 Prothrombin time 11.5 (H) 9.0 - 11.1 sec CBC WITH AUTOMATED DIFF Collection Time: 03/08/20  5:30 AM  
Result Value Ref Range WBC 16.6 (H) 4.1 - 11.1 K/uL  
 RBC 4.13 4.10 - 5.70 M/uL  
 HGB 12.4 12.1 - 17.0 g/dL HCT 38.7 36.6 - 50.3 % MCV 93.7 80.0 - 99.0 FL  
 MCH 30.0 26.0 - 34.0 PG  
 MCHC 32.0 30.0 - 36.5 g/dL  
 RDW 15.9 (H) 11.5 - 14.5 % PLATELET 268 590 - 385 K/uL MPV 9.0 8.9 - 12.9 FL  
 NRBC 0.0 0  WBC ABSOLUTE NRBC 0.00 0.00 - 0.01 K/uL NEUTROPHILS 85 (H) 32 - 75 % BAND NEUTROPHILS 9 % LYMPHOCYTES 1 (L) 12 - 49 % MONOCYTES 5 5 - 13 % EOSINOPHILS 0 0 - 7 % BASOPHILS 0 0 - 1 % IMMATURE GRANULOCYTES 0 0.0 - 0.5 % ABS. NEUTROPHILS 15.6 (H) 1.8 - 8.0 K/UL  
 ABS. LYMPHOCYTES 0.2 (L) 0.8 - 3.5 K/UL  
 ABS. MONOCYTES 0.8 0.0 - 1.0 K/UL  
 ABS. EOSINOPHILS 0.0 0.0 - 0.4 K/UL  
 ABS. BASOPHILS 0.0 0.0 - 0.1 K/UL  
 ABS. IMM. GRANS. 0.0 0.00 - 0.04 K/UL  
 DF MANUAL    
 RBC COMMENTS NORMOCYTIC, NORMOCHROMIC    
BLOOD GAS, ARTERIAL Collection Time: 03/08/20  6:10 AM  
Result Value Ref Range pH 7.36 7.35 - 7.45    
 PCO2 46 (H) 35.0 - 45.0 mmHg PO2 60 (L) 80 - 100 mmHg O2 SAT 90 (L) 92 - 97 %  BICARBONATE 26 22 - 26 mmol/L  
 BASE DEFICIT 0.3 mmol/L  
 O2 METHOD BIPAP    
 FIO2 35 % MODE BIPAP    
 SET RATE 10    
 SPONTANEOUS RATE 8    
 IPAP/PIP 14 EPAP/CPAP/PEEP 5 Sample source ARTERIAL    
 SITE LEFT RADIAL VISHNU'S TEST YES    
INFLUENZA A+B VIRAL AGS Collection Time: 03/08/20  6:53 AM  
Result Value Ref Range Influenza A Antigen NEGATIVE  NEG Influenza B Antigen NEGATIVE  NEG    
LACTIC ACID Collection Time: 03/08/20  6:53 AM  
Result Value Ref Range Lactic acid 1.3 0.4 - 2.0 MMOL/L  
EKG, 12 LEAD, INITIAL Collection Time: 03/08/20  7:03 AM  
Result Value Ref Range Ventricular Rate 101 BPM  
 Atrial Rate 101 BPM  
 P-R Interval 144 ms QRS Duration 110 ms  
 Q-T Interval 386 ms QTC Calculation (Bezet) 500 ms Calculated P Axis 72 degrees Calculated R Axis 2 degrees Calculated T Axis 86 degrees Diagnosis Sinus tachycardia Possible Left atrial enlargement Left ventricular hypertrophy Abnormal ECG When compared with ECG of 17-NOV-2019 12:34, 
Vent. rate has increased BY  35 BPM 
QRS duration has decreased T wave amplitude has increased in Anterior leads SAMPLES BEING HELD Collection Time: 03/08/20  9:20 AM  
Result Value Ref Range SAMPLES BEING HELD 1YELLOW AND GREY TOP URINE   
 COMMENT Add-on orders for these samples will be processed based on acceptable specimen integrity and analyte stability, which may vary by analyte. Recent Labs 03/08/20 
0530 WBC 16.6* HGB 12.4 HCT 38.7  Recent Labs 03/08/20 
0530 * K 3.9 CL 99  
CO2 29 * BUN 19  
CREA 1.05  
CA 8.7 MG 2.0 ALB 3.4* TBILI 0.7 SGOT 59* * INR 1.1 Imaging CXR: WNL Assessment and Plan Active Problems: COPD exacerbation ([facility] [address] (10/22/2014) Acute respiratory failure with hypoxia ([facility] [address] (3/8/2020) Acute Resp Failure with Hypoxia (O2 sat 68% on RA, RR 39,  on admission ) - Improved with BIPAP - transition to NC 
- ABG reviewed with Pulmonologist 
- Kareem Bryson/Pulmicort, IV Solumedrol - IV Levaquin 
- rapid transition to PO prednisone, possibly in AM  
- eval for home O2 needs before discharge SVT 
- resolved with Adenosine 6mg in Er 
- monitor on tele - Consider switching from Amlodipine to Cardizem if recurs Hypertension, urgency due to respiratory distress 
- improve after resuming home Amlodipine and Imdur S/p Prosthetic Aortic Valve 
- on Coumadin at home, INR 1.1 on admission, per wife, pt's coumadin was high last week and was asked to reduce dose by the doctor - Lovenox BID to bridge coumadin to goal INR 2.5-3.5 Cancer Pain 
- on MS contin q8h, Oxy PRN  
- follows with  as outpatient Stage IV Lung Ca 
- outpatient Onc follow up Diet:Regular Activity: As tolerated DVT prophylaxis: Lovenox BID and Coumadin  
Isolation precautions: NONE Consultations: Pulmonary Anticipated disposition: in 1-2 days NOK: Casey Brown (spouse), case was discussed with spouse and patient DNR/DNI Signed by: Talisha Rogers MD  
 March 8, 2020 at 12:27 PM

## 2020-03-08 NOTE — PROGRESS NOTES
[facility]: MED RECONCILIATION Comments/Recommendations:  
Medication reconciliation completed by wife who was knowledgeable regarding medications, patient did not take any medications today prior to hospital arrival.  
Patient on Keytruda every 3 weeks, has had two cycles, cycle 2 given on 2/26/20, due to cycle 3 on 3/18/20. Per wife they called MD yesterday and on-call palliative care MD instructed to increase MS Contin to 30mg TID (from BID) and to increase oxycodone 15mg q3h PRN to 20mg q3h PRN (patient takes a 5mg tab + 15mg tab). Patient also instructed to continue dexamethasone at 4mg daily (previously on a taper). Per wife, patient had INR checked ~2 weeks ago and INR was >4. Patient instructed to hold for 2 days and then decrease regimen from 5mg daily to 2.5mg (1/2 of 5mg tab) alternating with 1mg. Patient was scheduled to have INR checked last Wednesday 3/4/20 but patient was too ill to make appointment and appointment rescheduled for this week. Patient/wife state INR goal is 2.5-3.5. Per wife, patient instructed to stop ASA yesterday per MD-medication removed from list.   
Per wife, patient stopped bupropion on Friday 3/6/20 as her and patient feel the medication is not needed-does not want to resume. Confirmed allergies and preferred pharmacy as [facility] and [address] [place] [address]. Medications added:  
 
Miralax Senna Brimonidine Oxycodone 5mg Candice [place] Medications removed: 
 
ASA Bupropion Prednisone taper Medications adjusted: 
 
Promethazine 25mg q8h PRN adjusted from 12.5mg daily Warfarin regimen updated to 2.5mg alternating with 1mg-see above Information obtained from: wife, Rx query, outpatient MD notes, [place]  Allergies: Adenosine; Iodinated contrast media; and Pcn [penicillins] Prior to Admission Medications:  
 
Medication Documentation Review Audit Reviewed by Lorenzo Trejo (Pharmacist) on 03/08/20 at 2771 Medication Sig Documenting Provider Last Dose Status Taking?  
0.9% sodium chloride solp 100 mL with pembrolizumab 25 mg/mL soln 200 mg by IntraVENous route every twenty-one (21) days. Provider, Historical 2/26/2020 Active Yes Med Note (ALBERTO BRANDON Mar 8, 2020  2:53 PM) Cycle 2 given on 2/26/20, cycle 3 due 3/18/20. albuterol (PROVENTIL HFA, VENTOLIN HFA, PROAIR HFA) 90 mcg/actuation inhaler Take 2 Puffs by inhalation every six (6) hours as needed. Kallie Bautista MD 3/7/2020 Active Yes  
albuterol (PROVENTIL VENTOLIN) 2.5 mg /3 mL (0.083 %) nebulizer solution 3 mL by Nebulization route every four (4) hours as needed for Wheezing. Oscar Eisenberg MD 3/7/2020 Active Yes  
amLODIPine (NORVASC) 10 mg tablet Take 10 mg by mouth daily. Provider, Historical 3/7/2020 AM Active Yes  
atorvastatin (LIPITOR) 80 mg tablet Take 80 mg by mouth every evening. Provider, Historical 3/7/2020 PM Active Yes  
brimonidine (ALPHAGAN) 0.2 % ophthalmic solution Administer 1 Drop to both eyes two (2) times a day. Provider, Historical 3/7/2020 PM Active Yes  
clonazepam (KLONOPIN) 0.5 mg tablet Take 0.5 mg by mouth three (3) times daily. Kallie Bautista MD 3/7/2020 PM Active Yes  
dexAMETHasone (DECADRON) 4 mg tablet Take 4 mg by mouth daily. Take 1 tab every morning Clary Roberts MD 3/7/2020 AM Active Yes  
escitalopram oxalate (LEXAPRO) 10 mg tablet Take 10 mg by mouth daily. Kallie Bautista MD 3/7/2020 AM Active Yes  
fluticasone-salmeterol (ADVAIR) 500-50 mcg/dose diskus inhaler Take 1 Puff by inhalation every twelve (12) hours. Kallie Bautista MD 3/7/2020 PM Active Yes  
hydroxypropyl methylcellulose (GENTEAL) 0.2 % ophthalmic solution 2 drops right eye every 1-2 hours while awake Daphne Lopes MD  Active Yes  
isosorbide mononitrate (ISMO, MONOKET) 10 mg tablet Take 10 mg by mouth three (3) times daily. Patient takes at [facility], noon, and 5pm Other, MD Kallie 3/7/2020 PM Active Yes latanoprost (XALATAN) 0.005 % ophthalmic solution Administer 1 Drop to both eyes nightly. Provider, Historical 3/7/2020 PM Active Yes  
morphine CR (MS CONTIN) 30 mg CR tablet Take 30 mg by mouth three (3) times daily. Provider, Historical 3/7/2020 2230 Active Yes Med Note (ALBERTO BRANDON   [place] Mar 8, 2020  2:43 PM) Increased from BID to TID yesterday 3/7/20 per on-call palliative care MD  
oxyCODONE IR (OXY-IR) 15 mg immediate release tablet Take 1 Tab by mouth every three (3) hours as needed for Pain for up to 10 days. Max Daily Amount: 120 mg. Ebonie Valencia MD 3/7/2020 2300 Active Yes Med Note (ALBERTO BRANDON   [place] Mar 8, 2020  2:46 PM) Increased yesterday 3/7/20 by on-call palliative care MD from 15mg every 3h PRN to 20mg every 3h PRN. oxyCODONE IR (ROXICODONE) 5 mg immediate release tablet Take 5 mg by mouth every three (3) hours as needed for Pain. Takes with 15mg=20mg every 3 hours as needed Provider, Historical 3/7/2020 2300 Active Yes Med Note (ALBERTO BRANDON   [place] Mar 8, 2020  2:45 PM) Increased yesterday 3/7/20 by on-call palliative care MD from 15mg every 3h PRN to 20mg every 3h PRN. polyethylene glycol (MIRALAX) 17 gram packet Take 17 g by mouth daily. Provider, Historical 3/7/2020 AM Active Yes  
promethazine (PHENERGAN) 12.5 mg tablet Take 25 mg by mouth every eight (8) hours as needed. Indications: Nausea Provider, Historical 3/1/2020 Active Yes  
senna (SENNA) 8.6 mg tablet Take 2 Tabs by mouth nightly. Provider, Historical 3/7/2020 PM Active Yes  
warfarin (COUMADIN) 1 mg tablet Take 1 mg by mouth every other day. Provider, Historical 3/7/2020 PM Active Yes  
warfarin (COUMADIN) 5 mg tablet Take 2.5 mg by mouth every other day. Provider, Historical 3/6/2020 PM Active Yes Giles Bear, PharmD, BCPS   Contact: [phone]

## 2020-03-08 NOTE — ED NOTES
Per Dr. Nicola Trejo, patient ordered to be taken off of Bipap. Advised that patient is not retaining CO2. Patient placed on NC, 3 L per order. Advised able to titrate down to 2L as needed.

## 2020-03-08 NOTE — PROGRESS NOTES
[facility] Pharmacy Dosing Services: 3/8/2020 Consult for Enoxaparin by Dr. Garcia Medicine Consult made for this 76 y.o. male, for treatment of mechanical heart valve/CVA- bridge with coumadin per MD; pt with Hx metastatic NSCLC Enoxaparin 1.5 mg/kg dose given this AM; Will adjust to 1 mg/kg Q12 hrs, rounded to 70 mg Q12 hrs. Weight = 65.8 kg Weight                                Protocol dose                               Syringe 66-75 70 mg (0.7 ml) 80 mg Wt Readings from Last 1 Encounters:  
03/08/20 65.8 kg (145 lb) Ht Readings from Last 1 Encounters:  
03/08/20 167.6 cm (66\") Previous Dose Enoxaparin 100 mg (1.5 mg/kg) x 1 dose in ER Creatinine Clearance Estimated Creatinine Clearance: 55.7 mL/min (based on SCr of 1.05 mg/dL). Creatinine Lab Results Component Value Date/Time Creatinine 1.05 03/08/2020 05:30 AM  
 Creatinine (POC) 1.0 02/04/2020 01:45 PM  
   
Platelet Lab Results Component Value Date/Time PLATELET 163 33/99/3187 05:30 AM  
  
H/H Lab Results Component Value Date/Time HGB 12.4 03/08/2020 05:30 AM  
  
 
Pharmacy to monitor patients progress. Will make dose adjustment as needed per changing renal function. Will communicate further recommendations regarding patients anticoagulation therapy with prescriber. Signed Natalie Campos, [facility] information: [phone]

## 2020-03-08 NOTE — ED NOTES
Spoke with patient's wife, Yamilet Lopez over the phone regarding medications. Per wife, patient takes: Morphine 30 mg 3 times a day Oxycodone 20 mg q 3 hrs Promethazine as needed for nausea Zofran prior to chemotherapy treatment. Klonopin 0.5 mg, 3 times a day Pharmacy notified.

## 2020-03-08 NOTE — ED NOTES
TRANSFER - OUT REPORT: 
 
Verbal report given to April RN(name) on [facility]  being transferred to [facility](unit) for routine progression of care Report consisted of patients Situation, Background, Assessment and  
Recommendations(SBAR). Information from the following report(s) SBAR, Kardex, ED Summary, MAR, Recent Results and Cardiac Rhythm NSR with PVC's was reviewed with the receiving nurse. Lines:  
Venous Access Device PORT 02/07/20 Upper chest (subclavicular area, right (Active) Central Line Being Utilized Yes 3/8/2020  7:01 AM  
Site Assessment Clean, dry, & intact 3/8/2020  7:01 AM  
Date of Last Dressing Change 03/08/20 3/8/2020  7:01 AM  
Dressing Status Clean, dry, & intact 3/8/2020  7:01 AM  
Dressing Type Topical skin adhesive;Transparent 3/8/2020  7:01 AM  
Access Needle Size (Site #1) 20 G 3/8/2020  7:01 AM  
Access Needle Length (Medial Site) 1 inch 3/8/2020  7:01 AM  
Positive Blood Return (Medial Site) Yes 3/8/2020  7:01 AM  
Action Taken (Medial Site) Blood drawn;Flushed 3/8/2020  7:01 AM  
   
Peripheral IV 03/08/20 Left Antecubital (Active) Site Assessment Clean, dry, & intact 3/8/2020  5:19 AM  
Phlebitis Assessment 0 3/8/2020  5:19 AM  
Infiltration Assessment 0 3/8/2020  5:19 AM  
Dressing Status Clean, dry, & intact 3/8/2020  5:19 AM  
  
 
Opportunity for questions and clarification was provided. Patient transported with: 
 Monitor Registered Nurse

## 2020-03-08 NOTE — ED TRIAGE NOTES
Pt. States started with SOB that woke him up about 30 minutes ago, tried a neb at home with no relief pt. Was 69% on RA for EMS. EMS gave NRB and a neb with improvement. Hx of COPD CHF ASTHMA heart attack STROKE.

## 2020-03-08 NOTE — PROGRESS NOTES
Bedside shift change report given to Suly (oncoming nurse) by April (offgoing nurse). Report included the following information SBAR.

## 2020-03-08 NOTE — ACP (ADVANCE CARE PLANNING)
[facility] Advance Care Planning Note Name: Yohana Hayes YOB: 1945 MRN: [id number] Admission Date: 3/8/2020  5:16 AM 
 
Date of discussion: 3/8/2020 Active Diagnoses: 
 
Hospital Problems  Date Reviewed: 2/26/2020 Codes Class Noted POA Acute respiratory failure with hypoxia [facility]) ICD-10-CM: J96.01 
ICD-9-CM: 518.81  3/8/2020 Unknown COPD exacerbation ([facility] [address] ICD-10-CM: J44.1 ICD-9-CM: 491.21  10/22/2014 Unknown These active diagnoses are of sufficient risk that focused discussion on advance care planning is indicated in order to allow the patient to thoughtfully consider personal goals of care, and if situations arise that prevent the ability to personally give input, to ensure appropriate representation of their personal desires for different levels and aggressiveness of care. Discussion:  
 
Persons present and participating in discussion: Vern Becerril MD, Leslie Real Discussion: Acute respiratory failure with hypoxia, COPD Exacerbation and Lung cancer stage 4. We discussed patient's current condition and can potentially worsening leading to eventual Intubation and cardiopulmonary arrest. Discussed with patient who is alert and awake regarding his wishes. Patient cleared states he wants no life support in even that he has a cardiopulmonary arrest. Pt's wife and pt's RN in ER was present at bedside during this conversation. Time Spent:  
 
Total time spent face-to-face in education and discussion: 25 minutes.   
 
Ankit Miles MD 
Date of Service:  3/8/2020 
12:42 PM

## 2020-03-08 NOTE — CONSULTS
[facility] Pulmonary, Critical Care, and Sleep Medicine Initial Patient Consult Name: Christina Jha MRN: [id number] : 1945 Hospital: [facility] Date: 3/8/2020 IMPRESSION:  
· Acute respiratory failure · COPD exacerbation · Lung cancer RECOMMENDATIONS:  
· DC BIPAP 
· O2 
· IV steroids · Jet nebs · Empiric abx · Admitted to step down Subjective: This patient has been seen and evaluated at the request of hospitalist team for copd exacerbation. Patient is a 76 y.o. male presented with acute onset of sob, wheezing Given jet nebs, developed SVT, responded to adenosine Now pt in no severe distress Past Medical History:  
Diagnosis Date  Asthma  CAD (coronary artery disease)  Cancer ([facility] [address] \"10 years ago\" throat  COPD (chronic obstructive pulmonary disease) (HCC)  CVA (cerebral vascular accident) ([facility] [address]  H/O mechanical aortic valve replacement CT surgery  - CABG (LIMA to LAD) and AVR ([facility])  Hypertension  S/P CABG x 1   
 CT surgery  - CABG (LIMA to LAD) and AVR ([facility]) Past Surgical History:  
Procedure Laterality Date  CARDIAC SURG PROCEDURE UNLIST  HX HEART VALVE SURGERY    
 CT surgery  - CABG (LIMA to LAD) and AVR ([facility])  HX HEMORRHOIDECTOMY  IR INSERT TUNL CVC W PORT OVER 5 YEARS  2020 Prior to Admission medications Medication Sig Start Date End Date Taking? Authorizing Provider  
oxyCODONE IR (OXY-IR) 15 mg immediate release tablet Take 1 Tab by mouth every three (3) hours as needed for Pain for up to 10 days. Max Daily Amount: 120 mg. 3/6/20 3/16/20  Esvin Roberts MD  
morphine CR (MS CONTIN) 30 mg CR tablet Take 1 Tab by mouth every twelve (12) hours for 30 days. Max Daily Amount: 60 mg. 3/3/20 4/2/20  Clary Roberts MD  
dexAMETHasone (DECADRON) 4 mg tablet Take 4 mg by mouth daily.  Take 1 tab every morning 2/17/20   Vani Roberts MD  
hydroxypropyl methylcellulose (GENTEAL) 0.2 % ophthalmic solution 2 drops right eye every 1-2 hours while awake 11/17/19   Scooby Lopez MD  
predniSONE (DELTASONE) 10 mg tablet 6 tabs po daily x 5 days then 5 tabs for 1 day, 4 tabs for 1 day, 3 tabs for 1 day, 2 tabs for 1 day then 1 tab for 1 day 11/17/19   Scooby Lopez MD  
buPROPion SR [facility] SR) 150 mg SR tablet Take 150 mg by mouth two (2) times a day. Provider, Historical  
latanoprost (XALATAN) 0.005 % ophthalmic solution Administer 1 Drop to both eyes nightly. Provider, Historical  
amLODIPine (NORVASC) 10 mg tablet Take 10 mg by mouth daily. Provider, Historical  
albuterol (PROVENTIL VENTOLIN) 2.5 mg /3 mL (0.083 %) nebulizer solution 3 mL by Nebulization route every four (4) hours as needed for Wheezing. 3/17/19   Tank Avila MD  
promethazine (PHENERGAN) 12.5 mg tablet Take 12.5 mg by mouth daily. Indications: Nausea    Provider, Historical  
atorvastatin (LIPITOR) 80 mg tablet Take 80 mg by mouth every evening. Provider, Historical  
warfarin (COUMADIN) 5 mg tablet Take 5 mg by mouth every evening. Provider, Historical  
aspirin delayed-release 81 mg tablet Take 81 mg by mouth daily. Provider, Historical  
albuterol (PROVENTIL HFA, VENTOLIN HFA, PROAIR HFA) 90 mcg/actuation inhaler Take 2 Puffs by inhalation every six (6) hours as needed. Other, MD Kallie  
fluticasone-salmeterol (ADVAIR) 500-50 mcg/dose diskus inhaler Take 1 Puff by inhalation every twelve (12) hours. Kallie Bautista MD  
clonazepam (KLONOPIN) 0.5 mg tablet Take 0.5 mg by mouth three (3) times daily. 10/14/14   Kallie Bautista MD  
escitalopram oxalate (LEXAPRO) 10 mg tablet Take 10 mg by mouth daily. 10/21/14   Kallie Bautista MD  
isosorbide mononitrate (ISMO, MONOKET) 10 mg tablet Take 10 mg by mouth three (3) times daily.  Patient takes at [address], noon, and 5pm 10/21/14   Other, MD Kallie  
 Allergies Allergen Reactions  Adenosine Shortness of Breath Tolerates well when given for SVT, per Dr. Jazmyn Cash. Given on 3/8/2020  Iodinated Contrast Media Shortness of Breath  Pcn [Penicillins] Shortness of Breath Patient reports to have tolerated a 10 day course of cefdinir started 17 Social History Tobacco Use  Smoking status: Former Smoker Packs/day: 3.00 Years: 40.00 Pack years: 120.00 Last attempt to quit: 2019 Years since quittin.1  Smokeless tobacco: Never Used Substance Use Topics  Alcohol use: No  
  
Family History Problem Relation Age of Onset  Heart Disease Mother  Diabetes Mother  Hypertension Mother  Heart Disease Sister  Hypertension Sister  Hypertension Father  Cancer Brother   
     lung  Diabetes Brother  Heart Disease Brother  Hypertension Brother  Stroke Brother Current Facility-Administered Medications Medication Dose Route Frequency  sodium chloride (NS) flush 5-40 mL  5-40 mL IntraVENous Q8H  
 hydrALAZINE (APRESOLINE) 20 mg/mL injection 20 mg  20 mg IntraVENous NOW  levoFLOXacin (LEVAQUIN) 750 mg in D5W IVPB  750 mg IntraVENous NOW  amLODIPine (NORVASC) tablet 10 mg  10 mg Oral DAILY  aspirin delayed-release tablet 81 mg  81 mg Oral DAILY  atorvastatin (LIPITOR) tablet 80 mg  80 mg Oral QPM  
 buPROPion SR (WELLBUTRIN SR) tablet 150 mg  150 mg Oral BID  clonazePAM (KlonoPIN) tablet 0.5 mg  0.5 mg Oral TID  escitalopram oxalate (LEXAPRO) tablet 10 mg  10 mg Oral DAILY  isosorbide mononitrate (ISMO, MONOKET) tablet 10 mg  10 mg Oral TID  latanoprost (XALATAN) 0.005 % ophthalmic solution 1 Drop  1 Drop Both Eyes QHS  morphine CR (MS CONTIN) tablet 30 mg  30 mg Oral Q8H  promethazine (PHENERGAN) tablet 12.5 mg  12.5 mg Oral DAILY  sodium chloride (NS) flush 5-40 mL  5-40 mL IntraVENous Q8H  
  levoFLOXacin (LEVAQUIN) 750 mg in D5W IVPB  750 mg IntraVENous Q24H  
 methylPREDNISolone (PF) (Solu-MEDROL) injection 60 mg  60 mg IntraVENous Q6H  
 arformoteroL (BROVANA) neb solution 15 mcg  15 mcg Nebulization BID RT  
 budesonide (PULMICORT) 500 mcg/2 ml nebulizer suspension  500 mcg Nebulization BID RT  
 docusate sodium (COLACE) capsule 100 mg  100 mg Oral BID  polyethylene glycol (MIRALAX) packet 17 g  17 g Oral DAILY  ipratropium (ATROVENT) 0.02 % nebulizer solution 0.5 mg  0.5 mg Nebulization Q4H RT  
 levalbuterol (XOPENEX) nebulizer soln 0.63 mg/3 mL  0.63 mg Nebulization Q4H RT  
 
 
Review of Systems: A comprehensive review of systems was negative except for: Respiratory: positive for cough, wheezing or dyspnea on exertion Objective:  
Vital Signs:   
Visit Vitals /70 Pulse 89 Temp 98 °F (36.7 °C) Resp 17 Ht 5' 6\" (1.676 m) Wt 65.8 kg (145 lb) SpO2 97% BMI 23.40 kg/m² O2 Device: Nasal cannula O2 Flow Rate (L/min): 3 l/min Temp (24hrs), Av °F (36.7 °C), Min:98 °F (36.7 °C), Max:98 °F (36.7 °C) Intake/Output:  
Last shift:      No intake/output data recorded. Last 3 shifts: No intake/output data recorded. No intake or output data in the 24 hours ending 20 0813 Physical Exam:  
General:  Alert, cooperative, no distress, appears stated age. Head:  Normocephalic, without obvious abnormality, atraumatic. Eyes:  Conjunctivae/corneas clear. PERRL, EOMs intact. Nose: Nares normal. Septum midline. Mucosa normal. No drainage or sinus tenderness. Throat: Lips, mucosa, and tongue normal. Teeth and gums normal.  
Neck: Supple, symmetrical, trachea midline, no adenopathy, thyroid: no enlargment/tenderness/nodules, no carotid bruit and no JVD. Back:   Symmetric, no curvature. ROM normal.  
Lungs:   Decreased BS bilaterally. Chest wall:  No tenderness or deformity.   
Heart:  Regular rate and rhythm, S1, S2 normal, no murmur, click, rub or gallop. Abdomen:   Soft, non-tender. Bowel sounds normal. No masses,  No organomegaly. Extremities: Extremities normal, atraumatic, no cyanosis or edema. Pulses: 2+ and symmetric all extremities. Skin: Skin color, texture, turgor normal. No rashes or lesions Lymph nodes: Cervical, supraclavicular, and axillary nodes normal.  
Neurologic: Grossly nonfocal  
 
Data review:  
 
Recent Results (from the past 24 hour(s)) METABOLIC PANEL, COMPREHENSIVE Collection Time: 03/08/20  5:30 AM  
Result Value Ref Range Sodium 134 (L) 136 - 145 mmol/L Potassium 3.9 3.5 - 5.1 mmol/L Chloride 99 97 - 108 mmol/L  
 CO2 29 21 - 32 mmol/L Anion gap 6 5 - 15 mmol/L Glucose 208 (H) 65 - 100 mg/dL BUN 19 6 - 20 MG/DL Creatinine 1.05 0.70 - 1.30 MG/DL  
 BUN/Creatinine ratio 18 12 - 20 GFR est AA >60 >60 ml/min/1.73m2 GFR est non-AA >60 >60 ml/min/1.73m2 Calcium 8.7 8.5 - 10.1 MG/DL Bilirubin, total 0.7 0.2 - 1.0 MG/DL  
 ALT (SGPT) 119 (H) 12 - 78 U/L  
 AST (SGOT) 59 (H) 15 - 37 U/L Alk. phosphatase 184 (H) 45 - 117 U/L Protein, total 6.8 6.4 - 8.2 g/dL Albumin 3.4 (L) 3.5 - 5.0 g/dL Globulin 3.4 2.0 - 4.0 g/dL A-G Ratio 1.0 (L) 1.1 - 2.2    
TROPONIN I Collection Time: 03/08/20  5:30 AM  
Result Value Ref Range Troponin-I, Qt. <0.05 <0.05 ng/mL NT-PRO BNP Collection Time: 03/08/20  5:30 AM  
Result Value Ref Range NT pro- (H) <125 PG/ML  
MAGNESIUM Collection Time: 03/08/20  5:30 AM  
Result Value Ref Range Magnesium 2.0 1.6 - 2.4 mg/dL PROTHROMBIN TIME + INR Collection Time: 03/08/20  5:30 AM  
Result Value Ref Range INR 1.1 0.9 - 1.1 Prothrombin time 11.5 (H) 9.0 - 11.1 sec CBC WITH AUTOMATED DIFF Collection Time: 03/08/20  5:30 AM  
Result Value Ref Range WBC 16.6 (H) 4.1 - 11.1 K/uL  
 RBC 4.13 4.10 - 5.70 M/uL  
 HGB 12.4 12.1 - 17.0 g/dL HCT 38.7 36.6 - 50.3 %  MCV 93.7 80.0 - 99.0 FL  
 MCH 30.0 26.0 - 34.0 PG  
 MCHC 32.0 30.0 - 36.5 g/dL  
 RDW 15.9 (H) 11.5 - 14.5 % PLATELET 520 243 - 211 K/uL MPV 9.0 8.9 - 12.9 FL  
 NRBC 0.0 0  WBC ABSOLUTE NRBC 0.00 0.00 - 0.01 K/uL NEUTROPHILS 85 (H) 32 - 75 % BAND NEUTROPHILS 9 % LYMPHOCYTES 1 (L) 12 - 49 % MONOCYTES 5 5 - 13 % EOSINOPHILS 0 0 - 7 % BASOPHILS 0 0 - 1 % IMMATURE GRANULOCYTES 0 0.0 - 0.5 % ABS. NEUTROPHILS 15.6 (H) 1.8 - 8.0 K/UL  
 ABS. LYMPHOCYTES 0.2 (L) 0.8 - 3.5 K/UL  
 ABS. MONOCYTES 0.8 0.0 - 1.0 K/UL  
 ABS. EOSINOPHILS 0.0 0.0 - 0.4 K/UL  
 ABS. BASOPHILS 0.0 0.0 - 0.1 K/UL  
 ABS. IMM. GRANS. 0.0 0.00 - 0.04 K/UL  
 DF MANUAL    
 RBC COMMENTS NORMOCYTIC, NORMOCHROMIC    
BLOOD GAS, ARTERIAL Collection Time: 03/08/20  6:10 AM  
Result Value Ref Range pH 7.36 7.35 - 7.45    
 PCO2 46 (H) 35.0 - 45.0 mmHg PO2 60 (L) 80 - 100 mmHg O2 SAT 90 (L) 92 - 97 % BICARBONATE 26 22 - 26 mmol/L  
 BASE DEFICIT 0.3 mmol/L  
 O2 METHOD BIPAP    
 FIO2 35 % MODE BIPAP    
 SET RATE 10    
 SPONTANEOUS RATE 8    
 IPAP/PIP 14 EPAP/CPAP/PEEP 5 Sample source ARTERIAL    
 SITE LEFT RADIAL VISHNU'S TEST YES    
INFLUENZA A+B VIRAL AGS Collection Time: 03/08/20  6:53 AM  
Result Value Ref Range Influenza A Antigen NEGATIVE  NEG Influenza B Antigen NEGATIVE  NEG    
LACTIC ACID Collection Time: 03/08/20  6:53 AM  
Result Value Ref Range Lactic acid 1.3 0.4 - 2.0 MMOL/L Imaging: 
I have personally reviewed the patients radiographs and have reviewed the reports: CXR: no acute changes Myah Jean MD

## 2020-03-08 NOTE — ED PROVIDER NOTES
75-year-old male with significant past medical history of COPD, CAD, mechanical heart valve on Coumadin,  hypertension, recent diagnosis of non-small cell lung cancer with metastatic lesions on the spine, ribs, sacrum and iliac bones. Patient presenting to the ER by EMS for respiratory distress. Patient had been having shortness of breath for the last week and this evening became worse. Woke up and tried to use a breathing treatment with no improvement of his symptoms. When EMS arrived patient was satting 69% on room air. Patient had some improvement of symptoms with nonrebreather and neb. On arrival to the ER patient had continued increased work of breathing and placed on BiPAP. On review of past medical records Dr. Roberts Serum note states that patient cancer is not curable and management is palliative. Patient is followed by Dr. Dannielle Rosas, pulmonary CT c/a/p W/ IV (1/2020) IMPRESSION: 
Baseline research examination. 
  
Interval progression of osseous metastatic disease with new subtle lytic foci in 
the ribs on the right. Pathologic deformities. Spinal osseous metastases 
increased in overall volume as well. Epidural soft tissue is noted at the L3 
level. Moderate emphysematous changes. Chronic parenchymal scarring. Chronic pleural thickening as well. Stable scarring/spiculated nodule in the right upper lobe. [place] Coronary artery disease. Status post median sternotomy. Respiratory Distress Associated symptoms include cough and wheezing. Pertinent negatives include no fever, no headaches, no sore throat, no neck pain, no chest pain, no vomiting, no abdominal pain and no rash. Past Medical History:  
Diagnosis Date  Asthma  CAD (coronary artery disease)  Cancer ([facility] \"10 years ago\" throat  COPD (chronic obstructive pulmonary disease) (HCC)  CVA (cerebral vascular accident) ([facility]  H/O mechanical aortic valve replacement CT surgery  - CABG (LIMA to LAD) and AVR ([facility])  Hypertension  S/P CABG x 1   
 CT surgery  - CABG (LIMA to LAD) and AVR ([facility]) Past Surgical History:  
Procedure Laterality Date  CARDIAC SURG PROCEDURE UNLIST  HX HEART VALVE SURGERY    
 CT surgery  - CABG (LIMA to LAD) and AVR ([facility])  HX HEMORRHOIDECTOMY  IR INSERT TUNL CVC W PORT OVER 5 YEARS  2020 Family History:  
Problem Relation Age of Onset  Heart Disease Mother  Diabetes Mother  Hypertension Mother  Heart Disease Sister  Hypertension Sister  Hypertension Father  Cancer Brother   
     lung  Diabetes Brother  Heart Disease Brother  Hypertension Brother  Stroke Brother Social History Socioeconomic History  Marital status:  Spouse name: Trupti Pisano  Number of children: 3  
 Years of education: Not on file  Highest education level: Not on file Occupational History  Not on file Social Needs  Financial resource strain: Somewhat hard  Food insecurity:  
  Worry: Never true Inability: Never true  Transportation needs:  
  Medical: No  
  Non-medical: No  
Tobacco Use  Smoking status: Former Smoker Packs/day: 3.00 Years: 40.00 Pack years: 120.00 Last attempt to quit: 2019 Years since quittin.1  Smokeless tobacco: Never Used Substance and Sexual Activity  Alcohol use: No  
 Drug use: No  
 Sexual activity: Not Currently Lifestyle  Physical activity:  
  Days per week: Patient refused Minutes per session: Patient refused  Stress: Only a little Relationships  Social connections:  
  Talks on phone: More than three times a week Gets together: More than three times a week Attends L[employer] service: Never Active member of club or organization: No  
  Attends meetings of clubs or organizations: Never Relationship status:   Intimate partner violence:  
  Fear of current or ex partner: No  
  Emotionally abused: No  
  Physically abused: No  
  Forced sexual activity: No  
Other Topics Concern  Not on file Social History Narrative  Not on file ALLERGIES: Adenosine; Iodinated contrast media; and Pcn [penicillins] Review of Systems Constitutional: Positive for fatigue. Negative for chills and fever. HENT: Negative for congestion and sore throat. Eyes: Negative for pain. Respiratory: Positive for cough, shortness of breath and wheezing. Cardiovascular: Negative for chest pain. Gastrointestinal: Negative for abdominal pain, diarrhea, nausea and vomiting. Genitourinary: Negative for dysuria and flank pain. Musculoskeletal: Negative for back pain and neck pain. Skin: Negative for rash. Neurological: Negative for dizziness and headaches. Vitals:  
 03/08/20 0522 BP: (!) 217/93 Pulse: (!) 121 Resp: (!) 39 Temp: 98 °F (36.7 °C) SpO2: 90% Weight: 65.8 kg (145 lb) Height: 5' 6\" (1.676 m) Physical Exam 
Constitutional:   
   General: He is in acute distress. Appearance: He is well-developed. Interventions: Face mask in place. HENT:  
   Head: Normocephalic. Eyes:  
   Conjunctiva/sclera: Conjunctivae normal.  
Neck: Musculoskeletal: Normal range of motion and neck supple. Cardiovascular:  
   Rate and Rhythm: Regular rhythm. Tachycardia present. Heart sounds: Murmur present. Pulmonary:  
   Effort: Tachypnea, accessory muscle usage and respiratory distress present. Breath sounds: Wheezing present. No rhonchi or rales. Abdominal:  
   General: Bowel sounds are normal.  
   Palpations: Abdomen is soft. Tenderness: There is no abdominal tenderness. Musculoskeletal: Normal range of motion. Skin: 
   General: Skin is warm. Capillary Refill: Capillary refill takes less than 2 seconds. Findings: No rash. Neurological: Mental Status: He is alert and oriented to person, place, and time. Comments: No gross motor or sensory deficits MDM Number of Diagnoses or Management Options Acute exacerbation of chronic obstructive pulmonary disease (COPD) ([facility]): Acute respiratory failure with hypoxia [facility]):  
Lung cancer metastatic to bone [facility]):  
Nonsquamous nonsmall cell neoplasm of lung, unspecified laterality ([facility]): SIRS (systemic inflammatory response syndrome) ([facility]):  
Subtherapeutic international normalized ratio (INR):  
Diagnosis management comments: IMPRESSION: 
Acute exacerbation of chronic obstructive pulmonary disease (COPD) ([facility] [address]  (primary encounter diagnosis) SIRS (systemic inflammatory response syndrome) (HCC) Acute respiratory failure with hypoxia ([facility] Nonsquamous nonsmall cell neoplasm of lung, unspecified laterality ([facility] Lung cancer metastatic to bone ([facility] Subtherapeutic international normalized ratio (INR) 
 
- Sepsis order set entered: YES 
- Broad Spectrum Antibiotics given:  Levofloxacin - Repeat lactic acid ordered for time 0830 
- Re-assessment performed at time 0700 and clinical condition improving.   
- Actions taken: BIPAP, solumedrol, duoneb, IVF. If Septic Shock (SBP<90, MAP<65, Lactate >4): - IVF:  30cc/kg actual Body Weight 
- Vasopressors: Not indicated due to Septic Shock not present Plan: 
Admit to Step down and Palliative Care Consult Total critical care time spent exclusive of procedures:  50 minutes Ingris Rhoades MD 
 
Patient has mechanical valve, INR subtherapeutic given Lovenox 7:42 AM 
Patient went in SVT. Given 6 mg adenosine converted to normal sinus rhythm 
 hospitalist coming down to admit patient. Amount and/or Complexity of Data Reviewed Clinical lab tests: reviewed Tests in the radiology section of CPT®: reviewed ED Course as of Mar 08 0630 Sun Mar 08, 2020  
0530 O2 Sat (%): 90 % [ZD] 0530 Resp Rate(!): 39 [ZD] 0530 BP(!): 217/93 [ZD] 0530 Pulse (Heart Rate)(!): 121 [ZD] 0530 Temp: 98 °F (36.7 °C) [ZD] 0630 WBC(!): 16.6 [ZD] 0630 BAND NEUTROPHILS: 9 [ZD] 0630 Subtherapeutic, will order Lovenox INR: 1.1 [ZD] ED Course User Index [ZD] Leana Clifford MD  
 
 
CRITICAL CARE (ASAP ONLY) Performed by: Leana Clifford MD 
Authorized by: Leana Clifford MD  
 
Critical care provider statement:  
  Critical care time (minutes):  50 
  Critical care time was exclusive of:  Separately billable procedures and treating other patients Critical care was necessary to treat or prevent imminent or life-threatening deterioration of the following conditions:  Respiratory failure Critical care was time spent personally by me on the following activities:  Blood draw for specimens, development of treatment plan with patient or surrogate, discussions with consultants, evaluation of patient's response to treatment, examination of patient, interpretation of cardiac output measurements, obtaining history from patient or surrogate, review of old charts, re-evaluation of patient's condition, pulse oximetry, ordering and review of radiographic studies, ordering and review of laboratory studies and ordering and performing treatments and interventions Chemical cardioversion Date/Time: 3/8/2020 7:41 AM 
Performed by: Leana Clifford MD 
Authorized by: Leana Clifford MD  
 
Consent:  
  Consent obtained:  Verbal 
  Consent given by:  Patient Procedural risks discussed: Pain, dysrhythmia, repeat procedure. Alternatives discussed:  Alternative treatment Indications:  
  Indications:  SVT Anesthesia (see MAR for exact dosages): Anesthesia method:  None Post-procedure details:  
  Patient tolerance of procedure: Tolerated well, no immediate complications Comments:  
   Patient given 6 mg of adenosine rapid push. Cardioverted patient's SVT to normal sinus rhythm. Hospitalist Ki Olguin for Admission 7:04 AM 
 
ED Room Number: ER10/10 Patient Name and age:  Campos Rivera 76 y.o.  male Working Diagnosis: 1. Acute exacerbation of chronic obstructive pulmonary disease (COPD) ([facility] 2. SIRS (systemic inflammatory response syndrome) (HCC) 3. Acute respiratory failure with hypoxia ([facility] 4. Nonsquamous nonsmall cell neoplasm of lung, unspecified laterality ([facility] 5. [facility] 6. Subtherapeutic international normalized ratio (INR) Readmission: no 
Isolation Requirements:  no 
Recommended Level of Care:  step down vs ICU Code Status:  Full Code Department:[facility] ED - [phone] Other:  On BIPAP Recent Results (from the past 24 hour(s)) METABOLIC PANEL, COMPREHENSIVE Collection Time: 03/08/20  5:30 AM  
Result Value Ref Range Sodium 134 (L) 136 - 145 mmol/L Potassium 3.9 3.5 - 5.1 mmol/L Chloride 99 97 - 108 mmol/L  
 CO2 29 21 - 32 mmol/L Anion gap 6 5 - 15 mmol/L Glucose 208 (H) 65 - 100 mg/dL BUN 19 6 - 20 MG/DL Creatinine 1.05 0.70 - 1.30 MG/DL  
 BUN/Creatinine ratio 18 12 - 20 GFR est AA >60 >60 ml/min/1.73m2 GFR est non-AA >60 >60 ml/min/1.73m2 Calcium 8.7 8.5 - 10.1 MG/DL Bilirubin, total 0.7 0.2 - 1.0 MG/DL  
 ALT (SGPT) 119 (H) 12 - 78 U/L  
 AST (SGOT) 59 (H) 15 - 37 U/L Alk. phosphatase 184 (H) 45 - 117 U/L Protein, total 6.8 6.4 - 8.2 g/dL Albumin 3.4 (L) 3.5 - 5.0 g/dL Globulin 3.4 2.0 - 4.0 g/dL A-G Ratio 1.0 (L) 1.1 - 2.2    
TROPONIN I Collection Time: 03/08/20  5:30 AM  
Result Value Ref Range Troponin-I, Qt. <0.05 <0.05 ng/mL NT-PRO BNP Collection Time: 03/08/20  5:30 AM  
Result Value Ref Range NT pro- (H) <125 PG/ML  
MAGNESIUM Collection Time: 03/08/20  5:30 AM  
Result Value Ref Range Magnesium 2.0 1.6 - 2.4 mg/dL PROTHROMBIN TIME + INR Collection Time: 03/08/20  5:30 AM  
Result Value Ref Range INR 1.1 0.9 - 1.1 Prothrombin time 11.5 (H) 9.0 - 11.1 sec CBC WITH AUTOMATED DIFF Collection Time: 03/08/20  5:30 AM  
Result Value Ref Range WBC 16.6 (H) 4.1 - 11.1 K/uL  
 RBC 4.13 4.10 - 5.70 M/uL  
 HGB 12.4 12.1 - 17.0 g/dL HCT 38.7 36.6 - 50.3 % MCV 93.7 80.0 - 99.0 FL  
 MCH 30.0 26.0 - 34.0 PG  
 MCHC 32.0 30.0 - 36.5 g/dL  
 RDW 15.9 (H) 11.5 - 14.5 % PLATELET 936 357 - 143 K/uL MPV 9.0 8.9 - 12.9 FL  
 NRBC 0.0 0  WBC ABSOLUTE NRBC 0.00 0.00 - 0.01 K/uL NEUTROPHILS 85 (H) 32 - 75 % BAND NEUTROPHILS 9 % LYMPHOCYTES 1 (L) 12 - 49 % MONOCYTES 5 5 - 13 % EOSINOPHILS 0 0 - 7 % BASOPHILS 0 0 - 1 % IMMATURE GRANULOCYTES 0 0.0 - 0.5 % ABS. NEUTROPHILS 15.6 (H) 1.8 - 8.0 K/UL  
 ABS. LYMPHOCYTES 0.2 (L) 0.8 - 3.5 K/UL  
 ABS. MONOCYTES 0.8 0.0 - 1.0 K/UL  
 ABS. EOSINOPHILS 0.0 0.0 - 0.4 K/UL  
 ABS. BASOPHILS 0.0 0.0 - 0.1 K/UL  
 ABS. IMM. GRANS. 0.0 0.00 - 0.04 K/UL  
 DF MANUAL    
 RBC COMMENTS NORMOCYTIC, NORMOCHROMIC    
BLOOD GAS, ARTERIAL Collection Time: 03/08/20  6:10 AM  
Result Value Ref Range pH 7.36 7.35 - 7.45    
 PCO2 46 (H) 35.0 - 45.0 mmHg PO2 60 (L) 80 - 100 mmHg O2 SAT 90 (L) 92 - 97 % BICARBONATE 26 22 - 26 mmol/L  
 BASE DEFICIT 0.3 mmol/L  
 O2 METHOD BIPAP    
 FIO2 35 % MODE BIPAP    
 SET RATE 10    
 SPONTANEOUS RATE 8    
 IPAP/PIP 14 EPAP/CPAP/PEEP 5 Sample source ARTERIAL    
 SITE LEFT RADIAL VISHNU'S TEST YES    
LACTIC ACID Collection Time: 03/08/20  6:53 AM  
Result Value Ref Range Lactic acid 1.3 0.4 - 2.0 MMOL/L Xr [facility] Result Date: 3/8/2020 PORTABLE CHEST RADIOGRAPH/S: 3/8/2020 5:42 AM Clinical history: Dyspnea INDICATION:   Dyspnea COMPARISON: None FINDINGS: AP portable upright view of the chest demonstrates a stable  cardiopericardial silhouette. The lungs are adequately expanded. Chronic bibasilar atelectasis.  Chronic parenchymal changes on the left. Port catheter tip in appropriate position. Poststernotomy. The osseous structures are unremarkable. Patient is on a cardiac monitor. IMPRESSION: No acute process.

## 2020-03-09 NOTE — CDMP QUERY
Patient admitted with acute on chronic hypoxic resp failure and COPD exacerbation. Noted documentation of SIRS in the ED notes. Please specify in you are treating any of the following: 
 
=> SIRS due to  non-infectious process with acute organ dysfunction  
=>SIRS due to an non-infectious process without  acute organ dysfunction  
=> Other explanation  
=> Unable to determine The medical record reflects the following:   
   Risk Factors: 77 yo m with lung cancer was admitted with AHRF and COPD exacerbation Clinical Indicators: WBC 16.6 Neutrophils 85% Bands 9   Pulse 121 RR 39 ED note states \" SIRS\" Treatment:IV 1974 NS bolus, IV Levaquin Thank you for your time [facility] RN/BSN, CCDS Desk:   [phone] Other:  [phone]

## 2020-03-09 NOTE — PROGRESS NOTES
Nutrition Assessment: 
 
RECOMMENDATIONS/INTERVENTION(S):  
1. Continue with Cardiac diet order. 2. Will order Ensure Enlive BID and Magic Cup daily for additional kcal/protein intake. Will continue to monitor PO intakes of meals/ONS, weight, labs. ASSESSMENT:  
3/9: 75 yo male admitted for acute respiratory failure, COPD exacerbation. RD assessment for low BMI. PMhx: stage IV lung CA with mets to bone, CAD, CABG, COPD, CVA. Underweight per BMI per advanced age. Weight hx in EMR indicates pt with 3.7kg weight loss over last months - 5.7% loss which is significant for time frame. Pt busy with other caregiver at time of visit. Unable to speak with pt to obtain nutrition hx. Cardiac Diet ordered with 50% intake documented. Labs reviewed. Meds: statin, colace, Solumedrol, Miralax, Coumadin. Skin intact. Diet Order: Cardiac 
% Eaten:   
Patient Vitals for the past 72 hrs: 
 % Diet Eaten 03/09/20 0900 50 % Pertinent Medications: [x] Reviewed Labs: [x] Reviewed Anthropometrics: Height: 5' 6\" (167.6 cm) Weight: 61.2 kg (134 lb 14.7 oz) IBW (%IBW):   ( ) UBW (%UBW):   (  %) BMI: Body mass index is 21.78 kg/m². This BMI is indicative of: 
 [] Underweight    [] Normal    [] Overweight    []  Obesity    []  Extreme Obesity (BMI>40) Estimated Nutrition Needs (Based on): 1933 Kcals/day(REE 1295 x AF 1.3 + 250) , 67 g(67-80gm (1.1-1.3gm/kg/d)) Protein Carbohydrate: At Least 130 g/day  Fluids: 1933 mL/day (1 ml/kcal) Last BM: 3/8   [x]Active     []Hyperactive  []Hypoactive       [] Absent   BS Skin:    [x] Intact   [] Incision  [] Breakdown   [] DTI   [] Tears/Excoriation/Abrasion  []Edema [] Other: Wt Readings from Last 30 Encounters:  
03/09/20 61.2 kg (134 lb 14.7 oz) 02/26/20 62.6 kg (138 lb)  
02/26/20 62.6 kg (138 lb) 02/07/20 64 kg (141 lb 1.5 oz) 02/06/20 64.9 kg (143 lb) 02/05/20 64.4 kg (142 lb)  
01/31/20 64.7 kg (142 lb 9.6 oz) 11/17/19 62.6 kg (138 lb) 11/03/19 64.4 kg (142 lb)  
07/22/19 65.8 kg (145 lb)  
06/20/19 69.4 kg (153 lb)  
03/17/19 69.9 kg (154 lb)  
07/30/18 73.5 kg (162 lb) 12/19/17 68.9 kg (152 lb)  
08/25/17 70.9 kg (156 lb 4.9 oz)  
06/25/16 61.3 kg (135 lb 3 oz) 06/14/16 64.6 kg (142 lb 8 oz) 06/12/16 65.8 kg (145 lb)  
06/12/16 65.8 kg (145 lb) 10/25/14 56.7 kg (125 lb 1 oz) NUTRITION DIAGNOSES:  
Problem:  Unintended weight loss Etiology: related to decreased ability to consume sufficient energy to maintain weight Signs/Symptoms: as evidenced by 5.7% weight loss over 1 month NUTRITION INTERVENTIONS: 
Meals/Snacks: General/healthful diet   Supplements: Commercial supplement GOAL:  
Consume > 75% meals + ONS to promote weight stability / gain of 0.5lb within next 3-4 days Cultural, Quaker, or Ethnic Dietary Needs: None EDUCATION & DISCHARGE NEEDS:  
 [x] None Identified 
 [] Identified and Education Provided/Documented 
 [] Identified and Pt declined/was not appropriate [x] Interdisciplinary Care Plan Reviewed/Documented  
 [x] Discharge Needs:   Cardiac Diet with ONS 2-3 x/day 
 [] No Nutrition Related Discharge Needs NUTRITION RISK:  
Pt Is At Nutrition Risk  [x] No Nutrition Risk Identified  [] PT SEEN FOR:  
 []  MD Consult: []Calorie Count []Diabetic Diet Education []Diet Education []Electrolyte Management []General Nutrition Management and Supplements []Management of Tube Feeding []TPN Recommendations []  RN Referral:  []MST score >=2 
   []Enteral/Parenteral Nutrition PTA []Pregnant: Gestational DM or Multigestation  
              [] Pressure Ulcer 
 
[x]  Low BMI      []  Length of Stay       [] Dysphagia Diet         [] Ventilator 
[]  Follow-up Previous Recommendations: 
 [] Implemented          [] Not Implemented          [x] Not Applicable Previous Goal: [] Met              [] Progressing Towards Goal              [] Not Progressing Towards Goal   [x] Not Applicable Ree Gonzalez, CHARANJIT Pager [phone] Phone [phone]

## 2020-03-09 NOTE — PROGRESS NOTES
Bedside and Verbal shift change report given to Gus Douglas (oncoming nurse) by Osei Zamarripa (offgoing nurse). Report included the following information SBAR, Kardex, Intake/Output, MAR and Recent Results. 1815 called Pharmacy and spoke with [facility] regarding pt having lovenox and warfarin due. He explained that we are bridging with lovenox to reach the correct INR with the warfarin. Bedside and Verbal shift change report given to Suly (oncoming nurse) by Gus Douglas (offgoing nurse). Report included the following information SBAR, Kardex, Intake/Output, MAR and Recent Results.

## 2020-03-09 NOTE — PROGRESS NOTES
Reason for Admission:  Respiratory failure RUR Score:     29% PCP: First and Last name:  Dr. Lien Felix Name of Practice:   [facility] Are you a current patient: Yes/No:  Yes Approximate date of last visit:  Unclear, definitely in the last 6 months Resources/supports as identified by patient/family:   Patient lives with his wife Top Challenges facing patient (as identified by patient/family and CM): Finances/Medication cost?      Patient states medications are covered Transportation? Patient's wife drives and provides transportation Support system or lack thereof? Patient lives with his wife Living arrangements? Patient lives with wife Self-care/ADLs/Cognition? Patient is independent with ADLs, alert/oriented Current Advanced Directive/Advance Care Plan:   Full code, no AD on file Plan for utilizing home health:    Patient has never had home health in the past 
              
Transition of Care Plan:            
Patient lives with his wife in a single story home with a few steps to enter. At the time I saw the patient, he was having a neb treatment, and so some of his answers were not clear. Patient states he is independent with ADLs though does not drive. His wife Santa Cleveland is his emergency contact, although he was unable to give me her phone number due to neb mask. His wife will be able to transport him home when he is ready. Patient has never had home health and says he has used a walker in the past. Unclear if he is still in possession of the walker. He usually gets his prescriptions at [facility] and they are typically covered. Plan: 1. Continue to monitor pulmonary function 2. Likely discharge home with family assistance, home health if necessary 3. PCP, pulm follow ups as necessary 4. CM to continue to follow and assist with discharge planning Care Management Interventions PCP Verified by CM: Yes(Dr. William Warren) Mode of Transport at Discharge: Other (see comment)(family) Physical Therapy Consult: Yes Occupational Therapy Consult: No 
Speech Therapy Consult: No 
Current Support Network: Lives with Spouse Confirm Follow Up Transport: Family Discharge Location Discharge Placement: Unable to determine at this time TARA Donovan MSW

## 2020-03-09 NOTE — PROGRESS NOTES
Problem: Mobility Impaired (Adult and Pediatric) Goal: *Acute Goals and Plan of Care (Insert Text) Description FUNCTIONAL STATUS PRIOR TO ADMISSION: Patient required moderate assistance for basic and instrumental ADLs. Patient uses rollator with wife's close assistance. HOME SUPPORT PRIOR TO ADMISSION: The patient lived with wife who provided assist for assistance during gait, bathing and dressing activity. Physical Therapy Goals Initiated 3/9/2020 1. Patient will move from supine to sit and sit to supine  in bed with minimal assistance/contact guard assist within 7 day(s). 2.  Patient will transfer from bed to chair and chair to bed with minimal assistance/contact guard assist using the least restrictive device within 7 day(s). 3.  Patient will perform sit to stand with minimal assistance/contact guard assist within 7 day(s). 4.  Patient will ambulate with minimal assistance/contact guard assist for 50 feet with the least restrictive device within 7 day(s). 5.  Patient will ascend/descend 2 stairs with 1 handrail(s) with minimal assistance/contact guard assist within 7 day(s). Outcome: Progressing Towards Goal 
 PHYSICAL THERAPY EVALUATION Patient: Vanderbilt Dance (36 y.o. male) Date: 3/9/2020 Primary Diagnosis: Acute respiratory failure with hypoxia ([facility] [address] [J96.01] COPD exacerbation ([facility] [J44.1] Precautions:   Fall, Contact(airborne and contact), port-a-cath site ASSESSMENT Based on the objective data described below, the patient presents with generalized weakness, decreased bed mobility, decreased dynamic balance, high fall risk, confusion with decreased safety awareness, and decreased O2 sats with mobility efforts, following admission for acute respiratory failure and COPD exacerbation. Patient with complex medical history including COPD, CVA, MVR, Stage IV lung Cancer with bone mets. Patient now with new requirement for home O2.   Patient will need home O2, bedside commode,  HHPT, and assistance at all times with gait. Daughter and wife present and aware. Current Level of Function Impacting Discharge (mobility/balance): Patient received in bed alert but confused. Patient in saturated brief and wife reports his urinary incontinence is new since this admission. Patient requiring mod assist of 2 to come to edge of bed, then ambulated 20 X2 with rolling walker +2 min assist, with and without O2 with seated rest in between. Patient fatigues easily and very dyspneic. Cues needed for pursed lip breathing. Discussed home needs with his wife and provided her with gait belt. Will place order for equipment. Documentation for home O2: 
  
ROOM AIR  
 AT REST 
 O2 SATS 
93 HR 
106 ROOM AIR WITH ACTIVITY 02 SATS 84 HR 
120  
(2    ) LITERS OF O2 WITH ACTIVITY O2 SATS 96 HR 
107 (2    )LITERS OF 02 PATIENT LEFT COMFORTABLY SITTING/SUPINE 02 SATS 99 HR 
79 Functional Outcome Measure: The patient scored 30/100 on the Barthel outcome measure. Other factors to consider for discharge: medical complexity; dyspneic with activity. Patient will benefit from skilled therapy intervention to address the above noted impairments. PLAN : 
Recommendations and Planned Interventions: bed mobility training, transfer training, gait training, and therapeutic exercises Frequency/Duration: Patient will be followed by physical therapy:  5 times a week to address goals. Recommendation for discharge: (in order for the patient to meet his/her long term goals) Physical therapy at least 2 days/week in the home AND ensure assist and/or supervision for safety with gait This discharge recommendation: 
Has not yet been discussed the attending provider and/or case management IF patient discharges home will need the following DME: bedside commode and portable oxygen SUBJECTIVE:  
Patient stated I feel pretty good.  OBJECTIVE DATA SUMMARY:  
HISTORY:   
 Past Medical History:  
Diagnosis Date Asthma CAD (coronary artery disease) Cancer ([facility] [address] \"10 years ago\" throat COPD (chronic obstructive pulmonary disease) ([facility] [address] CVA (cerebral vascular accident) [facility])   
 H/O mechanical aortic valve replacement CT surgery 1994 - CABG (LIMA to LAD) and AVR ([facility]) Hypertension S/P CABG x 1   
 CT surgery 1994 - CABG (LIMA to LAD) and AVR ([facility]) Past Surgical History:  
Procedure Laterality Date CARDIAC SURG PROCEDURE UNLIST    
 HX HEART VALVE SURGERY    
 CT surgery 1994 - CABG (LIMA to LAD) and AVR ([facility]) HX HEMORRHOIDECTOMY    
 IR INSERT TUNL CVC W PORT OVER 5 YEARS  2/7/2020 Personal factors and/or comorbidities impacting plan of care: patient with decrease safety awareness, impaired balance, high fall risk, medically complex, and new home O2 requirement Home Situation Home Environment: Private residence # Steps to Enter: 2 Rails to Enter: Yes One/Two Story Residence: One story Living Alone: No 
Support Systems: Child(eric), Family member(s) Patient Expects to be Discharged to[de-identified] Private residence Current DME Used/Available at Home: [address], rollator, Nebulizer EXAMINATION/PRESENTATION/DECISION MAKING:  
Critical Behavior: 
Neurologic State: Alert Orientation Level: Oriented to person, Disoriented to place, Disoriented to time, Disoriented to situation Cognition: Follows commands Safety/Judgement: Decreased insight into deficits, Decreased awareness of need for safety, Decreased awareness of environment Hearing: Auditory Auditory Impairment: None Skin:  not fully observed Range Of Motion: 
AROM: Within functional limits Strength:   
Strength: Generally decreased, functional 
  
  
  
  
  
  
Tone & Sensation:  
Tone: Normal 
  
  
  
  
Sensation: Intact Vision:  
Corrective Lenses: Glasses(impaired vision; needed assistance to find placement of hands on walker) Functional Mobility: 
Bed Mobility: 
  
Supine to Sit: Assist x2; Moderate assistance Scooting: Minimum assistance Transfers: 
Sit to Stand: Assist x2;Minimum assistance Stand to Sit: Assist x2;Minimum assistance Bed to Chair: Assist x2;Minimum assistance Balance:  
Sitting: Impaired Sitting - Static: Poor (constant support) Sitting - Dynamic: Fair (occasional) Standing: Impaired Standing - Static: Constant support Standing - Dynamic : Poor Ambulation/Gait Training: 
Distance (ft): 20 Feet (ft)(x2) 
Assistive Device: Gait belt;Walker, rolling Ambulation - Level of Assistance: Assist x2;Minimal assistance Gait Abnormalities: Path deviations;Decreased step clearance Speed/Kamini: Pace decreased (<100 feet/min) Therapeutic Exercises:  
Pursed lip breathing Functional Measure: 
Barthel Index: 
 
Bathin Bladder: 5 Bowels: 10 
Groomin Dressin Feedin Mobility: 0 Stairs: 0 Toilet Use: 5 Transfer (Bed to Chair and Back): 5 Total: 30/100 The Barthel ADL Index: Guidelines 1. The index should be used as a record of what a patient does, not as a record of what a patient could do. 2. The main aim is to establish degree of independence from any help, physical or verbal, however minor and for whatever reason. 3. The need for supervision renders the patient not independent. 4. A patient's performance should be established using the best available evidence. Asking the patient, friends/relatives and nurses are the usual sources, but direct observation and common sense are also important. However direct testing is not needed. 5. Usually the patient's performance over the preceding 24-48 hours is important, but occasionally longer periods will be relevant. 6. Middle categories imply that the patient supplies over 50 per cent of the effort. 7. Use of aids to be independent is allowed. Ceasar Cabezas., BarthelRONI. (0896). Functional evaluation: the Barthel Index. [address] (14)2. ESTHER Hennessy Suellen Maas., Ashley Palumbo, Erika, [address] (1999). Measuring the change indisability after inpatient rehabilitation; comparison of the responsiveness of the Barthel Index and Functional Kimble Measure. Journal of Neurology, Neurosurgery, and Psychiatry, 66(4), 215-210. ABRAHAN Chau, EMELI Cameron, & Bonifacio Norton M.A. (2004.) Assessment of post-stroke quality of life in cost-effectiveness studies: The usefulness of the Barthel Index and the EuroQoL-5D. [facility], 13, 369-80 Physical Therapy Evaluation Charge Determination History Examination Presentation Decision-Making MEDIUM  Complexity : 1-2 comorbidities / personal factors will impact the outcome/ POC  MEDIUM Complexity : 3 Standardized tests and measures addressing body structure, function, activity limitation and / or participation in recreation  MEDIUM Complexity : Evolving with changing characteristics  Other outcome measures Barthel  HIGH Based on the above components, the patient evaluation is determined to be of the following complexity level: MEDIUM Pain Rating: 
None at this time Activity Tolerance:  
desaturates with exertion and requires oxygen, requires frequent rest breaks, and observed SOB with activity Please refer to the flowsheet for vital signs taken during this treatment. After treatment patient left in no apparent distress:  
Sitting in chair, Call bell within reach, Bed / chair alarm activated, and Caregiver / family present COMMUNICATION/EDUCATION:  
The patients plan of care was discussed with: Registered nurse. Fall prevention education was provided and the patient/caregiver indicated understanding. and Patient/family agree to work toward stated goals and plan of care. Thank you for this referral. 
Kailee Reid, PT Time Calculation: 30 mins

## 2020-03-09 NOTE — PROGRESS NOTES
SHIFT CHANGE: 
1930 Bedside and Verbal shift change report given to Suly LOVE (oncoming nurse) by Kylee (offgoing nurse). Report included the following information SBAR, Kardex, MAR and Recent Results. SHIFT SUMMARY: 
1940  Patient up to [facility] setting off bed alarm. Reminded to call for assistance and bed alarm reset. 2230  Patient drank ensure supplement. 0145  Patient pulled apart bipap, only on for aprox. Half hour. 4803  Received call from tele, patient's HR sustained in 140's to 150's. Upon checking patient, asymptomatic. Episode lasted 3 minutes. Dr. Julia Fitzgerald notified. 
Héctor Cull  INR 2.5 this am, asked Dr. Julia Fitzgerald if I should hold the 7 am dose of lovenox. Awaiting orders. 6182  lovenox held per order END OF SHIFT REPORT: 
0730  Bedside and Verbal shift change report given to Brian (oncoming nurse) by Juhi Yang (offgoing nurse). Report included the following information SBAR, Kardex, MAR and Recent Results.

## 2020-03-09 NOTE — PROGRESS NOTES
Evaluation for home oxygen need was completed by physical therapist.  See PT note.  Mr. Debra Wells states that he already has oxygen equipment at home that he uses as needed at 3 lpm.

## 2020-03-09 NOTE — PROGRESS NOTES
[facility] Medical Progress Note NAME: Dottie Dominguez :  1945 MRM:  [id number] Date/Time of service 3/9/2020  11:21 AM    
 
  
Assessment and Plan:  
 
Acute and Chronic Resp Failure with Hypoxia / Dyspnea - POA, with ER reporting O2 sat 68% on RA, RR 39, . He now refused BIPAP. His oxygen requiring is now at baseline. COPD exacerbation - POA, now breathing is at baseline. Continue Levaquin, prn duoNebs, Brovana/Pulmicort, IV Solumedrol. Check RVP. Awaiting pulmonary to clear him for DC home. 
  
SVT - Noted on admit, resolved with Adenosine 6mg in Er. Consider switching from Amlodipine to Cardizem if recurs 
  
Hypertension, urgency due to respiratory distress - Now stable on Amlodipine and Imdur 
  
S/p Prosthetic Aortic Valve / Chronic anticoagulation - Was on Coumadin at home, INR 1.1 on admission, per wife, pt's coumadin was high last week and was asked to reduce dose by the doctor 
  
Stage IV Lung Ca / Cancer Pain - Fatal disease. Appropriate for hospice. Family recinded DNR and then stated full code. Palliative consult. Outpatient onc and palliative followup. MS contin q8h, Oxy PRN Subjective: Chief Complaint:  Feels fine and wants to go home ROS: 
(bold if positive, if negative) Tolerating some PT  Tolerating Diet Objective:  
 
Last 24hrs VS reviewed since prior progress note. Most recent are: 
 
Visit Vitals /74 (BP 1 Location: Right arm, BP Patient Position: Head of bed elevated (Comment degrees)) Pulse 64 Temp 97.4 °F (36.3 °C) Resp 20 Ht 5' 6\" (1.676 m) Wt 61.2 kg (134 lb 14.7 oz) SpO2 97% BMI 21.78 kg/m² SpO2 Readings from Last 6 Encounters:  
20 97% 20 (!) 88% 20 94% 20 98% 20 90% 20 94% O2 Flow Rate (L/min): 2 l/min Intake/Output Summary (Last 24 hours) at 3/9/2020 1121 Last data filed at 3/9/2020 0561 Gross per 24 hour Intake 320 ml  
 Output  Net 320 ml Physical Exam: 
 
Gen:  Thin, frail, in no acute distress HEENT:  Pink conjunctivae, PERRL, hearing intact to voice, moist mucous membranes Neck:  Supple, without masses, thyroid non-tender Resp:  No accessory muscle use, bilateral breath sounds without wheezes rales or rhonchi 
Card:  No murmurs, normal S1, S2 without thrills, bruits or peripheral edema Abd:  Soft, non-tender, non-distended, normoactive bowel sounds are present, no mass Lymph:  No cervical or inguinal adenopathy Musc:  No cyanosis or clubbing Skin:  No rashes or ulcers, skin turgor is good Neuro:  Cranial nerves are grossly intact, no focal motor weakness, follows commands Psych:  Poor insight, oriented to person, place and time, alert Telemetry reviewed:   normal sinus rhythm 
__________________________________________________________________ Medications Reviewed: (see below) Medications:  
 
Current Facility-Administered Medications Medication Dose Route Frequency  sodium chloride (NS) flush 5-40 mL  5-40 mL IntraVENous Q8H  
 sodium chloride (NS) flush 5-40 mL  5-40 mL IntraVENous PRN  
 sodium chloride (NS) flush 5-10 mL  5-10 mL IntraVENous PRN  
 amLODIPine (NORVASC) tablet 10 mg  10 mg Oral DAILY  aspirin delayed-release tablet 81 mg  81 mg Oral DAILY  atorvastatin (LIPITOR) tablet 80 mg  80 mg Oral QHS  clonazePAM (KlonoPIN) tablet 0.5 mg  0.5 mg Oral TID  escitalopram oxalate (LEXAPRO) tablet 10 mg  10 mg Oral DAILY  artificial tears (dextran 70-hypromellose) (NATURAL BALANCE) 0.1-0.3 % ophthalmic solution 2 Drop  2 Drop Right Eye PRN  
 isosorbide mononitrate (ISMO, MONOKET) tablet 10 mg  10 mg Oral TID  latanoprost (XALATAN) 0.005 % ophthalmic solution 1 Drop  1 Drop Both Eyes QHS  morphine CR (MS CONTIN) tablet 30 mg  30 mg Oral Q8H  
 oxyCODONE IR (ROXICODONE) tablet 10 mg  10 mg Oral Q6H PRN  promethazine (PHENERGAN) tablet 12.5 mg  12.5 mg Oral DAILY PRN  
  sodium chloride (NS) flush 5-40 mL  5-40 mL IntraVENous Q8H  
 sodium chloride (NS) flush 5-40 mL  5-40 mL IntraVENous PRN  
 levoFLOXacin (LEVAQUIN) 750 mg in D5W IVPB  750 mg IntraVENous Q24H  
 acetaminophen (TYLENOL) tablet 650 mg  650 mg Oral Q4H PRN  
 naloxone (NARCAN) injection 0.4 mg  0.4 mg IntraVENous PRN  
 ondansetron (ZOFRAN) injection 4 mg  4 mg IntraVENous Q4H PRN  
 arformoteroL (BROVANA) neb solution 15 mcg  15 mcg Nebulization BID RT  
 budesonide (PULMICORT) 500 mcg/2 ml nebulizer suspension  500 mcg Nebulization BID RT  
 docusate sodium (COLACE) capsule 100 mg  100 mg Oral BID  
 bisacodyL (DULCOLAX) tablet 5 mg  5 mg Oral DAILY PRN  polyethylene glycol (MIRALAX) packet 17 g  17 g Oral DAILY  ipratropium (ATROVENT) 0.02 % nebulizer solution 0.5 mg  0.5 mg Nebulization Q4H RT  
 levalbuterol (XOPENEX) nebulizer soln 0.63 mg/3 mL  0.63 mg Nebulization Q4H RT  
 methylPREDNISolone (PF) (Solu-MEDROL) injection 60 mg  60 mg IntraVENous Q6H  
 enoxaparin (LOVENOX) injection 70 mg  70 mg SubCUTAneous Q12H  Warfarin: pharmacy to dose    Other Rx Dosing/Monitoring Lab Data Reviewed: (see below) Lab Review:  
 
Recent Labs 03/09/20 0345 03/08/20 
0530 WBC 9.5 16.6* HGB 9.8* 12.4 HCT 29.8* 38.7  232 Recent Labs 03/09/20 
0345 03/08/20 
1430 03/08/20 
0530   --  134* K 3.7  --  3.9   --  99  
CO2 27  --  29 *  --  208* BUN 16  --  19  
CREA 0.66*  --  1.05  
CA 8.2*  --  8.7 MG  --   --  2.0 ALB  --   --  3.4* TBILI  --   --  0.7 SGOT  --   --  59* ALT  --   --  119* INR 1.5* 1.3* 1.1 Lab Results Component Value Date/Time Glucose (POC) 94 11/17/2019 12:13 PM  
 Glucose (POC) 88 11/17/2019 12:01 PM  
 Glucose (POC) 97 06/19/2019 06:57 PM  
 Glucose (POC) 96 08/28/2017 12:11 PM  
 Glucose (POC) 120 (H) 08/28/2017 08:20 AM  
 
Recent Labs 03/08/20 
2487 PH 7.36  
PCO2 46* PO2 60* HCO3 26 FIO2 35 Recent Labs 03/09/20 
0345 03/08/20 
1430 03/08/20 
0530 INR 1.5* 1.3* 1.1 All Micro Results Procedure Component Value Units Date/Time RESPIRATORY PANEL,PCR,NASOPHARYNGEAL [[id number]] Collected:  03/09/20 0910 Order Status:  Completed Specimen:  NASOPHARYNGEAL SWAB Updated:  03/09/20 0214 LEGIONELLA PNEUMOPHILA AG, URINE [[id number]] Order Status:  Sent Specimen:  Urine S. [place] Po, UR/CSF [[id number]] Order Status:  Sent CULTURE, BLOOD [[id number]] Collected:  03/08/20 4516 Order Status:  Completed Specimen:  Blood Updated:  03/09/20 9536 Special Requests: NO SPECIAL REQUESTS Culture result: NO GROWTH AFTER 22 HOURS     
 CULTURE, BLOOD [[id number]] Collected:  03/08/20 0579 Order Status:  Completed Specimen:  Blood Updated:  03/09/20 2605 Special Requests: NO SPECIAL REQUESTS Culture result: NO GROWTH AFTER 22 HOURS Other pertinent lab: none Total time spent with patient: 39 Minutes I personally reviewed chart, notes, data and current medications in the medical record. I have personally examined and treated the patient at bedside during this period. Care Plan discussed with: Patient, Care Manager, Nursing Staff, Consultant/Specialist and >50% of time spent in counseling and coordination of care Discussed:  Care Plan and D/C Planning Prophylaxis:  Lovenox and H2B/PPI Disposition:  Home w/Family 
        
___________________________________________________ Attending Physician: Carolyn Stern MD

## 2020-03-09 NOTE — PROGRESS NOTES
[facility] Dosing Services: 3/9/20 Consult for Warfarin Dosing by Pharmacy by Dr. Dorothy Burnett Consult provided for this 76 y.o.  male , for indication of Mechanical Heart Valve. Day of Therapy: resumed from home (alternating between 2.5mg and 1mg)-patient preciously on 5mg daily but had INR >4 ~2 weeks ago and patient instructed hold warfarin x 2 days and then decrease dosing to current regimen. INR at admission=1.3 Dose to achieve an INR goal of 2.5- 3.5 Order entered for  Warfarin  5 (mg) ordered to be given today at 18:00. Enoxaparin bridge Significant drug interactions: levofloxacin D2 Previous dose given 10 mg per MD  
PT/INR Lab Results Component Value Date/Time INR 1.5 (H) 03/09/2020 03:45 AM  
  
Platelets Lab Results Component Value Date/Time PLATELET 563 70/66/5077 03:45 AM  
  
H/H Lab Results Component Value Date/Time HGB 9.8 (L) 03/09/2020 03:45 AM  
  
 
Pharmacy to follow daily and will provide subsequent Warfarin dosing based on clinical status. Thang Horner)  Contact information [phone]

## 2020-03-10 PROBLEM — J96.00 ACUTE RESPIRATORY FAILURE (HCC): Status: ACTIVE | Noted: 2020-01-01

## 2020-03-10 NOTE — PROGRESS NOTES
Bedside and Verbal shift change report given to Rita Fofana (oncoming nurse) by Agustin Torres (offgoing nurse). Report included the following information SBAR, Kardex, Intake/Output, MAR and Recent Results.

## 2020-03-10 NOTE — PROGRESS NOTES
[facility] Pulmonary, Critical Care, and Sleep Medicine Initial Patient Consult Name: Javier Ruvalcaba MRN: [id number] : 1945 Hospital: [address] Date: 3/10/2020 IMPRESSION:  
· Acute respiratory failure · COPD exacerbation · Lung cancer RECOMMENDATIONS:  
 
· O2 to keep sats >90%. Will need walking oximetry prior to discharge. · Prednisone taper placed; would taper over 9 days if discharging as he is still wheezing · Scheduled nebs · Empiric abx d/cd per primary team 
· Will arrange close f/u in 2 weeks with pulmonary if discharged Subjective: This patient has been seen and evaluated at the request of hospitalist team for copd exacerbation. Patient is a 76 y.o. male presented with acute onset of sob, wheezing Given jet nebs, developed SVT, responded to adenosine Now pt in no severe distress Interval History: Afebrile BP elevated O2 sats 10% on 2L NC Blood cultures negative x 2 days RVP negative Feels better overall. Still with some wheezing. Cough non-productive. Past Medical History:  
Diagnosis Date  Asthma  CAD (coronary artery disease)  Cancer ([facility] [address] \"10 years ago\" throat  COPD (chronic obstructive pulmonary disease) ([facility])  CVA (cerebral vascular accident) ([facility] [address]  H/O mechanical aortic valve replacement CT surgery  - CABG (LIMA to LAD) and AVR ([facility])  Hypertension  S/P CABG x 1   
 CT surgery  - CABG (LIMA to LAD) and AVR ([facility]) Past Surgical History:  
Procedure Laterality Date  CARDIAC SURG PROCEDURE UNLIST  HX HEART VALVE SURGERY    
 CT surgery  - CABG (LIMA to LAD) and AVR ([facility])  HX HEMORRHOIDECTOMY  IR INSERT TUNL CVC W PORT OVER 5 YEARS  2020 Prior to Admission medications Medication Sig Start Date End Date Taking?  Authorizing Provider  
morphine CR (MS CONTIN) 30 mg CR tablet Take 30 mg by mouth three (3) times daily. Yes Provider, Historical  
oxyCODONE IR (ROXICODONE) 5 mg immediate release tablet Take 5 mg by mouth every three (3) hours as needed for Pain. Takes with 15mg=20mg every 3 hours as needed   Yes Provider, Historical  
polyethylene glycol (MIRALAX) 17 gram packet Take 17 g by mouth daily. Yes Provider, Historical  
senna (SENNA) 8.6 mg tablet Take 2 Tabs by mouth nightly. Yes Provider, Historical  
brimonidine (ALPHAGAN) 0.2 % ophthalmic solution Administer 1 Drop to both eyes two (2) times a day. Yes Provider, Historical  
0.9% sodium chloride solp 100 mL with pembrolizumab 25 mg/mL soln 200 mg by IntraVENous route every twenty-one (21) days. Yes Provider, Historical  
warfarin (COUMADIN) 1 mg tablet Take 1 mg by mouth every other day. Yes Provider, Historical  
oxyCODONE IR (OXY-IR) 15 mg immediate release tablet Take 1 Tab by mouth every three (3) hours as needed for Pain for up to 10 days. Max Daily Amount: 120 mg. 3/6/20 3/16/20 Yes Clary Roberts MD  
dexAMETHasone (DECADRON) 4 mg tablet Take 4 mg by mouth daily. Take 1 tab every morning 2/17/20  Yes Clary Roberts MD  
hydroxypropyl methylcellulose (GENTEAL) 0.2 % ophthalmic solution 2 drops right eye every 1-2 hours while awake 11/17/19  Yes Juanita Canavan, MD  
latanoprost (XALATAN) 0.005 % ophthalmic solution Administer 1 Drop to both eyes nightly. Yes Provider, Historical  
amLODIPine (NORVASC) 10 mg tablet Take 10 mg by mouth daily. Yes Provider, Historical  
albuterol (PROVENTIL VENTOLIN) 2.5 mg /3 mL (0.083 %) nebulizer solution 3 mL by Nebulization route every four (4) hours as needed for Wheezing. 3/17/19  Yes Lisa Landry MD  
promethazine (PHENERGAN) 12.5 mg tablet Take 25 mg by mouth every eight (8) hours as needed. Indications: Nausea   Yes Provider, Historical  
atorvastatin (LIPITOR) 80 mg tablet Take 80 mg by mouth every evening.    Yes Provider, Historical  
 warfarin (COUMADIN) 5 mg tablet Take 2.5 mg by mouth every other day. Yes Provider, Anuj  
albuterol (PROVENTIL HFA, VENTOLIN HFA, PROAIR HFA) 90 mcg/actuation inhaler Take 2 Puffs by inhalation every six (6) hours as needed. Yes Other, MD Kallie  
fluticasone-salmeterol (ADVAIR) 500-50 mcg/dose diskus inhaler Take 1 Puff by inhalation every twelve (12) hours. Yes Other, MD Kallie  
clonazepam (KLONOPIN) 0.5 mg tablet Take 0.5 mg by mouth three (3) times daily. 10/14/14  Yes Lily, MD Kallie  
escitalopram oxalate (LEXAPRO) 10 mg tablet Take 10 mg by mouth daily. 10/21/14  Yes Other, MD Kallie  
isosorbide mononitrate (ISMO, MONOKET) 10 mg tablet Take 10 mg by mouth three (3) times daily. Patient takes at [address] [facility], noon, and 5pm 10/21/14  Yes Other, MD Kallie  
 
Allergies Allergen Reactions  Adenosine Shortness of Breath Tolerates well when given for SVT, per Dr. Lorri Gregorio. Given on 3/8/2020  Iodinated Contrast Media Shortness of Breath  Pcn [Penicillins] Shortness of Breath Patient reports to have tolerated a 10 day course of cefdinir started 17 Social History Tobacco Use  Smoking status: Former Smoker Packs/day: 3.00 Years: 40.00 Pack years: 120.00 Last attempt to quit: 2019 Years since quittin.1  Smokeless tobacco: Never Used Substance Use Topics  Alcohol use: No  
  
Family History Problem Relation Age of Onset  Heart Disease Mother  Diabetes Mother  Hypertension Mother  Heart Disease Sister  Hypertension Sister  Hypertension Father  Cancer Brother   
     lung  Diabetes Brother  Heart Disease Brother  Hypertension Brother  Stroke Brother Current Facility-Administered Medications Medication Dose Route Frequency  Warfarin: hold warfarin today    Other ONCE  predniSONE (DELTASONE) tablet 60 mg  60 mg Oral DAILY WITH LUNCH  
  [START ON 3/13/2020] predniSONE (DELTASONE) tablet 40 mg  40 mg Oral DAILY WITH BREAKFAST  [START ON 3/16/2020] predniSONE (DELTASONE) tablet 20 mg  20 mg Oral DAILY WITH BREAKFAST  sodium chloride (NS) flush 5-40 mL  5-40 mL IntraVENous Q8H  
 amLODIPine (NORVASC) tablet 10 mg  10 mg Oral DAILY  aspirin delayed-release tablet 81 mg  81 mg Oral DAILY  atorvastatin (LIPITOR) tablet 80 mg  80 mg Oral QHS  clonazePAM (KlonoPIN) tablet 0.5 mg  0.5 mg Oral TID  escitalopram oxalate (LEXAPRO) tablet 10 mg  10 mg Oral DAILY  isosorbide mononitrate (ISMO, MONOKET) tablet 10 mg  10 mg Oral TID  latanoprost (XALATAN) 0.005 % ophthalmic solution 1 Drop  1 Drop Both Eyes QHS  morphine CR (MS CONTIN) tablet 30 mg  30 mg Oral Q8H  
 sodium chloride (NS) flush 5-40 mL  5-40 mL IntraVENous Q8H  
 arformoteroL (BROVANA) neb solution 15 mcg  15 mcg Nebulization BID RT  
 budesonide (PULMICORT) 500 mcg/2 ml nebulizer suspension  500 mcg Nebulization BID RT  
 docusate sodium (COLACE) capsule 100 mg  100 mg Oral BID  polyethylene glycol (MIRALAX) packet 17 g  17 g Oral DAILY  ipratropium (ATROVENT) 0.02 % nebulizer solution 0.5 mg  0.5 mg Nebulization Q4H RT  
 levalbuterol (XOPENEX) nebulizer soln 0.63 mg/3 mL  0.63 mg Nebulization Q4H RT  
 
 
Review of Systems: A comprehensive review of systems was negative except for: Respiratory: positive for cough, wheezing or dyspnea on exertion Objective:  
Vital Signs:   
Visit Vitals /72 (BP 1 Location: Right arm, BP Patient Position: At rest) Pulse 60 Temp 97.4 °F (36.3 °C) Resp 20 Ht 5' 6\" (1.676 m) Wt 61.5 kg (135 lb 9.3 oz) SpO2 100% BMI 21.88 kg/m² O2 Device: Nasal cannula O2 Flow Rate (L/min): 2 l/min Temp (24hrs), Av.4 °F (36.3 °C), Min:96.8 °F (36 °C), Max:97.9 °F (36.6 °C) Intake/Output:  
Last shift:      03/10 0701 - 03/10 1900 In: 120 [P.O.:120] Out: - Last 3 shifts: 1901 - 03/10 0700 In: 4188 [P.O.:1017; I.V.:180] Out: 400 [Urine:400] Intake/Output Summary (Last 24 hours) at 3/10/2020 4989 Last data filed at 3/10/2020 5262 Gross per 24 hour Intake 877 ml Output 400 ml Net 477 ml Physical Exam:  
General:  Alert, cooperative, no distress, appears stated age. Head:  Normocephalic, without obvious abnormality, atraumatic. Eyes:  Conjunctivae/corneas clear. PERRL, EOMs intact. Nose: Nares normal. Septum midline. Mucosa normal. No drainage or sinus tenderness. Throat: Lips, mucosa, and tongue normal. Teeth and gums normal.  
Neck: Supple, symmetrical, trachea midline, no adenopathy, thyroid: no enlargment/tenderness/nodules, no carotid bruit and no JVD. Back:   Symmetric, no curvature. ROM normal.  
Lungs:   Decreased BS bilaterally. Chest wall:  No tenderness or deformity. Heart:  Regular rate and rhythm, S1, S2 normal, no murmur, click, rub or gallop. Abdomen:   Soft, non-tender. Bowel sounds normal. No masses,  No organomegaly. Extremities: Extremities normal, atraumatic, no cyanosis or edema. Pulses: 2+ and symmetric all extremities. Skin: Skin color, texture, turgor normal. No rashes or lesions Lymph nodes: Cervical, supraclavicular, and axillary nodes normal.  
Neurologic: Grossly nonfocal  
 
Data review:  
 
Recent Results (from the past 24 hour(s)) PROTHROMBIN TIME + INR Collection Time: 03/10/20  1:10 AM  
Result Value Ref Range INR 2.5 (H) 0.9 - 1.1 Prothrombin time 24.3 (H) 9.0 - 11.1 sec Imaging: 
I have personally reviewed the patients radiographs and have reviewed the reports: CXR: no acute changes Zechariah Munguia NP

## 2020-03-10 NOTE — DISCHARGE SUMMARY
Physician Discharge Summary Patient ID: Vanderbilt Dance [id number] 
34 y.o. 
1945 Admit date: 3/8/2020 Discharge date of service and time: 3/10/2020 Admission Diagnoses: Acute respiratory failure with hypoxia ([facility] [address] [J96.01] COPD exacerbation ([facility] [J44.1] Discharge Diagnoses:   
Principal Diagnosis Acute and Chronic Resp Failure with Hypoxia Hospital Course and other diagnoses Acute and Chronic Resp Failure with Hypoxia / Dyspnea - POA, with ER reporting O2 sat 68% on RA, RR 39, . I think his dyspnea was more related to RVR Afib than to COPD. He refused BIPAP. His oxygen requiring is now at baseline. He sues a concentrator at [employer] he is vague about settings. He will require home oxygen at 2L/min 
  
COPD exacerbation - POA, he reports breathing is at baseline. Stopped. Levaquin, continue prn duoNebs, Brovana/Pulmicort, IV Solumedrol converted to PO prednisone. Negative RVP. Pulmonary cleared him for DC home. 
  
SVT - Noted on admit, resolved with Adenosine 6mg in Er. Consider switching from Amlodipine to Cardizem if recurs 
  
Hypertension, urgency due to respiratory distress - Now stable on Amlodipine and Imdur 
  
S/p Prosthetic Aortic Valve / Chronic anticoagulation - Was on Coumadin at home, INR 1.1 on admission, 2.5 now, per wife, pt's coumadin was high last week and was asked to reduce dose by the doctor 
  
Stage IV Lung Ca / Cancer Pain - Fatal disease. Appropriate for hospice. Family recinded DNR and then stated full code. Palliative consult. Outpatient onc and palliative followup. MS contin q8h, Oxy PRN  
 
SIRS due to an non-infectious process without  acute organ dysfunction - POA 
  
PCP: George Matthews MD 
 
Consults: Cardiology, Pulmonary/Intensive care and Palliative Care Significant Diagnostic Studies: See Hospital Course Discharged home in improved condition. Discharge Exam: /72 (BP 1 Location: Right arm, BP Patient Position: At rest) Pulse 60 Temp 97.4 °F (36.3 °C) Resp 20 Ht 5' 6\" (1.676 m) Wt 61.5 kg (135 lb 9.3 oz) SpO2 100% BMI 21.88 kg/m²  
  
Gen:  Thin, frail, in no acute distress HEENT:  Pink conjunctivae, PERRL, hearing intact to voice, moist mucous membranes Neck:  Supple, without masses, thyroid non-tender Resp:  No accessory muscle use, bilateral breath sounds without wheezes rales or rhonchi 
Card:  No murmurs, normal S1, S2 without thrills, bruits or peripheral edema Abd:  Soft, non-tender, non-distended, normoactive bowel sounds are present, no mass Lymph:  No cervical or inguinal adenopathy Musc:  No cyanosis or clubbing Skin:  No rashes or ulcers, skin turgor is good Neuro:  Cranial nerves are grossly intact, no focal motor weakness, follows commands Psych:  Poor insight, oriented to person, place and time, alert Patient Instructions:  
Current Discharge Medication List  
  
START taking these medications Details  
predniSONE (DELTASONE) 20 mg tablet Take 3 tabs a day for 3 days, then 2 tabs a day for 3 days, then 1 tab a day for 3 days. Qty: 18 Tab, Refills: 0 CONTINUE these medications which have NOT CHANGED Details  
morphine CR (MS CONTIN) 30 mg CR tablet Take 30 mg by mouth three (3) times daily. !! oxyCODONE IR (ROXICODONE) 5 mg immediate release tablet Take 5 mg by mouth every three (3) hours as needed for Pain. Takes with 15mg=20mg every 3 hours as needed  
  
polyethylene glycol (MIRALAX) 17 gram packet Take 17 g by mouth daily. senna (SENNA) 8.6 mg tablet Take 2 Tabs by mouth nightly. brimonidine (ALPHAGAN) 0.2 % ophthalmic solution Administer 1 Drop to both eyes two (2) times a day. 0.9% sodium chloride solp 100 mL with pembrolizumab 25 mg/mL soln 200 mg by IntraVENous route every twenty-one (21) days. !! warfarin (COUMADIN) 1 mg tablet Take 1 mg by mouth every other day. !! oxyCODONE IR (OXY-IR) 15 mg immediate release tablet Take 1 Tab by mouth every three (3) hours as needed for Pain for up to 10 days. Max Daily Amount: 120 mg. 
Qty: 80 Tab, Refills: 0 Associated Diagnoses: Right-sided low back pain without sciatica, unspecified chronicity; Prostate cancer metastatic to bone ([facility] [address] dexAMETHasone (DECADRON) 4 mg tablet Take 4 mg by mouth daily. Take 1 tab every morning 
Qty: 30 Tab, Refills: 1  
  
hydroxypropyl methylcellulose (GENTEAL) 0.2 % ophthalmic solution 2 drops right eye every 1-2 hours while awake Qty: 1 Bottle, Refills: 3  
  
latanoprost (XALATAN) 0.005 % ophthalmic solution Administer 1 Drop to both eyes nightly. amLODIPine (NORVASC) 10 mg tablet Take 10 mg by mouth daily. albuterol (PROVENTIL VENTOLIN) 2.5 mg /3 mL (0.083 %) nebulizer solution 3 mL by Nebulization route every four (4) hours as needed for Wheezing. Qty: 30 Each, Refills: 0  
  
promethazine (PHENERGAN) 12.5 mg tablet Take 25 mg by mouth every eight (8) hours as needed. Indications: Nausea  
  
atorvastatin (LIPITOR) 80 mg tablet Take 80 mg by mouth every evening. !! warfarin (COUMADIN) 5 mg tablet Take 2.5 mg by mouth every other day. albuterol (PROVENTIL HFA, VENTOLIN HFA, PROAIR HFA) 90 mcg/actuation inhaler Take 2 Puffs by inhalation every six (6) hours as needed. fluticasone-salmeterol (ADVAIR) 500-50 mcg/dose diskus inhaler Take 1 Puff by inhalation every twelve (12) hours. clonazepam (KLONOPIN) 0.5 mg tablet Take 0.5 mg by mouth three (3) times daily. escitalopram oxalate (LEXAPRO) 10 mg tablet Take 10 mg by mouth daily. isosorbide mononitrate (ISMO, MONOKET) 10 mg tablet Take 10 mg by mouth three (3) times daily. Patient takes at [address] [place] [address], noon, and 5pm  
  
 !! - Potential duplicate medications found. Please discuss with provider. STOP taking these medications  
  
 aspirin delayed-release 81 mg tablet Comments:  
Reason for Stopping: Activity: Activity as tolerated Diet: Cardiac Diet and Increase noncaffeinated fluids Wound Care: None needed Follow-up with your PCP, palliative care and oncoogy in a few days. Follow-up tests/labs - none Signed: 
Julia Gunter MD 
3/10/2020 
11:07 AM

## 2020-03-10 NOTE — PROGRESS NOTES
3- CASE MANAGEMENT NOTE: 
Orders for home oxygen, BSC and home health noted. I spoke with the patient's wife, Ondina Odom ([phone]), who understands the patient may be ready for discharge today. The patient lives with his wife and their adult son is also in the home and available to assist. His wife has chosen [facility] for home oxygen, wheelchair and BSC and I sent the referral to them thru [facility]. She has also chosen [facility], the order was sent thru [employer] and they have accepted. [facility] Has accepted the referral for home oxygen, wheelchair and a BSC-will deliver the portable oxygen to the hospital and the concentrator, wheelchair and BSC to the home. TRANSITION OF CARE: 1. Patient has a Re-admission Risk of 29% 2. Lives with his wife and adult son who assist 
3. Oxygen and BSC ordered from [facility] 4. Home Health to be provided thru [facility] KIET Aleman, CM

## 2020-03-10 NOTE — PROGRESS NOTES
3- CASE MANAGEMENT NOTE: 
I just received a call from  that this patient has been downgraded from In-Pt to Obs. I explained this to his daughter who refused to sign the Medicare and [facility] Obs letters-given copies and originals placed on chart. I also gave her a Code 40 letter and placed one on the chart.  KIET Aleman, CM

## 2020-03-10 NOTE — PROGRESS NOTES
[facility] Dosing Services: 3/10/20 Consult for Warfarin Dosing by Pharmacy by Dr. Mukund Alfaro Consult provided for this 76 y.o.  male , for indication of Mechanical Heart Valve. Day of Therapy: resumed from home (alternating between 2.5mg and 1mg)-patient preciously on 5mg daily but had INR >4 ~2 weeks ago and patient instructed hold warfarin x 2 days and then decrease dosing to current regimen. INR at admission=1.3 Dose to achieve an INR goal of 2.5- 3.5 INR 2.5 which is therapeutic, however this is a rapid increase of 1 point (likely the result of the 10 mg dose given on 3/8) HOLD warfarin tonight per MD  
 
Significant drug interactions: levofloxacin D3; Enoxaparin bridge dc'd 3/10 Previous dose given 5 mg on 3/9 
10 mg on 3/8 per MD  
PT/INR Lab Results Component Value Date/Time INR 2.5 (H) 03/10/2020 01:10 AM  
  
Platelets Lab Results Component Value Date/Time PLATELET 219 81/41/1182 03:45 AM  
  
H/H Lab Results Component Value Date/Time HGB 9.8 (L) 03/09/2020 03:45 AM  
  
 
Pharmacy to follow daily and will provide subsequent Warfarin dosing based on clinical status. CARI Laureano)  Contact information [phone]

## 2020-03-10 NOTE — DISCHARGE INSTRUCTIONS
Patient Discharge Instructions    [facility] / [id number] : 1945    Admitted 3/8/2020 Discharged: 3/10/2020     Primary Diagnoses  Problem List as of 3/10/2020 Date Reviewed: 2020           Acute respiratory failure with hypoxia (HCC)   Lung cancer metastatic to bone (HCC)   TIA (transient ischemic attack)   Supratherapeutic INR   S/P CABG x 1   Pneumonia   S/P AVR (aortic valve replacement)   CAD (coronary artery disease)   CVA (cerebral vascular accident) ([facility] [address]   Aortic stenosis   Anticoagulated   COPD (chronic obstructive pulmonary disease) (HCC)   COPD exacerbation (HCC)   S/P MVR (mitral valve replacement)   Tobacco abuse   Throat cancer ([facility] [address]   Depression          Take Home Medications     · It is important that you take the medication exactly as they are prescribed. · Keep your medication in the bottles provided by the pharmacist and keep a list of the medication names, dosages, and times to be taken in your wallet. · Do not take other medications without consulting your doctor. What to do at Home    Recommended diet: Cardiac Diet and Increase noncaffeinated fluids    Recommended activity: Activity as tolerated    If you experience worse breathing, please follow up with pulmonary or palliative care. Follow-up with your PCP ad oncology in a few weeks        Information obtained by :  I understand that if any problems occur once I am at home I am to contact my physician. I understand and acknowledge receipt of the instructions indicated above.                                                                                                                                            Physician's or R.N.'s Signature                                                                  Date/Time                                                                                                                                              Patient or Representative Signature Date/Time

## 2020-03-10 NOTE — PROGRESS NOTES
Home Care Face to Face Encounter Patients Name: Derek Jeter    YOB: 1945 Primary Diagnosis: copd exacerbation Date of Face to Face:   March 10, 2020 Face to Face Encounter findings are related to primary reason for home care:   yes. 1. I certify that the patient needs intermittent care as follows: skilled nursing care:  skilled observation/assessment, patient education, complex care plan management and rehabilitative nursing 
physical therapy: strengthening, stretching/ROM, transfer training, gait/stair training, balance training and pt/caregiver education 
occupational therapy:  ADL safety (ie. cooking, bathing, dressing), ROM and pt/caregiver education Wheelchair, bedside commode 2. I certify that this patient is homebound, that is: 1) patient requires the use of a walker device, special transportation, or assistance of another to leave the home; or 2) patient's condition makes leaving the home medically contraindicated; and 3) patient has a normal inability to leave the home and leaving the home requires considerable and taxing effort. Patient may leave the home for infrequent and short duration for medical reasons, and occasional absences for non-medical reasons. Homebound status is due to the following functional limitations: Patient with increased shortness of breath and elevated heart rate with ambulation greater than 20 feet limiting patient's ability to ambulate safely within the community. Patient with strength deficits limiting the performance of all ADL's without caregiver assistance or the use of an assistive device. Patient with poor safety awareness and is at risk for falls without assistance of another person and the use of an assistive device. Patient with poor ambulation endurance limiting their safe ability to ascend/descend the required number of steps to leave the home. Patient with increased shortness of breath with all exertional activity limiting ambulation outside the home. Patient with strength deficits limiting the ability to carry portable O2 for distances outside the home without the assistance of a caregiver. 3. I certify that this patient is under my care and that I, or a nurse practitioner or [phone], or clinical nurse specialist, or certified nurse midwife, working with me, had a Face-to-Face Encounter that meets the physician Face-to-Face Encounter requirements. The following are the clinical findings from the [address] [place] [address] encounter that support the need for skilled services and is a summary of the encounter: per PT note Current Level of Function Impacting Discharge (mobility/balance): Patient received in bed alert but confused. Patient in saturated brief and wife reports his urinary incontinence is new since this admission. Patient requiring mod assist of 2 to come to edge of bed, then ambulated 20 X2 with rolling walker +2 min assist, with and without O2 with seated rest in between. Patient fatigues easily and very dyspneic. Cues needed for pursed lip breathing. Discussed home needs with his wife and provided her with gait belt. Will place order for equipment. Documentation for home O2: 
  
ROOM AIR  
  AT REST 
  O2 SATS 
93 HR 
106 ROOM AIR WITH ACTIVITY 02 SATS 84 HR 
120  
(2    ) LITERS OF O2 WITH ACTIVITY O2 SATS 96 HR 
107 (2    )LITERS OF 02 PATIENT LEFT COMFORTABLY SITTING/SUPINE 02 SATS 99 HR 
79 See discharge summary Charon Ormond, MD 
3/10/2020

## 2020-03-10 NOTE — PROGRESS NOTES
[facility] Medical Progress Note NAME: Clint Duval :  1945 MRM:  [id number] Date/Time of service 3/10/2020  11:01 AM    
 
  
Assessment and Plan:  
 
Acute and Chronic Resp Failure with Hypoxia / Dyspnea - POA, with ER reporting O2 sat 68% on RA, RR 39, . I think his dyspnea was more related to RVR Afib than to COPD. He refused BIPAP. His oxygen requiring is now at baseline. He sues a concentrator at [employer] he is vague about settings. He will require home oxygen at 2L/min COPD exacerbation - POA, he reports breathing is at baseline. Stopped. Levaquin, continue prn duoNebs, Brovana/Pulmicort, IV Solumedrol converted to PO prednisone. Negative RVP. Pulmonary cleared him for DC home. 
  
SVT - Noted on admit, resolved with Adenosine 6mg in Er. Consider switching from Amlodipine to Cardizem if recurs 
  
Hypertension, urgency due to respiratory distress - Now stable on Amlodipine and Imdur 
  
S/p Prosthetic Aortic Valve / Chronic anticoagulation - Was on Coumadin at home, INR 1.1 on admission, 2.5 now, per wife, pt's coumadin was high last week and was asked to reduce dose by the doctor 
  
Stage IV Lung Ca / Cancer Pain - Fatal disease. Appropriate for hospice. Family recinded DNR and then stated full code. Palliative consult. Outpatient onc and palliative followup. MS contin q8h, Oxy PRN Subjective: Chief Complaint:  Feels fine and wants to go home ROS: 
(bold if positive, if negative) Tolerating some PT  Tolerating some Diet Objective:  
 
Last 24hrs VS reviewed since prior progress note. Most recent are: 
 
Visit Vitals /72 (BP 1 Location: Right arm, BP Patient Position: At rest) Pulse 60 Temp 97.4 °F (36.3 °C) Resp 20 Ht 5' 6\" (1.676 m) Wt 61.5 kg (135 lb 9.3 oz) SpO2 100% BMI 21.88 kg/m² SpO2 Readings from Last 6 Encounters:  
03/10/20 100% 20 (!) 88% 20 94% 20 98% 02/06/20 90% 02/05/20 94% O2 Flow Rate (L/min): 2 l/min Intake/Output Summary (Last 24 hours) at 3/10/2020 1036 Last data filed at 3/10/2020 4995 Gross per 24 hour Intake 877 ml Output 400 ml Net 477 ml Physical Exam: 
 
Gen:  Thin, frail, in no acute distress HEENT:  Pink conjunctivae, PERRL, hearing intact to voice, moist mucous membranes Neck:  Supple, without masses, thyroid non-tender Resp:  No accessory muscle use, bilateral breath sounds without wheezes rales or rhonchi 
Card:  No murmurs, normal S1, S2 without thrills, bruits or peripheral edema Abd:  Soft, non-tender, non-distended, normoactive bowel sounds are present, no mass Lymph:  No cervical or inguinal adenopathy Musc:  No cyanosis or clubbing Skin:  No rashes or ulcers, skin turgor is good Neuro:  Cranial nerves are grossly intact, no focal motor weakness, follows commands Psych:  Poor insight, oriented to person, place and time, alert Telemetry reviewed:   normal sinus rhythm 
__________________________________________________________________ Medications Reviewed: (see below) Medications:  
 
Current Facility-Administered Medications Medication Dose Route Frequency  Warfarin: hold warfarin today    Other ONCE  predniSONE (DELTASONE) tablet 60 mg  60 mg Oral DAILY WITH LUNCH  
 [START ON 3/13/2020] predniSONE (DELTASONE) tablet 40 mg  40 mg Oral DAILY WITH BREAKFAST  [START ON 3/16/2020] predniSONE (DELTASONE) tablet 20 mg  20 mg Oral DAILY WITH BREAKFAST  sodium chloride (NS) flush 5-40 mL  5-40 mL IntraVENous Q8H  
 sodium chloride (NS) flush 5-40 mL  5-40 mL IntraVENous PRN  
 sodium chloride (NS) flush 5-10 mL  5-10 mL IntraVENous PRN  
 amLODIPine (NORVASC) tablet 10 mg  10 mg Oral DAILY  aspirin delayed-release tablet 81 mg  81 mg Oral DAILY  atorvastatin (LIPITOR) tablet 80 mg  80 mg Oral QHS  clonazePAM (KlonoPIN) tablet 0.5 mg  0.5 mg Oral TID  escitalopram oxalate (LEXAPRO) tablet 10 mg  10 mg Oral DAILY  artificial tears (dextran 70-hypromellose) (NATURAL BALANCE) 0.1-0.3 % ophthalmic solution 2 Drop  2 Drop Right Eye PRN  
 isosorbide mononitrate (ISMO, MONOKET) tablet 10 mg  10 mg Oral TID  latanoprost (XALATAN) 0.005 % ophthalmic solution 1 Drop  1 Drop Both Eyes QHS  morphine CR (MS CONTIN) tablet 30 mg  30 mg Oral Q8H  
 oxyCODONE IR (ROXICODONE) tablet 10 mg  10 mg Oral Q6H PRN  promethazine (PHENERGAN) tablet 12.5 mg  12.5 mg Oral DAILY PRN  
 sodium chloride (NS) flush 5-40 mL  5-40 mL IntraVENous Q8H  
 sodium chloride (NS) flush 5-40 mL  5-40 mL IntraVENous PRN  
 acetaminophen (TYLENOL) tablet 650 mg  650 mg Oral Q4H PRN  
 naloxone (NARCAN) injection 0.4 mg  0.4 mg IntraVENous PRN  
 ondansetron (ZOFRAN) injection 4 mg  4 mg IntraVENous Q4H PRN  
 arformoteroL (BROVANA) neb solution 15 mcg  15 mcg Nebulization BID RT  
 budesonide (PULMICORT) 500 mcg/2 ml nebulizer suspension  500 mcg Nebulization BID RT  
 docusate sodium (COLACE) capsule 100 mg  100 mg Oral BID  
 bisacodyL (DULCOLAX) tablet 5 mg  5 mg Oral DAILY PRN  polyethylene glycol (MIRALAX) packet 17 g  17 g Oral DAILY  ipratropium (ATROVENT) 0.02 % nebulizer solution 0.5 mg  0.5 mg Nebulization Q4H RT  
 levalbuterol (XOPENEX) nebulizer soln 0.63 mg/3 mL  0.63 mg Nebulization Q4H RT Lab Data Reviewed: (see below) Lab Review:  
 
Recent Labs 03/09/20 
0345 03/08/20 
0530 WBC 9.5 16.6* HGB 9.8* 12.4 HCT 29.8* 38.7  232 Recent Labs  
  03/10/20 
0110 03/09/20 
0345 03/08/20 
1430 03/08/20 
0530 NA  --  137  --  134* K  --  3.7  --  3.9 CL  --  105  --  99  
CO2  --  27  --  29  
GLU  --  137*  --  208* BUN  --  16  --  19  
CREA  --  0.66*  --  1.05  
CA  --  8.2*  --  8.7 MG  --   --   --  2.0 ALB  --   --   --  3.4* TBILI  --   --   --  0.7 SGOT  --   --   --  59* ALT  --   --   --  119* INR 2.5* 1.5* 1.3* 1.1 Lab Results Component Value Date/Time Glucose (POC) 94 11/17/2019 12:13 PM  
 Glucose (POC) 88 11/17/2019 12:01 PM  
 Glucose (POC) 97 06/19/2019 06:57 PM  
 Glucose (POC) 96 08/28/2017 12:11 PM  
 Glucose (POC) 120 (H) 08/28/2017 08:20 AM  
 
Recent Labs 03/08/20 
8497 PH 7.36  
PCO2 46* PO2 60* HCO3 26 FIO2 35 Recent Labs  
  03/10/20 
0110 03/09/20 
0345 03/08/20 
1430 INR 2.5* 1.5* 1.3* All Micro Results Procedure Component Value Units Date/Time CULTURE, BLOOD [[id number]] Collected:  03/08/20 0045 Order Status:  Completed Specimen:  Blood Updated:  03/10/20 8155 Special Requests: NO SPECIAL REQUESTS Culture result: NO GROWTH 2 DAYS     
 CULTURE, BLOOD [[id number]] Collected:  03/08/20 5782 Order Status:  Completed Specimen:  Blood Updated:  03/10/20 3956 Special Requests: NO SPECIAL REQUESTS Culture result: NO GROWTH 2 DAYS MOSES Herrera Sitter, UR/CSF [[id number]] Collected:  03/09/20 1727 Order Status:  Completed Updated:  03/09/20 1739 LEGIONELLA PNEUMOPHILA AG, URINE [[id number]] Collected:  03/09/20 1727 Order Status:  Completed Specimen:  Urine Updated:  03/09/20 1739 RESPIRATORY PANEL,PCR,NASOPHARYNGEAL [[id number]] Collected:  03/09/20 0910 Order Status:  Completed Specimen:  Nasopharyngeal Updated:  03/09/20 1431 Adenovirus NOT DETECTED Coronavirus 229E NOT DETECTED Coronavirus HKU1 NOT DETECTED Coronavirus CVNL63 NOT DETECTED Coronavirus OC43 NOT DETECTED Metapneumovirus NOT DETECTED Rhinovirus and Enterovirus NOT DETECTED Influenza A NOT DETECTED Influenza A, subtype H1 NOT DETECTED Influenza A, subtype H3 NOT DETECTED INFLUENZA A H1N1 PCR NOT DETECTED Influenza B NOT DETECTED Parainfluenza 1 NOT DETECTED Parainfluenza 2 NOT DETECTED   Parainfluenza 3 NOT DETECTED     
 Parainfluenza virus 4 NOT DETECTED     
  RSV by PCR NOT DETECTED     
  B. parapertussis, PCR NOT DETECTED Bordetella pertussis - PCR NOT DETECTED Chlamydophila pneumoniae DNA, QL, PCR NOT DETECTED Mycoplasma pneumoniae DNA, QL, PCR NOT DETECTED Other pertinent lab: none Total time spent with patient: 39 Minutes I personally reviewed chart, notes, data and current medications in the medical record. I have personally examined and treated the patient at bedside during this period. Care Plan discussed with: Patient, Care Manager, Nursing Staff, Consultant/Specialist and >50% of time spent in counseling and coordination of care Discussed:  Care Plan and D/C Planning Prophylaxis:  Lovenox and H2B/PPI Disposition:  Home w/Family 
        
___________________________________________________ Attending Physician: Annalise Perdomo MD

## 2020-03-11 NOTE — PROGRESS NOTES
Palliative Medicine - RN Transitions of Care Note  Hospital Admitted To: [facility]  Reason for Hospitalization: Acute respiratory failure with hypoxia ([facility] [J96.01]  COPD exacerbation ([facility] [J44.1]  Admission Date: 3/8/20  Discharge Date: 3/10/20    RRAT score: 29%  Medical History:     Past Medical History:   Diagnosis Date    Asthma     CAD (coronary artery disease)     Cancer ([facility] \"10 years ago\"    throat    COPD (chronic obstructive pulmonary disease) ([facility]     CVA (cerebral vascular accident) ([facility]     H/O mechanical aortic valve replacement     CT surgery 1994 - CABG (LIMA to LAD) and AVR ([facility] [facility])    Hypertension     S/P CABG x 1     CT surgery 1994 - CABG (LIMA to LAD) and AVR ([facility])       This represents Transitions of Care b/c NN spoke with patient and/or caregiver within 1 business days of discharge. Pt's TCM follow up appt is scheduled with Dr. Sabrina Guevara on Wednesday  @ 1:30 pm which is within 7 days of discharge. Called spouse on 3/11/20 and verified with 2 identifiers. Patient presenting symptoms Dyspnea, RVR A-Fib, O2 Sats at 68%. Course of current Hospitalization (referenced by Mary Kate Keller MD note): Hospital Course and other diagnoses  Acute and Chronic Resp Failure with Hypoxia / Dyspnea - POA, with ER reporting O2 sat 68% on RA, RR 39, .  I think his dyspnea was more related to RVR Afib than to COPD.  He refused BIPAP. His oxygen requiring is now at [employer] a concentrator at [facility] he is vague about settings.  He will require home oxygen at 2L/min     COPD exacerbation - POA, he reports breathing is at baseline.  Stopped. Levaquin, continue prn duoNebs, Brovana/Pulmicort, IV Solumedrol converted to PO prednisone.  Negative RVP.  Pulmonary cleared him for DC home.     SVT - Noted on admit, resolved with Adenosine 6mg in Er.  Consider switching from Amlodipine to Cardizem if recurs     Hypertension, urgency due to respiratory distress - Now stable on Amlodipine and Imdur     S/p Prosthetic Aortic Valve / Chronic anticoagulation - Was on Coumadin at home, INR 1.1 on admission, 2.5 now, per wife, pt's coumadin was high last week and was asked to reduce dose by the doctor     Stage IV Lung Ca / Cancer Pain - Fatal disease.  Appropriate for hospice.  Family recinded DNR and then stated full code.  Palliative consult. Outpatient onc and palliative followup. MS contin q8h, Oxy PRN      SIRS due to an non-infectious process without  acute organ dysfunction - POA    Diagnosed with Acute respiratory failure with hypoxia (HCC) [J96.01]  COPD exacerbation ([facility] [J44.1]. Admitted to Hospitalist Service with consults from Cardiology, Pulmonary/Intensive care and Palliative Care. Consults recommended     Significant Lab Findings:  Lab Results   Component Value Date/Time    WBC 9.5 03/09/2020 03:45 AM    HGB 9.8 (L) 03/09/2020 03:45 AM    PLATELET 322 78/62/0632 03:45 AM     Lab Results   Component Value Date/Time    Sodium 137 03/09/2020 03:45 AM    Potassium 3.7 03/09/2020 03:45 AM    Chloride 105 03/09/2020 03:45 AM    CO2 27 03/09/2020 03:45 AM    BUN 16 03/09/2020 03:45 AM    Creatinine 0.66 (L) 03/09/2020 03:45 AM    Calcium 8.2 (L) 03/09/2020 03:45 AM    Magnesium 2.0 03/08/2020 05:30 AM    Phosphorus 2.3 (L) 06/22/2019 02:04 AM      Lab Results   Component Value Date/Time    AST (SGOT) 59 (H) 03/08/2020 05:30 AM    Alk.  phosphatase 184 (H) 03/08/2020 05:30 AM    Protein, total 6.8 03/08/2020 05:30 AM    Albumin 3.4 (L) 03/08/2020 05:30 AM    Globulin 3.4 03/08/2020 05:30 AM     Lab Results   Component Value Date/Time    INR 2.5 (H) 03/10/2020 01:10 AM    Prothrombin time 24.3 (H) 03/10/2020 01:10 AM    aPTT 26.5 11/03/2019 06:57 PM      Lab Results   Component Value Date/Time    Iron 27 (L) 03/09/2020 09:09 AM    TIBC 240 (L) 03/09/2020 09:09 AM    Iron % saturation 11 (L) 03/09/2020 09:09 AM    Ferritin 362 03/09/2020 09:09 AM        Medication Reconciliation completed: Yes  New medications at discharge include Prednisone 20mg- 3 tabs a day for 3 days, 2 tabs a day for 3 days, 1 tab a day for 3 days. Prescription Medication total: 20 (pharmacy consult for polypharm needed?) No    If multiple admissions, ED visits, check and reviewed :  n/a    Barriers to care? Financial- SW/ONC referred patient daughter to [employer], for ramp assistance. Support System consists of: wife and adult son    Previous Use of [employer], if so what agency? [facility]. [facility] providing oxygen, W/C, concentrator, and [facility]     Advance Medical Directive on file in EMR? No    Future Plan for Patient to include communication plan: Palliative appt on 3/18/20 at 1:30pm    Adherence to previous treatment and likelihood for f/u: positive    Past Hospitalizations/ED visits last 12 months? 3 hospitalizations and 3 ED visits.

## 2020-03-11 NOTE — TELEPHONE ENCOUNTER
[facility]  Social Work Navigator Encounter     Patient Name:  Mendel Maha    Medical History: lung cancer    Advance Directives: none on file; conversation deferred    Narrative: Received VM message from patient's daughter, Devonte Clements, requesting a return call ([phone]). Call placed to [facility]. She tells me her father was discharged home with a wheelchair, oxygen and BSC. Initially there was confusion regarding the rental cost but the concern has been resolved. She also tells me her patients are concerns about their increased out of pocket cost due to how the hospitalization is coded and billed. Noted that he applied for FA which will cover the cost if approved. She asked if ramps are covered by insurance. Explained they are not as they are considered a 'home modification\". Referred her to [employer], and none profit that might be able to assist. She voiced appreciation and understanding. Barriers to Care: financial     Assessment/Action:  1. Referred patient to [employer].      Plan/Referral:   Other referral     Thank you,  Janet Kellogg LCSW

## 2020-03-12 NOTE — TELEPHONE ENCOUNTER
Mrs. Fernandez [place] is calling to speak to nurse regarding patient's discharge from hospital and what medication patient should be taking and which medication patient should not be taking. Advised that nurse would return her call.

## 2020-03-13 NOTE — PROGRESS NOTES
PA has been approved for the fentanyl 12 mcg patch through 12/31/20. Call made to the Pharmacy to see if Rx has been processed. The Pharmacist informed me they don't have the medication in stock. Also the medication will be $94.40 with the deductible. She said the [facility] does have the medication in stock. Call made to Pharmacist verified medication is in stock. Nurse Lanette Ya notified covering MD Dr Pretty Frazier Rx has been sent to the [facility] on [address]. Nurse called Mrs Jovani Leyva made aware of all.

## 2020-03-13 NOTE — TELEPHONE ENCOUNTER
Palliative Medicine  Nursing Note  [phone]  Fax [phone]      Telephone Call  Patient Name: Keyur Jack  YOB: 1945    3/13/2020        Primary Decision Maker: Jose Fofana - Spouse - [phone]   Advance Care Planning 3/8/2020   Patient's Healthcare Decision Maker is: -   Primary Decision Maker Name -   Secondary Decision Maker Name -   Secondary Decision Maker Phone Number -   Secondary Decision Maker Relationship to Patient -   Confirm Advance Directive None   Patient Would Like to Complete Advance Directive -     Incoming call from Mrs. Francisco. She states that the Oxycodone 15mg every 4 hours is not helping with her husbands pain. She is declining to give her  his previously tolerated Morphine Sulfate ER 30mg every 8 hours  since she heard that it can decrease his respirations. Extensive conversation yesterday 3/12/20 providing Opioid education, safety, tolerance, and need. She verbalized understanding, but remains too nervous to give it to him. She is requesting a different long acting medication to manage his pain. Discussed that a Palliative provider will be made aware and will call her with any changes. She was appreciative. Dr. Mayer Boards approved for a 12mcg Fentanyl patch- 1 patch every 72 hours. Call placed to Mrs. Karol Dominguez and discussed the above medication. Provided education regarding application, and use. Informed that insurance will need to approve and may be tomorrow afternoon before she can  at the pharmacy. Patient can continue to take Oxycodone 15mg every 4 hours as needed. Reiterated to please call Palliative Medicine for any concerns or questions. She verbalized understanding and was appreciative.     Bill Gonsalez, RN  Palliative Medicine

## 2020-03-13 NOTE — TELEPHONE ENCOUNTER
Palliative Medicine  Nursing Note  [phone]  Fax [phone]      Telephone Call  Patient Name: Keyur Jack  YOB: 1945    3/13/2020        Primary Decision Maker: Jose Fofana - Spouse - [phone]   Advance Care Planning 3/8/2020   Patient's Healthcare Decision Maker is: -   Primary Decision Maker Name -   Secondary Decision Maker Name -   Secondary Decision Maker Phone Number -   Secondary Decision Maker Relationship to Patient -   Confirm Advance Directive None   Patient Would Like to Complete Advance Directive -     Call placed to Mrs. Francisco. Informed that their insurance did approve the Fentanyl patch, but they have not met their out of pocket deductible, so the cost will be $90 for a month supply. Discussed that their regular pharmacy did not have Fentanyl in stock until Monday. Prescription was then sent to [facility] on [address] on [address]. She verbalized understanding of the above and is able to  medication there.      Bill Gonsalez RN  Palliative Medicine

## 2020-03-13 NOTE — PROGRESS NOTES
PA has been expedited for the fentanyl 25 mcg patch. PA is under review the agent will call me back regarding status.

## 2020-03-16 NOTE — TELEPHONE ENCOUNTER
Palliative Medicine  Nursing Note  [phone]  Fax [phone]      Telephone Call  Patient Name: Campos Rivera  YOB: 1945    3/16/2020        Primary Decision Maker: Deuce Abbott - Spouse - [phone]   Advance Care Planning 3/8/2020   Patient's Healthcare Decision Maker is: -   Primary Decision Maker Name -   Secondary Decision Maker Name -   Secondary Decision Maker Phone Number -   Secondary Decision Maker Relationship to Patient -   Confirm Advance Directive None   Patient Would Like to Complete Advance Directive -     Call placed to Mrs. Francisco. Inquired how the Fentanyl 12mcg patch was working for her . She felt it was helping a little, but he is still needing to take his Oxycodone 15mg every 4 hours, and he is asking for it by 3 hours. Informed her that Dr. Ger Mena has ordered that he can take the Oxycodone every 3 hours as needed. She verbalized understanding and will do that. Discussed with Mrs. Dionisio Smyth if she could bring her  to Palliative appt early, around 12:15 or 12:30, as he has other appt's with Oncology and Infusion that conflict with his previously scheduled appt for 1:30. She stated that she could do that.      Vito Lundberg, RN  Palliative Medicine

## 2020-03-17 NOTE — TELEPHONE ENCOUNTER
Called pt to remind him of his appt with  in the office at 12:30pm on Wednesday March 18,2020.  Pt confirmed appt

## 2020-03-17 NOTE — PROGRESS NOTES
[facility] [place] [address] [address] [facility]  Bennie Frater: [phone]  F: [phone]      Reason for Visit:   Dottie Dominguez is a 76 y.o. male who is seen in follow up for evaluation of lung cancer. Treatment History:   · CT A/P wo contrast 11/3/2019: New lytic lesion at T11  · MRI Brain 11/17/2019: no intracranial metastatic disease  · PET/CT 12/20/2019: RUL spiculated nodule, 1.4cm, SUV 3.2. Multifocal lytic osseous metastases in spine, ribs, sacrum, and iliac bones. Notably large lytic lesions involving cervical spine and T11. T11 lesion extends into spinal canal.  Suspicious precarinal mediastinal lymph node. 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 · CT guided biopsy of osseous metastasis 1/17/2020: metastatic adenocarcinoma, favor lung primary (TTF1+). PDL1 68%. EGFR/ALK/ROS1/BRAF negative. · Stage IVB (cT1b cN0 M1c) Non-small cell lung cancer  · Initiated palliative therapy with Pembrolizumab on 2/5/2020  · Palliative radiation to T10-T12, completed 2/21/2020    History of Present Illness:   Hospitalized after last cycle of therapy from 3/8/2020-3/10/2020 due to acute respiratory failure with hypoxia/COPD exacerbation. He was given antibiotics and steroid taper. Discharged home on 3/10/2020 on Prednisone taper for 9 days. Currently taking 20mg. Breathing is better than it was. Significantly worse with activity. No fever. No cough. Wearing oxygen. Loss of appetite. No nausea or vomiting. No change in bowels. Having more pain today in back, shoulders, hips. Pain medication is helping. Seen by Dr. Laura Galvan today. He quit smoking in 9/2019, previously smoked up to 3ppd. He is accompanied by his wife and daughter. He lives nearby. PAST HISTORY: The following sections were reviewed and updated in the EMR as appropriate: PMH, SH, FH, Medications, Allergies.       Allergies   Allergen Reactions    Adenosine Shortness of Breath     Tolerates well when given for SVT, per Dr. Emilia Brown. Given on 3/8/2020    Iodinated Contrast Media Shortness of Breath    Pcn [Penicillins] Shortness of Breath     Patient reports to have tolerated a 10 day course of cefdinir started 8/24/17      Review of Systems: A complete review of systems was obtained, reviewed, and scanned into the EMR. Pertinent findings reviewed above. Physical Exam:     Visit Vitals  /70 (BP 1 Location: Left arm, BP Patient Position: Sitting)   Pulse 88   Temp 96 °F (35.6 °C) (Temporal)   Resp 18   Ht 5' 6\" (1.676 m)   Wt 135 lb (61.2 kg)   SpO2 94% Comment: 2 liters   BMI 21.79 kg/m²     ECOG PS: 1  General: No distress, garbled speech at times  Eyes: PERRLA, anicteric sclerae  HENT: Atraumatic, OP clear  Neck: Supple  Lymphatic: No cervical, supraclavicular, or inguinal adenopathy  Respiratory: CTAB, normal respiratory effort  CV: Normal rate, regular rhythm, no murmurs, no peripheral edema  GI: Soft, nontender, nondistended, no masses, no hepatomegaly, no splenomegaly  MS: In wheelchair today. Digits without clubbing or cyanosis. Skin: No rashes, ecchymoses, or petechiae. Normal temperature, turgor, and texture. Psych: Alert, oriented, appropriate affect, normal judgment/insight    Results:     Lab Results   Component Value Date/Time    WBC 16.2 (H) 03/18/2020 01:39 PM    HGB 11.9 (L) 03/18/2020 01:39 PM    HCT 34.8 (L) 03/18/2020 01:39 PM    PLATELET 449 39/20/3733 01:39 PM    MCV 89.5 03/18/2020 01:39 PM    ABS.  NEUTROPHILS 15.1 (H) 03/18/2020 01:39 PM     Lab Results   Component Value Date/Time    Sodium 130 (L) 03/18/2020 01:39 PM    Potassium 4.3 03/18/2020 01:39 PM    Chloride 95 (L) 03/18/2020 01:39 PM    CO2 30 03/18/2020 01:39 PM    Glucose 174 (H) 03/18/2020 01:39 PM    BUN 19 03/18/2020 01:39 PM    Creatinine 0.75 03/18/2020 01:39 PM    GFR est AA >60 03/18/2020 01:39 PM    GFR est non-AA >60 03/18/2020 01:39 PM    Calcium 8.6 03/18/2020 01:39 PM Glucose (POC) 94 11/17/2019 12:13 PM    Creatinine (POC) 1.0 02/04/2020 01:45 PM     Lab Results   Component Value Date/Time    Bilirubin, total 1.3 (H) 03/18/2020 01:39 PM    ALT (SGPT) 158 (H) 03/18/2020 01:39 PM    AST (SGOT) 50 (H) 03/18/2020 01:39 PM    Alk. phosphatase 172 (H) 03/18/2020 01:39 PM    Protein, total 6.4 03/18/2020 01:39 PM    Albumin 3.1 (L) 03/18/2020 01:39 PM    Globulin 3.3 03/18/2020 01:39 PM       Records reviewed and summarized above. Pathology report(s) reviewed above. Radiology report(s) reviewed above. Bone scan at OSH 12/9/2019: Moderately increased uptake in the lower thoracic spine, T11. No additional suspicious focus elsewhere. MRI thoracic and lumbar spine at OSH on 11/25/2019:   Mild compression fracture of T11. Subcentimeter enhancing lesions within the T8 vertebral body worrisome for metastatic disease with pathologic fracture. Enhancing lesions involving L1 and L3 and marrow lesions iliac bones worrisome for metastatic disease  Multilevel degenerative changes of the lumbar spine    Assessment:   1) Metastatic non-small cell lung cancer (adenocarcinoma)  PDL1 68%, EGFR/ALK/ROS1/BRAF negative  He has disease within his lungs and bones. His cancer is not curable and management is with palliative intent. While the overall burden of disease within his lungs is rather small, the pathology results from the bone biopsy are most consistent with a lung primary, and no other apparent primary is evident on imaging. Tolerating therapy with exception of grade 1 fatigue. States he feels better after treatment. He will be seen with each cycle of therapy. Repeat imaging to assess response after C4. At progression he may be a candidate for the QUILT3 (if he experiences CR/WV on first line Keytruda) or LungMAP trials. NGS with Strata testing is pending. 2) Osseous metastases  Large cervical and thoracic lesions.   T11 lesion extends into spinal canal. MRI at outside hospital reviewed. Scanned under media. 3) Back pain  Secondary to his metastatic disease. Worsened since biopsy. Currently on Oxycodone PRN and Oxycontin scheduled. Palliative appt reviewed. Received palliative radiation to T10-T12, completed 2/21/2020. Seen by Dr. Carlos Otero today. Will initiate therapy with Gabapentin today. 4) COPD  Following with pulmonary, Dr. Jermaine Vyas. Recent hospital admission due to COPD exacerbation. 5) Emotional Well Being  No psychosocial concerns identified today. Patient has adequate support. Plan:     · Oxycodone PRN pain  · Strata testing on pathology-pending  · Proceed today with C3 Pembrolizumab (200mg) given every 3 weeks  · Labs: CBC, CMP prior to each cycle, TSH every 6 weeks  · To follow up every 3 weeks on therapy    Patient was seen in conjunction with Nayely Ac NP.     Signed By: Trevon Vega MD

## 2020-03-18 NOTE — PATIENT INSTRUCTIONS
Dear Serenity Oliver ,    It was a pleasure seeing you today in [facility]. We will see you again in 2 to 3 weeks to coordinate with your return to the [facility]. If labs or imaging tests have been ordered for you today, please call the office  at [phone] 48 hours after completion to obtain the results. Your stated goal: to continue watching your soap operas! Your described symptoms were: Fatigue: 7 Drowsiness: 6   Depression: 7 Pain: 7   Anxiety: 7 Nausea: 7   Anorexia: 6 Dyspnea: 6   Best Well-Bein Constipation: No   Other Problem (Comment): 0       This is the plan we talked about:    1. Back pain  -Your pain is a little better after you started the fentanyl patch last week  -You're taking oxycodone 15-mg every 3 hours on a regular basis  -Today I'm increasing your fentanyl patch dose to 25-mcg every 72 hours  -Today I sent a prescription for the 25-mcg patches (#30) to your pharmacy  -You can use 2 of the 12-mcg patches until you finish what you have at home  -Continue oxycodone 15-mg every 3 hours as needed  -We talked today about adding a medicine called gabapentin for the neuropathic (nerve) component of your pain  -Start gabapentin 300-mg:   -Take 1 cap at bedtime for 3 days, then:   -Start 1 cap twice a a day for 3 days, then:   -Start 1 cap 3 times a day  -Your wife had some concerns about the effect the morphine might have had on your breathing  -Today I prescribed narcan and our nurse reviewed it's use with your wife    2. Constipation  -You're moving your bowels regularly with senna and Miralax    3. Nausea  -Today I prescribed ondansetron 8-mg every 8 hours as needed    4.  Shortness of breath/cough  -You went into the hospital last week with increased shortness of breath  -This was due to a rapid heart rate called atrial fibrillation  -You are now back to your \"regular\" shortness of breath   -This is related to your COPD and your lung cancer  -Continue using your nebulizer every 4 hours as needed  -You're on a prednisone taper    5. Depression  -Continue escitalopram 10-mg daily Lexapro and   -Continue clonazepam 0.5-mg three times a day  -Continue Wellbutrin 150-mg twice a day  -You've been taking these medications for years and find then helpful    6. Lung cancer  -You continue to receive cancer therapy yesterday with an immune agent called pembrolizumab Shoshanadouglas Dexter)  -You will be coming to the infusion center every 3 weeks for your treatment      This is what you have shared with us about Advance Care Planning:      Primary Decision Maker: Valeriano Misael - [phone]  Advance Care Planning 3/18/2020   Patient's [address] is: Verbal statement (Legal Next of Kin remains as decision maker)   Primary Decision Maker Name -   Secondary Decision [address] Name -   Secondary Decision [address] Phone Number -   Secondary Decision Maker Relationship to Patient -   Confirm Advance Directive None   Patient Would Like to Complete Advance Directive No           The Palliative Medicine Team is here to support you and your family.        Sincerely,      Ricardo Soliz MD and the Palliative Medicine Team

## 2020-03-18 NOTE — PROGRESS NOTES
Palliative Medicine Outpatient Services  [place]: [phone]    Patient Name: Jacqueline Cantu  YOB: 1945    Date of Current Visit: 03/18/20  Location of Current Visit:    [] [facility] Office  [x] [facility] Office  [] [facility] Office  [] Home  [] Other:      Date of Initial Visit: 2/6/2020   Referral from: David Green MD  Date of Initial Referral:  Primary Care Physician: Italo Agustin MD      SUMMARY:   Jacqueline Canut is a 76y.o. 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 Lizbeth Montemayor for symptom management. He was diagnosed in 12/2019. He is currently being treated with pembrolizumab. The patients social history includes: he is  to Lillie. He has 2 daughters, Ingrid Rivera and Lillie, and a son, Ovi Kearney. 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 Palliative Medicine is addressing the following current patient/family concerns: right low back pain due to bony metastatic disease; depression; fatigue; shortness of breath; cough; advanced care planning. Initial Referral Intake note reviewed   PALLIATIVE DIAGNOSES:       ICD-10-CM ICD-9-CM    1. Right-sided low back pain without sciatica, unspecified chronicity M54.5 724.2 fentaNYL (DURAGESIC) 25 mcg/hr PATCH      gabapentin (NEURONTIN) 300 mg capsule      oxyCODONE IR (OXY-IR) 15 mg immediate release tablet   2. Drug-induced constipation K59.03 564.09      E980.5    3. Nausea without vomiting R11.0 787.02    4. Shortness of breath R06.02 786.05    5. Depression, unspecified depression type F32.9 311    6. Lung cancer metastatic to bone (HCC) C34.90 162.9 fentaNYL (DURAGESIC) 25 mcg/hr PATCH    C79.51 198.5 gabapentin (NEURONTIN) 300 mg capsule   7.  Prostate cancer metastatic to bone [facility]) C66 185 oxyCODONE IR (OXY-IR) 15 mg immediate release tablet    C79.51 198.5           PLAN:   Patient Instructions     Dear Tho Barnhart ,    It was a pleasure seeing you today in [facility]. We will see you again in 2 to 3 weeks to coordinate with your return to the [facility]. If labs or imaging tests have been ordered for you today, please call the office  at [phone] 48 hours after completion to obtain the results. Your stated goal: to continue watching your soap operas! Your described symptoms were: Fatigue: 7 Drowsiness: 6   Depression: 7 Pain: 7   Anxiety: 7 Nausea: 7   Anorexia: 6 Dyspnea: 6   Best Well-Bein Constipation: No   Other Problem (Comment): 0       This is the plan we talked about:    1. Back pain  -Your pain is a little better after you started the fentanyl patch last week  -You're taking oxycodone 15-mg every 3 hours on a regular basis  -Today I'm increasing your fentanyl patch dose to 25-mcg every 72 hours  -Today I sent a prescription for the 25-mcg patches (#30) to your pharmacy  -You can use 2 of the 12-mcg patches until you finish what you have at home  -Continue oxycodone 15-mg every 3 hours as needed  -We talked today about adding a medicine called gabapentin for the neuropathic (nerve) component of your pain  -Start gabapentin 300-mg:   -Take 1 cap at bedtime for 3 days, then:   -Start 1 cap twice a a day for 3 days, then:   -Start 1 cap 3 times a day    2. Constipation  -You're moving your bowels regularly with senna and Miralax    3. Nausea  -Today I prescribed ondansetron 8-mg every 8 hours as needed    4.  Shortness of breath/cough  -You went into the hospital last week with increased shortness of breath  -This was due to a rapid heart rate called atrial fibrillation  -You are now back to your \"regular\" shortness of breath   -This is related to your COPD and your lung cancer  -Continue using your nebulizer every 4 hours as needed  -You're on a prednisone taper    5. Depression  -Continue escitalopram 10-mg daily Lexapro and   -Continue clonazepam 0.5-mg three times a day  -Continue Wellbutrin 150-mg twice a day  -You've been taking these medications for years and find then helpful    6. Lung cancer  -You continue to receive cancer therapy yesterday with an immune agent called pembrolizumab Johnalmaperfecto Dickerson)  -You will be coming to the infusion center every 3 weeks for your treatment      This is what you have shared with us about Advance Care Planning:      Primary Decision Maker: Celestino Graves - [phone]  Advance Care Planning 3/18/2020   Patient's [place] is: Verbal statement (Legal Next of Kin remains as decision maker)   Primary Decision Maker Name -   Secondary Decision [address] Name -   Secondary Decision [address] Phone Number -   Secondary Decision Maker Relationship to Patient -   Confirm Advance Directive None   Patient Would Like to Complete Advance Directive No           The Palliative Medicine Team is here to support you and your family. Sincerely,      Natalia Peralta MD and the Palliative Medicine Team       CAREGIVER STRESS - patient's wife appears to be overwhelmed with caring for her . I've asked Jimbo Salazar LCSW, to reach out to her by phone.      GOALS OF CARE / TREATMENT PREFERENCES:   [====Goals of Care====]  GOALS OF CARE:  Patient / health care proxy stated goals: See Patient Instructions / Summary    TREATMENT PREFERENCES:   Code Status:  [x] Attempt Resuscitation       [] Do Not Attempt Resuscitation    Advance Care Planning:  [x] [facility] Interdisciplinary Team has updated the ACP Navigator with Decision Maker and Patient Capacity      Primary Decision MakerCphani Graves - [phone]  Advance Care Planning 3/18/2020   Patient's Healthcare Decision Maker is: Verbal statement (Legal Next of Kin remains as decision maker)   Primary Decision Maker Name - Secondary Decision Maker Name -   Secondary Decision Maker Phone Number -   Secondary Decision Maker Relationship to Patient -   Confirm Advance Directive None   Patient Would Like to Complete Advance Directive No       Other:  (If patient appropriate for POST, consider using PALLPOST smart phrase here)    The palliative care team has discussed with patient / health care proxy about goals of care / treatment preferences for patient.  [====Goals of Care====]     PRESCRIPTIONS GIVEN:     Medications Ordered Today   Medications    fentaNYL (DURAGESIC) 25 mcg/hr PATCH     Si Patch by TransDERmal route every seventy-two (72) hours for 30 days. Max Daily Amount: 1 Patch. Dispense:  10 Patch     Refill:  0    gabapentin (NEURONTIN) 300 mg capsule     Si qhs x 3 days, then 1 BID x 3 days, then 1 TID     Dispense:  90 Cap     Refill:  1    oxyCODONE IR (OXY-IR) 15 mg immediate release tablet     Sig: Take 1 Tab by mouth every three (3) hours as needed for Pain for up to 15 days. Max Daily Amount: 120 mg. Dispense:  120 Tab     Refill:  0           FOLLOW UP:     Future Appointments   Date Time Provider Carmen Carrizales   3/18/2020  1:45 PM Nile Mairbel,  [facility],  [address]   2020  1:00 PM SS INF5 CH4 <1H [facility]   2020  1:00 PM SS INF5 CH4 <1H [facility]   2020  1:00 PM SS INF5 CH4 <1H [facility]           PHYSICIANS INVOLVED IN CARE:   Patient Care Team:  Milan Cuba MD as PCP - General (Family Practice)  Cy Vaughan MD (Hematology and Oncology)  Thao Salinas MD (Pulmonary Disease)  Alexia Boxer, MD (Radiation Oncology)  Trista Perry MD (Palliative Medicine)  Trista Perry MD as Physician (Palliative Medicine)       HISTORY:   Reviewed patient-completed ESAS and advance care planning form.   Reviewed patient record in prescription monitoring program.    CHIEF COMPLAINT:   Chief Complaint   Patient presents with    Back Pain HPI/SUBJECTIVE:    The patient is: [x] Verbal / [] Nonverbal     See Plan/Patient Instructions for details of interval history    Clinical Pain Assessment (nonverbal scale for nonverbal patients):   [++++ Clinical Pain Assessment++++]  [++++Pain Severity++++]: Pain: 7  [++++Pain Character++++]: hurts  [++++Pain Duration++++]: about a month  [++++Pain Effect++++]: staying in bed   [++++Pain Factors++++]: coughing makes it worse  [++++Pain Frequency++++]: constant with varying intensity  [++++Pain Location++++]: right low back, non-radiating  [++++ Clinical Pain Assessment++++]       FUNCTIONAL ASSESSMENT:     Palliative Performance Scale (PPS):  PPS: 70       PSYCHOSOCIAL/SPIRITUAL SCREENING:     Any spiritual / Sikh concerns:  [] Yes /  [x] No    Caregiver Burnout:  [] Yes /  [x] No /  [] No Caregiver Present      Anticipatory grief assessment:   [x] Normal  / [] Maladaptive       ESAS Anxiety: Anxiety: 7    ESAS Depression: Depression: 7       REVIEW OF SYSTEMS:     The following systems were [x] reviewed / [] unable to be reviewed  Systems: constitutional, ears/nose/mouth/throat, respiratory, gastrointestinal, genitourinary, musculoskeletal, integumentary, neurologic, psychiatric, endocrine. Positive findings noted below. Modified ESAS Completed by: provider   Fatigue: 7 Drowsiness: 6   Depression: 7 Pain: 7   Anxiety: 7 Nausea: 7   Anorexia: 6 Dyspnea: 6   Best Well-Bein Constipation: No   Other Problem (Comment): 0          PHYSICAL EXAM:     Wt Readings from Last 3 Encounters:   03/10/20 135 lb 9.3 oz (61.5 kg)   20 138 lb (62.6 kg)   20 138 lb (62.6 kg)     Blood pressure 109/77, pulse (!) 101, temperature 97.1 °F (36.2 °C), temperature source Oral, resp. rate 16, height 5' 6\" (1.676 m), SpO2 98 %.   Last bowel movement: See Nursing Note    Constitutional: sitting in wheelchair, appears uncomfortable and fatigued  Eyes: pupils equal, anicteric  ENMT: no nasal discharge, moist mucous membranes  Cardiovascular: regular rhythm, distal pulses intact  Respiratory: breathing not labored, symmetric; scattered wheezes  Gastrointestinal: soft non-tender, +bowel sounds  Musculoskeletal: no deformity, no tenderness to palpation  Skin: warm, dry  Neurologic: following commands, moving all extremities  Psychiatric: full affect, no hallucinations  Other:       HISTORY:     Past Medical History:   Diagnosis Date    Asthma     CAD (coronary artery disease)     Cancer ([facility] [address] \"10 years ago\"    throat    COPD (chronic obstructive pulmonary disease) ([facility] [address] [facility]     CVA (cerebral vascular accident) ([facility] [address]     H/O mechanical aortic valve replacement     CT surgery  - CABG (LIMA to LAD) and AVR ([facility])    Hypertension     S/P CABG x 1     CT surgery  - CABG (LIMA to LAD) and AVR ([facility])      Past Surgical History:   Procedure Laterality Date    CARDIAC SURG PROCEDURE UNLIST      HX HEART VALVE SURGERY      CT surgery  - CABG (LIMA to LAD) and AVR ([facility])    HX HEMORRHOIDECTOMY      IR INSERT TUNL CVC W PORT OVER 5 YEARS  2020      Family History   Problem Relation Age of Onset    Heart Disease Mother     Diabetes Mother     Hypertension Mother     Heart Disease Sister     Hypertension Sister     Hypertension Father     Cancer Brother         lung    Diabetes Brother     Heart Disease Brother     Hypertension Brother     Stroke Brother       History reviewed, no pertinent family history. Social History     Tobacco Use    Smoking status: Former Smoker     Packs/day: 3.00     Years: 40.00     Pack years: 120.00     Last attempt to quit: 2019     Years since quittin.2    Smokeless tobacco: Never Used   Substance Use Topics    Alcohol use: No     Allergies   Allergen Reactions    Adenosine Shortness of Breath     Tolerates well when given for SVT, per Dr. Paulino Hansen.  Given on 3/8/2020    Iodinated Contrast Media Shortness of Breath    Pcn [Penicillins] Shortness of Breath Patient reports to have tolerated a 10 day course of cefdinir started 8/24/17      Current Outpatient Medications   Medication Sig    oxyCODONE IR (OXY-IR) 15 mg immediate release tablet Take 1 Tab by mouth every three (3) hours as needed for Pain for up to 15 days. Max Daily Amount: 120 mg.  predniSONE (DELTASONE) 20 mg tablet Take 3 tabs a day for 3 days, then 2 tabs a day for 3 days, then 1 tab a day for 3 days.  polyethylene glycol (MIRALAX) 17 gram packet Take 17 g by mouth daily.  senna (SENNA) 8.6 mg tablet Take 2 Tabs by mouth nightly.  brimonidine (ALPHAGAN) 0.2 % ophthalmic solution Administer 1 Drop to both eyes two (2) times a day.  0.9% sodium chloride solp 100 mL with pembrolizumab 25 mg/mL soln 200 mg by IntraVENous route every twenty-one (21) days.  warfarin (COUMADIN) 1 mg tablet Take 1 mg by mouth every other day.  hydroxypropyl methylcellulose (GENTEAL) 0.2 % ophthalmic solution 2 drops right eye every 1-2 hours while awake    latanoprost (XALATAN) 0.005 % ophthalmic solution Administer 1 Drop to both eyes nightly.  amLODIPine (NORVASC) 10 mg tablet Take 10 mg by mouth daily.  albuterol (PROVENTIL VENTOLIN) 2.5 mg /3 mL (0.083 %) nebulizer solution 3 mL by Nebulization route every four (4) hours as needed for Wheezing.  atorvastatin (LIPITOR) 80 mg tablet Take 80 mg by mouth every evening.  warfarin (COUMADIN) 5 mg tablet Take 2.5 mg by mouth every other day.  albuterol (PROVENTIL HFA, VENTOLIN HFA, PROAIR HFA) 90 mcg/actuation inhaler Take 2 Puffs by inhalation every six (6) hours as needed.  fluticasone-salmeterol (ADVAIR) 500-50 mcg/dose diskus inhaler Take 1 Puff by inhalation every twelve (12) hours.  clonazepam (KLONOPIN) 0.5 mg tablet Take 0.5 mg by mouth three (3) times daily.  escitalopram oxalate (LEXAPRO) 10 mg tablet Take 10 mg by mouth daily.     isosorbide mononitrate (ISMO, MONOKET) 10 mg tablet Take 10 mg by mouth three (3) times daily. Patient takes at 9am, noon, and 5pm    fentaNYL (DURAGESIC) 25 mcg/hr PATCH 1 Patch by TransDERmal route every seventy-two (72) hours for 30 days. Max Daily Amount: 1 Patch.  gabapentin (NEURONTIN) 300 mg capsule 1 qhs x 3 days, then 1 BID x 3 days, then 1 TID    promethazine (PHENERGAN) 12.5 mg tablet Take 25 mg by mouth every eight (8) hours as needed. Indications: Nausea     No current facility-administered medications for this visit. LAB DATA REVIEWED:     Lab Results   Component Value Date/Time    WBC 9.5 03/09/2020 03:45 AM    HGB 9.8 (L) 03/09/2020 03:45 AM    PLATELET 393 64/90/9697 03:45 AM     Lab Results   Component Value Date/Time    Sodium 137 03/09/2020 03:45 AM    Potassium 3.7 03/09/2020 03:45 AM    Chloride 105 03/09/2020 03:45 AM    CO2 27 03/09/2020 03:45 AM    BUN 16 03/09/2020 03:45 AM    Creatinine 0.66 (L) 03/09/2020 03:45 AM    Calcium 8.2 (L) 03/09/2020 03:45 AM    Magnesium 2.0 03/08/2020 05:30 AM    Phosphorus 2.3 (L) 06/22/2019 02:04 AM      Lab Results   Component Value Date/Time    AST (SGOT) 59 (H) 03/08/2020 05:30 AM    Alk. phosphatase 184 (H) 03/08/2020 05:30 AM    Protein, total 6.8 03/08/2020 05:30 AM    Albumin 3.4 (L) 03/08/2020 05:30 AM    Globulin 3.4 03/08/2020 05:30 AM     Lab Results   Component Value Date/Time    INR 2.5 (H) 03/10/2020 01:10 AM    Prothrombin time 24.3 (H) 03/10/2020 01:10 AM    aPTT 26.5 11/03/2019 06:57 PM      Lab Results   Component Value Date/Time    Iron 27 (L) 03/09/2020 09:09 AM    TIBC 240 (L) 03/09/2020 09:09 AM    Iron % saturation 11 (L) 03/09/2020 09:09 AM    Ferritin 362 03/09/2020 09:09 AM        CT 2/4/2020:  FINDINGS:      THYROID: No nodule. MEDIASTINUM: No mass or lymphadenopathy. MAXINE: No mass or lymphadenopathy. THORACIC AORTA: Aortic atherosclerotic change. MAIN PULMONARY ARTERY: Normal in caliber. TRACHEA/BRONCHI: Patent. ESOPHAGUS: No wall thickening or dilatation. HEART: Post median sternotomy.  Coronary artery disease. PLEURA: Chronic pleural thickening. Chronic basilar atelectatic change. Emphysematous changes are noted. Increase atelectasis/bronchiectatic change in  the left upper lobe. LUNGS: No definite mass lesion. Nodular density in the right upper lobe is  irregular. 3-13.     LIVER: No mass or biliary dilatation. There is no intrahepatic duct dilatation. The CBD is not dilated. There is no hepatic parenchymal mass. Hepatic  enhancement pattern is within normal limits. Portal vein is patent. GALLBLADDER:  No dilatation or wall thickening. SPLEEN/PANCREAS: No mass. There is no pancreatic duct dilatation. There is no  evidence of splenomegaly. ADRENALS/KIDNEYS: Bilateral renal cysts and renal cortical thinning. There is  no hydronephrosis. There is no renal or ureteral calculus. There is no renal  mass. There is no perinephric mass. COLON AND SMALL BOWEL: Colonic diverticula without thyromegaly radiculitis. Fecal stasis. There is no free intraperitoneal air. There is no evidence of  incarceration or obstruction. No mesenteric adenopathy. PERITONEUM: Unremarkable. APPENDIX: Unremarkable. BLADDER/REPRODUCTIVE ORGANS: No mass or calculus. OSSEOUS STRUCTURES: Lytic lesion along the posterior margin of the L3 vertebral  body with epidural soft tissue component. Lytic focus at T11 and T8 likely  osseous metastasis at T2 and L5 as well. Scattered lytic foci in the ribs on the  right, T2-32, 2-41 with pathologic fractures. 2-33 2-28. RETROPERITONEUM:  Unremarkable. The abdominal aorta is normal in caliber. No aneurysm. No  retroperitoneal adenopathy.     IMPRESSION:  Baseline research examination.     Interval progression of osseous metastatic disease with new subtle lytic foci in  the ribs on the right. Pathologic deformities. Spinal osseous metastases  increased in overall volume as well. Epidural soft tissue is noted at the L3  level. Moderate emphysematous changes. Chronic parenchymal scarring. Chronic pleural thickening as well. Stable scarring/spiculated nodule in the right upper lobe. .  Coronary artery disease.  Status post median sternotomy.      CONTROLLED SUBSTANCES SAFETY ASSESSMENT (IF ON CONTROLLED SUBSTANCES):     Reviewed opioid safety handout:  [x] Yes   [] No  24 hour opioid dose >150mg morphine equivalent/day:  [] Yes   [x] No  Benzodiazepines:  [] Yes   [x] No  Sleep apnea:  [] Yes   [] No  Urine Toxicology Testing within last 6 months:  [] Yes   [x] No  History of or new aberrant medication taking behaviors:  [] Yes   [x] No  Has Narcan been prescribed [] Yes   [x] No          Total time:   Counseling / coordination time:   > 50% counseling / coordination?:

## 2020-03-18 NOTE — PROGRESS NOTES
[facility] Progress Note    Date: 2020    Name: Robert Pruitt    MRN: [id number]         : 1945    Mr. Bernie Lucas Arrived in wheel chair and in no distress for cycle 3 day 1 of Keytruda regimen. Assessment was completed, no acute issues at this time, pt still having L shoulder chronic pain pt is weak and using a wheel chair. R chest port accessed without difficulty, labs drawn and in process. Chemotherapy Flowsheet 3/18/2020   Cycle C3D1   Date 3/18/2020   Drug / Regimen Keytruda   Notes -          Proceeded to appt with Dr. Liu Betancourt    Mr. Francisco's vitals were reviewed. Visit Vitals  /79   Pulse 92   Temp 97.8 °F (36.6 °C)   Resp 16   SpO2 99%       Lab results were obtained and reviewed. Recent Results (from the past 12 hour(s))   CBC WITH 3 PART DIFF    Collection Time: 20  1:39 PM   Result Value Ref Range    WBC 16.2 (H) 4.1 - 11.1 K/uL    RBC 3.89 (L) 4.10 - 5.70 M/uL    HGB 11.9 (L) 12.1 - 17.0 g/dL    HCT 34.8 (L) 36.6 - 50.3 %    MCV 89.5 80.0 - 99.0 FL    MCH 30.6 26.0 - 34.0 PG    MCHC 34.2 30.0 - 36.5 g/dL    RDW 15.7 11.8 - 15.8 %    PLATELET 105 777 - 618 K/uL    NEUTROPHILS 94 (H) 32 - 75 %    MIXED CELLS 6 3.2 - 16.9 %    LYMPHOCYTES 1 (L) 12 - 49 %    ABS. NEUTROPHILS 15.1 (H) 1.8 - 8.0 K/UL    ABS. MIXED CELLS 0.9 0.2 - 1.2 K/uL    ABS. LYMPHOCYTES 0.2 (L) 0.8 - 3.5 K/UL    DF AUTOMATED         Pre-medications  were administered as ordered and chemotherapy was initiated.      Medications Administered     0.9% sodium chloride infusion     Admin Date  2020 Action  New Bag Dose  25 mL/hr Rate  25 mL/hr Route  IntraVENous Administered By  oSnido Nolasco          heparin (porcine) pf 300-500 Units     Admin Date  2020 Action  Given Dose  500 Units Route  InterCATHeter Administered By  Pierre Man Date  2020 Action  Given Dose  500 Units Route  InterCATHeter Administered By  Sonido Nolasco          pembrolizumab [facility]) 200 mg in 0.9% sodium chloride 100 mL, overfill volume 10 mL IVPB     Admin Date  03/18/2020 Action  New Bag Dose  200 mg Rate  236 mL/hr Route  IntraVENous Administered By  Braxton Sarabia          saline peripheral flush soln 10 mL     Admin Date  03/18/2020 Action  Given Dose  10 mL Route  InterCATHeter Administered By  Braxton Sarabia                  Mr. Mahesh Quezada tolerated treatment well and was discharged from [place] [address] in stable condition. He is to return on 4/8/20 for his next appointment.     Yaya Arnett  March 18, 2020

## 2020-03-18 NOTE — PROGRESS NOTES
Christina Jha is a 76 y.o. male follow up for lung cancer. 1. Have you been to the ER, urgent care clinic since your last visit? Hospitalized since your last visit?no     2. Have you seen or consulted any other health care providers outside of the [address] [place] [address] since your last visit? Include any pap smears or colon screening.  no

## 2020-03-18 NOTE — PROGRESS NOTES
Palliative Medicine Office Visit  Palliative Medicine Nurse Check In  ([phone])    Patient Name: Imani Martin  YOB: 1945      Date of Office Visit: 3/18/2020      Patient states: C/O back/shoulder/arm pain. From Check In Sheet (scanned in Media):  Has a medical provider talked with you about cardiopulmonary resuscitation (CPR)? [x] Yes   [] No   [] Unable to obtain    Nurse reminder to complete or update ACP FlowSheet:    Is ACP on the Problem List?    [] Yes    [x] No  IF ACP Document is ON FILE; Nurse to place ACP on Problem List     Is there an ACP Note in Chart Review/Note? [] Yes    [x] No   If NO: [address] Planning 3/8/2020   Patient's Healthcare Decision Maker is: -   Primary Decision Maker Name -   Secondary Decision Maker Name -   Secondary Decision Maker Phone Number -   Secondary Decision Maker Relationship to Patient -   Confirm Advance Directive None   Patient Would Like to Complete Advance Directive -       [phone]  Advance Care Planning 3/18/2020   Patient's [address] is: Verbal statement (Legal Next of Kin remains as decision maker)   Primary Decision Maker Name -   Secondary Decision [address] Name -   Secondary Decision [address] Phone Number -   Secondary Decision Maker Relationship to Patient -   Confirm Advance Directive None   Patient Would Like to Complete Advance Directive No         Is there anything that we should know about you as a person in order to provide you the best care possible? Have you been to the ER, urgent care clinic since your last visit? [x] Yes   [] No   [] Unable to obtain    Have you been hospitalized since your last visit? [x] Yes   [] No   [] Unable to obtain    Have you seen or consulted any other health care providers outside of the [address] [place] [address] since your last visit?    [] Yes   [x] No   [] Unable to obtain    Functional status (describe):     Last BM: 3/18/2020       accessed (date):  3/17/20    Bottle review (for  opioid pain medication):  Medication 1:  oxycodone Ir 15 mg tab   Date filled: 3/6/20  Directions: 1 tab by mouth every 3 hrs as needed  # filled:   80  # left: 30  # pills taking per day:6  Last dose taken:3/18/2020      Medication 2:  fentanyl 12 mcg patch  Date filled: 3/13/20  Directions: apply 1 patch transdermal every 72 hrs   # filled: 10  # left: 8  # pills taking per day:  Last dose taken: 3/16/20   Verified/Returned    Medication 3:   Date filled:   Directions:   # filled:   # left:   # pills taking per day:  Last dose taken:    Medication 4:   Date filled:   Directions:   # filled:   # left:   # pills taking per day:  Last dose taken:

## 2020-03-20 PROBLEM — I48.0 PAF (PAROXYSMAL ATRIAL FIBRILLATION) (HCC): Status: ACTIVE | Noted: 2020-01-01

## 2020-03-20 PROBLEM — S12.9XXA MULTIPLE FRACTURES OF CERVICAL SPINE (HCC): Status: ACTIVE | Noted: 2020-01-01

## 2020-03-20 PROBLEM — J96.21 ACUTE AND CHRONIC RESPIRATORY FAILURE WITH HYPOXIA (HCC): Status: ACTIVE | Noted: 2020-01-01

## 2020-03-20 NOTE — CONSULTS
Luther Steinberg MD [facility] - [address] [facility] Office [phone] Date of  Admission: 3/20/2020  1:16 AM 
  
 
Assessment/Plan:  
· PAF with RVR on admission: back in SR now. Would stop Norvasc and start dilt for rate control for PAF. On warfarin on admission. ECHO pending. · A/C resp failure:  
· CAD s/p 1 V CABG: no chest pain · S/P AVR (1994): ECHO pending · COPD: per medicine team  
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  
· HTN: norvasc > dilt . · Cervical spine fracture: incidental findings on CT. Possible epidural extension. Due to malignancy. High chance of paralysis and death. Ortho consult pending.  
  
     
 
Thank you for allowing us to participate in Derek Jeter care. We will be happy to follow along. Please call for any questions. Will follow remotely. Consult requested by Ratna Del Rio MD for Acute and chronic respiratory failure with hypoxia ([facility] [J96.21] Subjective: 
Derek Jeter is a 76 y.o. AA  male  with PMH of  HTN, COPD on 2L O2, Pafib, prosthetic AVR, stage 4 lung CA w/ spinal mets, presented w/ resp failure, hypoxia. The patient is a poor historian. Per chart review  He c/o worsening dyspnea at home for the past 2 days. Denied chest pain, fevers, chills, nausea, vomiting, diarrhea. The patient was admitted last week for COPD, SVT, and lung CA. He reported DNR status, but not on hospice. Currently undergoing palliative chemo. In the ED, O2 sat was 87% on 2L O2.  HR~147, afib.   
  
 
The patient denies chest pain, dependent edema, diaphoresis, LEE, orthopnea, palpitations, PND, shortness of breath, claudication or syncope. No bleeding. Cardiac risk factors   
HTN  
DM   
Former smoker quit 2019 Male gender CT Results (most recent): 
Results from THERESE ZAMARRIPA  NATHALY Encounter encounter on 03/20/20 CT CHEST WO CONT Narrative CT CHEST WITHOUT CONTRAST. 3/20/2020 2:15 AM  
 
INDICATION: Evaluate interval change of cancer. HISTORY (per electronic medical record): Stage IV B lung adenocarcinoma with 
osseous metastases. COMPARISON: 2/4/2020, 6/21/2019. TECHNIQUE: CT of the chest was performed without contrast. Coronal and sagittal 
reconstructions were performed. CT dose reduction was achieved through use of a 
standardized protocol tailored for this examination and automatic exposure 
control for dose modulation. FINDINGS: The examination is limited by respiratory motion. Lung nodules: A 10 x 11 x 13 mm, spiculated, right upper lobe nodule has 
increased from 6/21/2019, when it measured 7 x 7 x 8 mm. A subpleural right upper lobe nodule in the posterior segment is new and 
measures 11 mm. There are probably additional nodules in the right upper lobe 
(3-22, 3-21, 601-67), though evaluation of these is limited by respiratory 
motion. Lungs: There is segmental and subsegmental endobronchial mucous plugging in the 
right lower lobe. Scarring in the posterior basal segment of the right lower 
lobe and in the superior segment of the left lower lobe is stable. There is 
bullous emphysema in the left lung base. Incidental note is made of pleural fat 
deposition in the left hemithorax. Soft and calcified left pleural plaque 
suggests old asbestos exposure. No pneumothorax or pleural effusion. Chest: The heart is mildly enlarged. Post aortic valve replacement and CABG. There is moderate calcification of the native coronary arteries, as well as 
atherosclerotic calcification of the LAD and LIMA grafts. A right IJ ported 
catheter terminates in the right atrium. The left and right pulmonary arteries 
are enlarged, suggesting pulmonary artery hypertension. An air-fluid level 
extends in the esophagus to the level of the aortic arch, consistent with reflux. No thoracic lymphadenopathy. There is a small hiatal hernia. There is 
left renal cortical scarring. Incidental note is made of small right renal 
simple and hemorrhagic cysts. The visualized unenhanced upper abdomen is 
otherwise normal. 
 
Bones: A large C5 metastasis was not imaged on prior chest CTs. The left pedicle 
is destroyed, and there is likely epidural extension (2-2). The left facet is 
destroyed, and tumor narrows the neural foramen (712-67). A pathologic fracture 
of the vertebral body is essentially a 3 column fracture, as the right lamina 
and spinous process of C6 are fractured (602-74, 602-79) and the spinous process 
of C4 is fractured (3-1, 602-76). This is likely unstable. The left C7 facet may 
be fractured as well (792-05) Retropulsion of inferior fragments and 
anterolisthesis of C4 on C5 causes at least moderate canal stenosis (253-47). A 
partially imaged fracture of the spinous process of C4 is associated (3-1, 
602-76). The left lamina of C4 is destroyed by tumor (2-1). Diffuse osseous metastases have progressed from 6/21/2019. T7 and L1 compression fractures are new from 2/4/2020. There may also be a new 
compression fracture of L2, partially imaged (594-38). Retropulsion at L1 causes 
at least moderate canal stenosis. There are multiple subacute right rib 
fractures. Periosteal thickening and possible extracortical soft tissue 
extension are associated with a ninth posterior pathologic rib fracture (2-50). Impression IMPRESSION:  
1. Three-column pathologic fracture involves the vertebral body of C5 and the 
posterior elements of C4 and C6. This is likely unstable. 2. Probable epidural extension at C5. Tumor narrows the left C5 neural foramen. 3. New T7 and L1 compression fractures. 4. Diffuse osseous metastases, increased from 6/21/2019. 
5. Increased, spiculated, 11 mm, right upper lobe nodule. 6. New, subpleural, 11 mm, right upper lobe nodule. 7. Probable additional nodules in the right upper lobe; evaluation limited by 
respiratory motion. The findings were called to Dr. Brandon English Do on 3/20/2020 2:57 AM by Dr. Jodee Adams. [address] Cardiographics:   Telemetry: SR   
ECG:  
A fib RVR Cardiac Testing/ Procedures: A. Cardiac Cath/PCI:  
B.ECHO/JUAN: 6/2019: EF 51-55% No MWA, The prosthetic valve is normal.Right Ventricle: Mildly reduced systolic function. C.StressNuclear/Stress ECHO/Stress test: D.Vascular:  
E. EP:  
Rafi Moy SOCIAL HX: , former smoker Patient Active Problem List  
 Diagnosis Date Noted  Acute and chronic respiratory failure with hypoxia ([address] 03/20/2020  Acute respiratory failure ([address] 03/10/2020  Acute respiratory failure with hypoxia ([address] 03/08/2020  Lung cancer metastatic to bone ([address] 01/31/2020  TIA (transient ischemic attack) 06/20/2019  Supratherapeutic INR 06/19/2019  S/P CABG x 1 08/25/2017  Pneumonia 08/25/2017  S/P AVR (aortic valve replacement) 08/25/2017  CAD (coronary artery disease) 08/25/2017  CVA (cerebral vascular accident) ([address] 06/12/2016  Aortic stenosis 06/12/2016  Anticoagulated 06/12/2016  COPD with acute exacerbation ([address] 10/22/2014  S/P MVR (mitral valve replacement) 10/22/2014  Tobacco abuse 10/22/2014  Throat cancer ([address] 10/22/2014  Depression 10/22/2014  COPD (chronic obstructive pulmonary disease) (HCC) Past Medical History:  
Diagnosis Date  Asthma  CAD (coronary artery disease)  Cancer ([address] \"10 years ago\" throat  COPD (chronic obstructive pulmonary disease) (HCC)  CVA (cerebral vascular accident) ([address]  H/O mechanical aortic valve replacement CT surgery 1994 - CABG (LIMA to LAD) and AVR ([facility])  Hypertension  S/P CABG x 1   
 CT surgery 1994 - CABG (LIMA to LAD) and AVR ([facility]) Past Surgical History:  
Procedure Laterality Date  CARDIAC SURG PROCEDURE UNLIST  HX HEART VALVE SURGERY    
 CT surgery 1994 - CABG (LIMA to LAD) and AVR ([facility])  HX HEMORRHOIDECTOMY  IR INSERT TUNL CVC W PORT OVER 5 YEARS  2/7/2020 Allergies Allergen Reactions  Adenosine Shortness of Breath Tolerates well when given for SVT, per Dr. Boone Connors. Given on 3/8/2020  Iodinated Contrast Media Shortness of Breath  Pcn [Penicillins] Shortness of Breath Patient reports to have tolerated a 10 day course of cefdinir started 8/24/17 Current Facility-Administered Medications Medication Dose Route Frequency  dilTIAZem (CARDIZEM) 100 mg in 0.9% sodium chloride (MBP/ADV) 100 mL infusion  0-15 mg/hr IntraVENous TITRATE  albuterol-ipratropium (DUO-NEB) 2.5 MG-0.5 MG/3 ML  3 mL Nebulization Q6H RT  
 methylPREDNISolone (PF) (SOLU-MEDROL) injection 40 mg  40 mg IntraVENous Q8H  
 guaiFENesin ER (MUCINEX) tablet 1,200 mg  1,200 mg Oral Q12H  
 benzonatate (TESSALON) capsule 100 mg  100 mg Oral TID PRN  
 amLODIPine (NORVASC) tablet 10 mg  10 mg Oral DAILY  atorvastatin (LIPITOR) tablet 80 mg  80 mg Oral QPM  
 brimonidine (ALPHAGAN) 0.2 % ophthalmic solution 1 Drop  1 Drop Both Eyes BID  clonazePAM (KlonoPIN) tablet 0.5 mg  0.5 mg Oral TID  escitalopram oxalate (LEXAPRO) tablet 10 mg  10 mg Oral DAILY  fentaNYL (DURAGESIC) 25 mcg/hr patch 1 Patch  1 Patch TransDERmal Q72H  budesonide (PULMICORT) 500 mcg/2 ml nebulizer suspension  500 mcg Nebulization BID RT  
 arformoteroL (BROVANA) neb solution 15 mcg  15 mcg Nebulization BID RT  
 gabapentin (NEURONTIN) capsule 300 mg  300 mg Oral TID  isosorbide mononitrate (ISMO, MONOKET) tablet 10 mg  10 mg Oral TID  latanoprost (XALATAN) 0.005 % ophthalmic solution 1 Drop  1 Drop Both Eyes QHS  oxyCODONE IR (ROXICODONE) tablet 15 mg  15 mg Oral Q3H PRN  polyethylene glycol (MIRALAX) packet 17 g  17 g Oral DAILY  senna (SENOKOT) tablet 17.2 mg  2 Tab Oral QHS  sodium chloride (NS) flush 5-40 mL  5-40 mL IntraVENous Q8H  
 sodium chloride (NS) flush 5-40 mL  5-40 mL IntraVENous PRN  
 acetaminophen (TYLENOL) tablet 650 mg  650 mg Oral Q4H PRN  
 HYDROmorphone (PF) (DILAUDID) injection 1 mg  1 mg IntraVENous Q4H PRN  
 naloxone (NARCAN) injection 0.4 mg  0.4 mg IntraVENous PRN  
 diphenhydrAMINE (BENADRYL) injection 12.5 mg  12.5 mg IntraVENous Q4H PRN  
 ondansetron (ZOFRAN) injection 4 mg  4 mg IntraVENous Q6H PRN  
 docusate sodium (COLACE) capsule 100 mg  100 mg Oral BID  
 0.9% sodium chloride infusion  75 mL/hr IntraVENous CONTINUOUS  
 Warfarin - Pharmacy to Dose   Other Rx Dosing/Monitoring  warfarin (COUMADIN) tablet 2.5 mg  2.5 mg Oral ONCE  carboxymethylcellulose sodium (CELLUVISC) 1 % ophthalmic solution 2 Drop  2 Drop Both Eyes PRN  perflutren lipid microspheres (DEFINITY) contrast injection 2 mL  2 mL IntraVENous ONCE Review of Symptoms: 
Constitutional: positive for fatigue ENT: negative Respiratory: positive for cough or dyspnea on exertion Gastrointestinal: negative for nausea and vomiting Genitourinary: dysuria, hematuria, frequency Musculoskeletal:negative for back pain Neurological: positive for memory problems Other systems reviewed and negative except as above. Social History Socioeconomic History  Marital status:  Spouse name: Haley Malone  Number of children: 3  
 Years of education: Not on file  Highest education level: Not on file Social Needs  Financial resource strain: Somewhat hard  Food insecurity Worry: Never true Inability: Never true  Transportation needs Medical: No  
  Non-medical: No  
Tobacco Use  Smoking status: Former Smoker Packs/day: 3.00 Years: 40.00 Pack years: 120.00 Last attempt to quit: 2019 Years since quittin.2  Smokeless tobacco: Never Used Substance and Sexual Activity  Alcohol use: No  
 Drug use: No  
 Sexual activity: Not Currently Lifestyle  Physical activity Days per week: Patient refused Minutes per session: Patient refused  Stress: Only a little Relationships  Social connections Talks on phone: More than three times a week Gets together: More than three times a week Attends Gnosticism service: Never Active member of club or organization: No  
  Attends meetings of clubs or organizations: Never Relationship status:  Family History Problem Relation Age of Onset  Heart Disease Mother  Diabetes Mother  Hypertension Mother  Heart Disease Sister  Hypertension Sister  Hypertension Father  Cancer Brother   
     lung  Diabetes Brother  Heart Disease Brother  Hypertension Brother  Stroke Brother Physical Exam 
 
Visit Vitals /69 (BP 1 Location: Right arm, BP Patient Position: At rest) Pulse 79 Temp 98.3 °F (36.8 °C) Resp 20 Wt 136 lb 11 oz (62 kg) SpO2 99% BMI 22.06 kg/m² General: awake, alert, and oriented. MAEx4. No acute distress. Thin, frail. HEENT Exam:   
 Normocephalic, atraumatic. Sclera anicteric . Neck: supple Lung Exam: Not dyspneic. Respirations unlabored. O2 @ 99% 3 liters  Sat:  . Lungs:  Diminished, few rhonchi Heart Exam: PMI nondisplaced,  Rate: Rhythm: regular, 2/6 systolic murmur. No JVD,  no carotid bruits,=  
 
Abdomen Exam:   
  obese, soft, nontender. + Bowel sounds. No palpable masses. : voiding Extremities Exam:   
  Atraumatic. No edema. Vascular Exam:   
  radial, brachial, dorsalis pedis, posterior tibial, are equal and strong bilaterally Neuro exam:  No focal deficits Psy Exam: Normal affect, does not appear anxious or agitatied Recent Results (from the past 12 hour(s)) CBC WITH AUTOMATED DIFF  Collection Time: 03/20/20  1:47 AM  
 Result Value Ref Range WBC 12.5 (H) 4.1 - 11.1 K/uL  
 RBC 3.91 (L) 4.10 - 5.70 M/uL  
 HGB 11.9 (L) 12.1 - 17.0 g/dL HCT 35.1 (L) 36.6 - 50.3 % MCV 89.8 80.0 - 99.0 FL  
 MCH 30.4 26.0 - 34.0 PG  
 MCHC 33.9 30.0 - 36.5 g/dL  
 RDW 14.8 (H) 11.5 - 14.5 % PLATELET 973 250 - 652 K/uL MPV 8.8 (L) 8.9 - 12.9 FL  
 NRBC 0.0 0  WBC ABSOLUTE NRBC 0.00 0.00 - 0.01 K/uL NEUTROPHILS 91 (H) 32 - 75 % LYMPHOCYTES 2 (L) 12 - 49 % MONOCYTES 6 5 - 13 % EOSINOPHILS 0 0 - 7 % BASOPHILS 0 0 - 1 % IMMATURE GRANULOCYTES 1 (H) 0.0 - 0.5 % ABS. NEUTROPHILS 11.3 (H) 1.8 - 8.0 K/UL  
 ABS. LYMPHOCYTES 0.3 (L) 0.8 - 3.5 K/UL  
 ABS. MONOCYTES 0.8 0.0 - 1.0 K/UL  
 ABS. EOSINOPHILS 0.0 0.0 - 0.4 K/UL  
 ABS. BASOPHILS 0.0 0.0 - 0.1 K/UL  
 ABS. IMM. GRANS. 0.1 (H) 0.00 - 0.04 K/UL  
 DF SMEAR SCANNED    
 RBC COMMENTS ELYSE CELLS 
PRESENT 
    
 RBC COMMENTS SCHISTOCYTES PRESENT 
    
 WBC COMMENTS TOXIC GRANULATION    
PROTHROMBIN TIME + INR Collection Time: 03/20/20  1:47 AM  
Result Value Ref Range INR 1.3 (H) 0.9 - 1.1 Prothrombin time 13.6 (H) 9.0 - 11.1 sec METABOLIC PANEL, COMPREHENSIVE Collection Time: 03/20/20  1:47 AM  
Result Value Ref Range Sodium 131 (L) 136 - 145 mmol/L Potassium 3.8 3.5 - 5.1 mmol/L Chloride 95 (L) 97 - 108 mmol/L  
 CO2 30 21 - 32 mmol/L Anion gap 6 5 - 15 mmol/L Glucose 137 (H) 65 - 100 mg/dL BUN 22 (H) 6 - 20 MG/DL Creatinine 0.70 0.70 - 1.30 MG/DL  
 BUN/Creatinine ratio 31 (H) 12 - 20 GFR est AA >60 >60 ml/min/1.73m2 GFR est non-AA >60 >60 ml/min/1.73m2 Calcium 8.0 (L) 8.5 - 10.1 MG/DL Bilirubin, total 1.2 (H) 0.2 - 1.0 MG/DL  
 ALT (SGPT) 151 (H) 12 - 78 U/L  
 AST (SGOT) 48 (H) 15 - 37 U/L Alk. phosphatase 168 (H) 45 - 117 U/L Protein, total 6.0 (L) 6.4 - 8.2 g/dL Albumin 2.9 (L) 3.5 - 5.0 g/dL Globulin 3.1 2.0 - 4.0 g/dL A-G Ratio 0.9 (L) 1.1 - 2.2 NT-PRO BNP  
 Collection Time: 03/20/20  1:47 AM  
Result Value Ref Range NT pro- (H) <125 PG/ML  
TROPONIN I Collection Time: 03/20/20  1:47 AM  
Result Value Ref Range Troponin-I, Qt. <0.05 <0.05 ng/mL Janes Tinoco MS [facility]

## 2020-03-20 NOTE — PROGRESS NOTES
Code Status: DNR Advance Care Planning: 
 
Advance Care Planning 3/20/2020 Patient's Healthcare Decision Maker is: Legal Next of Kin Primary Decision Maker Name Trupti Pisano Primary Decision Maker Phone Number [phone] Primary Decision Maker Relationship to Patient Wife Confirm Advance Directive No  
Patient Would Like to Complete Advance Directive No  
     
  
 
Psychosocial History:  Pt and wife Sigrid Alexis have three adult children (dts Susan Scruggs and Radha Mclain, son Yoselyn Allen), nine grandchildren, and six great-grandchildren. Pt's parents and siblings are all . Pt is retired, worked in a warehouse and drove a truck. Pt is not currently active in a jeanine community but has identified prayer as a source of comfort. Oncology Patient Navigator provided pt with information in the past re: LINC (Legal Information Network for [employer]) and the '[employer]. Outpt Palliative SW identified some degree of caregiver stress/exhaustion; wife had only been sleeping when pt was sleeping, while also managing the household affairs. Son reportedly lives in the home as well and is able to provide some assistance; dtrs appear active in pt's care as well. Pt does not have AMD on file, declined to complete during 2020 Palliative clinic appt, stated at that time that his wife Sigrid Alexis (legal NOK) would be his surrogate decision maker in the event pt is unable to speak for himself. Pt currently has inpt DNR order in place, will need DDNR form completed prior to discharge. Thank you for including Palliative Medicine in the care of Mr. Francisco. Caro Martino LCSW, [facility]- 
[phone])

## 2020-03-20 NOTE — ED NOTES
TRANSFER - OUT REPORT: 
 
Verbal report given to Parish Villasenor RN(name) on [facility]  being transferred to ICU(unit) for routine progression of care Report consisted of patients Situation, Background, Assessment and  
Recommendations(SBAR). Information from the following report(s) SBAR, ED Summary and MAR was reviewed with the receiving nurse. Lines:  
Venous Access Device PORT 02/07/20 Upper chest (subclavicular area, right (Active) Subcutaneous Infusion Line 03/20/20 Right  (Active) Opportunity for questions and clarification was provided. Patient transported with: 
 Monitor O2 @ 3 liters Registered Nurse

## 2020-03-20 NOTE — CONSULTS
Palliative medicine brief note- full visit documentation pending Oncology NP Deanna Zapien and I met with the patient, his wife and their daughter. Spent time discussing patients health, including; COPD. Stage IV metastatic lung cancer and palliative immunotherapy as well  new findings of unstable C5 Column 3 fx, We spent time discussing options of care, MRI and possible high risk surgical repair vs palliative radiation and comfort focused care with Hospice. Family not wanting to pursue surgical repair of cervical spine. Patient clear that he wants to go home, family interested in palliative radiation to cervical spine. We explained that wearing the hard collar atc was recommended, patient stated that he would NOT be willing to wear it the rest of his life. We talked about risk if not wearing collar and complications of a completed C5 fx and recommended Hospice. Family declined Hospice services at this time, stated that they have been managing to take care of the patient themselves. Recommend obtaining signed DDNR prior to discharge. Palliative team will follow up with patient if still here on Monday 3/23

## 2020-03-20 NOTE — PROGRESS NOTES
[facility]: MED RECONCILIATION Comments/Recommendations:  
 
Med rec completed from discharge summary 3/11/20 and RX query data Pt was prescribed prednisone taper Filled 3/10 for 9 days Last dose would have been 20 mg 3/19 Warfarin PTA dose is 1 mg every other day and 2.5 mg every other day, INR on admission was 1.3, pharmacy is consulted to dose Of note Pt is taking Morphine ER 30 mg TID, oxycodone IR total of 20 mg q 3 h PRN and fentanyl patch 12 mcg/hr filled 3/13/2. I do not see a recent Fill history for gabapentin 300 mg TID (removed from list) Last Pembrolizumab infusion on 3/18/2020 Information obtained from: Discharge summary 3/11/2020, Don Wing Allergies: Adenosine; Iodinated contrast media; and Pcn [penicillins] Prior to Admission Medications:  
 
Prior to Admission Medications Prescriptions Last Dose Informant Patient Reported? Taking?  
0.9% sodium chloride solp 100 mL with pembrolizumab 25 mg/mL soln 3/18/2020 Other Yes No  
Si mg by IntraVENous route every twenty-one (21) days. albuterol (PROVENTIL HFA, VENTOLIN HFA, PROAIR HFA) 90 mcg/actuation inhaler  Other Yes No  
Sig: Take 2 Puffs by inhalation every six (6) hours as needed. albuterol (PROVENTIL VENTOLIN) 2.5 mg /3 mL (0.083 %) nebulizer solution  Other No No  
Sig: 3 mL by Nebulization route every four (4) hours as needed for Wheezing. amLODIPine (NORVASC) 10 mg tablet  Other Yes No  
Sig: Take 10 mg by mouth daily. atorvastatin (LIPITOR) 80 mg tablet  Other Yes No  
Sig: Take 80 mg by mouth every evening. brimonidine (ALPHAGAN) 0.2 % ophthalmic solution  Other Yes No  
Sig: Administer 1 Drop to both eyes two (2) times a day. clonazepam (KLONOPIN) 0.5 mg tablet  Other Yes No  
Sig: Take 0.5 mg by mouth three (3) times daily. escitalopram oxalate (LEXAPRO) 10 mg tablet  Other Yes No  
Sig: Take 10 mg by mouth daily. fentaNYL (DURAGESIC) 12 mcg/hr patch  Other Yes Yes Si Patch by TransDERmal route every seventy-two (72) hours. fluticasone-salmeterol (ADVAIR) 500-50 mcg/dose diskus inhaler  Other Yes No  
Sig: Take 1 Puff by inhalation every twelve (12) hours. hydroxypropyl methylcellulose (GENTEAL) 0.2 % ophthalmic solution  Other No No  
Si drops right eye every 1-2 hours while awake  
isosorbide mononitrate (ISMO, MONOKET) 10 mg tablet  Other Yes No  
Sig: Take 10 mg by mouth three (3) times daily. Patient takes at 9am, noon, and 5pm  
latanoprost (XALATAN) 0.005 % ophthalmic solution  Other Yes No  
Sig: Administer 1 Drop to both eyes nightly. morphine 30 mg TR12  Other Yes Yes Sig: Take 30 mg by mouth three (3) times daily. naloxone (Narcan) 4 mg/actuation nasal spray  Other No No  
Sig: Use 1 spray intranasally, then discard. Repeat with new spray every 2 min as needed for opioid overdose symptoms, alternating nostrils. oxyCODONE IR (OXY-IR) 15 mg immediate release tablet  Other No No  
Sig: Take 1 Tab by mouth every three (3) hours as needed for Pain for up to 15 days. Max Daily Amount: 120 mg.  
oxyCODONE IR (ROXICODONE) 5 mg immediate release tablet  Other Yes Yes Sig: Take 5 mg by mouth every three to four (3-4) hours as needed for Pain.  
polyethylene glycol (MIRALAX) 17 gram packet  Other Yes No  
Sig: Take 17 g by mouth daily. predniSONE (DELTASONE) 20 mg tablet  Other No No  
Sig: Take 3 tabs a day for 3 days, then 2 tabs a day for 3 days, then 1 tab a day for 3 days. promethazine (PHENERGAN) 12.5 mg tablet  Other Yes No  
Sig: Take 25 mg by mouth every eight (8) hours as needed. Indications: Nausea  
senna (SENNA) 8.6 mg tablet  Other Yes No  
Sig: Take 2 Tabs by mouth nightly. warfarin (COUMADIN) 1 mg tablet  Other Yes No  
Sig: Take 1 mg by mouth every other day. warfarin (COUMADIN) 5 mg tablet  Other Yes No  
Sig: Take 2.5 mg by mouth every other day. Facility-Administered Medications: None Willy Thurston Pharm. D.  
 Clinical Staff Pharmacist 
[place] POLOFELA [facility] [phone]

## 2020-03-20 NOTE — PROGRESS NOTES
Spiritual Care Assessment/Progress Note [address] 
 
 
NAME: Roosevelt Ramos      MRN: [id number] AGE: 76 y.o. SEX: male Sabianist Affiliation: Anglican  
Language: English  
 
3/20/2020     Total Time (in minutes): 7 Spiritual Assessment begun in [facility] through conversation with: 
  
    [x]Patient        [] Family    [] Friend(s) Reason for Consult: Palliative Care, Initial/Spiritual Assessment Spiritual beliefs: (Please include comment if needed) 
   [] Identifies with a jeanine tradition:     
   [] Supported by a jeanine community:        
   [] Claims no spiritual orientation:       
   [] Seeking spiritual identity:            
   [] Adheres to an individual form of spirituality:       
   [x] Not able to assess:                   
 
    
Identified resources for coping:  
   [] Prayer                           
   [] Music                  [] Guided Imagery 
   [] Family/friends                 [] Pet visits [] Devotional reading                         [x] Unknown 
   [] Other:                                          
 
 
Interventions offered during this visit: (See comments for more details) Patient Interventions: Affirmation of emotions/emotional suffering Plan of Care: 
 
 [] Support spiritual and/or cultural needs  
 [] Support AMD and/or advance care planning process    
 [] Support grieving process 
 [] Coordinate Rites and/or Rituals  
 [] Coordination with community clergy [] No spiritual needs identified at this time 
 [] Detailed Plan of Care below (See Comments)  [] Make referral to Music Therapy 
[] Make referral to Pet Therapy    
[] Make referral to Addiction services 
[] Make referral to [facility] 
[] Make referral to Spiritual Care Partner 
[] No future visits requested       
[x] Follow up visits as needed Comments:  visited Mr. Arturo De La Cruz, for a palliative care initial spiritual assessment in the ICU. Mr Elke Hood was lying in bed and appeared to be resting comfortably. No family was present at this time. Mr. Elke Hood appeared to become alert when the  came into the room, opening his eyes, and making eye contact.  introduced herself and extended support through active listening and pastoral presence. Mr. Elke Hood nodded in acknowledgement of the 's presence and thanked the  for stopping by. He requested to sleep at this time. Advised of 's availability.  will follow up as able and/or needed Conchita Chahal. Hanna Lyman.  Paging Service: [phone])

## 2020-03-20 NOTE — PROGRESS NOTES
Dr Alfredo Chris made aware that patients heart rate continues to run in the 80's and that cardizem drip was placed on hold in the ER, patient has been SR with occasional PACs Dr Alfredo Chris also clarified that Ortho Surg could be consulted in the AM when all other consults were called Patient resting/sleeping in bed, reports no pain or discomfort or needs at this time and vitals within parameter Pharmacy notified about meds due at 0600 and not verified for RN to administer, pharmacy stated that they would be reconciling medications this AM

## 2020-03-20 NOTE — PROGRESS NOTES
Physical Therapy: 
Orders recieved and chart reviewed. Patient currently admitted with hypoxia and dyspnea. History of lung cancer with bone mets. He now has new cervical fractures that are unstable. Has an orthopedic consult pending. Due to the instability and acute nature of his condition he is not appropriate for PT at this time. We will complete the order. Thank you.  
Be Church PT,DPT,NCS

## 2020-03-20 NOTE — PROGRESS NOTES
[facility] Dosing Services: 03/20/20 Consult for Warfarin Dosing by Pharmacy by Dr. Sandy Coleman provided for this 76 y.o.  male , for indication of AVR, atrial fibrillation Day of Therapy: Resumed from PTA medication list 
Home Regimen: Med rec not completed at this admission yet, but most recent admission patient was taking warfarin 2.5 mg alternating every other day with 1 mg Dose to achieve an INR goal of 2.5- 3.5 Assessment/Plan: H/H and platelets stable, no documentation of any bleeding events in progress notes Pending med rec, unclear when last dose of warfarin was administered Today's INR of 1.3 is sub-therapeutic for indication, no bridging per MD as patient may benefit from hospice Will order warfarin 2.5 mg PO x 1 dose tonight at 1800 Will order INR daily with AM labs per protocol Significant drug interactions: None identified Previous dose given Unknown PT/INR Lab Results Component Value Date/Time INR 1.3 (H) 03/20/2020 01:47 AM  
  
Platelets Lab Results Component Value Date/Time PLATELET 400 22/47/7337 01:47 AM  
  
H/H Lab Results Component Value Date/Time HGB 11.9 (L) 03/20/2020 01:47 AM  
  
 
Pharmacy to follow daily and will provide subsequent Warfarin dosing based on clinical status. Floridalma Chery, [address] [place], [place] [address])  Contact information [phone]

## 2020-03-20 NOTE — CONSULTS
[facility] Initial Patient Consult Name: Aakash Bundy MRN: [id number] : 1945 Hospital: [facility] Date: 3/20/2020 IMPRESSION:  
· COPD with acute exacerbation · Stage IV lung ca · Confusion:  Pt on home benzos · Cervical spine fx as described below RECOMMENDATIONS:  
· freq scheduled nebs · IV solumedrol · Supple oxygen as needed · Sitter now in room w pt · Will try prn haldol for agitation · Pt is DNR Subjective: This patient has been seen and evaluated at the request of Dr. Agus Shahid for SOB and COPD. Patient is a 76 y.o. male who presented with complaints of sob. Pt with COPD and stage 4 NSCCa of the lung. On CT, pt found to have  C5 column fx and C4 and C6 spinous process fractures and is being evaluated by Ortho. Pt is a poor historian. Has some confusion and is having impulsive behavior . Pt will answer some simple questions. Notes some sob. + cough and chest congestion. Will cough to command and is bringing up moderate white thick secretions. PFT in 2017 with FEV1 0.8 (34%pred); FVC 2.3 (71%pred); FEV1% 36%. Pt is a former smoker Past Medical History:  
Diagnosis Date  Asthma  CAD (coronary artery disease)  Cancer ([facility] [address] \"10 years ago\" throat  COPD [facility]chronic obstructive pulmonary disease) ([facility])  CVA (cerebral vascular accident) ([facility] [address]  H/O mechanical aortic valve replacement CT surgery  - CABG (LIMA to LAD) and AVR ([facility])  Hypertension  S/P CABG x 1   
 CT surgery  - CABG (LIMA to LAD) and AVR ([facility]) Past Surgical History:  
Procedure Laterality Date  CARDIAC SURG PROCEDURE UNLIST  HX HEART VALVE SURGERY    
 CT surgery  - CABG (LIMA to LAD) and AVR ([facility])  HX HEMORRHOIDECTOMY  IR INSERT TUNL CVC W PORT OVER 5 YEARS  2020 Prior to Admission medications Medication Sig Start Date End Date Taking? Authorizing Provider  
morphine 30 mg TR12 Take 30 mg by mouth three (3) times daily. Yes Provider, Historical  
oxyCODONE IR (ROXICODONE) 5 mg immediate release tablet Take 5 mg by mouth every three to four (3-4) hours as needed for Pain. Yes Provider, Historical  
fentaNYL (DURAGESIC) 12 mcg/hr patch 1 Patch by TransDERmal route every seventy-two (72) hours. Yes Provider, Historical  
oxyCODONE IR (OXY-IR) 15 mg immediate release tablet Take 1 Tab by mouth every three (3) hours as needed for Pain for up to 15 days. Max Daily Amount: 120 mg. 3/18/20 4/2/20  Vani Roberts MD  
naloxone (Narcan) 4 mg/actuation nasal spray Use 1 spray intranasally, then discard. Repeat with new spray every 2 min as needed for opioid overdose symptoms, alternating nostrils. 3/18/20   Vani Roberts MD  
predniSONE (DELTASONE) 20 mg tablet Take 3 tabs a day for 3 days, then 2 tabs a day for 3 days, then 1 tab a day for 3 days. 3/10/20   Rasta Reid MD  
polyethylene glycol [facility]) 17 gram packet Take 17 g by mouth daily. Provider, Historical  
senna (SENNA) 8.6 mg tablet Take 2 Tabs by mouth nightly. Provider, Historical  
brimonidine (ALPHAGAN) 0.2 % ophthalmic solution Administer 1 Drop to both eyes two (2) times a day. Provider, Historical  
0.9% sodium chloride solp 100 mL with pembrolizumab 25 mg/mL soln 200 mg by IntraVENous route every twenty-one (21) days. Provider, Historical  
warfarin (COUMADIN) 1 mg tablet Take 1 mg by mouth every other day. Provider, Historical  
hydroxypropyl methylcellulose (GENTEAL) 0.2 % ophthalmic solution 2 drops right eye every 1-2 hours while awake 11/17/19   Scooby Lopez MD  
latanoprost (XALATAN) 0.005 % ophthalmic solution Administer 1 Drop to both eyes nightly. Provider, Historical  
amLODIPine (NORVASC) 10 mg tablet Take 10 mg by mouth daily.     Provider, Historical  
 albuterol (PROVENTIL VENTOLIN) 2.5 mg /3 mL (0.083 %) nebulizer solution 3 mL by Nebulization route every four (4) hours as needed for Wheezing. 3/17/19   Mannie Ramos MD  
promethazine (PHENERGAN) 12.5 mg tablet Take 25 mg by mouth every eight (8) hours as needed. Indications: Nausea    Provider, Historical  
atorvastatin (LIPITOR) 80 mg tablet Take 80 mg by mouth every evening. Provider, Historical  
warfarin (COUMADIN) 5 mg tablet Take 2.5 mg by mouth every other day. Provider, Historical  
albuterol (PROVENTIL HFA, VENTOLIN HFA, PROAIR HFA) 90 mcg/actuation inhaler Take 2 Puffs by inhalation every six (6) hours as needed. Other, MD Kallie  
fluticasone-salmeterol (ADVAIR) 500-50 mcg/dose diskus inhaler Take 1 Puff by inhalation every twelve (12) hours. Other, MD Kallie  
clonazepam (KLONOPIN) 0.5 mg tablet Take 0.5 mg by mouth three (3) times daily. 10/14/14   Kallie Bautista MD  
escitalopram oxalate (LEXAPRO) 10 mg tablet Take 10 mg by mouth daily. 10/21/14   Kallie Bautista MD  
isosorbide mononitrate (ISMO, MONOKET) 10 mg tablet Take 10 mg by mouth three (3) times daily. Patient takes at [address] [place] [address], noon, and 5pm 10/21/14   Kallie Bautista MD  
 
Allergies Allergen Reactions  Adenosine Shortness of Breath Tolerates well when given for SVT, per Dr. Kushal Schulz. Given on 3/8/2020  Iodinated Contrast Media Shortness of Breath  Pcn [Penicillins] Shortness of Breath Patient reports to have tolerated a 10 day course of cefdinir started 17 Social History Tobacco Use  Smoking status: Former Smoker Packs/day: 3.00 Years: 40.00 Pack years: 120.00 Last attempt to quit: 2019 Years since quittin.2  Smokeless tobacco: Never Used Substance Use Topics  Alcohol use: No  
  
Family History Problem Relation Age of Onset  Heart Disease Mother  Diabetes Mother  Hypertension Mother  Heart Disease Sister  Hypertension Sister  Hypertension Father  Cancer Brother   
     lung  Diabetes Brother  Heart Disease Brother  Hypertension Brother  Stroke Brother Current Facility-Administered Medications Medication Dose Route Frequency  dilTIAZem (CARDIZEM) 100 mg in 0.9% sodium chloride (MBP/ADV) 100 mL infusion  0-15 mg/hr IntraVENous TITRATE  albuterol-ipratropium (DUO-NEB) 2.5 MG-0.5 MG/3 ML  3 mL Nebulization Q6H RT  
 methylPREDNISolone (PF) (SOLU-MEDROL) injection 40 mg  40 mg IntraVENous Q8H  
 guaiFENesin ER (MUCINEX) tablet 1,200 mg  1,200 mg Oral Q12H  
 atorvastatin (LIPITOR) tablet 80 mg  80 mg Oral QPM  
 brimonidine (ALPHAGAN) 0.2 % ophthalmic solution 1 Drop  1 Drop Both Eyes BID  clonazePAM (KlonoPIN) tablet 0.5 mg  0.5 mg Oral TID  escitalopram oxalate (LEXAPRO) tablet 10 mg  10 mg Oral DAILY  fentaNYL (DURAGESIC) 25 mcg/hr patch 1 Patch  1 Patch TransDERmal Q72H  budesonide (PULMICORT) 500 mcg/2 ml nebulizer suspension  500 mcg Nebulization BID RT  
 arformoteroL (BROVANA) neb solution 15 mcg  15 mcg Nebulization BID RT  
 gabapentin (NEURONTIN) capsule 300 mg  300 mg Oral TID  isosorbide mononitrate (ISMO, MONOKET) tablet 10 mg  10 mg Oral TID  latanoprost (XALATAN) 0.005 % ophthalmic solution 1 Drop  1 Drop Both Eyes QHS  polyethylene glycol (MIRALAX) packet 17 g  17 g Oral DAILY  senna (SENOKOT) tablet 17.2 mg  2 Tab Oral QHS  sodium chloride (NS) flush 5-40 mL  5-40 mL IntraVENous Q8H  
 docusate sodium (COLACE) capsule 100 mg  100 mg Oral BID  
 0.9% sodium chloride infusion  75 mL/hr IntraVENous CONTINUOUS  
 Warfarin - Pharmacy to Dose   Other Rx Dosing/Monitoring  warfarin (COUMADIN) tablet 2.5 mg  2.5 mg Oral ONCE  
 dilTIAZem CD (CARDIZEM CD) capsule 240 mg  240 mg Oral DAILY Review of Systems: 
Review of systems not obtained due to patient factors. Objective:  
Vital Signs:   
Visit Vitals /87 Pulse 79 Temp 98.3 °F (36.8 °C) Resp 20 Ht 5' 6\" (1.676 m) Wt 61.7 kg (136 lb) SpO2 99% BMI 21.95 kg/m² O2 Device: Nasal cannula O2 Flow Rate (L/min): 3 l/min Temp (24hrs), Av °F (36.7 °C), Min:97.8 °F (36.6 °C), Max:98.3 °F (36.8 °C) Intake/Output:  
Last shift:      No intake/output data recorded. Last 3 shifts: No intake/output data recorded. No intake or output data in the 24 hours ending 20 1207 Physical Exam:  
General:  Alert, confused, occasionally with some agitation Head:  Normocephalic, without obvious abnormality, atraumatic. Eyes:  Conjunctivae/corneas clear. PERRL, EOMs intact. Nose: Nares normal. Septum midline. Mucosa normal. No drainage or sinus tenderness. Throat: Lips, mucosa, and tongue normal. Teeth and gums normal.  
Neck: Supple, symmetrical, trachea midline, no adenopathy, thyroid: no enlargment/tenderness/nodules, no carotid bruit and no JVD. Back:   Symmetric, no curvature. ROM normal.  
Lungs:   Dist bs, mod coarse bs and rhonchi, improves with cough Chest wall:  No tenderness or deformity. Heart:  Regular rate and rhythm, S1, S2 normal, no murmur, click, rub or gallop. Abdomen:   Soft, non-tender. Bowel sounds normal. No masses,  No organomegaly. Extremities: Extremities normal, atraumatic, no cyanosis or edema. Pulses: 2+ and symmetric all extremities. Skin: Skin color, texture, turgor normal. No rashes or lesions Lymph nodes: Cervical, supraclavicular, and axillary nodes normal.  
Neurologic: confusion Data review:  
 
Recent Results (from the past 24 hour(s)) EKG, 12 LEAD, SUBSEQUENT Collection Time: 20  1:26 AM  
Result Value Ref Range Ventricular Rate 158 BPM  
 Atrial Rate 125 BPM  
 QRS Duration 102 ms Q-T Interval 304 ms QTC Calculation (Bezet) 492 ms Calculated R Axis 25 degrees Calculated T Axis 109 degrees Diagnosis Atrial fibrillation with rapid ventricular response Voltage criteria for left ventricular hypertrophy ST elevation, consider early repolarization, pericarditis, or injury Marked ST abnormality, possible inferior subendocardial injury Abnormal ECG Confirmed by [id number], Percy ARCOS, [place] ([id number]) on 3/20/2020 12:04:43 PM 
  
CBC WITH AUTOMATED DIFF Collection Time: 03/20/20  1:47 AM  
Result Value Ref Range WBC 12.5 (H) 4.1 - 11.1 K/uL  
 RBC 3.91 (L) 4.10 - 5.70 M/uL  
 HGB 11.9 (L) 12.1 - 17.0 g/dL HCT 35.1 (L) 36.6 - 50.3 % MCV 89.8 80.0 - 99.0 FL  
 MCH 30.4 26.0 - 34.0 PG  
 MCHC 33.9 30.0 - 36.5 g/dL  
 RDW 14.8 (H) 11.5 - 14.5 % PLATELET 546 900 - 447 K/uL MPV 8.8 (L) 8.9 - 12.9 FL  
 NRBC 0.0 0  WBC ABSOLUTE NRBC 0.00 0.00 - 0.01 K/uL NEUTROPHILS 91 (H) 32 - 75 % LYMPHOCYTES 2 (L) 12 - 49 % MONOCYTES 6 5 - 13 % EOSINOPHILS 0 0 - 7 % BASOPHILS 0 0 - 1 % IMMATURE GRANULOCYTES 1 (H) 0.0 - 0.5 % ABS. NEUTROPHILS 11.3 (H) 1.8 - 8.0 K/UL  
 ABS. LYMPHOCYTES 0.3 (L) 0.8 - 3.5 K/UL  
 ABS. MONOCYTES 0.8 0.0 - 1.0 K/UL  
 ABS. EOSINOPHILS 0.0 0.0 - 0.4 K/UL  
 ABS. BASOPHILS 0.0 0.0 - 0.1 K/UL  
 ABS. IMM. GRANS. 0.1 (H) 0.00 - 0.04 K/UL  
 DF SMEAR SCANNED    
 RBC COMMENTS ELYSE CELLS 
PRESENT 
    
 RBC COMMENTS SCHISTOCYTES PRESENT 
    
 WBC COMMENTS TOXIC GRANULATION    
PROTHROMBIN TIME + INR Collection Time: 03/20/20  1:47 AM  
Result Value Ref Range INR 1.3 (H) 0.9 - 1.1 Prothrombin time 13.6 (H) 9.0 - 11.1 sec METABOLIC PANEL, COMPREHENSIVE Collection Time: 03/20/20  1:47 AM  
Result Value Ref Range Sodium 131 (L) 136 - 145 mmol/L Potassium 3.8 3.5 - 5.1 mmol/L Chloride 95 (L) 97 - 108 mmol/L  
 CO2 30 21 - 32 mmol/L Anion gap 6 5 - 15 mmol/L Glucose 137 (H) 65 - 100 mg/dL BUN 22 (H) 6 - 20 MG/DL Creatinine 0.70 0.70 - 1.30 MG/DL  
 BUN/Creatinine ratio 31 (H) 12 - 20 GFR est AA >60 >60 ml/min/1.73m2 GFR est non-AA >60 >60 ml/min/1.73m2 Calcium 8.0 (L) 8.5 - 10.1 MG/DL Bilirubin, total 1.2 (H) 0.2 - 1.0 MG/DL  
 ALT (SGPT) 151 (H) 12 - 78 U/L  
 AST (SGOT) 48 (H) 15 - 37 U/L Alk. phosphatase 168 (H) 45 - 117 U/L Protein, total 6.0 (L) 6.4 - 8.2 g/dL Albumin 2.9 (L) 3.5 - 5.0 g/dL Globulin 3.1 2.0 - 4.0 g/dL A-G Ratio 0.9 (L) 1.1 - 2.2 NT-PRO BNP Collection Time: 03/20/20  1:47 AM  
Result Value Ref Range NT pro- (H) <125 PG/ML  
TROPONIN I Collection Time: 03/20/20  1:47 AM  
Result Value Ref Range Troponin-I, Qt. <0.05 <0.05 ng/mL ECHO ADULT COMPLETE Collection Time: 03/20/20  9:09 AM  
Result Value Ref Range LA Volume 45.97 18 - 58 mL Ao Root D 3.29 cm Aortic Valve Systolic Peak Velocity 971.00 cm/s AoV VTI 64.53 cm Aortic Valve Area by Continuity of Peak Velocity 0.8 cm2 Aortic Valve Area by Continuity of VTI 0.9 cm2 AoV PG 44.5 mmHg LVIDd 3.50 (A) 4.2 - 5.9 cm  
 LVPWd 1.07 (A) 0.6 - 1.0 cm LVIDs 2.44 cm IVSd 1.53 (A) 0.6 - 1.0 cm  
 LVOT d 1.92 cm  
 LVOT Peak Velocity 96.71 cm/s LVOT Peak Gradient 3.7 mmHg LVOT VTI 20.39 cm  
 MV A Marcelo 126.56 cm/s  
 MV E Marcelo 96.81 cm/s  
 MV E/A 0.76 Left Atrium to Aortic Root Ratio 0.98   
 RVIDd 2.91 cm Aortic Valve Systolic Mean Gradient 87.9 mmHg LA Vol 4C 40.36 18 - 58 mL  
 LA Vol 2C 41.99 18 - 58 mL  
 LV Mass .0 88 - 224 g LV Mass AL Index 104.8 49 - 115 g/m2 RVSP 29.3 mmHg Est. RA Pressure 3.0 mmHg Mitral Regurgitant Peak Velocity 329.25 cm/s Mitral Valve E Wave Deceleration Time 223.7 ms Left Atrium Major Axis 3.21 cm Triscuspid Valve Regurgitation Peak Gradient 26.3 mmHg Pulmonic Valve Max Velocity 119.55 cm/s LVOT SV 59.2 ml  
 TR Max Velocity 256.57 cm/s  
 LA Vol Index 27.08 16 - 28 ml/m2 PASP 29.3 mmHg LA Vol Index 24.73 16 - 28 ml/m2 LA Vol Index 23.77 16 - 28 ml/m2 MR Peak Gradient 43.4 mmHg Left Ventricular Fractional Shortening by 2D 50.686143876 % Left Ventricular Outflow Tract Mean Gradient 2.3407135923 mmHg Left Ventricular Outflow Tract Mean Velocity 8.7396230469 cm/s PV End Diastolic Velocity 1.2 mmHg Mitral Valve Deceleration [place] 3.5971765664 AV Velocity Ratio 0.29 AV VTI Ratio 0.3 Aortic valve mean velocity 2.4511987304 m/s Pulmonary Artery Mean Presure 15.8 mmHg PI End Diastolic Pressure 9.1 mmHg PV peak gradient 5.7 mmHg Imaging: 
I have personally reviewed the patients radiographs and have reviewed the reports: 
CT reviewed Kirsten River MD

## 2020-03-20 NOTE — PROGRESS NOTES
Bedside and Verbal shift change report given to Derek Cardona (oncoming nurse) by Annika Suarez RN (offgoing nurse). Report included the following information SBAR, Kardex, ED Summary, Intake/Output, Recent Results, Med Rec Status and Cardiac Rhythm NSR. 
 
1930: Pt awake, alert to self, denies pain. Pt wife and daughter at pt bedside refusing to leave until Pt DNR status is changed to Full code. 1958: Notified dr. Walden Come of pt family request. Dr. Claudia Aldridge be at pt bedside as soon as possible. 2004: Dr. Walden Come at pt bedside. Pt requested to have is code status changed to Full Code. 2130: Pt transported to CT and radiology with RN. 2240: Pt returned to Room; tolerated Radiology scans well; denies pain. 0630: Pt resting intermittently throughout the night, pulling on lines, trying to take cervical collar off; sitter at bedside to help keep pt calm and safe. Bedside and Verbal shift change report given to Annika Suarez RN (oncoming nurse) by Derek Cardona (offgoing nurse).  Report included the following information SBAR, Kardex, ED Summary, Intake/Output, Recent Results, Med Rec Status and Cardiac Rhythm SA.

## 2020-03-20 NOTE — PROGRESS NOTES
Speech pathology note Reviewed chart and discussed case with RN and ortho PA. Patient has new cervical fractures that are unstable despite cervical collar. Although SLP was cleared to [facility] for swallowing evaluation by ortho PA, given unstable cervical fractures, stage 4 lung cancer with spinal mets on palliative chemo, and MD recommendation for hospice with palliative consulted, SLP will hold swallowing evaluation at this time. Will follow up as medically indicated after palliative consult. Thank you. Myrtle Espino., CCC-SLP

## 2020-03-20 NOTE — CONSULTS
Palliative Medicine Consult Rg: [phone] ([phone]) Patient Name: Serenity Oliver YOB: 1945 Date of Initial Consult: 3/20/2020 Reason for Consult: [address] Discussion Requesting Provider: Dr. Jensen Churchill 
Primary Care Physician: Milan Cuba MD 
 
 SUMMARY:  
Serenity Oliver is a 76 y. o. with a past history of COPD, Stage IV NSCC Lung Ca with mets receiving palliative immunotherapy (completed 3 cycles), CAD, prosthetic  AV, HTN, CABG, who was admitted on 3/20/2020 from Home with a diagnosis of Acute on Chronic hypoxic respiratory failure, Afib w/ RVR abd unstable cervical spine fracture. Current medical issues leading to Palliative Medicine involvement include: GOC discussion in setting of 75 yo with stage IV metastatic lung cancer w/ interval progression, including a new unstable asymptomatic pathological C5 Column 3 fracture, and  New T7 and L1 compression fx's. Patient presented to Er w/ cc of SOB x 2 days. In ER O2 87% on 2L, , in afib. WBC 16.2, Na 130, albumin 3.1. Initial CXR found no acute pulmonary process. Imaging of chest w/ incidental finding of unstable C5, 3 column  Fracture. Mr. Rommel Servin sees Dr. Valery Moy with out patient palliative medicine clinic Course of Hospitalization: Ortho consulted, patient would need MRI of cervical and thoracic spine with and without contrast, repair of fx High risk, Oncology following, indicated all previous treatments palliative, next option chemo, but not la good candidate due to poor performance Baseline cognitive and functional status: Home O2 2 Liters, ambulated with rollator, wife indicated to staff some confusion. PALLIATIVE DIAGNOSES:  
1. Advanced care planning discussion 2. Goals of care discussion 3. DNR discussion 4. Debility PLAN:  
1.  Prior to visit I completed chart review, including documentation from out patient palliative medicine team.  
 2. Oncology NP Norberto Pelaez and I met with the patient, introduced ourselves. 3. Assessed patients understanding of hospitalization, he was able to tell us he came in for chest pain and sob, however he denied having neck fx. Spent time attempting to educate patient about pathological fx, however patient continued to deny problem with neck. 4. Patient agreed he wanted to discuss GOc with wife present. I called wife scheduled meeting for later in the day. Norberto Pelaez made arrangements to have hospital allow patient to have visitors. 5. We returned later to meet with patient, his wife and daughter. We spent time assessing their understanding of patients health and hospitalization, including stage IV lung ca with mets, palliative nature of immunotherapy and new findings of unstable C5 column 3 fx. 6. We shared information regarding Orthos assessment. Family indicated they would not likely  pursue surgery due to risks. Patient repeatedly told us that he would not wear hard neck brace continuously. We  Told him the risk of paralysis or even death if neck fx completed, 
7. We talked about possibility of palliative radiation to neck for symptom management, we also spoke about the appropriateness for Hospice, offered to arrange Hospice information only session, however daughter declined, stated that family has been caring for patient at home, they did not need assistance. We attempted to explore the multiple benefits of Hospice, however family not open at the time. 8. GOC Discussion- Home with family. Want to be evaluated for radiation to neck. 9. Advanced care planning discussion- No AMD, patient stated wife would be health care decision maker. 10. DNR discussion- patient has a DNR code status, need DDNR signed prior to discharge. 11. Initial consult note routed to primary continuity provider and/or primary health care team members 12.  Communicated plan of care with: Palliative IDTMarlena 192 Team 
 
 GOALS OF CARE / TREATMENT PREFERENCES:  
 
GOALS OF CARE: 
Patient/Health Care Proxy Stated Goals: Prolong life TREATMENT PREFERENCES:  
Code Status: Full Code Advance Care Planning: 
[x] [facility] Interdisciplinary Team has updated the ACP Navigator with [place] and Patient Capacity Primary Decision Maker: Мария Cap - Spouse - [phone] Advance Care Planning 3/20/2020 Patient's Healthcare Decision Maker is: Legal Next of Kin Primary Decision Maker Name - Secondary Decision Maker Name - Secondary Decision Maker Phone Number - Secondary Decision Maker Relationship to Patient -  
Confirm Advance Directive None Patient Would Like to Complete Advance Directive No  
 
 
Medical Interventions: Limited additional interventions Other Instructions: Other: As far as possible, the palliative care team has discussed with patient / health care proxy about goals of care / treatment preferences for patient. HISTORY:  
 
History obtained from: medical records/ patient/family CHIEF COMPLAINT: want to get home HPI/SUBJECTIVE: The patient is:  
[x] Verbal and participatory [] Non-participatory due to:  
Patient denied pain, other than uncomfortable neck brace, not happy about brace Clinical Pain Assessment (nonverbal scale for severity on nonverbal patients):  
Clinical Pain Assessment Severity: 0 Duration: for how long has pt been experiencing pain (e.g., 2 days, 1 month, years) Frequency: how often pain is an issue (e.g., several times per day, once every few days, constant) FUNCTIONAL ASSESSMENT:  
 
Palliative Performance Scale (PPS): PPS: 40 PSYCHOSOCIAL/SPIRITUAL SCREENING:  
 
Palliative IDT has assessed this patient for cultural preferences / practices and a referral made as appropriate to needs (Cultural Services, Patient Advocacy, Ethics, etc.) Any spiritual / Pentecostalism concerns: 
[] Yes /  [x] No 
 
 Caregiver Burnout: 
[] Yes /  [x] No /  [] No Caregiver Present Anticipatory grief assessment:  
[x] Normal  / [] Maladaptive ESAS Anxiety: Anxiety: 2 ESAS Depression:    
 
 
 REVIEW OF SYSTEMS:  
 
Positive and pertinent negative findings in ROS are noted above in HPI. The following systems were [x] reviewed / [] unable to be reviewed as noted in HPI Other findings are noted below. Systems: constitutional, ears/nose/mouth/throat, respiratory, gastrointestinal, genitourinary, musculoskeletal, integumentary, neurologic, psychiatric, endocrine. Positive findings noted below. Modified ESAS Completed by: provider Fatigue: 6 Drowsiness: 1 Pain: 0 Anxiety: 2 Anorexia: 0 Dyspnea: 0 Stool Occurrence(s): 1 PHYSICAL EXAM:  
 
From RN flowsheet: 
Wt Readings from Last 3 Encounters:  
03/21/20 125 lb (56.7 kg) 03/18/20 135 lb (61.2 kg) 03/10/20 135 lb 9.3 oz (61.5 kg) Blood pressure 138/75, pulse 65, temperature 98.1 °F (36.7 °C), resp. rate 20, height 5' 6\" (1.676 m), weight 125 lb (56.7 kg), SpO2 100 %. Pain Scale 1: Numeric (0 - 10) Pain Intensity 1: 5 Pain Location 1: Shoulder, Neck Pain Orientation 1: Left Pain Description 1: Aching Pain Intervention(s) 1: Medication (see MAR) Last bowel movement, if known:  
 
Constitutional: elderly, weak, restless Eyes: pupils equal, anicteric ENMT: no nasal discharge, moist mucous membranes Cardiovascular: regular rhythm, distal pulses intact Respiratory: breathing not labored, symmetric Gastrointestinal: soft non-tender, +bowel sounds Musculoskeletal: no deformity, no tenderness to palpation Skin: warm, dry Neurologic: following commands, moving all extremities Psychiatric: restricted affect, no hallucinations Other: 
 
 
 HISTORY:  
 
Principal Problem: 
  Acute and chronic respiratory failure with hypoxia ([facility] (3/20/2020) Active Problems: COPD with acute exacerbation ([facility] [facility] (10/22/2014) S/P MVR (mitral valve replacement) (10/22/2014) Depression (10/22/2014) Aortic stenosis (2016) S/P CABG x 1 (2017) Overview: CT surgery  - CABG (LIMA to LAD) and AVR ([facility]) S/P AVR (aortic valve replacement) (2017) CAD (coronary artery disease) (2017) Lung cancer metastatic to bone ([address] (2020) PAF (paroxysmal atrial fibrillation) ([address] (3/20/2020) Multiple fractures of cervical spine ([address] (3/20/2020) Hx of completed stroke (3/21/2020) Anemia (3/21/2020) Leukocytosis (3/21/2020) Chronic anticoagulation (3/21/2020) Past Medical History:  
Diagnosis Date  Asthma  CAD (coronary artery disease)  Cancer ([facility] [address] \"10 years ago\" throat  COPD (chronic obstructive pulmonary disease) (HCC)  CVA (cerebral vascular accident) ([address]  H/O mechanical aortic valve replacement CT surgery  - CABG (LIMA to LAD) and AVR ([facility])  Hypertension  S/P CABG x 1   
 CT surgery  - CABG (LIMA to LAD) and AVR ([facility]) Past Surgical History:  
Procedure Laterality Date  CARDIAC SURG PROCEDURE UNLIST  HX HEART VALVE SURGERY    
 CT surgery  - CABG (LIMA to LAD) and AVR ([facility])  HX HEMORRHOIDECTOMY  IR INSERT TUNL CVC W PORT OVER 5 YEARS  2020 Family History Problem Relation Age of Onset  Heart Disease Mother  Diabetes Mother  Hypertension Mother  Heart Disease Sister  Hypertension Sister  Hypertension Father  Cancer Brother   
     lung  Diabetes Brother  Heart Disease Brother  Hypertension Brother  Stroke Brother History reviewed, no pertinent family history. Social History Tobacco Use  Smoking status: Former Smoker Packs/day: 3.00 Years: 40.00 Pack years: 120.00 Last attempt to quit: 2019 Years since quittin.2  Smokeless tobacco: Never Used Substance Use Topics  Alcohol use: No  
 
Allergies Allergen Reactions  Adenosine Shortness of Breath Tolerates well when given for SVT, per Dr. India Renteria. Given on 3/8/2020  Iodinated Contrast Media Shortness of Breath  Pcn [Penicillins] Shortness of Breath Patient reports to have tolerated a 10 day course of cefdinir started 8/24/17 Current Facility-Administered Medications Medication Dose Route Frequency  albuterol-ipratropium (DUO-NEB) 2.5 MG-0.5 MG/3 ML  3 mL Nebulization Q6H RT  
 methylPREDNISolone (PF) (SOLU-MEDROL) injection 40 mg  40 mg IntraVENous Q8H  
 guaiFENesin ER (MUCINEX) tablet 1,200 mg  1,200 mg Oral Q12H  
 benzonatate (TESSALON) capsule 100 mg  100 mg Oral TID PRN  
 atorvastatin (LIPITOR) tablet 80 mg  80 mg Oral QPM  
 brimonidine (ALPHAGAN) 0.2 % ophthalmic solution 1 Drop  1 Drop Both Eyes BID  clonazePAM (KlonoPIN) tablet 0.5 mg  0.5 mg Oral TID  escitalopram oxalate (LEXAPRO) tablet 10 mg  10 mg Oral DAILY  budesonide (PULMICORT) 500 mcg/2 ml nebulizer suspension  500 mcg Nebulization BID RT  
 arformoteroL (BROVANA) neb solution 15 mcg  15 mcg Nebulization BID RT  
 isosorbide mononitrate (ISMO, MONOKET) tablet 10 mg  10 mg Oral TID  latanoprost (XALATAN) 0.005 % ophthalmic solution 1 Drop  1 Drop Both Eyes QHS  oxyCODONE IR (ROXICODONE) tablet 15 mg  15 mg Oral Q3H PRN  polyethylene glycol (MIRALAX) packet 17 g  17 g Oral DAILY  senna (SENOKOT) tablet 17.2 mg  2 Tab Oral QHS  sodium chloride (NS) flush 5-40 mL  5-40 mL IntraVENous Q8H  
 sodium chloride (NS) flush 5-40 mL  5-40 mL IntraVENous PRN  
 acetaminophen (TYLENOL) tablet 650 mg  650 mg Oral Q4H PRN  
 HYDROmorphone (PF) (DILAUDID) injection 1 mg  1 mg IntraVENous Q4H PRN  
 naloxone (NARCAN) injection 0.4 mg  0.4 mg IntraVENous PRN  
 diphenhydrAMINE (BENADRYL) injection 12.5 mg  12.5 mg IntraVENous Q4H PRN  
 ondansetron (ZOFRAN) injection 4 mg  4 mg IntraVENous Q6H PRN  
  docusate sodium (COLACE) capsule 100 mg  100 mg Oral BID  
 0.9% sodium chloride infusion  75 mL/hr IntraVENous CONTINUOUS  
 Warfarin - Pharmacy to Dose   Other Rx Dosing/Monitoring  carboxymethylcellulose sodium (CELLUVISC) 1 % ophthalmic solution 2 Drop  2 Drop Both Eyes PRN  
 dilTIAZem CD (CARDIZEM CD) capsule 240 mg  240 mg Oral DAILY  
 
 
 
 LAB AND IMAGING FINDINGS:  
 
Lab Results Component Value Date/Time WBC 12.6 (H) 03/21/2020 04:41 AM  
 HGB 10.2 (L) 03/21/2020 04:41 AM  
 PLATELET 977 75/45/7795 04:41 AM  
 
Lab Results Component Value Date/Time Sodium 133 (L) 03/21/2020 04:41 AM  
 Potassium 3.9 03/21/2020 04:41 AM  
 Chloride 101 03/21/2020 04:41 AM  
 CO2 28 03/21/2020 04:41 AM  
 BUN 16 03/21/2020 04:41 AM  
 Creatinine 0.61 (L) 03/21/2020 04:41 AM  
 Calcium 8.0 (L) 03/21/2020 04:41 AM  
 Magnesium 2.3 03/21/2020 04:41 AM  
 Phosphorus 1.9 (L) 03/21/2020 04:41 AM  
  
Lab Results Component Value Date/Time AST (SGOT) 40 (H) 03/21/2020 04:41 AM  
 Alk. phosphatase 131 (H) 03/21/2020 04:41 AM  
 Protein, total 5.3 (L) 03/21/2020 04:41 AM  
 Albumin 2.4 (L) 03/21/2020 04:41 AM  
 Globulin 2.9 03/21/2020 04:41 AM  
 
Lab Results Component Value Date/Time INR 2.3 (H) 03/22/2020 02:50 AM  
 Prothrombin time 22.7 (H) 03/22/2020 02:50 AM  
 aPTT 26.5 11/03/2019 06:57 PM  
  
Lab Results Component Value Date/Time Iron 27 (L) 03/09/2020 09:09 AM  
 TIBC 240 (L) 03/09/2020 09:09 AM  
 Iron % saturation 11 (L) 03/09/2020 09:09 AM  
 Ferritin 362 03/09/2020 09:09 AM  
  
Lab Results Component Value Date/Time pH 7.36 03/08/2020 06:10 AM  
 PCO2 46 (H) 03/08/2020 06:10 AM  
 PO2 60 (L) 03/08/2020 06:10 AM  
 
No components found for: [place] Lab Results Component Value Date/Time CK 71 07/15/2010 03:15 AM  
 CK - MB <0.5 (L) 07/15/2010 03:15 AM  
  
 
 
   
 
Total time: 100 min Counseling / coordination time, spent as noted above: 80 min > 50% counseling / coordination?: yes Prolonged service was provided for  [x]30 min   []75 min in face to face time in the presence of the patient, spent as noted above. Time Start: 1100  Time End: 1130 Time Start: 1330     Time End: 8127 Note: this can only be billed with 63367 (initial) or 76787 (follow up). If multiple start / stop times, list each separately.

## 2020-03-20 NOTE — PROGRESS NOTES
Will need MRI with and without contrast of C, T, L spine to evaluate cord, ivy extent of lesions, and ligamentous damage. If family is willing to proceed with this and care for patient will be greater than 3 months we will proceed this weekend. Surgical and nonsurgical options would be discussed following this, but likely leaning towards conservative care pending results of family discussion with palliative team today with understanding he has a severe fracture in his cervical spine that is likely unstable. GALI Lynch-C Orthopaedic Surgery  [facility]

## 2020-03-20 NOTE — PROGRESS NOTES
Problem: Falls - Risk of 
Goal: *Absence of Falls Description: Document Waleska Hines Fall Risk and appropriate interventions in the flowsheet. Outcome: Progressing Towards Goal 
Note: Fall Risk Interventions: 
Mobility Interventions: Bed/chair exit alarm Mentation Interventions: Bed/chair exit alarm, Reorient patient Medication Interventions: Bed/chair exit alarm Elimination Interventions: Bed/chair exit alarm, Call light in reach Problem: Pressure Injury - Risk of 
Goal: *Prevention of pressure injury Description: Document Devonte Scale and appropriate interventions in the flowsheet. Outcome: Progressing Towards Goal 
Note: Pressure Injury Interventions: 
  
 
Moisture Interventions: Absorbent underpads, Check for incontinence Q2 hours and as needed, Internal/External urinary devices Activity Interventions: Pressure redistribution bed/mattress(bed type) Mobility Interventions: Pressure redistribution bed/mattress (bed type) Nutrition Interventions: Document food/fluid/supplement intake Friction and Shear Interventions: Minimize layers Problem: Patient Education: Go to Patient Education Activity Goal: Patient/Family Education Outcome: Progressing Towards Goal 
  
Problem: Pressure Injury - Risk of 
Goal: *Prevention of pressure injury Description: Document Devonte Scale and appropriate interventions in the flowsheet. Outcome: Progressing Towards Goal 
Note: Pressure Injury Interventions: 
  
 
Moisture Interventions: Absorbent underpads, Check for incontinence Q2 hours and as needed, Internal/External urinary devices Activity Interventions: Pressure redistribution bed/mattress(bed type) Mobility Interventions: Pressure redistribution bed/mattress (bed type) Nutrition Interventions: Document food/fluid/supplement intake Friction and Shear Interventions: Minimize layers

## 2020-03-20 NOTE — PROGRESS NOTES
Bedside and Verbal shift change report given to Winston Mariscal (oncoming nurse) by Cornelious Felty (offgoing nurse).  Report included the following information SBAR, Kardex, Intake/Output, MAR, Recent Results and Cardiac Rhythm SR.

## 2020-03-20 NOTE — PROGRESS NOTES
Occupational Therapy Note:  Orders recieved and appreciated. Chart reviewed. Spoke with RN and patient currently admitted with hypoxia and dyspnea. History of lung cancer with bone mets. He has new cervical fractures that are unstable and is awaiting orthopedic consult. Due to the instability and acute nature of his condition he is not appropriate for OT at this time. Will continue to follow and perform OT eval once pt able to actively participate. Thank you for the consult.  
Ketty Hess, OTR/L, CBIS

## 2020-03-20 NOTE — PROGRESS NOTES
0700 - . Candance Huger Bedside and Verbal shift change report given to Isela Storey RN (oncoming nurse) by Jakub Busch RN (offgoing nurse). Report included the following information SBAR, Intake/Output, MAR, Recent Results and Med Rec Status. Candance Huger .Primary Nurse Gonzalo Campbell RN and Jakub Busch RN performed a dual skin assessment on this patient No impairment noted Devonte score is 15 
 
0900 - Ortho PA  Christo Pink at bedside. Patient has significant cervical fractures per PA and needs stabilization with a cervical collar. PA to speak with patient's family to decide if they would want to pursue surgical options. Per PA need to try and limit movement as much as possible. 1000 - Patient given CHG bath, condom catheter applied, incontinence care performed and linens changed 1100 - patient very agitated and wants to leave, patient took off telemetry leads and wires, and was attempting to get out of the bed, educated patient on plan of care 65 - Oncology NP and Palliative care NP at bedside for goals of care conversation and to discuss plan going forward. 1800 -. .TRANSFER - OUT REPORT: 
 
Verbal report given to IVAN Maki(name) on Cuco Isbell  being transferred to PCC(unit) for routine progression of care Report consisted of patients Situation, Background, Assessment and  
Recommendations(SBAR). Information from the following report(s) SBAR, Intake/Output, MAR, Recent Results and Med Rec Status was reviewed with the receiving nurse. Lines:  
Venous Access Device PORT 02/07/20 Upper chest (subclavicular area, right (Active) Subcutaneous Infusion Line 03/20/20 Right  (Active) Site Assessment Clean, dry, & intact 3/20/2020  4:00 PM  
Dressing Status Clean, dry, & intact 3/20/2020  4:00 PM  
Dressing Type Disk with Chlorhexadine gluconate (CHG); Transparent 3/20/2020  4:00 PM  
Hub Color/Line Status Infusing 3/20/2020  4:00 PM  
Action Taken Open ports on tubing capped 3/20/2020  4:00 PM  
  
 
 Opportunity for questions and clarification was provided. Patient transported with: 
 
RN Tech 
3L O2 via nasal cannula

## 2020-03-20 NOTE — PROGRESS NOTES
Nutrition Assessment: 
 
RECOMMENDATIONS/INTERVENTION(S):  
1. Advance to diet as medically able, recommend Regular diet - texture/ liquid thickness per SLP. 2. If pt to remain NPO and complete care warranted, consider alternative means of nutrition support. 3. Monitor POC, GI function, labs, and weight trends. ASSESSMENT:  
3/20: 75 y/o M admitted with acute on chronic respiratory failure with hypoxia. MD consult for general nutrition management. PMH - HTN, COPD, Pafib, prosthetic AVR, stage 4 lung CA w/ spinal mets. NPO at this time. Pt poor historian. Attempted discussing nutritional Hx; was able to tell me he had a good appetite PTA and consumed x3 meals/d. Pt continued to ask for wife. SLP following. Noted family meeting planned for later today. ?POC - ability for PO feeds versus need for nutrition support.  lb. BMI 22.1 c/w underweight for age. Estimated nutritional needs +250 kcal to promote healthy weight. Weight Hx per EMR shows gradual 18 lb (12%) weight loss x 12 mo - not significant for timeframe but noted. NKFA. No note of GI distress. Pt reports some abdominal pain. LBM 3/20, on bowel regimen. Skin intact. Labs - Na 131 L, BUN 22 H, Ca 8.0 L. Meds - NS 75 ml/hr, docusate, methylprednisolone, polyethylene glycol, senna. Diet Order: NPO 
% Eaten:  No data found. Pertinent Medications: [x] Reviewed Labs: [x] Reviewed Anthropometrics: Height: 5' 6\" (167.6 cm) Weight: 61.7 kg (136 lb) IBW (%IBW):   ( ) UBW (%UBW):   (  %) BMI: Body mass index is 21.95 kg/m². This BMI is indicative of: 
 [x] Underweight for age    [] Normal    [] Overweight    []  Obesity    []  Extreme Obesity (BMI>40) Estimated Nutrition Needs (Based on): 1944 Kcals/day(REE 1303 x 1.3 + 250 kcal) , 70 g(62 - 74 gm (1.0 - 1.2 gm/kg)) Protein Carbohydrate: At Least 130 g/day  Fluids: 1944 mL/day (1 ml/kcal) Last BM: 3/20   []Active     []Hyperactive  []Hypoactive       [] Absent   BS 
 Skin:    [x] Intact   [] Incision  [] Breakdown   [] DTI   [] Tears/Excoriation/Abrasion  []Edema [] Other: Wt Readings from Last 30 Encounters:  
03/20/20 61.7 kg (136 lb)  
03/18/20 61.2 kg (135 lb)  
03/10/20 61.5 kg (135 lb 9.3 oz) 02/26/20 62.6 kg (138 lb)  
02/26/20 62.6 kg (138 lb) 02/07/20 64 kg (141 lb 1.5 oz) 02/06/20 64.9 kg (143 lb) 02/05/20 64.4 kg (142 lb)  
01/31/20 64.7 kg (142 lb 9.6 oz)  
11/17/19 62.6 kg (138 lb) 11/03/19 64.4 kg (142 lb)  
07/22/19 65.8 kg (145 lb)  
06/20/19 69.4 kg (153 lb)  
03/17/19 69.9 kg (154 lb)  
07/30/18 73.5 kg (162 lb) 12/19/17 68.9 kg (152 lb)  
08/25/17 70.9 kg (156 lb 4.9 oz)  
06/25/16 61.3 kg (135 lb 3 oz) 06/14/16 64.6 kg (142 lb 8 oz) 06/12/16 65.8 kg (145 lb)  
06/12/16 65.8 kg (145 lb) 10/25/14 56.7 kg (125 lb 1 oz) NUTRITION DIAGNOSES:  
Problem:  Underweight Etiology: related to decreased ability to consume sufficient energy Signs/Symptoms: as evidenced by BMI 22.1 c/w underweight for age NUTRITION INTERVENTIONS: 
Meals/Snacks: General/healthful diet Enteral/Parenteral Nutrition: Initiate enteral nutrition GOAL:  
Pt will advance to diet or nutrition support will be initiated within 1-3 days Cultural, Restorationism, or Ethnic Dietary Needs: None EDUCATION & DISCHARGE NEEDS:  
 [x] None Identified 
 [] Identified and Education Provided/Documented 
 [] Identified and Pt declined/was not appropriate [x] Interdisciplinary Care Plan Reviewed/Documented  
 [x] Discharge Needs:    Regular diet  
 [] No Nutrition Related Discharge Needs NUTRITION RISK:  
Pt Is At Nutrition Risk  [x] No Nutrition Risk Identified  [] PT SEEN FOR:  
 [x]  MD Consult: []Calorie Count []Diabetic Diet Education []Diet Education []Electrolyte Management 
   [x]General Nutrition Management and Supplements []Management of Tube Feeding []TPN Recommendations  []  RN Referral:  []MST score >=2 
 []Enteral/Parenteral Nutrition PTA []Pregnant: Gestational DM or Multigestation  
              [] Pressure Ulcer 
 
[]  Low BMI      []  Length of Stay       [] Dysphagia Diet         [] Ventilator 
[]  Follow-up Previous Recommendations: 
 [] Implemented          [] Not Implemented          [x] Not Applicable Previous Goal: 
 [] Met              [] Progressing Towards Goal              [] Not Progressing Towards Goal   [x] Not Applicable Danitza Zarate RDN Pager [phone] Phone [phone]

## 2020-03-20 NOTE — CONSULTS
Palliative Medicine brief note- Full visit documentation pending. I met with patient, though he was able to tell me what brought him into the hospital, he is adamant that he does not have a cervical spine fx,. Oncology NP Bibi attempted to educate patient about cancer causing the fracture, however patient not receptive to information,  becomes somewhat angry, stated wants his wife. Oncology and Palliative team scheduled  Family meeting with the patient, his wife and daughter at [address] [place] [address] today 3/20/2020. Please contact me via perfect serve if any questions or concerns. Thank you.

## 2020-03-20 NOTE — ED PROVIDER NOTES
77-year-old male presenting the ER with chest pain and shortness of breath. Patient has significant past prior history of COPD, CAD, mechanical aortic valve, hypertension, CABG, stage IVb non-small cell lung cancer with metastasis receiving palliative treatment cardiac echo from June 2019 show ejection fraction of 55% no wall motion abnormalities. Patient has mildly reduced systolic function of the right ventricle. Unable to assess pulmonary hypertension Patient had recent hospitalization March 10 for acute on chronic respiratory failure. COPD exacerbation. Patient requires 2 L home oxygen. During last hospitalization patient also had an episode of SVT that resolved with adenosine during last hospitalization family declined DNR status and states that patient is full code. Palliative consult was performed Patient and EMS reports that he had sudden onset of shortness of breath this evening. EMS reports initially patient was in A. fib with RVR that converted to sinus tach. While in the ER patient going in and out of sinus tach and A. fib with RVR. Ports chest tightness and shortness of breath. Reports being compliant with his Coumadin. No reported fevers or chills. No reported travel. Initial EKG: Sinus tachycardia with PVCs with a rate of 126 bpm.  Normal axis. Normal intervals. LVH, nonspecific ST/T wave abnormalities. EKG moments later showing A. fib with RVR with a rate of 158. LVH. No ST elevations or depressions. EKG is interpreted by Seth Trujillo MD 
 
 
 
 
 
 
  
 
Past Medical History:  
Diagnosis Date  Asthma  CAD (coronary artery disease)  Cancer ([facility] [address] \"10 years ago\" throat  COPD (chronic obstructive pulmonary disease) (HCC)  CVA (cerebral vascular accident) ([facility] [address]  H/O mechanical aortic valve replacement CT surgery 1994 - CABG (LIMA to LAD) and AVR ([facility])  Hypertension  S/P CABG x 1   
 CT surgery  - CABG (LIMA to LAD) and AVR ([facility]) Past Surgical History:  
Procedure Laterality Date  CARDIAC SURG PROCEDURE UNLIST  HX HEART VALVE SURGERY    
 CT surgery  - CABG (LIMA to LAD) and AVR ([facility])  HX HEMORRHOIDECTOMY  IR INSERT TUNL CVC W PORT OVER 5 YEARS  2020 Family History:  
Problem Relation Age of Onset  Heart Disease Mother  Diabetes Mother  Hypertension Mother  Heart Disease Sister  Hypertension Sister  Hypertension Father  Cancer Brother   
     lung  Diabetes Brother  Heart Disease Brother  Hypertension Brother  Stroke Brother Social History Socioeconomic History  Marital status:  Spouse name: Mike Escalera  Number of children: 3  
 Years of education: Not on file  Highest education level: Not on file Occupational History  Not on file Social Needs  Financial resource strain: Somewhat hard  Food insecurity Worry: Never true Inability: Never true  Transportation needs Medical: No  
  Non-medical: No  
Tobacco Use  Smoking status: Former Smoker Packs/day: 3.00 Years: 40.00 Pack years: 120.00 Last attempt to quit: 2019 Years since quittin.2  Smokeless tobacco: Never Used Substance and Sexual Activity  Alcohol use: No  
 Drug use: No  
 Sexual activity: Not Currently Lifestyle  Physical activity Days per week: Patient refused Minutes per session: Patient refused  Stress: Only a little Relationships  Social connections Talks on phone: More than three times a week Gets together: More than three times a week Attends [employer] service: Never Active member of club or organization: No  
  Attends meetings of clubs or organizations: Never Relationship status:   Intimate partner violence Fear of current or ex partner: No  
  Emotionally abused:  No  
 Physically abused: No  
  Forced sexual activity: No  
Other Topics Concern  Not on file Social History Narrative  Not on file ALLERGIES: Adenosine; Iodinated contrast media; and Pcn [penicillins] Review of Systems Constitutional: Negative for chills and fever. HENT: Negative for congestion and sore throat. Eyes: Negative for pain. Respiratory: Positive for chest tightness and shortness of breath. Cardiovascular: Positive for chest pain. Gastrointestinal: Negative for abdominal pain, diarrhea, nausea and vomiting. Genitourinary: Negative for dysuria and flank pain. Musculoskeletal: Negative for back pain and neck pain. Skin: Negative for rash. Neurological: Negative for dizziness and headaches. Vitals:  
 03/20/20 0127 03/20/20 0128 03/20/20 0129 03/20/20 0130 BP:      
Pulse: (!) 116 (!) 162 (!) 108 (!) 149 Resp: 25 20 24 27 Temp:      
SpO2: 98% 98% 98% 98% Weight:      
      
 
Physical Exam 
Constitutional:   
   General: He is in acute distress. Comments: Frail HENT:  
   Head: Normocephalic. Nose: Nose normal.  
   Mouth/Throat:  
   Pharynx: Oropharynx is clear. Eyes:  
   Conjunctiva/sclera: Conjunctivae normal.  
Neck: Musculoskeletal: Neck supple. Vascular: JVD present. Cardiovascular:  
   Rate and Rhythm: Tachycardia present. Rhythm irregularly irregular. Heart sounds: Murmur present. Pulmonary:  
   Effort: Tachypnea and respiratory distress present. Comments: Crackles bilaterally Abdominal:  
   General: Abdomen is flat. Tenderness: There is no abdominal tenderness. Musculoskeletal: Normal range of motion. General: No swelling. Right lower leg: No edema. Left lower leg: No edema. Skin: 
   General: Skin is warm. Capillary Refill: Capillary refill takes less than 2 seconds. Findings: No rash. Neurological:  
   General: No focal deficit present. Mental Status: He is alert and oriented to person, place, and time. MDM Number of Diagnoses or Management Options Acute on chronic respiratory failure with hypoxia ([facility] [address]): Atrial fibrillation with RVR ([facility] [address]):  
Chronic obstructive pulmonary disease, unspecified COPD type [facility]):  
Lung cancer metastatic to bone [facility]):  
Diagnosis management comments: Patient with terminal non-small cell lung cancer with mets, history of COPD presenting with A. fib with RVR with sudden onset of shortness of breath. Patient is on Coumadin for his A. fib and now. Patient has crackles throughout the lung and poor air movement. Differential includes flash pulmonary edema and shortness of breath as result of A. fib with RVR, COPD exacerbation, rule out pneumonia, less likely PE as patient is on anticoagulation. Treated A. fib with RVR with Cardizem which improved his heart rate. Which also improved patient's breathing. Also gave nebulizer treatment and steroids due to his history of COPD. Chest x-ray unremarkable. Hospitalist ordered CAT scan. Patient has leukocytosis however no sign pneumonia. Patient is being followed by palliative care and recommend palliative care consult in the hospital. 
No signs of ischemia on EKG and troponin negative. Patient's proBNP slightly bumped from previous. She denies abdominal pain and dysuria. No bacterial source of infection found. She has sirs criteria however this is as result of respiratory distress. And will be hospitalized. Amount and/or Complexity of Data Reviewed Clinical lab tests: reviewed Tests in the radiology section of CPT®: reviewed ED Course as of Mar 20 0337 Fri Mar 20, 2020  
0224 Heart rate improved with cardizem bolus of 10mg And currently NSR at 80-90 bmp  
 [ZD] 7060 Elevated LFTs at baseline 
  
 [ZD] ED Course User Index [ZD] Rl Guardado MD  
 
 
CRITICAL CARE (ASAP ONLY) Performed by: Rl Guardado MD 
 Authorized by: Ysabel Fields MD  
 
Critical care provider statement:  
  Critical care time (minutes):  35 
  Critical care time was exclusive of:  Separately billable procedures and treating other patients Critical care was necessary to treat or prevent imminent or life-threatening deterioration of the following conditions:  Respiratory failure and cardiac failure Critical care was time spent personally by me on the following activities:  Blood draw for specimens, development of treatment plan with patient or surrogate, discussions with consultants, evaluation of patient's response to treatment, examination of patient, interpretation of cardiac output measurements, obtaining history from patient or surrogate, review of old charts, re-evaluation of patient's condition, pulse oximetry, ordering and review of radiographic studies, ordering and review of laboratory studies and ordering and performing treatments and interventions   I assumed direction of critical care for this patient from another provider in my specialty: no

## 2020-03-20 NOTE — PROGRESS NOTES
3- CASE MANAGEMENT NOTE: 
Reason for Admission:  Acute and Chronic Respiratory Failure RUR Score:   28% PCP: First and Last name: Dr. Panda Driver Name of Practice: 
 Are you a current patient: Yes/No: 
 Approximate date of last visit: Last week-did not feel well so did not go Resources/supports as identified by patient/family:  Lives with wife and adult son Top Challenges facing patient (as identified by patient/family and CM): Finances/Medication cost?    Has coverage Transportation? Wife transports Support system or lack thereof? Supportive family Living arrangements? No problem Self-care/ADLs/Cognition? Needs assistance with ADL's Current Advanced Directive/Advance Care Plan:  DNR Plan for utilizing home health:  TBD Transition of Care Plan: 1. Lives with and cared for wife and adult son 2. Here 3-8-2020-3- and discharged with home health 3. Mat be appropriate for hospice I spoke with the patient's wife, Gagandeep Queen ([phone]), to determine potential discharge needs. The patient was here from 3-8-2020-3- for Respiratory Failure and discharged home with wife and referred to [facility]. He was never opened to home health as he refused services (confirmed by [facility] liaison and the patient's wife). He lives with and is cared for his wife and adult son in a one story house. He can ambulate short distances with assistance. He has a rollator, wheelchair, BSC and oxygen from [facility]. I asked the wife if anyone has spoken to her about hospice as this could be helpful for the patient and wife as she plans to take him home when stable for discharge.  The patient's PCP is Dr. Panda Driver but he did not feel well enough to go to his appointment scheduled for last week. He has prescription drug coverage and uses [facility] at [place] [id number]. CM will continue to follow and assist as needed. Care Management Interventions PCP Verified by CM: Yes(Dr. Albert Norton) Transition of Care Consult (CM Consult): Discharge Planning Discharge Durable Medical Equipment: No(Has a rollator, wheellchair, BSC and oxygen from [place]) Physical Therapy Consult: Yes Occupational Therapy Consult: Yes Speech Therapy Consult: Yes Current Support Network: Lives with Spouse, Own Home The Plan for Transition of Care is Related to the Following Treatment Goals : Acute and Chronic Respiratory Failure Discharge Location Discharge Placement: (Home with family) KIET Aleman, CM

## 2020-03-20 NOTE — H&P
[facility] 
[address] [facility], [address] [place] [address] 
[phone] Admission History and Physical 
 
 
NAME:  Kesha Sargent :   1945 MRN:  [id number] PCP:  Lynne Ariza MD  
 
Date/Time of service:  3/20/2020  1:57 AM 
  
  
Subjective: CHIEF COMPLAINT: dyspnea HISTORY OF PRESENT ILLNESS:    
The patient is a 75 yo hx of HTN, COPD on 2L O2, Pafib, prosthetic AVR, stage 4 lung CA w/ spinal mets, presented w/ resp failure, hypoxia, COPD, lung CA. The patient is a poor historian. There was no family at bedside. He c/o worsening dyspnea at home for the past 2 days. Denied chest pain, fevers, chills, nausea, vomiting, diarrhea. The patient was admitted last week for COPD, SVT, and lung CA. He reported DNR status, but not on hospice. Currently undergoing palliative chemo. In the ED, O2 sat was 87% on 2L O2.  HR~147, afib. Allergies Allergen Reactions  Adenosine Shortness of Breath Tolerates well when given for SVT, per Dr. Hiawatha Cabot. Given on 3/8/2020  Iodinated Contrast Media Shortness of Breath  Pcn [Penicillins] Shortness of Breath Patient reports to have tolerated a 10 day course of cefdinir started 17 Prior to Admission medications Medication Sig Start Date End Date Taking? Authorizing Provider  
fentaNYL (DURAGESIC) 25 mcg/hr PATCH 1 Patch by TransDERmal route every seventy-two (72) hours for 30 days. Max Daily Amount: 1 Patch. 3/18/20 4/17/20  Vani Roberts MD  
gabapentin (NEURONTIN) 300 mg capsule 1 qhs x 3 days, then 1 BID x 3 days, then 1 TID 3/18/20   Clary Roberts MD  
oxyCODONE IR (OXY-IR) 15 mg immediate release tablet Take 1 Tab by mouth every three (3) hours as needed for Pain for up to 15 days.  Max Daily Amount: 120 mg. 3/18/20 4/2/20  Vani Roberts MD  
naloxone (Narcan) 4 mg/actuation nasal spray Use 1 spray intranasally, then discard. Repeat with new spray every 2 min as needed for opioid overdose symptoms, alternating nostrils. 3/18/20   Vani Robrets MD  
predniSONE (DELTASONE) 20 mg tablet Take 3 tabs a day for 3 days, then 2 tabs a day for 3 days, then 1 tab a day for 3 days. 3/10/20   Stu Ocasio MD  
polyethylene glycol [facility]) 17 gram packet Take 17 g by mouth daily. Provider, Historical  
senna (SENNA) 8.6 mg tablet Take 2 Tabs by mouth nightly. Provider, Historical  
brimonidine (ALPHAGAN) 0.2 % ophthalmic solution Administer 1 Drop to both eyes two (2) times a day. Provider, Historical  
0.9% sodium chloride solp 100 mL with pembrolizumab 25 mg/mL soln 200 mg by IntraVENous route every twenty-one (21) days. Provider, Historical  
warfarin (COUMADIN) 1 mg tablet Take 1 mg by mouth every other day. Provider, Historical  
hydroxypropyl methylcellulose (GENTEAL) 0.2 % ophthalmic solution 2 drops right eye every 1-2 hours while awake 11/17/19   Finn Devine MD  
latanoprost (XALATAN) 0.005 % ophthalmic solution Administer 1 Drop to both eyes nightly. Provider, Historical  
amLODIPine (NORVASC) 10 mg tablet Take 10 mg by mouth daily. Provider, Historical  
albuterol (PROVENTIL VENTOLIN) 2.5 mg /3 mL (0.083 %) nebulizer solution 3 mL by Nebulization route every four (4) hours as needed for Wheezing. 3/17/19   Ashlyn Carrillo MD  
promethazine (PHENERGAN) 12.5 mg tablet Take 25 mg by mouth every eight (8) hours as needed. Indications: Nausea    Provider, Historical  
atorvastatin (LIPITOR) 80 mg tablet Take 80 mg by mouth every evening. Provider, Historical  
warfarin (COUMADIN) 5 mg tablet Take 2.5 mg by mouth every other day. Provider, Historical  
albuterol (PROVENTIL HFA, VENTOLIN HFA, PROAIR HFA) 90 mcg/actuation inhaler Take 2 Puffs by inhalation every six (6) hours as needed.     Other, MD Kallie  
 fluticasone-salmeterol (ADVAIR) 500-50 mcg/dose diskus inhaler Take 1 Puff by inhalation every twelve (12) hours. Kallie Bautista MD  
clonazepam (KLONOPIN) 0.5 mg tablet Take 0.5 mg by mouth three (3) times daily. 10/14/14   Kallie Bautista MD  
escitalopram oxalate (LEXAPRO) 10 mg tablet Take 10 mg by mouth daily. 10/21/14   Kallie Bautista MD  
isosorbide mononitrate (ISMO, MONOKET) 10 mg tablet Take 10 mg by mouth three (3) times daily. Patient takes at [address] [facility], noon, and 5pm 10/21/14   Kallie Bautista MD  
 
 
Past Medical History:  
Diagnosis Date  Asthma  CAD (coronary artery disease)  Cancer ([facility] [address] \"10 years ago\" throat  COPD (chronic obstructive pulmonary disease) ([facility])  CVA (cerebral vascular accident) ([facility] [address]  H/O mechanical aortic valve replacement CT surgery  - CABG (LIMA to LAD) and AVR ([facility])  Hypertension  S/P CABG x 1   
 CT surgery  - CABG (LIMA to LAD) and AVR ([facility]) Past Surgical History:  
Procedure Laterality Date  CARDIAC SURG PROCEDURE UNLIST  HX HEART VALVE SURGERY    
 CT surgery  - CABG (LIMA to LAD) and AVR ([facility])  HX HEMORRHOIDECTOMY  IR INSERT TUNL CVC W PORT OVER 5 YEARS  2020 Social History Tobacco Use  Smoking status: Former Smoker Packs/day: 3.00 Years: 40.00 Pack years: 120.00 Last attempt to quit: 2019 Years since quittin.2  Smokeless tobacco: Never Used Substance Use Topics  Alcohol use: No  
  
 
Family History Problem Relation Age of Onset  Heart Disease Mother  Diabetes Mother  Hypertension Mother  Heart Disease Sister  Hypertension Sister  Hypertension Father  Cancer Brother   
     lung  Diabetes Brother  Heart Disease Brother  Hypertension Brother  Stroke Brother Review of Systems: 
(bold if positive, if negative) Gen:  Eyes:  ENT:  CVS:  Pulm:  , dyspneaGI:  :  MS:  Skin:  Psych: Endo:  Hem:  Renal:  Neuro:    
 
  
Objective: VITALS:   
Vital signs reviewed; most recent are: 
 
Visit Vitals /79 Pulse (!) 107 Temp 97.9 °F (36.6 °C) Resp 21 Wt 62 kg (136 lb 11 oz) SpO2 100% BMI 22.06 kg/m² SpO2 Readings from Last 6 Encounters:  
03/20/20 100% 03/18/20 94% 03/18/20 98% 03/18/20 99% 03/10/20 100% 03/03/20 (!) 88% O2 Flow Rate (L/min): 3 l/min No intake or output data in the 24 hours ending 03/20/20 0157 Exam:  
 
Physical Exam: 
 
Gen:  Elderly, ill-appearing, cachectic, mild distress HEENT:  Pink conjunctivae, PERRL, hearing intact to voice, dry mucous membranes Neck:  Supple, without masses, thyroid non-tender Resp:  ++ accessory muscle use, poor expiration, decreased BS at bases Card:  2/6 murmur with mechanical click, normal S1, S2 without thrills, bruits or peripheral edema Abd:  Soft, non-tender, non-distended, normoactive bowel sounds are present Lymph:  No cervical adenopathy Musc:  No cyanosis or clubbing Skin:  No rashes Neuro:  Cranial nerves 3-12 are grossly intact, follows commands appropriately Psych:  Alert with poor insight. Oriented to person, place Labs: 
 
Recent Labs  
  03/18/20 
1339 WBC 16.2* HGB 11.9*  
HCT 34.8*  
 Recent Labs  
  03/18/20 
1339 *  
K 4.3 CL 95* CO2 30 * BUN 19  
CREA 0.75 CA 8.6 ALB 3.1* TBILI 1.3*  
SGOT 50* * Lab Results Component Value Date/Time Glucose (POC) 94 11/17/2019 12:13 PM  
 Glucose (POC) 88 11/17/2019 12:01 PM  
 
No results for input(s): PH, PCO2, PO2, HCO3, FIO2 in the last 72 hours. No results for input(s): INR, INREXT in the last 72 hours. Radiology and EKG reviewed:   pending **Old Records reviewed in [facility]** Assessment/Plan:   
  
Principal Problem: 
 
77 yo hx of HTN, COPD on 2L O2, Pafib, prosthetic AVR, stage 4 lung CA w/ spinal mets, presented w/ resp failure, hypoxia, COPD, lung CA 
 
 1) Acute and chronic respiratory failure with hypoxia: O2 sat 87% on 2L (home O2). Likely due to advanced lung CA and COPD. Cont O2, incentive spirometry. Would benefit from hospice 2) COPD with acute exacerbation: end stage disease. Recent viral panel neg. Will start IV solumedrol, duo nebs, LABA/ICS. Consult pulm 3) Afib RVR: exacerbated by advanced COPD. Will monitor on Tele. Check Echo. Start dilt gtt. Cont coumadin. Consult Cards 4) Cervical spine fracture: incidental findings on CT. Possible epidural extension. Due to malignancy. Poor prognosis. High chance of paralysis and death. Will consult Ortho 5) Stage 4 lung CA w/ spinal mets: poor prognosis. Has been receiving palliative chemo. Would benefit from hospice. Will consult Oncology, Palliative 6) Mechanical AVR: cont coumadin, pharm to dose 7) Cancer related pain: cont fentanyl patch. Use IV dilaudid prn pain 8) Severe pro-alhaji malnutrition: has severe muscle wasting from end stage CA. Consult nutrition Code: DNR - per patient. Will need to speak with family Risk of deterioration: high Total time spent with patient: 79 Minutes **I personally saw and examined the patient during this time period** Care Plan discussed with: Patient, nursing Discussed:  Care Plan Prophylaxis:  Coumadin 
 
Probable Disposition:  hospice 
        
___________________________________________________ Attending Physician: Rhoda Holbrook MD

## 2020-03-20 NOTE — PROGRESS NOTES
Heme/Onc Family meeting with pt, pt's wife, Aki Ray and daughter Karlos Galvin, palliative NP Ignacia and myself. Explained current findings of C5 fx and ortho recs of MRI and need for potential surgery. Dr Fermin Thomas has spoken with wife today via phone prior to meeting as well. Rad/Onc  Dr Rishi Andrews has reviewed imaging and thinks pt would benefit from palliative XRT for symptom relief to neck area but pt would still need hard collar in place. Reviewed our concern that pt would not be able to tolerate chemotherapy based on his current performance status and reviewed  option of concentrating on symptom management with support of hospice with pt and family. Mr Jovani Leyva states he just wants to go home. Daughter austin during conversation states she wants to concentrate on getting her dad home; they have been managing before he came in and her dad does not like having strangers in their home. Wife agrees that her  is not a candidate for surgery or chemo at this time and would like to pursue XRT and pt agrees. At this time pt and family are not interested in hospice but would like the option of palliative XRT. Rad/Onc will be in touch with the family after he weekend to establish appt.  
 
Deniz Guardado, SULEMA

## 2020-03-20 NOTE — CONSULTS
[facility] at [place] [id number] [address] [facility], [address] [facility] 100[address] [facility] W: [phone]  F: [phone] Reason for Visit:  
Ruth Krishna is a 76 y.o. male who is seen in consultation at the request of Dr. Eric Pierre  for evaluation of stage IV lung cancer. Hematology / Oncology Treatment History: · CT A/P wo contrast 11/3/2019: New lytic lesion at T11 
· MRI Brain 11/17/2019: no intracranial metastatic disease · PET/CT 12/20/2019: RUL spiculated nodule, 1.4cm, SUV 3.2. Multifocal lytic osseous metastases in spine, ribs, sacrum, and iliac bones. Notably large lytic lesions involving cervical spine and T11. T11 lesion extends into spinal canal.  Suspicious precarinal mediastinal lymph node. 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 · CT guided biopsy of osseous metastasis 1/17/2020: metastatic adenocarcinoma, favor lung primary (TTF1+). PDL1 68%. EGFR/ALK/ROS1/BRAF negative. · Stage IVB (cT1b cN0 M1c) Non-small cell lung cancer · Initiated palliative therapy with Pembrolizumab on 2/5/2020 · Palliative radiation to T10-T12, completed 2/21/2020 History of Present Illness:  
 
Chris Hagen was admitted on 3/20/2020 from the ED when he presented with c/o chest pain and worsening SOB x 2 days. ED O2 sats 87% on 2 L . ED CT chest reveals C5 pathological fx with epidural extension. New T7 and L1 compression fx. Increased spiculated 11 mm RUL nodule and new subpleural  11mm RUL nodule Mr Lashaun Fisher states he does not have a broken neck. Having pain to his next area but it is not broken. Radhames Spies His breathing is ok. No other pain. States at home he is up and round the house with his rollator. Requesting his wife be here. Past Medical History:  
Diagnosis Date  Asthma  CAD (coronary artery disease)  Cancer ([facility] [address] \"10 years ago\" throat  COPD (chronic obstructive pulmonary disease) (HCC)  CVA (cerebral vascular accident) ([address]  H/O mechanical aortic valve replacement CT surgery  - CABG (LIMA to LAD) and AVR ([facility])  Hypertension  S/P CABG x 1   
 CT surgery  - CABG (LIMA to LAD) and AVR ([facility]) Past Surgical History:  
Procedure Laterality Date  CARDIAC SURG PROCEDURE UNLIST  HX HEART VALVE SURGERY    
 CT surgery  - CABG (LIMA to LAD) and AVR ([facility])  HX HEMORRHOIDECTOMY  IR INSERT TUNL CVC W PORT OVER 5 YEARS  2020 Social History Tobacco Use  Smoking status: Former Smoker Packs/day: 3.00 Years: 40.00 Pack years: 120.00 Last attempt to quit: 2019 Years since quittin.2  Smokeless tobacco: Never Used Substance Use Topics  Alcohol use: No  
  
Family History Problem Relation Age of Onset  Heart Disease Mother  Diabetes Mother  Hypertension Mother  Heart Disease Sister  Hypertension Sister  Hypertension Father  Cancer Brother   
     lung  Diabetes Brother  Heart Disease Brother  Hypertension Brother  Stroke Brother Current Facility-Administered Medications Medication Dose Route Frequency  albuterol-ipratropium (DUO-NEB) 2.5 MG-0.5 MG/3 ML  3 mL Nebulization Q6H RT  
 methylPREDNISolone (PF) (SOLU-MEDROL) injection 40 mg  40 mg IntraVENous Q8H  
 guaiFENesin ER (MUCINEX) tablet 1,200 mg  1,200 mg Oral Q12H  
 benzonatate (TESSALON) capsule 100 mg  100 mg Oral TID PRN  
 atorvastatin (LIPITOR) tablet 80 mg  80 mg Oral QPM  
 brimonidine (ALPHAGAN) 0.2 % ophthalmic solution 1 Drop  1 Drop Both Eyes BID  clonazePAM (KlonoPIN) tablet 0.5 mg  0.5 mg Oral TID  escitalopram oxalate (LEXAPRO) tablet 10 mg  10 mg Oral DAILY  fentaNYL (DURAGESIC) 25 mcg/hr patch 1 Patch  1 Patch TransDERmal Q72H  budesonide (PULMICORT) 500 mcg/2 ml nebulizer suspension  500 mcg Nebulization BID RT  
 arformoteroL (BROVANA) neb solution 15 mcg  15 mcg Nebulization BID RT  
  gabapentin (NEURONTIN) capsule 300 mg  300 mg Oral TID  isosorbide mononitrate (ISMO, MONOKET) tablet 10 mg  10 mg Oral TID  latanoprost (XALATAN) 0.005 % ophthalmic solution 1 Drop  1 Drop Both Eyes QHS  oxyCODONE IR (ROXICODONE) tablet 15 mg  15 mg Oral Q3H PRN  polyethylene glycol (MIRALAX) packet 17 g  17 g Oral DAILY  senna (SENOKOT) tablet 17.2 mg  2 Tab Oral QHS  sodium chloride (NS) flush 5-40 mL  5-40 mL IntraVENous Q8H  
 sodium chloride (NS) flush 5-40 mL  5-40 mL IntraVENous PRN  
 acetaminophen (TYLENOL) tablet 650 mg  650 mg Oral Q4H PRN  
 HYDROmorphone (PF) (DILAUDID) injection 1 mg  1 mg IntraVENous Q4H PRN  
 naloxone (NARCAN) injection 0.4 mg  0.4 mg IntraVENous PRN  
 diphenhydrAMINE (BENADRYL) injection 12.5 mg  12.5 mg IntraVENous Q4H PRN  
 ondansetron (ZOFRAN) injection 4 mg  4 mg IntraVENous Q6H PRN  
 docusate sodium (COLACE) capsule 100 mg  100 mg Oral BID  
 0.9% sodium chloride infusion  75 mL/hr IntraVENous CONTINUOUS  
 Warfarin - Pharmacy to Dose   Other Rx Dosing/Monitoring  warfarin (COUMADIN) tablet 2.5 mg  2.5 mg Oral ONCE  carboxymethylcellulose sodium (CELLUVISC) 1 % ophthalmic solution 2 Drop  2 Drop Both Eyes PRN  
 dilTIAZem CD (CARDIZEM CD) capsule 240 mg  240 mg Oral DAILY  dilTIAZem (CARDIZEM) 125 mg in dextrose 5% 125 mL infusion  0-15 mg/hr IntraVENous TITRATE Allergies Allergen Reactions  Adenosine Shortness of Breath Tolerates well when given for SVT, per Dr. Sharan Rodríguez. Given on 3/8/2020  Iodinated Contrast Media Shortness of Breath  Pcn [Penicillins] Shortness of Breath Patient reports to have tolerated a 10 day course of cefdinir started 8/24/17 Review of Systems: A complete review of systems was obtained, negative except as described above. Physical Exam:  
 
Visit Vitals /67 (BP 1 Location: Right arm, BP Patient Position: At rest;Supine) Pulse 70 Temp 97.6 °F (36.4 °C) Resp 20 Ht 5' 6\" (1.676 m) Wt 136 lb (61.7 kg) SpO2 100% BMI 21.95 kg/m² ECOG PS: 2-3 General: No distress Eyes: PERRLA, anicteric sclerae HENT: Hard collar in place. Atraumatic with normal appearance of ears and nose; OP clear Neck: Supple; no thyromegaly Lymphatic: No cervical, supraclavicular, or axillary adenopathy Respiratory: anteriorly CTAB, normal respiratory effort: O2 in use CV: Prot to rt upper chest; Normal rate,irregular rhythm, no murmurs, no peripheral edema GI: Soft, nontender, nondistended, no masses, no hepatomegaly, no splenomegaly MS:  Digits without clubbing or cyanosis. Skin: No rashes, ecchymoses, or petechiae. Normal temperature, turgor, and texture. Neuro/Psych: Alert, aggitated,  Judgment/insight not tested Results:  
 
Lab Results Component Value Date/Time WBC 12.5 (H) 03/20/2020 01:47 AM  
 HGB 11.9 (L) 03/20/2020 01:47 AM  
 HCT 35.1 (L) 03/20/2020 01:47 AM  
 PLATELET 529 50/90/2884 01:47 AM  
 MCV 89.8 03/20/2020 01:47 AM  
 ABS. NEUTROPHILS 11.3 (H) 03/20/2020 01:47 AM  
 
Lab Results Component Value Date/Time Sodium 131 (L) 03/20/2020 01:47 AM  
 Potassium 3.8 03/20/2020 01:47 AM  
 Chloride 95 (L) 03/20/2020 01:47 AM  
 CO2 30 03/20/2020 01:47 AM  
 Glucose 137 (H) 03/20/2020 01:47 AM  
 BUN 22 (H) 03/20/2020 01:47 AM  
 Creatinine 0.70 03/20/2020 01:47 AM  
 GFR est AA >60 03/20/2020 01:47 AM  
 GFR est non-AA >60 03/20/2020 01:47 AM  
 Calcium 8.0 (L) 03/20/2020 01:47 AM  
 Glucose (POC) 94 11/17/2019 12:13 PM  
 Creatinine (POC) 1.0 02/04/2020 01:45 PM  
 
Lab Results Component Value Date/Time Bilirubin, total 1.2 (H) 03/20/2020 01:47 AM  
 ALT (SGPT) 151 (H) 03/20/2020 01:47 AM  
 AST (SGOT) 48 (H) 03/20/2020 01:47 AM  
 Alk. phosphatase 168 (H) 03/20/2020 01:47 AM  
 Protein, total 6.0 (L) 03/20/2020 01:47 AM  
 Albumin 2.9 (L) 03/20/2020 01:47 AM  
 Globulin 3.1 03/20/2020 01:47 AM  
 
Lab Results Component Value Date/Time WBC 12.5 (H) 03/20/2020 01:47 AM  
 HGB 11.9 (L) 03/20/2020 01:47 AM  
 HCT 35.1 (L) 03/20/2020 01:47 AM  
 PLATELET 950 67/08/0879 01:47 AM  
 MCV 89.8 03/20/2020 01:47 AM  
 ABS. NEUTROPHILS 11.3 (H) 03/20/2020 01:47 AM  
 
Lab Results Component Value Date/Time Sodium 131 (L) 03/20/2020 01:47 AM  
 Potassium 3.8 03/20/2020 01:47 AM  
 Chloride 95 (L) 03/20/2020 01:47 AM  
 CO2 30 03/20/2020 01:47 AM  
 Glucose 137 (H) 03/20/2020 01:47 AM  
 BUN 22 (H) 03/20/2020 01:47 AM  
 Creatinine 0.70 03/20/2020 01:47 AM  
 GFR est AA >60 03/20/2020 01:47 AM  
 GFR est non-AA >60 03/20/2020 01:47 AM  
 Calcium 8.0 (L) 03/20/2020 01:47 AM  
 Glucose (POC) 94 11/17/2019 12:13 PM  
 Creatinine (POC) 1.0 02/04/2020 01:45 PM  
 
Lab Results Component Value Date/Time Bilirubin, total 1.2 (H) 03/20/2020 01:47 AM  
 ALT (SGPT) 151 (H) 03/20/2020 01:47 AM  
 AST (SGOT) 48 (H) 03/20/2020 01:47 AM  
 Alk. phosphatase 168 (H) 03/20/2020 01:47 AM  
 Protein, total 6.0 (L) 03/20/2020 01:47 AM  
 Albumin 2.9 (L) 03/20/2020 01:47 AM  
 Globulin 3.1 03/20/2020 01:47 AM  
 
Lab Results Component Value Date/Time  (H) 03/09/2020 09:09 AM  
 
3/20/2020 XR CHEST IMPRESSION:  
No acute process. 3/20/2020 CT CHEST WO CONT IMPRESSION:  
1. Three-column pathologic fracture involves the vertebral body of C5 and the 
posterior elements of C4 and C6. This is likely unstable. 2. Probable epidural extension at C5. Tumor narrows the left C5 neural foramen. 3. New T7 and L1 compression fractures. 4. Diffuse osseous metastases, increased from 6/21/2019. 
5. Increased, spiculated, 11 mm, right upper lobe nodule. 6. New, subpleural, 11 mm, right upper lobe nodule. 7. Probable additional nodules in the right upper lobe; evaluation limited by 
respiratory motion. Assessment and Recommendations: 1. Pathological C5 fracture Incidental finding on CT Chest.  Known disease in this area from prior PET/CT, but fracture was not known. Ortho consulted, concerned that this is unstable. Patient now with hard color in place. Patient struggling to understand the situation and in some denial about his fracture. I spoke to his wife at length and discussed the situation. Next step in evaluation would be an MRI, though it may be difficult for patient to tolerate this study. Additionally, I think he would be a poor surgical candidate, and the patient's wife agrees. We discussed the option of proceeding with aggressive management to include MRI and surgical evaluation, versus transitioning to comfort measures and hospice care. I sense she is reluctant to pursue either path and is wishing there was a middle road that was not as aggressive. I reached out to Dr. Javier Dooley in radiation oncology after I spoke with the patient's wife, and she states that palliative radiation to the cervical spine is certainly an option, though it would be more for symptom relief and would not immediately stabilize this area, so a hard collar would be needed long-term. The patient's wife is considering options and a family meeting is planned for later today. 2. Acute resp failure 2/2 to COPD with acute exacerbation. I don't think this is secondary to progressive lung metastases, given the low burden of mets in his lungs. Pulmonary following. 3. Metastatic non-small cell lung cancer (adenocarcinoma) PDL1 68%, EGFR/ALK/ROS1/BRAF negative He has disease within his lungs and bones. His cancer is not curable and management is with palliative intent. While the overall burden of disease within his lungs is rather small, the pathology results from the bone biopsy are most consistent with a lung primary, and no other apparent primary is evident on imaging. Hospitalized after C2 of therapy (3/8-3/10/2020) with COPD exacerbation. Received C3 on Pembrolizumab on 3/18. If we continue with therapy, we may need to consider the addition of chemotherapy given his repeated need for steroids, and the questionable benefit he is having from treatment so far. Not sure if he would tolerate chemotherapy based on his current performance status. Without treatment, his prognosis is likely only a few months. 4. Afib Cardio consulted : dilt gtt for rate control ECHO pending 5. COPD Follows with pulmonary as outpatient 6. Goals of care Palliative team consulted Plan for family meeting today with wife, Loretta Nelson and daughter, Eduard Renee [phone]. Have obtained clearance for family to be present for in person discussion. Patient seen in conjunction with Junior Leo NP.  
 
 
Signed By: Ashanti Mosley MD

## 2020-03-20 NOTE — PROGRESS NOTES
1830: TRANSFER - IN REPORT: 
 
Verbal report received from [place], RN(name) on Aakash Bundy  being received from CCU(unit) for routine progression of care Report consisted of patients Situation, Background, Assessment and  
Recommendations(SBAR). Information from the following report(s) SBAR, Procedure Summary, Intake/Output, MAR, Accordion, Recent Results and Cardiac Rhythm NSR was reviewed with the receiving nurse. Opportunity for questions and clarification was provided. Assessment completed upon patients arrival to unit and care assumed. Shift Sumary 1855: Patient arrived to floor, settled, admission questions completed 1910: Daughter came out of room following RN and asked what the patient's code status is. Rn informed her that it is currently DNR. Daughter says this is a mistake and the code status should be Full code. She says this is the patient's and wifes wishes and it should have been changed. Disucussed what full restorative measures with compressions and intubation looks like, including poor prognosis and considerable trauma, especially in light of cervical fractures. Daughter says she understands this and this is what the family wishes. Talked to Dr. Luther Estrada, on call MD, who says that this decision is the patient and wife's and that discussion can be had with patient in AM. Bedside and Verbal shift change report given to Carson Gallardo RN (oncoming nurse) by Mindy Strong Rn and April [address] RN (offgoing nurse). Report included the following information SBAR, Kardex, Procedure Summary, Intake/Output, MAR, Accordion and Recent Results. During report, daughter asked again about code status. Wife strongly agreed that patient was supposed to be a full code. Asked if the doctors had spoken directly to the patient about this- they said that the patient did not really know what he was agreeing to because he was in lot of pain. Talked again about deon's advanced disease state and what full restorative measures entail- Family is insistant that patient should be and wants to be full code. Patient not able to speak his own mind on the matter with family there.  Family said they are not leaving until code status is adressed with MD.

## 2020-03-20 NOTE — CONSULTS
ORTHOPEDIC SURGERY CONSULT Subjective:  
 
Date of Consultation:  March 20, 2020 Referring Physician:  Dr. Jeanette Schlatter Hola Frye is a 76 y.o. male who is being seen for C5 3 column fracture and C4 and C6 spinous process fractures. Patient has a long past medical history of lung cancer with bone metastases, TIA, CABG, CAD, CVA, aortic stenosis, COPD, depression, MVR, and chronic hypoxia. He presented to the [facility] yesterday with worsening hypoxia and respiratory failure. He was admitted to the hospital and on chest CT they found the above fractures. He was not placed in a hard collar in the ER. He denies recent injury/trauma. He denies worsening weakness. He is utilizing a rollator at home. He denies bowel or bladder incontinence. Denies neck pain. Patient Active Problem List  
 Diagnosis Date Noted  Acute and chronic respiratory failure with hypoxia ([address] 03/20/2020  Acute respiratory failure ([address] 03/10/2020  Acute respiratory failure with hypoxia ([address] 03/08/2020  Lung cancer metastatic to bone ([address] 01/31/2020  TIA (transient ischemic attack) 06/20/2019  Supratherapeutic INR 06/19/2019  S/P CABG x 1 08/25/2017  Pneumonia 08/25/2017  S/P AVR (aortic valve replacement) 08/25/2017  CAD (coronary artery disease) 08/25/2017  CVA (cerebral vascular accident) ([address] 06/12/2016  Aortic stenosis 06/12/2016  Anticoagulated 06/12/2016  COPD with acute exacerbation ([address] 10/22/2014  S/P MVR (mitral valve replacement) 10/22/2014  Tobacco abuse 10/22/2014  Throat cancer ([address] 10/22/2014  Depression 10/22/2014  COPD (chronic obstructive pulmonary disease) (HCC) Family History Problem Relation Age of Onset  Heart Disease Mother  Diabetes Mother  Hypertension Mother  Heart Disease Sister  Hypertension Sister  Hypertension Father  Cancer Brother   
     lung  Diabetes Brother  Heart Disease Brother  Hypertension Brother  Stroke Brother Social History Tobacco Use  Smoking status: Former Smoker Packs/day: 3.00 Years: 40.00 Pack years: 120.00 Last attempt to quit: 2019 Years since quittin.2  Smokeless tobacco: Never Used Substance Use Topics  Alcohol use: No  
 
Past Medical History:  
Diagnosis Date  Asthma  CAD (coronary artery disease)  Cancer ([facility] \"10 years ago\" throat  COPD (chronic obstructive pulmonary disease) ([facility])  CVA (cerebral vascular accident) ([facility]  H/O mechanical aortic valve replacement CT surgery  - CABG (LIMA to LAD) and AVR ([facility])  Hypertension  S/P CABG x 1   
 CT surgery  - CABG (LIMA to LAD) and AVR ([facility]) Past Surgical History:  
Procedure Laterality Date  CARDIAC SURG PROCEDURE UNLIST  HX HEART VALVE SURGERY    
 CT surgery  - CABG (LIMA to LAD) and AVR ([facility])  HX HEMORRHOIDECTOMY  IR INSERT TUNL CVC W PORT OVER 5 YEARS  2020 Prior to Admission medications Medication Sig Start Date End Date Taking? Authorizing Provider  
morphine 30 mg TR12 Take 30 mg by mouth three (3) times daily. Yes Provider, Historical  
oxyCODONE IR (ROXICODONE) 5 mg immediate release tablet Take 5 mg by mouth every three to four (3-4) hours as needed for Pain. Yes Provider, Historical  
fentaNYL (DURAGESIC) 12 mcg/hr patch 1 Patch by TransDERmal route every seventy-two (72) hours. Yes Provider, Historical  
oxyCODONE IR (OXY-IR) 15 mg immediate release tablet Take 1 Tab by mouth every three (3) hours as needed for Pain for up to 15 days. Max Daily Amount: 120 mg. 3/18/20 4/2/20  Vani Roberts MD  
naloxone (Narcan) 4 mg/actuation nasal spray Use 1 spray intranasally, then discard. Repeat with new spray every 2 min as needed for opioid overdose symptoms, alternating nostrils.  3/18/20   Taisha Lynch MD  
 predniSONE (DELTASONE) 20 mg tablet Take 3 tabs a day for 3 days, then 2 tabs a day for 3 days, then 1 tab a day for 3 days. 3/10/20   Bubba Matthews MD  
polyethylene glycol [facility]) 17 gram packet Take 17 g by mouth daily. Provider, Historical  
senna (SENNA) 8.6 mg tablet Take 2 Tabs by mouth nightly. Provider, Historical  
brimonidine (ALPHAGAN) 0.2 % ophthalmic solution Administer 1 Drop to both eyes two (2) times a day. Provider, Historical  
0.9% sodium chloride solp 100 mL with pembrolizumab 25 mg/mL soln 200 mg by IntraVENous route every twenty-one (21) days. Provider, Historical  
warfarin (COUMADIN) 1 mg tablet Take 1 mg by mouth every other day. Provider, Historical  
hydroxypropyl methylcellulose (GENTEAL) 0.2 % ophthalmic solution 2 drops right eye every 1-2 hours while awake 11/17/19   Chai Iglesias MD  
latanoprost (XALATAN) 0.005 % ophthalmic solution Administer 1 Drop to both eyes nightly. Provider, Historical  
amLODIPine (NORVASC) 10 mg tablet Take 10 mg by mouth daily. Provider, Historical  
albuterol (PROVENTIL VENTOLIN) 2.5 mg /3 mL (0.083 %) nebulizer solution 3 mL by Nebulization route every four (4) hours as needed for Wheezing. 3/17/19   Arleen Villa MD  
promethazine (PHENERGAN) 12.5 mg tablet Take 25 mg by mouth every eight (8) hours as needed. Indications: Nausea    Provider, Historical  
atorvastatin (LIPITOR) 80 mg tablet Take 80 mg by mouth every evening. Provider, Historical  
warfarin (COUMADIN) 5 mg tablet Take 2.5 mg by mouth every other day. Provider, Historical  
albuterol (PROVENTIL HFA, VENTOLIN HFA, PROAIR HFA) 90 mcg/actuation inhaler Take 2 Puffs by inhalation every six (6) hours as needed. Other, MD Kallie  
fluticasone-salmeterol (ADVAIR) 500-50 mcg/dose diskus inhaler Take 1 Puff by inhalation every twelve (12) hours.     Other, MD Kallie  
clonazepam (KLONOPIN) 0.5 mg tablet Take 0.5 mg by mouth three (3) times daily. 10/14/14   Kallie Bautista MD  
escitalopram oxalate (LEXAPRO) 10 mg tablet Take 10 mg by mouth daily. 10/21/14   Kallie Bautista MD  
isosorbide mononitrate (ISMO, MONOKET) 10 mg tablet Take 10 mg by mouth three (3) times daily. Patient takes at [address] [place] [address], noon, and 5pm 10/21/14   Kallie Bautista MD  
 
Current Facility-Administered Medications Medication Dose Route Frequency  dilTIAZem (CARDIZEM) 100 mg in 0.9% sodium chloride (MBP/ADV) 100 mL infusion  0-15 mg/hr IntraVENous TITRATE  albuterol-ipratropium (DUO-NEB) 2.5 MG-0.5 MG/3 ML  3 mL Nebulization Q6H RT  
 methylPREDNISolone (PF) (SOLU-MEDROL) injection 40 mg  40 mg IntraVENous Q8H  
 guaiFENesin ER (MUCINEX) tablet 1,200 mg  1,200 mg Oral Q12H  
 benzonatate (TESSALON) capsule 100 mg  100 mg Oral TID PRN  
 atorvastatin (LIPITOR) tablet 80 mg  80 mg Oral QPM  
 brimonidine (ALPHAGAN) 0.2 % ophthalmic solution 1 Drop  1 Drop Both Eyes BID  clonazePAM (KlonoPIN) tablet 0.5 mg  0.5 mg Oral TID  escitalopram oxalate (LEXAPRO) tablet 10 mg  10 mg Oral DAILY  fentaNYL (DURAGESIC) 25 mcg/hr patch 1 Patch  1 Patch TransDERmal Q72H  budesonide (PULMICORT) 500 mcg/2 ml nebulizer suspension  500 mcg Nebulization BID RT  
 arformoteroL (BROVANA) neb solution 15 mcg  15 mcg Nebulization BID RT  
 gabapentin (NEURONTIN) capsule 300 mg  300 mg Oral TID  isosorbide mononitrate (ISMO, MONOKET) tablet 10 mg  10 mg Oral TID  latanoprost (XALATAN) 0.005 % ophthalmic solution 1 Drop  1 Drop Both Eyes QHS  oxyCODONE IR (ROXICODONE) tablet 15 mg  15 mg Oral Q3H PRN  polyethylene glycol (MIRALAX) packet 17 g  17 g Oral DAILY  senna (SENOKOT) tablet 17.2 mg  2 Tab Oral QHS  sodium chloride (NS) flush 5-40 mL  5-40 mL IntraVENous Q8H  
 sodium chloride (NS) flush 5-40 mL  5-40 mL IntraVENous PRN  
 acetaminophen (TYLENOL) tablet 650 mg  650 mg Oral Q4H PRN  
 HYDROmorphone (PF) (DILAUDID) injection 1 mg  1 mg IntraVENous Q4H PRN  
  naloxone (NARCAN) injection 0.4 mg  0.4 mg IntraVENous PRN  
 diphenhydrAMINE (BENADRYL) injection 12.5 mg  12.5 mg IntraVENous Q4H PRN  
 ondansetron (ZOFRAN) injection 4 mg  4 mg IntraVENous Q6H PRN  
 docusate sodium (COLACE) capsule 100 mg  100 mg Oral BID  
 0.9% sodium chloride infusion  75 mL/hr IntraVENous CONTINUOUS  
 Warfarin - Pharmacy to Dose   Other Rx Dosing/Monitoring  warfarin (COUMADIN) tablet 2.5 mg  2.5 mg Oral ONCE  carboxymethylcellulose sodium (CELLUVISC) 1 % ophthalmic solution 2 Drop  2 Drop Both Eyes PRN  
 dilTIAZem CD (CARDIZEM CD) capsule 240 mg  240 mg Oral DAILY Allergies Allergen Reactions  Adenosine Shortness of Breath Tolerates well when given for SVT, per Dr. Li Jauregui. Given on 3/8/2020  Iodinated Contrast Media Shortness of Breath  Pcn [Penicillins] Shortness of Breath Patient reports to have tolerated a 10 day course of cefdinir started 17 Review of Systems:  Pertinent items are noted in HPI. Objective:  
 
Patient Vitals for the past 8 hrs: 
 BP Temp Pulse Resp SpO2 Height Weight  
20 0909 128/87     5' 6\" (1.676 m) 61.7 kg (136 lb)  
20 0828     99 %    
20 0817     98 %    
20 0700 122/69  79 20 100 %    
20 0600 120/64  79 20 100 %    
20 0500 120/76  79 16 100 %    
20 0454   87      
20 0410 127/83 98.3 °F (36.8 °C) 85 17 98 %    
20 0330 123/65 97.8 °F (36.6 °C) 88 17 99 %    
20 0315 126/67  84 17 98 %    
20 0300 131/66  88 20 99 %    
20 0245 132/74  92 21 98 %   Temp (24hrs), Av °F (36.7 °C), Min:97.8 °F (36.6 °C), Max:98.3 °F (36.8 °C) EXAM: GEN: Ill appearing male in NAD  
PSYCH:  AAO x 4 MUSC/NEURO:  No pain with palpation of the cervical spine. No palpable stepoff. Able to raise both arms. Able to rotate neck left and right.  strength 5/5 bl.  WF and WE: 5/5 bl. Negative hoffmans bilateral.  Difficult to assess elbow flexion and elbow extension due to positioning. DF and PF: 5/5. S/LT intact L1-S1. Quad and HS: 4/5. EHL and EFL: 5/5. IMAGING: 
IMPRESSION:  
1. Three-column pathologic fracture involves the vertebral body of C5 and the 
posterior elements of C4 and C6. This is likely unstable. 2. Probable epidural extension at C5. Tumor narrows the left C5 neural foramen. 3. New T7 and L1 compression fractures. 4. Diffuse osseous metastases, increased from 6/21/2019. 
5. Increased, spiculated, 11 mm, right upper lobe nodule. 6. New, subpleural, 11 mm, right upper lobe nodule. 7. Probable additional nodules in the right upper lobe; evaluation limited by 
respiratory motion. Data Review Recent Results (from the past 24 hour(s)) CBC WITH AUTOMATED DIFF Collection Time: 03/20/20  1:47 AM  
Result Value Ref Range WBC 12.5 (H) 4.1 - 11.1 K/uL  
 RBC 3.91 (L) 4.10 - 5.70 M/uL  
 HGB 11.9 (L) 12.1 - 17.0 g/dL HCT 35.1 (L) 36.6 - 50.3 % MCV 89.8 80.0 - 99.0 FL  
 MCH 30.4 26.0 - 34.0 PG  
 MCHC 33.9 30.0 - 36.5 g/dL  
 RDW 14.8 (H) 11.5 - 14.5 % PLATELET 845 457 - 993 K/uL MPV 8.8 (L) 8.9 - 12.9 FL  
 NRBC 0.0 0  WBC ABSOLUTE NRBC 0.00 0.00 - 0.01 K/uL NEUTROPHILS 91 (H) 32 - 75 % LYMPHOCYTES 2 (L) 12 - 49 % MONOCYTES 6 5 - 13 % EOSINOPHILS 0 0 - 7 % BASOPHILS 0 0 - 1 % IMMATURE GRANULOCYTES 1 (H) 0.0 - 0.5 % ABS. NEUTROPHILS 11.3 (H) 1.8 - 8.0 K/UL  
 ABS. LYMPHOCYTES 0.3 (L) 0.8 - 3.5 K/UL  
 ABS. MONOCYTES 0.8 0.0 - 1.0 K/UL  
 ABS. EOSINOPHILS 0.0 0.0 - 0.4 K/UL  
 ABS. BASOPHILS 0.0 0.0 - 0.1 K/UL  
 ABS. IMM. GRANS. 0.1 (H) 0.00 - 0.04 K/UL  
 DF SMEAR SCANNED    
 RBC COMMENTS ELYSE CELLS 
PRESENT 
    
 RBC COMMENTS SCHISTOCYTES PRESENT 
    
 WBC COMMENTS TOXIC GRANULATION    
PROTHROMBIN TIME + INR Collection Time: 03/20/20  1:47 AM  
Result Value Ref Range INR 1.3 (H) 0.9 - 1.1 Prothrombin time 13.6 (H) 9.0 - 11.1 sec METABOLIC PANEL, COMPREHENSIVE Collection Time: 03/20/20  1:47 AM  
Result Value Ref Range Sodium 131 (L) 136 - 145 mmol/L Potassium 3.8 3.5 - 5.1 mmol/L Chloride 95 (L) 97 - 108 mmol/L  
 CO2 30 21 - 32 mmol/L Anion gap 6 5 - 15 mmol/L Glucose 137 (H) 65 - 100 mg/dL BUN 22 (H) 6 - 20 MG/DL Creatinine 0.70 0.70 - 1.30 MG/DL  
 BUN/Creatinine ratio 31 (H) 12 - 20 GFR est AA >60 >60 ml/min/1.73m2 GFR est non-AA >60 >60 ml/min/1.73m2 Calcium 8.0 (L) 8.5 - 10.1 MG/DL Bilirubin, total 1.2 (H) 0.2 - 1.0 MG/DL  
 ALT (SGPT) 151 (H) 12 - 78 U/L  
 AST (SGOT) 48 (H) 15 - 37 U/L Alk. phosphatase 168 (H) 45 - 117 U/L Protein, total 6.0 (L) 6.4 - 8.2 g/dL Albumin 2.9 (L) 3.5 - 5.0 g/dL Globulin 3.1 2.0 - 4.0 g/dL A-G Ratio 0.9 (L) 1.1 - 2.2 NT-PRO BNP Collection Time: 03/20/20  1:47 AM  
Result Value Ref Range NT pro- (H) <125 PG/ML  
TROPONIN I Collection Time: 03/20/20  1:47 AM  
Result Value Ref Range Troponin-I, Qt. <0.05 <0.05 ng/mL ECHO ADULT COMPLETE Collection Time: 03/20/20  9:09 AM  
Result Value Ref Range LA Volume 45.97 18 - 58 mL Ao Root D 3.29 cm Assessment/Plan:  
A: 1. C5 3 column fracture 2. C4 spinous process fracture 3. C6 spinous process fracture 4. Lung CA with ivy mets P: 1. C5 3 column fracture: This was found on CT, but not a dedicated CT of the spine. This is an unstable fracture and needs MRI of the C-L spine to determine extent of fracture/epidural extension, but I am unsure of his lung Ca life expectancy/hospice plan/capacity to have surgery. I will coordinate with oncology and palliative to determine best course of treatment for him. He needs to be in the hard collar at all times. Avoid flexion, extension, and rotation of the cspine.   Log roll is best to move patient. Ok to sit up for swallow evaluation today. Ok to situp in bed if needed for nutrition reasons. 2. C4 and C6: need further evaluation with MRI. See above for plan. 3. Lung ca with ivy mets: Oncology and palliative to see. 4. Orthopedics will follow. Discussed case with Dr. Lyndia Severance who agrees with plan. GALI Chavez Orthopaedic Surgery  [facility]

## 2020-03-20 NOTE — PROGRESS NOTES
Patient seen for clinical progress following admission last night. No new changes. He has been stable. Discussed with orthopedic surgery and Oncology. Discussions underway with family to determine goals of care to guide further orthopedic testing and management. No other changes made.

## 2020-03-20 NOTE — ACP (ADVANCE CARE PLANNING)
Advance Care Planning (ACP) Provider Note - Comprehensive Date of ACP Conversation: 03/20/20 Diagnosis: stage 4 lung CA Persons included in Conversation:  patient Length of ACP Conversation in minutes:  16 minutes Authorized Decision Maker (if patient is incapable of making informed decisions): This person is: 
wife, Beryle Gelineau Primary Decision Maker: Allyson Woodson - Spouse - [phone] General ACP for ALL Patients with Decision Making Capacity: 
 Exploration of values, goals, and preferences if recovery is not expected, even with continued medical treatment in the event of: Imminent death Review of Existing Advance Directive: 
Given advanced lung CA and poor prognosis, ACP was initiated. The patient is a poor historian but he understood about why he came to the hospital and his underlying diseases. the patient did not want to be resuscitated. He has not thought about hospice and wanted me to speak with his wife. Goal is to return home For Serious or Chronic Illness: 
Understanding of medical condition Interventions Provided: 
Recommended communicating the plan and making copies for the healthcare agent, personal physician, and others as appropriate (e.g., health system)

## 2020-03-20 NOTE — ACP (ADVANCE CARE PLANNING)
Advance Care Planning 3/20/2020 Patient's Healthcare Decision Maker is: Legal Next of Kin Primary Decision Maker Name Mike Escalera Primary Decision Maker Phone Number [phone] Primary Decision Maker Relationship to Patient Wife Confirm Advance Directive No  
Patient Would Like to Complete Advance Directive No  
   
 
 
Pt does not have AMD on file, declined to complete during February 2020 Palliative clinic appt, stated at that time that his wife Vasile Lane (legal NOK) would be his surrogate decision maker in the event pt is unable to speak for himself. Pt currently has inpt DNR order in place, will need DDNR form completed prior to discharge.

## 2020-03-21 PROBLEM — D72.829 LEUKOCYTOSIS: Status: ACTIVE | Noted: 2020-01-01

## 2020-03-21 PROBLEM — Z86.73 HX OF COMPLETED STROKE: Status: ACTIVE | Noted: 2020-01-01

## 2020-03-21 PROBLEM — J96.00 ACUTE RESPIRATORY FAILURE (HCC): Status: RESOLVED | Noted: 2020-01-01 | Resolved: 2020-01-01

## 2020-03-21 PROBLEM — J18.9 PNEUMONIA: Status: RESOLVED | Noted: 2017-08-25 | Resolved: 2020-01-01

## 2020-03-21 PROBLEM — G45.9 TIA (TRANSIENT ISCHEMIC ATTACK): Status: RESOLVED | Noted: 2019-01-01 | Resolved: 2020-01-01

## 2020-03-21 PROBLEM — D64.9 ANEMIA: Status: ACTIVE | Noted: 2020-01-01

## 2020-03-21 PROBLEM — R79.1 SUPRATHERAPEUTIC INR: Status: RESOLVED | Noted: 2019-01-01 | Resolved: 2020-01-01

## 2020-03-21 PROBLEM — Z79.01 CHRONIC ANTICOAGULATION: Status: ACTIVE | Noted: 2020-01-01

## 2020-03-21 PROBLEM — J96.01 ACUTE RESPIRATORY FAILURE WITH HYPOXIA (HCC): Status: RESOLVED | Noted: 2020-01-01 | Resolved: 2020-01-01

## 2020-03-21 NOTE — PROGRESS NOTES
Shift Summary 0730: Bedside and Verbal shift change report given to Prateek Ballesteros RN and Shanthi Dotson,  (oncoming nurse) by Mikel Joe RN (offgoing nurse). Report included the following information SBAR, Kardex, Procedure Summary, Intake/Output, MAR, Accordion, Recent Results and Cardiac Rhythm SA. 
 
1000: Wife and daughter at bedside. They say they changed their mind and decided they wanted patient to have MRI done. Notified Dr. Patricio Sanchez, who will speak to wife and patient. 1200: Orders received to transfer patient to medical. Awaiting bed.

## 2020-03-21 NOTE — PROGRESS NOTES
TRANSFER - OUT REPORT: 
 
Verbal report given to Tessa Brooks (name) on Imani Sers  being transferred to Medical Surgical (unit) for routine progression of care Report consisted of patients Situation, Background, Assessment and  
Recommendations(SBAR). Information from the following report(s) SBAR, Kardex, MAR, Accordion, Recent Results and Cardiac Rhythm NS at 67 was reviewed with the receiving nurse. Lines:  
Venous Access Device PORT 02/07/20 Upper chest (subclavicular area, right (Active) Subcutaneous Infusion Line 03/20/20 Right  (Active) Site Assessment Clean, dry, & intact 3/21/2020  3:56 PM  
Dressing Status Clean, dry, & intact 3/21/2020  3:56 PM  
Dressing Type Disk with Chlorhexadine gluconate (CHG) 3/21/2020  3:56 PM  
Hub Color/Line Status Infusing;Patent 3/21/2020  3:56 PM  
Action Taken Open ports on tubing capped 3/21/2020  3:56 PM  
  
 
Opportunity for questions and clarification was provided. Patient transported with: 
 O2 @ 3 liters Registered Nurse Tech  
NS at 75ml/hr via pump

## 2020-03-21 NOTE — PROGRESS NOTES
Cardiology Daily Progress Note Corazon Valladares is a 76 y.o. male with PMH of HTN, AVR, CAD, AFIB, Lung CA with mets. Afib converted to SR on Dilt. Remain in SR. Visit Vitals /62 Pulse 66 Temp 97.9 °F (36.6 °C) Resp 16 Ht 5' 6\" (1.676 m) Wt 125 lb (56.7 kg) SpO2 99% BMI 20.18 kg/m² Intake/Output Summary (Last 24 hours) at 3/21/2020 1014 Last data filed at 3/21/2020 2989 Gross per 24 hour Intake 2272.5 ml Output 1500 ml Net 772.5 ml  
 
General appearance -he is in a neck collar. Mental status -drowsy and sleepy. Chest - clear to auscultation, no wheezes, rales or rhonchi Heart - normal rate, regular rhythm, normal S1, S2, no murmurs, rubs, clicks or gallops Extremities - peripheral pulses normal, no pedal edema Current Facility-Administered Medications Medication Dose Route Frequency  albuterol-ipratropium (DUO-NEB) 2.5 MG-0.5 MG/3 ML  3 mL Nebulization Q6H RT  
 methylPREDNISolone (PF) (SOLU-MEDROL) injection 40 mg  40 mg IntraVENous Q8H  
 guaiFENesin ER (MUCINEX) tablet 1,200 mg  1,200 mg Oral Q12H  
 benzonatate (TESSALON) capsule 100 mg  100 mg Oral TID PRN  
 atorvastatin (LIPITOR) tablet 80 mg  80 mg Oral QPM  
 brimonidine (ALPHAGAN) 0.2 % ophthalmic solution 1 Drop  1 Drop Both Eyes BID  clonazePAM (KlonoPIN) tablet 0.5 mg  0.5 mg Oral TID  escitalopram oxalate (LEXAPRO) tablet 10 mg  10 mg Oral DAILY  budesonide (PULMICORT) 500 mcg/2 ml nebulizer suspension  500 mcg Nebulization BID RT  
 arformoteroL (BROVANA) neb solution 15 mcg  15 mcg Nebulization BID RT  
 isosorbide mononitrate (ISMO, MONOKET) tablet 10 mg  10 mg Oral TID  latanoprost (XALATAN) 0.005 % ophthalmic solution 1 Drop  1 Drop Both Eyes QHS  oxyCODONE IR (ROXICODONE) tablet 15 mg  15 mg Oral Q3H PRN  polyethylene glycol (MIRALAX) packet 17 g  17 g Oral DAILY  senna (SENOKOT) tablet 17.2 mg  2 Tab Oral QHS  sodium chloride (NS) flush 5-40 mL  5-40 mL IntraVENous Q8H  
 sodium chloride (NS) flush 5-40 mL  5-40 mL IntraVENous PRN  
 acetaminophen (TYLENOL) tablet 650 mg  650 mg Oral Q4H PRN  
 HYDROmorphone (PF) (DILAUDID) injection 1 mg  1 mg IntraVENous Q4H PRN  
 naloxone (NARCAN) injection 0.4 mg  0.4 mg IntraVENous PRN  
 diphenhydrAMINE (BENADRYL) injection 12.5 mg  12.5 mg IntraVENous Q4H PRN  
 ondansetron (ZOFRAN) injection 4 mg  4 mg IntraVENous Q6H PRN  
 docusate sodium (COLACE) capsule 100 mg  100 mg Oral BID  
 0.9% sodium chloride infusion  75 mL/hr IntraVENous CONTINUOUS  
 Warfarin - Pharmacy to Dose   Other Rx Dosing/Monitoring  carboxymethylcellulose sodium (CELLUVISC) 1 % ophthalmic solution 2 Drop  2 Drop Both Eyes PRN  
 dilTIAZem CD (CARDIZEM CD) capsule 240 mg  240 mg Oral DAILY  dilTIAZem (CARDIZEM) 125 mg in dextrose 5% 125 mL infusion  0-15 mg/hr IntraVENous TITRATE  
  
 
CT surgery 1994 - CABG (LIMA to LAD) and AVR ([facility]) Echo 10/14 - Mod LVH. LVEF 60%. Mild-mod LAE. AV not well seen - Mean gradient 20 mmHg. Exercise Cardiolite 10/15 - walked 5:25 - inferior ST depression on stress EKG; normal MPI, LVEF 59% Echo 6/12/16 - LVEF 65%. RV normal. Neg bubble study. Aortic valve: A mechanical prosthesis was present - It exhibited normal function - Valve mean gradient [facility] Assessment and plan: 
 
· PAF with RVR on admission: back in SR now. Continue Dilt for rate control for PAF. On warfarin on admission. EF normal.  
· A/C resp failure · CAD s/p 1 V CABG: no chest pain · S/P AVR (1994): No change in AVR function on Echo. · COPD: per medicine team  
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  
· HTN: norvasc > dilt . · Cervical spine fracture: incidental findings on CT.  Ortho following. Will sign off. Please call if any questions. ___________________________________________________ Feliciano Morejon MD, [facility]

## 2020-03-21 NOTE — PROGRESS NOTES
Orthopaedic Progress Note March 21, 2020 10:54 AM  
 
Patient: Melania Amaya MRN: [id number]  SSN: [id number] YOB: 1945  Age: 76 y.o. Sex: male Admit date:  3/20/2020 Admitting Physician:  Marta Garcia MD  
Consulting Physician(s): Treatment Team: Attending Provider: Anjel Waller MD; Consulting Provider: GALI Diane; Consulting Provider: Mike Hunter MD; Consulting Provider: Jahaira Downing MD; Consulting Provider: Cedric Martinez MD; Utilization Review: Dasia Harrell RN; Care Manager: Brit Rao; Consulting Provider: Anila Newby MD; Utilization Review: Jeanna Erazo RN 
 
SUBJECTIVE: 
  
Melania Amaya is a 76 y.o. male is resting in bed. Hard collar was malpositioned. Repositioned it today. He tells me he does not want to wear the collar. He would rather live without it. Family does not want anything from a surgical standpoint. No complaints of nausea, vomiting, dizziness, lightheadedness, chest pain, or shortness of breath. OBJECTIVE: 
 
  
Physical Exam: 
General: Alert, cooperative, no distress. Respiratory: Respirations unlabored Neurological:  Neurovascular exam within normal limits. Motor: + DF/PF. Able to lift left arm. Flexion at elbow 4/5. Extension: 5/5. WF and WE: 5/5 left hand. WF and WE: 5/5 right hand.  strength 5/5 BL. Able to move both legs. PF and DF remains 5/5 BL. S/LT intact Musculoskeletal: Calves soft, supple, non-tender upon palpation. DP and PT + BL LE IMAGING: 
  IMPRESSION: 
  
1. Multiple lytic metastases as above. 2. Moderate pathologic compression fracture of C5. Tumor extends into the 
posterior elements on the left destroying the bone. Local invasion is seen into 
the adjacent C4 and C6 posterior elements. Fractures extend into the posterior 
elements. Severe left neural foraminal narrowing is present at C4-5 and C5-6 
secondary to tumor. 3. Mild pathologic compression fracture of C6. 
4. Pathologic fracture of the posterior elements of C2. 
5. Spondylosis as above. 6. Spiculated nodule again seen in the right apex. Vital Signs:  
  
 
Patient Vitals for the past 8 hrs: 
 BP Temp Pulse Resp SpO2 Weight  
20 1100 129/69 97.4 °F (36.3 °C) 81 22 99 %   
20 1006 132/62  66     
20 0821     99 %   
20 0816     100 %   
20 0725 132/76 97.9 °F (36.6 °C) 62 16 99 %   
20 0708 149/67 98 °F (36.7 °C) 68 22 98 %   
20 0702   64     
20 0611      56.7 kg (125 lb)  
20 0600 127/70  71 24 97 %   
20 0500 128/71  67 20 97 %  Temp (24hrs), Av.8 °F (36.6 °C), Min:97.1 °F (36.2 °C), Max:98.6 °F (37 °C) Labs:  
  
 
Recent Labs  
  20 
0441 HCT 30.3* HGB 10.2* INR 1.8* Lab Results Component Value Date/Time Sodium 133 (L) 2020 04:41 AM  
 Potassium 3.9 2020 04:41 AM  
 Chloride 101 2020 04:41 AM  
 CO2 28 2020 04:41 AM  
 Glucose 186 (H) 2020 04:41 AM  
 BUN 16 2020 04:41 AM  
 Creatinine 0.61 (L) 2020 04:41 AM  
 Calcium 8.0 (L) 2020 04:41 AM  
 
 
PT/OT:  
 
  
  
  
 
Patient mobility ASSESSMENT / PLAN:  
Principal Problem: 
  Acute and chronic respiratory failure with hypoxia ([address] (3/20/2020) Active Problems: COPD with acute exacerbation ([address] (10/22/2014) S/P MVR (mitral valve replacement) (10/22/2014) Depression (10/22/2014) Aortic stenosis (2016) S/P CABG x 1 (2017) Overview: CT surgery  - CABG (LIMA to LAD) and AVR ([place]) S/P AVR (aortic valve replacement) (2017) CAD (coronary artery disease) (2017) Lung cancer metastatic to bone ([facility] (2020) PAF (paroxysmal atrial fibrillation) ([facility] (3/20/2020) Multiple fractures of cervical spine ([facility] (3/20/2020) Hx of completed stroke (3/21/2020) Anemia (3/21/2020) Leukocytosis (3/21/2020) Chronic anticoagulation (3/21/2020) A: 1. Pathologic fractures of C2, C4, C5, and C6 2. Left sided foraminal stenosis at C5-C6 due to tumor 3. C5 3 column fracture 4. Lung Ca with ivy mets P: 1. Long discussion with family today regarding diagnosis, treatment, and outcomes. Explained the risks of an unstable C5 fracture with impending central cord syndrome. I explained to the wife multiple times that a hard collar is not the long term treatment for this especially with a large tumor that is likely to cause cord compression. I explained that central cord syndrome leads to complete paralysis and that he would not have a quality of life that he was once used to. There is also a high potential of central cord syndrome with intubation and that he would likely die from this. I offered surgical options to the patient including halo placement and resection of tumor with fusion. They did not want to pursue this. For that reason, I would not recommend further imaging to evaluate ligamentous structures and tumor progression. Family was well educated on outcomes in this setting and I offered my assistance in the future if they wished to pursue the above. They understand that no intervention on his spine will result in paralysis and death. They agreed to no further intervention. Orthopedic surgery will sign off as I have nothing further to offer at this point. Signed By: 
GALI Michel [address] [facility]

## 2020-03-21 NOTE — PROGRESS NOTES
[facility] Medical Progress Note NAME: Davin Coleman :  1945 MRM:  [id number] Date/Time of service 3/21/2020  11:20 AM    
 
  
Assessment and Plan:  
 
Acute and chronic respiratory failure with hypoxia - POA, likely a combination of COPD and lung cancer, with deconditioning and splinting contributing. Pulmonary consulted. Oxygen as needed. Family re converted to full code. Intubation in this gravely ill patient with unstable C5 would almost certainly lead to paralysis, and probable death. COPD with acute exacerbation / leukocytosis - POA, unclear trigger. Check RVP. Pulmonary consulted. 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 Patient too debilitated for even palliative treatment. Family has not accepted prognosis. Hospice is the only reasonable option. Prn oxycodone po and prn IV dilaudid Multiple fractures of cervical spine - Noted on CT. Likely unstable C5. Wearing C collar. Ortho consulted, and initially risky intervention declined, but now family interested. I have attempted to contact family to discuss this, without success. PAF (paroxysmal atrial fibrillation) / Aortic stenosis S/P AVR (aortic valve replacement) / S/P MVR (mitral valve replacement) - POA and initially required diltizem, now stable on PO diltiazem and coumadin. CAD (coronary artery disease) S/P CABG / hyperlipidemia - No acute symptoms. Continue ISMO. No role for statin with impending death. Glaucoma - continue alphagan and xalatan Depression - Continue lexapro and prn clonazepam 
 
Debilitated patient / Hx of completed stroke - No hope of recovery or rehab. Hospice is only reasonable option. Anemia - POA due to chronic disease and chemo. GI loss due to coumadin not excluded, but not safe for any workup. Subjective: Chief Complaint:  Frail, unable to converse ROS: 
(bold if positive, if negative) Not Tolerating PT  Not Tolerating Diet Objective:  
 
Last 24hrs VS reviewed since prior progress note. Most recent are: 
 
Visit Vitals /69 Pulse 81 Temp 97.4 °F (36.3 °C) Resp 22 Ht 5' 6\" (1.676 m) Wt 56.7 kg (125 lb) SpO2 99% BMI 20.18 kg/m² SpO2 Readings from Last 6 Encounters:  
03/21/20 99% 03/18/20 94% 03/18/20 98% 03/18/20 99% 03/10/20 100% 03/03/20 (!) 88% O2 Flow Rate (L/min): 3 l/min Intake/Output Summary (Last 24 hours) at 3/21/2020 1120 Last data filed at 3/21/2020 7468 Gross per 24 hour Intake 2272.5 ml Output 1500 ml Net 772.5 ml Physical Exam: 
 
Gen:  Thin, frail, cachectic, in no acute distress HEENT:  Pink conjunctivae, PERRL, hearing intact to voice, moist mucous membranes Neck:  Wearing C collar, without masses, thyroid non-tender Resp:  No accessory muscle use, bilateral reduced coarse breath sounds Card:  No murmurs, normal S1, S2 without thrills, bruits or peripheral edema Abd:  Soft, non-tender, non-distended, reduced bowel sounds are present, no mass Lymph:  No cervical or inguinal adenopathy Musc:  No cyanosis or clubbing Skin:  No rashes or ulcers, skin turgor is reduced Neuro:  Cranial nerves are grossly intact, severe motor weakness, does not follow commands appropriately Psych:  Poor insight, oriented to person, place and time, alert Telemetry reviewed:   normal sinus rhythm 
__________________________________________________________________ Medications Reviewed: (see below) Medications:  
 
Current Facility-Administered Medications Medication Dose Route Frequency  albuterol-ipratropium (DUO-NEB) 2.5 MG-0.5 MG/3 ML  3 mL Nebulization Q6H RT  
 methylPREDNISolone (PF) (SOLU-MEDROL) injection 40 mg  40 mg IntraVENous Q8H  
 guaiFENesin ER (MUCINEX) tablet 1,200 mg  1,200 mg Oral Q12H  benzonatate (TESSALON) capsule 100 mg  100 mg Oral TID PRN  
 atorvastatin (LIPITOR) tablet 80 mg  80 mg Oral QPM  
 brimonidine (ALPHAGAN) 0.2 % ophthalmic solution 1 Drop  1 Drop Both Eyes BID  clonazePAM (KlonoPIN) tablet 0.5 mg  0.5 mg Oral TID  escitalopram oxalate (LEXAPRO) tablet 10 mg  10 mg Oral DAILY  budesonide (PULMICORT) 500 mcg/2 ml nebulizer suspension  500 mcg Nebulization BID RT  
 arformoteroL (BROVANA) neb solution 15 mcg  15 mcg Nebulization BID RT  
 isosorbide mononitrate (ISMO, MONOKET) tablet 10 mg  10 mg Oral TID  latanoprost (XALATAN) 0.005 % ophthalmic solution 1 Drop  1 Drop Both Eyes QHS  oxyCODONE IR (ROXICODONE) tablet 15 mg  15 mg Oral Q3H PRN  polyethylene glycol (MIRALAX) packet 17 g  17 g Oral DAILY  senna (SENOKOT) tablet 17.2 mg  2 Tab Oral QHS  sodium chloride (NS) flush 5-40 mL  5-40 mL IntraVENous Q8H  
 sodium chloride (NS) flush 5-40 mL  5-40 mL IntraVENous PRN  
 acetaminophen (TYLENOL) tablet 650 mg  650 mg Oral Q4H PRN  
 HYDROmorphone (PF) (DILAUDID) injection 1 mg  1 mg IntraVENous Q4H PRN  
 naloxone (NARCAN) injection 0.4 mg  0.4 mg IntraVENous PRN  
 diphenhydrAMINE (BENADRYL) injection 12.5 mg  12.5 mg IntraVENous Q4H PRN  
 ondansetron (ZOFRAN) injection 4 mg  4 mg IntraVENous Q6H PRN  
 docusate sodium (COLACE) capsule 100 mg  100 mg Oral BID  
 0.9% sodium chloride infusion  75 mL/hr IntraVENous CONTINUOUS  
 Warfarin - Pharmacy to Dose   Other Rx Dosing/Monitoring  carboxymethylcellulose sodium (CELLUVISC) 1 % ophthalmic solution 2 Drop  2 Drop Both Eyes PRN  
 dilTIAZem CD (CARDIZEM CD) capsule 240 mg  240 mg Oral DAILY  dilTIAZem (CARDIZEM) 125 mg in dextrose 5% 125 mL infusion  0-15 mg/hr IntraVENous TITRATE Lab Data Reviewed: (see below) Lab Review:  
 
Recent Labs  
  03/21/20 
0441 03/20/20 
0147 03/18/20 
1339 WBC 12.6* 12.5* 16.2* HGB 10.2* 11.9* 11.9*  
HCT 30.3* 35.1* 34.8*  
 241 258 Recent Labs  
  03/21/20 
0441 03/20/20 
0147 03/18/20 
1339 * 131* 130*  
K 3.9 3.8 4.3  95* 95* CO2 28 30 30 * 137* 174* BUN 16 22* 19  
CREA 0.61* 0.70 0.75 CA 8.0* 8.0* 8.6 MG 2.3  --   --   
PHOS 1.9*  --   --   
ALB 2.4* 2.9* 3.1* TBILI 1.1* 1.2* 1.3*  
SGOT 40* 48* 50* * 151* 158* INR 1.8* 1.3*  --   
 
Lab Results Component Value Date/Time Glucose (POC) 94 11/17/2019 12:13 PM  
 Glucose (POC) 88 11/17/2019 12:01 PM  
 Glucose (POC) 97 06/19/2019 06:57 PM  
 Glucose (POC) 96 08/28/2017 12:11 PM  
 Glucose (POC) 120 (H) 08/28/2017 08:20 AM  
 
No results for input(s): PH, PCO2, PO2, HCO3, FIO2 in the last 72 hours. Recent Labs  
  03/21/20 
0441 03/20/20 
0147 INR 1.8* 1.3* All Micro Results None Other pertinent lab: none Total time spent with patient: 39 Minutes I personally reviewed chart, notes, data and current medications in the medical record. I have personally examined and treated the patient at bedside during this period. Care Plan discussed with: Patient, Family, Care Manager, Nursing Staff and >50% of time spent in counseling and coordination of care Discussed:  Code Status, Care Plan and D/C Planning Prophylaxis:  H2B/PPI Disposition:  Home w/Family 
        
___________________________________________________ Attending Physician: Gabi Mcrae MD

## 2020-03-21 NOTE — PROGRESS NOTES
[address] [facility] Adult  Hospitalist Group Advance Care Planning Note Name: Campos Rivera YOB: 1945 MRN: [id number] Admission Date: 3/20/2020  1:16 AM 
 
Date of discussion: 3/21/2020 Active Diagnoses: 
 
Hospital Problems  Date Reviewed: 3/21/2020 Hx of completed stroke Anemia Leukocytosis Chronic anticoagulation * (Principal) Acute and chronic respiratory failure with hypoxia (HCC) PAF (paroxysmal atrial fibrillation) ([address] Multiple fractures of cervical spine ([address] Lung cancer metastatic to bone ([address] S/P CABG x 1 S/P AVR (aortic valve replacement) CAD (coronary artery disease) Aortic stenosis COPD with acute exacerbation ([facility] S/P MVR (mitral valve replacement) Depression These active diagnoses are of sufficient risk that focused discussion on advance care planning is indicated in order to allow the patient to thoughtfully consider personal goals of care, and if situations arise that prevent the ability to personally give input, to ensure appropriate representation of their personal desires for different levels and aggressiveness of care. Discussion:  
 
Persons present and participating in discussion: Matt Quintana MD, wife by phone Discussion: C5 fracture treatment and prognosis in setting of end stage terminal cancer. Family had discussed moving forward with MRI neck today, but after I discussed risks (current unstable neck and current hypoxic failure) they now wish to not do that. I also informed them that as full code, with an unstable C5, he is at risk of permanent paralysis in case of attempted intubation. They continue to desire full code. I reiterated likelihood that resuscitation efforts would likely be futile and painful in the setting of his co morbidities, and they stated that is what they still want. Time Spent: Total time spent face-to-face in education and discussion: 19 minutes.   
 
Clari Pena MD 
Date of Service:  3/21/2020 
12:04 PM

## 2020-03-21 NOTE — PROGRESS NOTES
[facility] Pharmacy Dosing Services: 3/21/20 Consult for Warfarin Dosing by Pharmacy by Dr. Jessica Mclean provided for this 76 y.o.  male , for indication of Atrial Fibrillation,Mechanical Heart Valve (mitral and aortic). Day of Therapy resumed - recent home dose alternating 2.5 mg with 1 mg Dose to achieve an INR goal of 2.5- 3.5 Order entered for  Warfarin  2 (mg) ordered to be given today at 18:00. Significant drug interactions: none Previous dose given 2.5 mg  
PT/INR Lab Results Component Value Date/Time INR 1.8 (H) 03/21/2020 04:41 AM  
  
Platelets Lab Results Component Value Date/Time PLATELET 969 93/29/1095 04:41 AM  
  
H/H Lab Results Component Value Date/Time HGB 10.2 (L) 03/21/2020 04:41 AM  
  
 
Pharmacy to follow daily and will provide subsequent Warfarin dosing based on clinical status. Thang Horner)  Contact information [phone]

## 2020-03-22 NOTE — HOSPICE
[facility] to Those in Need 
[phone] Patient Name: Imani Martin YOB: 1945 Age: 76 y.o. [facility] RN Note:  Hospice consult noted. Chart reviewed. Plan of care discussed with patients nurse, Anabel Boyd RN & care manager, Joe Khan. Also spoke with Gilberto Prince NP Palliative care to review patient plan of care and family concerns. Telephone call with patient's wife, Oanh Pina, who is bedside patient. Room phone number: [phone]. Home number: [phone]. Rachel sounds overwhelmed, however, it is difficult to assess this over the phone. Rachel did not offer any information without asked specifically. She states that she is not familiar with hospice, she does not know what plans for discharge are at this time. Rachel admitted to speaking with Dr. Aida Handy this am, but is unable to share what was discussed. Discussed briefly Hospice philosophy, general plan of care, levels of care, services and on call procedures. Educated wife that hospice nurse is available to visit with her and her  on Monday. Wife is unable to give a time she will be available, and said that she would call us back after she decides. Plan: Educated wife that hospice nurse will be at the hospital on Monday, and the nurse would be available to visit with her. Plan to contact wife via phone if she is not in the hospital on Monday. Thank you for the opportunity to be of service to this patient. Please call Hospice with concerns or questions. Hospice would like to assist with caring for this patient and family, if family is agreeable to hospice services. Dr. Aida Handy updated through [address] [place] [address]. Nahum Odom RN, [facility] Hospice Nurse Liaison [phone] Mobile [phone] Office

## 2020-03-22 NOTE — HOSPICE
[facility] Good Help to Those in Need 
[phone] Patient Name: Bebeto Parks YOB: 1945 Age: 76 y.o. [facility] RN Note:  Verbal Hospice consult received from Care Manager Phyllis Butcher. Currently reviewing chart and plan to follow up with Unit Nurse and Care Manager to discuss plan of care, patient status and discharge disposition within the hour. Thank you for the opportunity to be of service to this patient. Candy Acevedo RN, [facility] Hospice Nurse Liaison [phone] [place] [phone] Office

## 2020-03-22 NOTE — ROUTINE PROCESS
Bedside and Verbal shift change report given to Kelsey Orlando RN (oncoming nurse) by Bernadene Dancer, RN (offgoing nurse). Report included the following information SBAR, Intake/Output, MAR and Recent Results.

## 2020-03-22 NOTE — ROUTINE PROCESS
Bedside shift change report given to IVAN Schwartz (oncoming nurse) by Sherman Verdin RN (offgoing nurse). Report included the following information SBAR, Kardex, Intake/Output, MAR, Accordion and Recent Results.

## 2020-03-22 NOTE — PROGRESS NOTES
[facility] Medical Progress Note NAME: Corazon Valladares :  1945 MRM:  [id number] Date/Time of service 3/22/2020  10:11 AM    
 
  
Assessment and Plan:  
 
Acute and chronic respiratory failure with hypoxia - POA, likely a combination of COPD and lung cancer, with deconditioning and splinting contributing. Pulmonary consulted. Oxygen as needed. Family re converted to full code. Intubation in this gravely ill patient with unstable C5 would almost certainly lead to paralysis, and probable death. COPD with acute exacerbation / leukocytosis - POA, unclear trigger. Check RVP. Pulmonary consulted. 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 Patient too debilitated for even palliative treatment. Family has not accepted prognosis. Hospice is the only reasonable option. Prn oxycodone po and prn IV dilaudid Multiple fractures of cervical spine - Noted on CT. Likely unstable C5. Wearing C collar. Ortho consulted, and initially risky intervention declined, but now family interested. I have attempted to contact family to discuss this, without success. PAF (paroxysmal atrial fibrillation) / Aortic stenosis S/P AVR (aortic valve replacement) / S/P MVR (mitral valve replacement) - POA and initially required diltizem, now stable on PO diltiazem and coumadin. CAD (coronary artery disease) S/P CABG / hyperlipidemia - No acute symptoms. Continue ISMO. No role for statin with impending death. Glaucoma - continue alphagan and xalatan Depression - Continue lexapro and prn clonazepam 
 
Debilitated patient / Hx of completed stroke - No hope of recovery or rehab. Hospice is only reasonable option. Anemia - POA due to chronic disease and chemo. GI loss due to coumadin not excluded, but not safe for any workup. Subjective: Chief Complaint:  Frail, answers yes of now. Wife arrived at bedside, and agrees to get hospice info. ROS: 
(bold if positive, if negative) Not Tolerating PT  Not Tolerating Diet Objective:  
 
Last 24hrs VS reviewed since prior progress note. Most recent are: 
 
Visit Vitals /82 (BP 1 Location: Left arm, BP Patient Position: At rest) Pulse 61 Temp 98.3 °F (36.8 °C) Resp 20 Ht 5' 6\" (1.676 m) Wt 56.7 kg (125 lb) SpO2 95% BMI 20.18 kg/m² SpO2 Readings from Last 6 Encounters:  
03/22/20 95% 03/18/20 99% 03/18/20 94% 03/18/20 98% 03/10/20 100% 03/03/20 (!) 88% O2 Flow Rate (L/min): 2 l/min Intake/Output Summary (Last 24 hours) at 3/22/2020 1948 Last data filed at 3/21/2020 1117 Gross per 24 hour Intake 1340 ml Output 1051 ml Net 289 ml Physical Exam: 
 
Gen:  Thin, frail, cachectic, in no acute distress HEENT:  Pink conjunctivae, PERRL, hearing intact to voice, moist mucous membranes Neck:  Wearing C collar, without masses, thyroid non-tender Resp:  No accessory muscle use, bilateral reduced coarse breath sounds Card:  No murmurs, normal S1, S2 without thrills, bruits or peripheral edema Abd:  Soft, non-tender, non-distended, reduced bowel sounds are present, no mass Lymph:  No cervical or inguinal adenopathy Musc:  No cyanosis or clubbing Skin:  No rashes or ulcers, skin turgor is reduced Neuro:  Cranial nerves are grossly intact, severe motor weakness, does not follow commands appropriately Psych:  Poor insight, oriented to person, place and time, alert Telemetry reviewed:   normal sinus rhythm 
__________________________________________________________________ Medications Reviewed: (see below) Medications:  
 
Current Facility-Administered Medications Medication Dose Route Frequency  warfarin (COUMADIN) tablet 2 mg  2 mg Oral ONCE  
 albuterol-ipratropium (DUO-NEB) 2.5 MG-0.5 MG/3 ML  3 mL Nebulization Q6H RT  
  methylPREDNISolone (PF) (SOLU-MEDROL) injection 40 mg  40 mg IntraVENous Q8H  
 guaiFENesin ER (MUCINEX) tablet 1,200 mg  1,200 mg Oral Q12H  
 benzonatate (TESSALON) capsule 100 mg  100 mg Oral TID PRN  
 atorvastatin (LIPITOR) tablet 80 mg  80 mg Oral QPM  
 brimonidine (ALPHAGAN) 0.2 % ophthalmic solution 1 Drop  1 Drop Both Eyes BID  clonazePAM (KlonoPIN) tablet 0.5 mg  0.5 mg Oral TID  escitalopram oxalate (LEXAPRO) tablet 10 mg  10 mg Oral DAILY  budesonide (PULMICORT) 500 mcg/2 ml nebulizer suspension  500 mcg Nebulization BID RT  
 arformoteroL (BROVANA) neb solution 15 mcg  15 mcg Nebulization BID RT  
 isosorbide mononitrate (ISMO, MONOKET) tablet 10 mg  10 mg Oral TID  latanoprost (XALATAN) 0.005 % ophthalmic solution 1 Drop  1 Drop Both Eyes QHS  oxyCODONE IR (ROXICODONE) tablet 15 mg  15 mg Oral Q3H PRN  polyethylene glycol (MIRALAX) packet 17 g  17 g Oral DAILY  senna (SENOKOT) tablet 17.2 mg  2 Tab Oral QHS  sodium chloride (NS) flush 5-40 mL  5-40 mL IntraVENous Q8H  
 sodium chloride (NS) flush 5-40 mL  5-40 mL IntraVENous PRN  
 acetaminophen (TYLENOL) tablet 650 mg  650 mg Oral Q4H PRN  
 HYDROmorphone (PF) (DILAUDID) injection 1 mg  1 mg IntraVENous Q4H PRN  
 naloxone (NARCAN) injection 0.4 mg  0.4 mg IntraVENous PRN  
 diphenhydrAMINE (BENADRYL) injection 12.5 mg  12.5 mg IntraVENous Q4H PRN  
 ondansetron (ZOFRAN) injection 4 mg  4 mg IntraVENous Q6H PRN  
 docusate sodium (COLACE) capsule 100 mg  100 mg Oral BID  
 0.9% sodium chloride infusion  75 mL/hr IntraVENous CONTINUOUS  
 Warfarin - Pharmacy to Dose   Other Rx Dosing/Monitoring  carboxymethylcellulose sodium (CELLUVISC) 1 % ophthalmic solution 2 Drop  2 Drop Both Eyes PRN  
 dilTIAZem CD (CARDIZEM CD) capsule 240 mg  240 mg Oral DAILY Lab Data Reviewed: (see below) Lab Review:  
 
Recent Labs  
  03/21/20 
0441 03/20/20 
0147 WBC 12.6* 12.5* HGB 10.2* 11.9*  
HCT 30.3* 35.1*  
  241 Recent Labs  
  03/22/20 
0250 03/21/20 
0441 03/20/20 
0147 NA  --  133* 131* K  --  3.9 3.8 CL  --  101 95* CO2  --  28 30 GLU  --  186* 137* BUN  --  16 22* CREA  --  0.61* 0.70 CA  --  8.0* 8.0*  
MG  --  2.3  --   
PHOS  --  1.9*  --   
ALB  --  2.4* 2.9* TBILI  --  1.1* 1.2* SGOT  --  40* 48* ALT  --  124* 151* INR 2.3* 1.8* 1.3* Lab Results Component Value Date/Time Glucose (POC) 94 11/17/2019 12:13 PM  
 Glucose (POC) 88 11/17/2019 12:01 PM  
 Glucose (POC) 97 06/19/2019 06:57 PM  
 Glucose (POC) 96 08/28/2017 12:11 PM  
 Glucose (POC) 120 (H) 08/28/2017 08:20 AM  
 
No results for input(s): PH, PCO2, PO2, HCO3, FIO2 in the last 72 hours. Recent Labs  
  03/22/20 
0250 03/21/20 
0441 03/20/20 
0147 INR 2.3* 1.8* 1.3* All Micro Results Procedure Component Value Units Date/Time RESPIRATORY PANEL,PCR,NASOPHARYNGEAL [[id number]] Collected:  03/21/20 1733 Order Status:  Completed Specimen:  Nasopharyngeal Updated:  03/21/20 2242 Adenovirus Not detected Coronavirus 229E Not detected Coronavirus HKU1 Not detected Coronavirus CVNL63 Not detected Coronavirus OC43 Not detected Metapneumovirus Not detected Rhinovirus and Enterovirus Not detected Influenza A Not detected Influenza A, subtype H1 Not detected Influenza A, subtype H3 Not detected INFLUENZA A H1N1 PCR Not detected Influenza B Not detected Parainfluenza 1 Not detected Parainfluenza 2 Not detected Parainfluenza 3 Not detected Parainfluenza virus 4 Not detected RSV by PCR Not detected B. parapertussis, PCR Not detected Bordetella pertussis - PCR Not detected Chlamydophila pneumoniae DNA, QL, PCR Not detected Mycoplasma pneumoniae DNA, QL, PCR Not detected Other pertinent lab: none Total time spent with patient: 39 Minutes I personally reviewed chart, notes, data and current medications in the medical record. I have personally examined and treated the patient at bedside during this period. Care Plan discussed with: Patient, Family, Care Manager, Nursing Staff and >50% of time spent in counseling and coordination of care Discussed:  Code Status, Care Plan and D/C Planning Prophylaxis:  H2B/PPI Disposition:  Home w/Family 
        
___________________________________________________ Attending Physician: Gladys Borjas MD

## 2020-03-22 NOTE — PROGRESS NOTES
3/22/2020   CARE MANAGEMENT NOTE:  (weekend coverage)  PEPPER received order from MD re: hospice info session (today if possible). CM made a referral via [address] and also spoke with the hospice on-call liaison Anamika Sandra) via phone who will reach out to the wife. Ashley

## 2020-03-22 NOTE — PROGRESS NOTES
Bedside and Verbal shift change report given to Vasquez Youssef RN (oncoming nurse) by Patrick Ledesma RN (offgoing nurse). Report included the following information SBAR, Kardex, Procedure Summary, Intake/Output, MAR, Accordion, Recent Results, Med Rec Status and Cardiac Rhythm sinus arrythmia.

## 2020-03-22 NOTE — PROGRESS NOTES
[facility] Pharmacy Dosing Services: 03/22/20 Consult for Warfarin Dosing by Pharmacy by Dr. Nasreen Parada Consult provided for this 76 y.o.  male , for indication of AVR/MVR and atrial fibrillation Day of Therapy: Resumed from PTA medication list 
Home Regimen: Warfarin 2.5 mg alternating every other day with 1 mg Dose to achieve an INR goal of 2.5- 3.5 Assessment/Plan: H/H and platelets stable, no documentation of any bleeding events in progress notes Today's INR of 2.3 is sub-therapeutic for indication, no bridging per MD as patient may benefit from hospice Will order warfarin 2 mg PO x 1 dose again tonight at 1800 INR daily with AM labs per protocol Significant drug interactions: None identified Previous dose given 2 mg on 3/21 PT/INR Lab Results Component Value Date/Time INR 2.3 (H) 03/22/2020 02:50 AM  
  
Platelets Lab Results Component Value Date/Time PLATELET 770 93/26/2064 04:41 AM  
  
H/H Lab Results Component Value Date/Time HGB 10.2 (L) 03/21/2020 04:41 AM  
  
 
Pharmacy to follow daily and will provide subsequent Warfarin dosing based on clinical status. ELISABET Uriarte  Contact information [phone]

## 2020-03-23 NOTE — PALLIATIVE CARE DISCHARGE
Goals of Care/Treatment Preferences The Palliative Medicine team was consulted as part of your/your loved one's care in the hospital. Our team is a supportive service; we strive to relieve suffering and improve quality of life. We reviewed advance care planning information, which includes the following: 
Patient's [place] is[de-identified] Legal Next of Kin Confirm Advance Directive: None Patient Would Like to Complete Advance Directive: No 
 
Patient/Health Care Proxy Stated Goals: Prolong life We reviewed / discussed your code status as: Full Code Full Code means perform CPR in the event of cardiac arrest. 
    DNR means do NOT perform CPR in the event of cardiac arrest. 
    Partial Code means you have specific preferences, please discuss with your healthcare team. 
    Jigneshita Niaabilio means this issue was not addressed / resolved during your stay Medical Interventions: Limited additional interventions Other Instructions: You will be returning home with support from [employer]. Because of the importance of this information, we are providing you with a printed copy to share with other healthcare providers after this hospitalization is complete.

## 2020-03-23 NOTE — ROUTINE PROCESS
Bedside and Verbal shift change report given to Amaris Edouard RN (oncoming nurse) by Michael Lipscomb RN (offgoing nurse). Report included the following information SBAR, Intake/Output, MAR and Recent Results.

## 2020-03-23 NOTE — PROGRESS NOTES
Palliative Medicine Consult Rg: [phone] ([phone]) Patient Name: Manisha Gates YOB: 1945 Date of Initial Consult: 3/20/2020 Reason for Consult: [address] Discussion Requesting Provider: Dr. Hector Humphreys 
Primary Care Physician: Elpidio Farrar MD 
 
 SUMMARY:  
Manisha Gates is a 76 y. o. with a past history of COPD, Stage IV NSCC Lung Ca with mets receiving palliative immunotherapy (completed 3 cycles), CAD, prosthetic  AV, HTN, CABG, who was admitted on 3/20/2020 from Home with a diagnosis of Acute on Chronic hypoxic respiratory failure, Afib w/ RVR abd unstable cervical spine fracture. Current medical issues leading to Palliative Medicine involvement include: GOC discussion in setting of 77 yo with stage IV metastatic lung cancer w/ interval progression, including a new unstable asymptomatic pathological C5 Column 3 fracture, and  New T7 and L1 compression fx's. Patient presented to Er w/ cc of SOB x 2 days. In ER O2 87% on 2L, , in afib. WBC 16.2, Na 130, albumin 3.1. Initial CXR found no acute pulmonary process. Imaging of chest w/ incidental finding of unstable C5, 3 column  Fracture. Mr. Richard Ann sees Dr. Shari El with out patient palliative medicine clinic Course of Hospitalization: Ortho consulted, patient would need MRI of cervical and thoracic spine with and without contrast, repair of fx High risk, Oncology following, indicated all previous treatments palliative, next option chemo, but not la good candidate due to poor performance Baseline cognitive and functional status: Home O2 2 Liters, ambulated with rollator, wife indicated to staff some confusion. PALLIATIVE DIAGNOSES:  
1. Advanced care planning discussion 2. Goals of care discussion 3. DNR discussion 4. Debility 5. Pathological fracture multiple levels in cervical spine PLAN:  
1. Briefly met with patient, wife, daughter in the room.   Patient's denies any significant pain but has cervical collar in place. Sneha Nino, NP with oncology and I then met with patient's wife and daughter outside the room. Again reviewed the role of palliative medicine in their care and then discussed the current medical issues. Reviewed different events from the weekend and potential needs going forward. Patient's family remains very clear that the goal is for him to return home as quickly as possible and provide as much comfort as possible. They again agree that proceeding with cervical spine surgery is not an option. They also understand the need for ongoing cervical collar wearing. Daughter is positive for can waste they know that he is not a candidate for chemotherapy/immunotherapy at this time. We discussed the potential use of palliative radiation to the cervical spine area but they do not think he will tolerate the back and forth to radiation. They would rather just keep him home which is what his wish is. We then discussed options as far supporting them at home. Have been very resistant about anybody coming to the home but we discussed the potential needs that he may have going forward such as a hospital bed, medication for symptom management. We again reviewed the advantages of having hospice even if they do not want aids//, they will at least have nurse check on him regularly and they also will be able to provide DME equipment. After this discussion, they seem to support having hospice at home for the patient. 2. Goals of care patient to go home with hospice. Johanny Lopez, hospice liaison met with family after our meeting. Arrangements for DME to be delivered tomorrow so the patient can be discharged tomorrow. 3. Advance care planning no AMD has been completed. His wife would serve as medical POA and legal next of kin.  
4. DNR discussion-had a long discussion with his family outside the room about resuscitation. We discussed at length the limitations of resuscitation for Mr. Lashaun Fisher given his advanced cancer. Patient has expressed to them that he would like to remain full code for now. We discussed in detail why we would recommend no attempts at resuscitation or intubation. We are very fearful that the cervical spine metastasis could potentially cause paralysis if any attempts are done at intubation. They seem to understand this medically but are not ready to change his CODE STATUS. I explained that the hospice team would have ongoing discussions about CODE STATUS at home. He can be on hospice and be a full code but we have concerns about attempting to resuscitate or intubate him. 5. Symptom managementpatient denied any pain when I was in the room. Does appear he used 2 doses oxycodone 15 mg yesterday but none so far today. We will be sure that hospice orders this at home 6. Psychosocialpatient has good support from family. His wife and daughter will be providing care for him. 7. Discussed with bedside nurse, case management, Knute Schaumann from hospice, Dr. Zoe Shaver 8. Initial consult note routed to primary continuity provider and/or primary health care team members 9. Communicated plan of care with: Palliative IDT, [id number] Team 
 
 GOALS OF CARE / TREATMENT PREFERENCES:  
 
GOALS OF CARE: 
Patient/Health Care Proxy Stated Goals: (Home with hospice) TREATMENT PREFERENCES:  
Code Status: Full Code Advance Care Planning: 
[x] [facility] Interdisciplinary Team has updated the ACP Navigator with [place] and Patient Capacity Primary Decision Maker: Christina Zhang - Spouse - [phone] Advance Care Planning 3/20/2020 Patient's Healthcare Decision Maker is: Legal Next of Kin Primary Decision Maker Name - Secondary Decision Maker Name - Secondary Decision Maker Phone Number - Secondary Decision Maker Relationship to Patient -  
 Confirm Advance Directive None Patient Would Like to Complete Advance Directive No  
 
 
Medical Interventions: Comfort measures Other Instructions: Other: As far as possible, the palliative care team has discussed with patient / health care proxy about goals of care / treatment preferences for patient. HISTORY:  
 
History obtained from: medical records/ patient/family CHIEF COMPLAINT: want to get home HPI/SUBJECTIVE: The patient is:  
[x] Verbal and participatory [] Non-participatory due to:  
Patient denied pain, other than uncomfortable neck brace, not happy about brace 3/23patient denies any pain this morning. Neck brace remains in place Clinical Pain Assessment (nonverbal scale for severity on nonverbal patients):  
Clinical Pain Assessment Severity: 0 Duration: for how long has pt been experiencing pain (e.g., 2 days, 1 month, years) Frequency: how often pain is an issue (e.g., several times per day, once every few days, constant) FUNCTIONAL ASSESSMENT:  
 
Palliative Performance Scale (PPS): PPS: 40 PSYCHOSOCIAL/SPIRITUAL SCREENING:  
 
Palliative IDT has assessed this patient for cultural preferences / practices and a referral made as appropriate to needs (Cultural Services, Patient Advocacy, Ethics, etc.) Any spiritual / Sabianism concerns: 
[] Yes /  [x] No 
 
Caregiver Burnout: 
[] Yes /  [x] No /  [] No Caregiver Present Anticipatory grief assessment:  
[x] Normal  / [] Maladaptive ESAS Anxiety: Anxiety: 2 ESAS Depression:    
 
 
 REVIEW OF SYSTEMS:  
 
Positive and pertinent negative findings in ROS are noted above in HPI. The following systems were [x] reviewed / [] unable to be reviewed as noted in HPI Other findings are noted below.  
Systems: constitutional, ears/nose/mouth/throat, respiratory, gastrointestinal, genitourinary, musculoskeletal, integumentary, neurologic, psychiatric, endocrine. Positive findings noted below. Modified ESAS Completed by: provider Fatigue: 6 Drowsiness: 1 Pain: 0 Anxiety: 2 Anorexia: 0 Dyspnea: 0 Stool Occurrence(s): 1 PHYSICAL EXAM:  
 
From RN flowsheet: 
Wt Readings from Last 3 Encounters:  
03/21/20 125 lb (56.7 kg) 03/18/20 135 lb (61.2 kg) 03/10/20 135 lb 9.3 oz (61.5 kg) Blood pressure 132/83, pulse 71, temperature 98.2 °F (36.8 °C), resp. rate 20, height 5' 6\" (1.676 m), weight 125 lb (56.7 kg), SpO2 100 %. Pain Scale 1: Numeric (0 - 10) Pain Intensity 1: 4 Pain Onset 1: acute Pain Location 1: Neck Pain Orientation 1: Mid 
Pain Description 1: Throbbing Pain Intervention(s) 1: Medication (see MAR) Last bowel movement, if known:  
 
Constitutional: elderly, weak, alert and oriented Eyes: pupils equal, anicteric ENMT: no nasal discharge, moist mucous membranes, cervical collar in place Cardiovascular: regular rhythm, distal pulses intact Respiratory: breathing not labored, symmetric Gastrointestinal: soft non-tender, +bowel sounds Musculoskeletal: no deformity, no tenderness to palpation Skin: warm, dry Neurologic: following commands, moving all extremities Psychiatric: restricted affect, no hallucinations Other: 
 
 
 HISTORY:  
 
Principal Problem: 
  Acute and chronic respiratory failure with hypoxia ([address] (3/20/2020) Active Problems: COPD with acute exacerbation ([address] (10/22/2014) S/P MVR (mitral valve replacement) (10/22/2014) Depression (10/22/2014) Aortic stenosis (6/12/2016) S/P CABG x 1 (8/25/2017) Overview: CT surgery 1994 - CABG (LIMA to LAD) and AVR ([facility]) S/P AVR (aortic valve replacement) (8/25/2017) CAD (coronary artery disease) (8/25/2017) Lung cancer metastatic to bone ([address] (1/31/2020) PAF (paroxysmal atrial fibrillation) ([address] (3/20/2020) Multiple fractures of cervical spine ([address] (3/20/2020) Hx of completed stroke (3/21/2020) Anemia (3/21/2020) Leukocytosis (3/21/2020) Chronic anticoagulation (3/21/2020) Past Medical History:  
Diagnosis Date  Asthma  CAD (coronary artery disease)  Cancer ([facility] \"10 years ago\" throat  COPD (chronic obstructive pulmonary disease) (HCC)  CVA (cerebral vascular accident) ([facility]  H/O mechanical aortic valve replacement CT surgery  - CABG (LIMA to LAD) and AVR ([facility])  Hypertension  S/P CABG x 1   
 CT surgery  - CABG (LIMA to LAD) and AVR ([facility]) Past Surgical History:  
Procedure Laterality Date  CARDIAC SURG PROCEDURE UNLIST  HX HEART VALVE SURGERY    
 CT surgery  - CABG (LIMA to LAD) and AVR ([facility])  HX HEMORRHOIDECTOMY  IR INSERT TUNL CVC W PORT OVER 5 YEARS  2020 Family History Problem Relation Age of Onset  Heart Disease Mother  Diabetes Mother  Hypertension Mother  Heart Disease Sister  Hypertension Sister  Hypertension Father  Cancer Brother   
     lung  Diabetes Brother  Heart Disease Brother  Hypertension Brother  Stroke Brother History reviewed, no pertinent family history. Social History Tobacco Use  Smoking status: Former Smoker Packs/day: 3.00 Years: 40.00 Pack years: 120.00 Last attempt to quit: 2019 Years since quittin.2  Smokeless tobacco: Never Used Substance Use Topics  Alcohol use: No  
 
Allergies Allergen Reactions  Adenosine Shortness of Breath Tolerates well when given for SVT, per Dr. Orquidea Brown. Given on 3/8/2020  Iodinated Contrast Media Shortness of Breath  Pcn [Penicillins] Shortness of Breath Patient reports to have tolerated a 10 day course of cefdinir started 17 Current Facility-Administered Medications Medication Dose Route Frequency  methylPREDNISolone (PF) (SOLU-MEDROL) injection 40 mg  40 mg IntraVENous Q12H  albuterol-ipratropium (DUO-NEB) 2.5 MG-0.5 MG/3 ML  3 mL Nebulization Q6H RT  
 guaiFENesin ER (MUCINEX) tablet 1,200 mg  1,200 mg Oral Q12H  
 benzonatate (TESSALON) capsule 100 mg  100 mg Oral TID PRN  
 atorvastatin (LIPITOR) tablet 80 mg  80 mg Oral QPM  
 brimonidine (ALPHAGAN) 0.2 % ophthalmic solution 1 Drop  1 Drop Both Eyes BID  clonazePAM (KlonoPIN) tablet 0.5 mg  0.5 mg Oral TID  escitalopram oxalate (LEXAPRO) tablet 10 mg  10 mg Oral DAILY  budesonide (PULMICORT) 500 mcg/2 ml nebulizer suspension  500 mcg Nebulization BID RT  
 arformoteroL (BROVANA) neb solution 15 mcg  15 mcg Nebulization BID RT  
 isosorbide mononitrate (ISMO, MONOKET) tablet 10 mg  10 mg Oral TID  latanoprost (XALATAN) 0.005 % ophthalmic solution 1 Drop  1 Drop Both Eyes QHS  oxyCODONE IR (ROXICODONE) tablet 15 mg  15 mg Oral Q3H PRN  polyethylene glycol (MIRALAX) packet 17 g  17 g Oral DAILY  senna (SENOKOT) tablet 17.2 mg  2 Tab Oral QHS  sodium chloride (NS) flush 5-40 mL  5-40 mL IntraVENous Q8H  
 sodium chloride (NS) flush 5-40 mL  5-40 mL IntraVENous PRN  
 acetaminophen (TYLENOL) tablet 650 mg  650 mg Oral Q4H PRN  
 HYDROmorphone (PF) (DILAUDID) injection 1 mg  1 mg IntraVENous Q4H PRN  
 naloxone (NARCAN) injection 0.4 mg  0.4 mg IntraVENous PRN  
 diphenhydrAMINE (BENADRYL) injection 12.5 mg  12.5 mg IntraVENous Q4H PRN  
 ondansetron (ZOFRAN) injection 4 mg  4 mg IntraVENous Q6H PRN  
 docusate sodium (COLACE) capsule 100 mg  100 mg Oral BID  
 0.9% sodium chloride infusion  75 mL/hr IntraVENous CONTINUOUS  
 Warfarin - Pharmacy to Dose   Other Rx Dosing/Monitoring  carboxymethylcellulose sodium (CELLUVISC) 1 % ophthalmic solution 2 Drop  2 Drop Both Eyes PRN  
 dilTIAZem CD (CARDIZEM CD) capsule 240 mg  240 mg Oral DAILY  
 
 
 
 LAB AND IMAGING FINDINGS:  
 
Lab Results Component Value Date/Time  WBC 12.6 (H) 03/21/2020 04:41 AM  
 HGB 10.2 (L) 03/21/2020 04:41 AM  
 PLATELET 261 28/97/1164 04:41 AM  
 
Lab Results Component Value Date/Time Sodium 133 (L) 03/21/2020 04:41 AM  
 Potassium 3.9 03/21/2020 04:41 AM  
 Chloride 101 03/21/2020 04:41 AM  
 CO2 28 03/21/2020 04:41 AM  
 BUN 16 03/21/2020 04:41 AM  
 Creatinine 0.61 (L) 03/21/2020 04:41 AM  
 Calcium 8.0 (L) 03/21/2020 04:41 AM  
 Magnesium 2.3 03/21/2020 04:41 AM  
 Phosphorus 1.9 (L) 03/21/2020 04:41 AM  
  
Lab Results Component Value Date/Time AST (SGOT) 40 (H) 03/21/2020 04:41 AM  
 Alk. phosphatase 131 (H) 03/21/2020 04:41 AM  
 Protein, total 5.3 (L) 03/21/2020 04:41 AM  
 Albumin 2.4 (L) 03/21/2020 04:41 AM  
 Globulin 2.9 03/21/2020 04:41 AM  
 
Lab Results Component Value Date/Time INR 3.1 (H) 03/23/2020 02:05 AM  
 Prothrombin time 29.4 (H) 03/23/2020 02:05 AM  
 aPTT 26.5 11/03/2019 06:57 PM  
  
Lab Results Component Value Date/Time Iron 27 (L) 03/09/2020 09:09 AM  
 TIBC 240 (L) 03/09/2020 09:09 AM  
 Iron % saturation 11 (L) 03/09/2020 09:09 AM  
 Ferritin 362 03/09/2020 09:09 AM  
  
Lab Results Component Value Date/Time pH 7.36 03/08/2020 06:10 AM  
 PCO2 46 (H) 03/08/2020 06:10 AM  
 PO2 60 (L) 03/08/2020 06:10 AM  
 
No components found for: [place] Lab Results Component Value Date/Time CK 71 07/15/2010 03:15 AM  
 CK - MB <0.5 (L) 07/15/2010 03:15 AM  
  
 
 
   
 
Total time: 35min Counseling / coordination time, spent as noted above: 30 min 
> 50% counseling / coordination?: yes Prolonged service was provided for  []30 min   []75 min in face to face time in the presence of the patient, Time Start: 
Time Start: 
Note: this can only be billed with  (initial) or 21 [phone] (follow up). If multiple start / stop times, list each separately.

## 2020-03-23 NOTE — PROGRESS NOTES
Problem: Dysphagia (Adult) Goal: *Acute Goals and Plan of Care (Insert Text) Description: Swallowing goals initiated 3-23-20: 
1) MD and family to determine goals of care. MBS will be completed if needed to help goals of care and if patient's decisions would change with knowledge that he may be aspirating Outcome: Progressing Towards Goal 
 SPEECH LANGUAGE PATHOLOGY BEDSIDE SWALLOW EVALUATION Patient: Bebeto Parks (36 y.o. male) Date: 3/23/2020 Primary Diagnosis: Acute and chronic respiratory failure with hypoxia ([facility] [J96.21] Precautions: aspiration ASSESSMENT : 
Based on the objective data described below, the patient may have aspiration that is difficult to r/o at bedside. 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  
3) h/o multiple CVAs, ( R parietal, frontal, basal ganglion, thalamux, occipital) in 2016. 4) COPD, 5) h/o reflux -Chest CT 3-20-20 noted reflux to aortic arch of fluids and small hiatal hernia Could complete MBS to r/o aspiration. Family and MDs to meet this afternoon to discuss goals of care. He is not able to chew hard solids due to reduced dentition, so diet downgraded to mech soft, thins. Patient admitted 3-20-20 with acute and chronic respiratory failure and Cervical fxs (C5, , T7, L1). Chest CT also showed RLL mucous plug, small HH, increased R UL nodules, new RUL nodule, reflux to aortic arch. OTHER PMH:  lung CA -on  at home. COPD, CAD, HTN, +tobacco, CABG, ef 55%, just hospitalized March 10 for chronic respiratory failure and COPD exacerbation. , CVAs Patient will benefit from skilled intervention to address the above impairments. Patients rehabilitation potential is considered to be Fair PLAN : 
Recommendations and Planned Interventions: 
Downgrade diet to mech soft, thins MD and family to determine goals of care this afternoon. MBS only recommended if it would change goals of care. Frequency/Duration: Patient will be followed by speech-language pathology 1 time a week to address goals. Discharge Recommendations: To Be Determined SUBJECTIVE:  
Patient stated I like the oranges better. OBJECTIVE:  
 
Past Medical History:  
Diagnosis Date Asthma CAD (coronary artery disease) Cancer ([facility] [address] \"10 years ago\" throat COPD (chronic obstructive pulmonary disease) ([facility] [address] CVA (cerebral vascular accident) [facility])   
 H/O mechanical aortic valve replacement CT surgery 1994 - CABG (LIMA to LAD) and AVR ([facility]) Hypertension S/P CABG x 1   
 CT surgery 1994 - CABG (LIMA to LAD) and AVR ([facility]) Past Surgical History:  
Procedure Laterality Date CARDIAC SURG PROCEDURE UNLIST    
 HX HEART VALVE SURGERY    
 CT surgery 1994 - CABG (LIMA to LAD) and AVR ([facility]) HX HEMORRHOIDECTOMY    
 IR INSERT TUNL CVC W PORT OVER 5 YEARS  2/7/2020 Prior Level of Function/Home Situation:  
Home Situation Home Environment: Private residence # Steps to Enter: 2 One/Two Story Residence: One story Living Alone: No 
Support Systems: Child(eric) Patient Expects to be Discharged to[de-identified] Private residence Current DME Used/Available at Home: Commode, bedside, Walker, rollator, Wheelchair, Oxygen, portable Diet prior to admission: regular, thins? ??? 
Current Diet:  regular, thins Cognitive and Communication Status: 
Neurologic State: Alert Orientation Level: Oriented to person, Oriented to place, Disoriented to situation, Disoriented to time Cognition: Impaired decision making, Impulsive, Memory loss, Follows commands Perception: Appears intact Perseveration: No perseveration noted Safety/Judgement: Decreased insight into deficits Oral Assessment: 
Oral Assessment Labial: No impairment Dentition: Natural;Limited Lingual: No impairment Mandible: No impairment P.O. Trials: 
Patient Position: upright in bed Vocal quality prior to P.O.: Hoarse;Low volume(hoarse-premorbid from throat cancer about 15 years ago. in 2016, he told SLP he had regular  ENT follow ups) Consistency Presented: Solid; Thin liquid How Presented: SLP-fed/presented;Spoon;Straw;Self-fed/presented; Successive swallows ORAL PHASE:  
Patient refused most PO. He did try the cheese omelet, fresh oranges and drinks. He pocketed the oranges on either side and eventually had to spit out the pulp. PHARYNGEAL PHASE:  
Mild delay. Occasional throat clear s/p thins, but not consistently. SLP palpated some congestion around larynx. 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 Family and MD will meet today at 1 to discuss goals of care. ?MBS or not if it will affect his care RN reports effort with pills with large gulps of water. SLP has seen this patient last in June 2019. At this time, he had throat clearing with thins, no dentures and [facility] soft, thins was recommended diet. NOMS:  
The NOMS functional outcome measure was used to quantify this patient's level of swallowing impairment. Based on the NOMS, the patient was determined to be at level 5 for swallow function NOMS Swallowing Levels: 
Level 1 (CN): NPO Level 2 (CM): NPO but takes consistency in therapy Level 3 (CL): Takes less than 50% of nutrition p.o. and continues with nonoral feedings; and/or safe with mod cues; and/or max diet restriction Level 4 (CK): Safe swallow but needs mod cues; and/or mod diet restriction; and/or still requires some nonoral feeding/supplements Level 5 (CJ): Safe swallow with min diet restriction; and/or needs min cues Level 6 (CI): Independent with p.o.; rare cues; usually self cues; may need to avoid some foods or needs extra time Level 7 (CH): Independent for all p.o. SHARAD. (2003). National Outcomes Measurement System (NOMS): Adult Speech-Language Pathology User's Guide. Pain: 
Pain Scale 1: Numeric (0 - 10) Pain Intensity 1: 0 After treatment:  
Patient left in no apparent distress in bed and Nursing notified COMMUNICATION/EDUCATION:  
Patient was educated regarding his deficit(s) of dysphagia  as this relates to his diagnosis of acute on chronic respiratory failure. He demonstrated Fair understanding as evidenced by discussion. . 
 
The patient's plan of care including recommendations, planned interventions, and recommended diet changes were discussed with: Registered nurse and oncology. Patient understands intent and goals of therapy, but is neutral about his/her participation. Thank you for this referral. 
MANUEL Shaikh Time Calculation: 15 mins

## 2020-03-23 NOTE — PROGRESS NOTES
[facility] Pulmonary, Critical Care, and Sleep Medicine Initial Patient Consult Name: Nalini Wakefield MRN: [id number] : 1945 Hospital: [facility] Date: 3/23/2020 IMPRESSION:  
· COPD with acute exacerbation · Stage IV lung ca · Confusion:  Pt on home benzos · Cervical spine fx as described below RECOMMENDATIONS:  
· Continue with freq scheduled nebs · IV solumedrol; wean to q12h today · Supple oxygen as needed · Haldol PRN agitation · Pt is DNR; hospice appropriate. Palliative following. Subjective: This patient has been seen and evaluated at the request of Dr. Suly Rangel for SOB and COPD. Patient is a 76 y.o. male who presented with complaints of sob. Pt with COPD and stage 4 NSCCa of the lung. On CT, pt found to have  C5 column fx and C4 and C6 spinous process fractures and is being evaluated by Ortho. Pt is a poor historian. Has some confusion and is having impulsive behavior . Pt will answer some simple questions. Notes some sob. + cough and chest congestion. Will cough to command and is bringing up moderate white thick secretions. PFT in 2017 with FEV1 0.8 (34%pred); FVC 2.3 (71%pred); FEV1% 36%. Pt is a former smoker Interval History: Afebrile overnight BP stable O2 sats 98% on 2L NC Phos 1.9 RVP negative States that his breathing is fine this morning. Sleepy but able to awaken easily. No cough or sputum. Past Medical History:  
Diagnosis Date  Asthma  CAD (coronary artery disease)  Cancer ([facility] \"10 years ago\" throat  COPD (chronic obstructive pulmonary disease) (HCC)  CVA (cerebral vascular accident) ([facility]  H/O mechanical aortic valve replacement CT surgery  - CABG (LIMA to LAD) and AVR ([facility])  Hypertension  S/P CABG x 1   
 CT surgery  - CABG (LIMA to LAD) and AVR ([facility]) Past Surgical History:  
Procedure Laterality Date  CARDIAC SURG PROCEDURE UNLIST  HX HEART VALVE SURGERY    
 CT surgery 1994 - CABG (LIMA to LAD) and AVR ([facility])  HX HEMORRHOIDECTOMY  IR INSERT TUNL CVC W PORT OVER 5 YEARS  2/7/2020 Prior to Admission medications Medication Sig Start Date End Date Taking? Authorizing Provider  
morphine 30 mg TR12 Take 30 mg by mouth three (3) times daily. Yes Provider, Historical  
oxyCODONE IR (ROXICODONE) 5 mg immediate release tablet Take 5 mg by mouth every three to four (3-4) hours as needed for Pain. Yes Provider, Historical  
fentaNYL (DURAGESIC) 12 mcg/hr patch 1 Patch by TransDERmal route every seventy-two (72) hours. Yes Provider, Historical  
oxyCODONE IR (OXY-IR) 15 mg immediate release tablet Take 1 Tab by mouth every three (3) hours as needed for Pain for up to 15 days. Max Daily Amount: 120 mg. 3/18/20 4/2/20  Vani Roberts MD  
naloxone (Narcan) 4 mg/actuation nasal spray Use 1 spray intranasally, then discard. Repeat with new spray every 2 min as needed for opioid overdose symptoms, alternating nostrils. 3/18/20   Vani Roberts MD  
predniSONE (DELTASONE) 20 mg tablet Take 3 tabs a day for 3 days, then 2 tabs a day for 3 days, then 1 tab a day for 3 days. 3/10/20   Sonny Hines MD  
polyethylene glycol [facility]) 17 gram packet Take 17 g by mouth daily. Provider, Historical  
senna (SENNA) 8.6 mg tablet Take 2 Tabs by mouth nightly. Provider, Historical  
brimonidine (ALPHAGAN) 0.2 % ophthalmic solution Administer 1 Drop to both eyes two (2) times a day. Provider, Historical  
0.9% sodium chloride solp 100 mL with pembrolizumab 25 mg/mL soln 200 mg by IntraVENous route every twenty-one (21) days. Provider, Historical  
warfarin (COUMADIN) 1 mg tablet Take 1 mg by mouth every other day.     Provider, Historical  
hydroxypropyl methylcellulose (GENTEAL) 0.2 % ophthalmic solution 2 drops right eye every 1-2 hours while awake 11/17/19   Kyrie Polo MD  
latanoprost (XALATAN) 0.005 % ophthalmic solution Administer 1 Drop to both eyes nightly. Provider, Historical  
amLODIPine (NORVASC) 10 mg tablet Take 10 mg by mouth daily. Provider, Historical  
albuterol (PROVENTIL VENTOLIN) 2.5 mg /3 mL (0.083 %) nebulizer solution 3 mL by Nebulization route every four (4) hours as needed for Wheezing. 3/17/19   Javier Cassidy MD  
promethazine (PHENERGAN) 12.5 mg tablet Take 25 mg by mouth every eight (8) hours as needed. Indications: Nausea    Provider, Historical  
atorvastatin (LIPITOR) 80 mg tablet Take 80 mg by mouth every evening. Provider, Historical  
warfarin (COUMADIN) 5 mg tablet Take 2.5 mg by mouth every other day. Provider, Historical  
albuterol (PROVENTIL HFA, VENTOLIN HFA, PROAIR HFA) 90 mcg/actuation inhaler Take 2 Puffs by inhalation every six (6) hours as needed. OtherKallie MD  
fluticasone-salmeterol (ADVAIR) 500-50 mcg/dose diskus inhaler Take 1 Puff by inhalation every twelve (12) hours. Kallie Bautista MD  
clonazepam (KLONOPIN) 0.5 mg tablet Take 0.5 mg by mouth three (3) times daily. 10/14/14   Kallie Bautista MD  
escitalopram oxalate (LEXAPRO) 10 mg tablet Take 10 mg by mouth daily. 10/21/14   Kallie Bautista MD  
isosorbide mononitrate (ISMO, MONOKET) 10 mg tablet Take 10 mg by mouth three (3) times daily. Patient takes at [address] [facility], noon, and 5pm 10/21/14   Kallie Bautista MD  
 
Allergies Allergen Reactions  Adenosine Shortness of Breath Tolerates well when given for SVT, per Dr. Nikita Kinsey. Given on 3/8/2020  Iodinated Contrast Media Shortness of Breath  Pcn [Penicillins] Shortness of Breath Patient reports to have tolerated a 10 day course of cefdinir started 8/24/17 Social History Tobacco Use  Smoking status: Former Smoker Packs/day: 3.00 Years: 40.00 Pack years: 120.00 Last attempt to quit: 1/1/2019 Years since quittin.2  Smokeless tobacco: Never Used Substance Use Topics  Alcohol use: No  
  
Family History Problem Relation Age of Onset  Heart Disease Mother  Diabetes Mother  Hypertension Mother  Heart Disease Sister  Hypertension Sister  Hypertension Father  Cancer Brother   
     lung  Diabetes Brother  Heart Disease Brother  Hypertension Brother  Stroke Brother Current Facility-Administered Medications Medication Dose Route Frequency  albuterol-ipratropium (DUO-NEB) 2.5 MG-0.5 MG/3 ML  3 mL Nebulization Q6H RT  
 methylPREDNISolone (PF) (SOLU-MEDROL) injection 40 mg  40 mg IntraVENous Q8H  
 guaiFENesin ER (MUCINEX) tablet 1,200 mg  1,200 mg Oral Q12H  
 atorvastatin (LIPITOR) tablet 80 mg  80 mg Oral QPM  
 brimonidine (ALPHAGAN) 0.2 % ophthalmic solution 1 Drop  1 Drop Both Eyes BID  clonazePAM (KlonoPIN) tablet 0.5 mg  0.5 mg Oral TID  escitalopram oxalate (LEXAPRO) tablet 10 mg  10 mg Oral DAILY  budesonide (PULMICORT) 500 mcg/2 ml nebulizer suspension  500 mcg Nebulization BID RT  
 arformoteroL (BROVANA) neb solution 15 mcg  15 mcg Nebulization BID RT  
 isosorbide mononitrate (ISMO, MONOKET) tablet 10 mg  10 mg Oral TID  latanoprost (XALATAN) 0.005 % ophthalmic solution 1 Drop  1 Drop Both Eyes QHS  polyethylene glycol (MIRALAX) packet 17 g  17 g Oral DAILY  senna (SENOKOT) tablet 17.2 mg  2 Tab Oral QHS  sodium chloride (NS) flush 5-40 mL  5-40 mL IntraVENous Q8H  
 docusate sodium (COLACE) capsule 100 mg  100 mg Oral BID  
 0.9% sodium chloride infusion  75 mL/hr IntraVENous CONTINUOUS  
 Warfarin - Pharmacy to Dose   Other Rx Dosing/Monitoring  dilTIAZem CD (CARDIZEM CD) capsule 240 mg  240 mg Oral DAILY Review of Systems: 
Review of systems not obtained due to patient factors. Objective:  
Vital Signs:   
Visit Vitals /70 (BP 1 Location: Left arm, BP Patient Position: At rest) Pulse 69 Temp 97.5 °F (36.4 °C) Resp 20 Ht 5' 6\" (1.676 m) Wt 56.7 kg (125 lb) SpO2 98% BMI 20.18 kg/m² O2 Device: Nasal cannula O2 Flow Rate (L/min): 2 l/min Temp (24hrs), Av.9 °F (36.6 °C), Min:97.5 °F (36.4 °C), Max:98.5 °F (36.9 °C) Intake/Output:  
Last shift:      No intake/output data recorded. Last 3 shifts:  1901 -  0700 In: 480 [P.O.:480] Out: 2450 [Urine:2450] Intake/Output Summary (Last 24 hours) at 3/23/2020 2161 Last data filed at 3/23/2020 9736 Gross per 24 hour Intake 240 ml Output 2250 ml Net -2010 ml Physical Exam:  
General:  Awakens easily, confused, no acute distress Head:  Normocephalic, without obvious abnormality, atraumatic. Eyes:  Conjunctivae/corneas clear. Nose: Nares normal. Septum midline. Mucosa normal.   
Throat: Lips, mucosa, and tongue normal. Teeth and gums normal.  
Neck: Supple, symmetrical, trachea midline, cervical collar in place Lungs:   Distant bs, anterior exam only Chest wall:  No tenderness or deformity. Heart:  Regular rate and rhythm, S1, S2 normal, no murmur, click, rub or gallop. Abdomen:   Soft, non-tender. Bowel sounds normal.   
Extremities: Extremities normal, atraumatic, no cyanosis or edema. Pulses: 2+ and symmetric all extremities. Skin: Skin color, texture, turgor normal. No rashes or lesions Lymph nodes: Cervical nodes normal.  
Neurologic: confusion Data review:  
 
Recent Results (from the past 24 hour(s)) PROTHROMBIN TIME + INR Collection Time: 20  2:05 AM  
Result Value Ref Range INR 3.1 (H) 0.9 - 1.1 Prothrombin time 29.4 (H) 9.0 - 11.1 sec Imaging: 
I have personally reviewed the patients radiographs and have reviewed the reports: 
CT reviewed Vadim Moss NP

## 2020-03-23 NOTE — PROGRESS NOTES
Heme/ Onc Family meeting with pt's wife, Oanh Pina; pt's daughter, Kyrie brandon; Dr Yunior Lemons, palliative physician and myself today to discuss pt and family wishes moving forward. Initially met in pt's room and then wife asked if we could meet somewhere else in private. Meeting moved to separate room. They have decided they  do not want to put their /dad through daily radiation and therefore are declining palliative radiation to the C5 neck area. After discussion; wife and daughter would like to take pt home with support of hospice and know that  pt would like to go home as soon as possible. They remain reluctant to have a lot of different people coming into their home and would like at this time to limit it to the nurse initially Wife stated that her  misunderstood what a DNR meant and he would want everything done to try and help him. Wife  wants pt to remain a full code. Pt's daughter said that she is aware that her dad's bones are weak but she also wants to continue with full code for her dad. Family  agreed to hospice info session. Hopefully with be able to discharge pt home with support of hospice tomorrow.   
 
Sameer Gonzalez NP

## 2020-03-23 NOTE — PROGRESS NOTES
Nutrition Assessment: 
 
RECOMMENDATIONS/INTERVENTION(S):  
1. Continue regular diet. 2. Add Ensure Enlive BID to promote adequate po intake. 3. Phos low, recommend repletion. 4. Monitor POC, GI function, labs, and weight trends. ASSESSMENT:  
3/23: F/u. Diet advanced to regular. Recorded intakes poor, 0-35%. Will add Ensure BID to promote adequate intake. Per MD note, pt's wife is agreeable to hospice session. Will monitor plan of care. Phos low, recommend repletion. Labs- Na 133, phos 1.9, A1c 6.4, -137-174. Meds- lipitor, bowel regimen. 3/20: 77 y/o M admitted with acute on chronic respiratory failure with hypoxia. MD consult for general nutrition management. PMH - HTN, COPD, Pafib, prosthetic AVR, stage 4 lung CA w/ spinal mets. NPO at this time. Pt poor historian. Attempted discussing nutritional Hx; was able to tell me he had a good appetite PTA and consumed x3 meals/d. Pt continued to ask for wife. SLP following. Noted family meeting planned for later today. ?POC - ability for PO feeds versus need for nutrition support.  lb. BMI 22.1 c/w underweight for age. Estimated nutritional needs +250 kcal to promote healthy weight. Weight Hx per EMR shows gradual 18 lb (12%) weight loss x 12 mo - not significant for timeframe but noted. NKFA. No note of GI distress. Pt reports some abdominal pain. LBM 3/20, on bowel regimen. Skin intact. Labs - Na 131 L, BUN 22 H, Ca 8.0 L. Meds - NS 75 ml/hr, docusate, methylprednisolone, polyethylene glycol, senna. Diet Order: Regular 
% Eaten:   
Patient Vitals for the past 72 hrs: 
 % Diet Eaten  
03/22/20 1300 30 % 03/21/20 1719 35 % 03/21/20 1232 0 %  
03/21/20 1006 10 % 03/20/20 1700 35 % Pertinent Medications: [x] Reviewed Labs: [x] Reviewed Anthropometrics: Height: 5' 6\" (167.6 cm) Weight: 56.7 kg (125 lb) IBW (%IBW):   ( ) UBW (%UBW):   (  %) BMI: Body mass index is 20.18 kg/m². This BMI is indicative of: [x] Underweight for age    [] Normal    [] Overweight    []  Obesity    []  Extreme Obesity (BMI>40) Estimated Nutrition Needs (Based on): 1944 Kcals/day(REE 1303 x 1.3 + 250 kcal) , 70 g(62 - 74 gm (1.0 - 1.2 gm/kg)) Protein Carbohydrate: At Least 130 g/day  Fluids: 1944 mL/day (1 ml/kcal) Last BM: 3/22   []Active     []Hyperactive  []Hypoactive       [] Absent   BS Skin:    [x] Intact   [] Incision  [] Breakdown   [] DTI   [] Tears/Excoriation/Abrasion  []Edema [] Other: Wt Readings from Last 30 Encounters:  
03/21/20 56.7 kg (125 lb)  
03/18/20 61.2 kg (135 lb)  
03/10/20 61.5 kg (135 lb 9.3 oz) 02/26/20 62.6 kg (138 lb)  
02/26/20 62.6 kg (138 lb) 02/07/20 64 kg (141 lb 1.5 oz) 02/06/20 64.9 kg (143 lb) 02/05/20 64.4 kg (142 lb)  
01/31/20 64.7 kg (142 lb 9.6 oz)  
11/17/19 62.6 kg (138 lb) 11/03/19 64.4 kg (142 lb)  
07/22/19 65.8 kg (145 lb)  
06/20/19 69.4 kg (153 lb)  
03/17/19 69.9 kg (154 lb)  
07/30/18 73.5 kg (162 lb) 12/19/17 68.9 kg (152 lb)  
08/25/17 70.9 kg (156 lb 4.9 oz)  
06/25/16 61.3 kg (135 lb 3 oz) 06/14/16 64.6 kg (142 lb 8 oz) 06/12/16 65.8 kg (145 lb)  
06/12/16 65.8 kg (145 lb) 10/25/14 56.7 kg (125 lb 1 oz) NUTRITION DIAGNOSES:  
Problem:  Underweight Etiology: related to decreased ability to consume sufficient energy Signs/Symptoms: as evidenced by BMI 22.1 c/w underweight for age 3/23: Nutrition dx continues. NUTRITION INTERVENTIONS: 
Meals/Snacks: General/healthful diet Enteral/Parenteral Nutrition: Initiate enteral nutrition GOAL:  
PO intake >50% meals + ONS or plan of care decided next 1-3 days Cultural, Yarsani, or Ethnic Dietary Needs: None EDUCATION & DISCHARGE NEEDS:  
 [x] None Identified 
 [] Identified and Education Provided/Documented 
 [] Identified and Pt declined/was not appropriate [x] Interdisciplinary Care Plan Reviewed/Documented  
 [x] Discharge Needs:    Regular diet [] No Nutrition Related Discharge Needs NUTRITION RISK:  
Pt Is At Nutrition Risk  [x] No Nutrition Risk Identified  [] PT SEEN FOR:  
 []  MD Consult: []Calorie Count []Diabetic Diet Education []Diet Education []Electrolyte Management []General Nutrition Management and Supplements []Management of Tube Feeding []TPN Recommendations []  RN Referral:  []MST score >=2 
   []Enteral/Parenteral Nutrition PTA []Pregnant: Gestational DM or Multigestation  
              [] Pressure Ulcer 
 
[]  Low BMI      []  Length of Stay       [] Dysphagia Diet         [] Ventilator [x]  Follow-up Previous Recommendations: 
 [x] Implemented          [] Not Implemented          [x] Not Applicable Previous Goal: 
 [] Met              [x] Progressing Towards Goal              [] Not Progressing Towards Goal   [] Not Applicable Betsy No RDN Pager [phone] Phone [phone]

## 2020-03-23 NOTE — PROGRESS NOTES
Bedside shift change report given to IVAN Diaz (oncoming nurse) by IVAN Lambert (offgoing nurse). Report included the following information SBAR, Kardex, Procedure Summary, Intake/Output, MAR and Recent Results.

## 2020-03-23 NOTE — HOSPICE
[facility] 
[phone] Patient Name: Campos Rivera YOB: 1945 Age: 76 y.o. [employer] [facility] RN Note:  Hospice consult received, reviewing chart. Will follow up with Unit Nurse and Care Manager to discuss plan of care, patient status and discharge disposition Met w/ wife, daugther and patient at bedside. Review hopsice philosophy, levels of care, services offered. Plan to go home w/ hospice on 03/24 AM. Equipment ordered for delivery tonight. Has oxycodone and klonopin in home already, no addl meds ordered at this time Patient would like to go home w/ condom catheter in place Wishes to remain full code at this time Requested that Adriana PABON set up transport for 03/24 at 09:00am for home hospice admission at 10:00am 
 
Thank you for the opportunity to be of service to this patient.

## 2020-03-23 NOTE — PROGRESS NOTES
Occupational Therapy Note:  Chart reviewed. Due to the instability on his C5 fx and per ortho family declining sx intervention, his condition he is not appropriate for OT at this time. We will complete the order. Thank you for the consult.  
Yesenia Hess, OTR/L, RHONDAIS

## 2020-03-23 NOTE — HOSPICE
[facility] Those in Need 
[phone] Inpatient Nursing PRE Admission Patient Name: Imani Martin YOB: 1945 Age: 76 y.o. Date of PLANNED Hospice Admission: 2020 Hospice Attending: Dr. Yvette Rivas Primary Care Physician: Bony Peterson, [id number] [facility] Zip Code: [address] Expected  (if patient transferred to Home Hospice): Rae Vilchis ADVANCE CARE PLANNING Code Status: FULL CODE Durable DNR: []  Yes  []  No 
Advance Care Planning 3/20/2020 Patient's Healthcare Decision Maker is: Legal Next of Kin Primary Decision Maker Name - Secondary Decision Maker Name - Secondary Decision Maker Phone Number - Secondary Decision Maker Relationship to Patient -  
Confirm Advance Directive None Patient Would Like to Complete Advance Directive No  
 
 
Tenriism: Jewish  Home: TBD HOSPICE SUMMARY  
ER Visits/ Hospitalizations in past year: 5 Hospice Diagnosis: metastatic lung ca Onset Date of Hospice Diagnosis: 2020 Summary of Disease Progression Leading to Hospice Diagnosis: Per Palliative note : \"COPD. Stage IV metastatic lung cancer and palliative immunotherapy as well  new findings of unstable C5 Column 3 fx, We spent time discussing options of care, MRI and possible high risk surgical repair vs palliative radiation and comfort focused care with Hospice. Family not wanting to pursue surgical repair of cervical spine. Patient clear that he wants to go home, family interested in palliative radiation to cervical spine. We explained that wearing the hard collar atc was recommended, patient stated that he would NOT be willing to wear it the rest of his life. We talked about risk if not wearing collar and complications of a completed C5 fx and recommended Hospice. \" Patient and family wish to go home w/ support of hospice services at this time. Daughter is adamant that patient remain full code.  Will need ongoing education regarding code status Co-Morbidities:  
Patient Active Problem List  
Diagnosis Code  COPD with acute exacerbation ([facility] J44.1  S/P MVR (mitral valve replacement) Z95.2  Depression F32.9  Aortic stenosis I35.0  S/P CABG x 1 Z95.1  
 S/P AVR (aortic valve replacement) Z95.2  CAD (coronary artery disease) I25.10  Lung cancer metastatic to bone ([facility]) C34.90, C79.51  
 Acute and chronic respiratory failure with hypoxia ([facility]) J96.21  
 PAF (paroxysmal atrial fibrillation) ([facility]) I48.0  Multiple fractures of cervical spine ([facility]) S12. 9XXA  Hx of completed stroke Z86.73  
 Anemia D64.9  Leukocytosis D72.829  
 Chronic anticoagulation Z79.01 Rationale for a prognosis of life expectancy of 6 months or less if the disease follows its normal course (Disease Specific History): Karri Arenas is a 76 y. o. who was admitted to [facility]. The patient's principle diagnosis of metastatic lung ca has resulted in pursuit of hospice care. Functionally, the patient's Palliative Performance Scale has declined over a period of weeks and is estimated at 40. Objective information that support this patients limited prognosis includes:unstable cervical fx requiring use of c-collar, hypoxia requiring O2, weakness  The patient/family chose comfort measures with the support of Hospice. Patient meets for home routine  LOC as evidenced by patient stable w/ symptoms able to be managed in the home setting Verbal certification of terminal diagnosis with life expectancy of 6 months or less received from: Dr. Jake Herrmann Prognosis estimated based on 03/23/20 clinical assessment is:  
[] Few to Many Hours [] Hours to Days  
[] Few to Many Days  
[] Days to David Schneider [x] Few to Many Weeks  
[] Weeks to Months  
[] Few to Many Months CLINICAL INFORMATION Allergies: Allergies Allergen Reactions  Adenosine Shortness of Breath Tolerates well when given for SVT, per Dr. Hiawatha Cabot. Given on 3/8/2020  Iodinated Contrast Media Shortness of Breath  Pcn [Penicillins] Shortness of Breath Patient reports to have tolerated a 10 day course of cefdinir started 8/24/17 Wt Readings from Last 3 Encounters:  
03/21/20 56.7 kg (125 lb)  
03/18/20 61.2 kg (135 lb)  
03/10/20 61.5 kg (135 lb 9.3 oz) Ht Readings from Last 3 Encounters:  
03/20/20 5' 6\" (1.676 m)  
03/18/20 5' 6\" (1.676 m)  
03/18/20 5' 6\" (1.676 m) Body mass index is 20.18 kg/m². Visit Vitals /72 (BP 1 Location: Left arm, BP Patient Position: At rest) Pulse 70 Temp 97.2 °F (36.2 °C) Resp 20 Ht 5' 6\" (1.676 m) Wt 56.7 kg (125 lb) SpO2 98% BMI 20.18 kg/m² LAB VALUES No results found for this visit on 03/20/20 (from the past 12 hour(s)). No results found for this visit on 03/20/20 (from the past 6 hour(s)). Lab Results Component Value Date/Time Protein, total 5.3 (L) 03/21/2020 04:41 AM  
 Albumin 2.4 (L) 03/21/2020 04:41 AM  
 
 
Currently this patient has: 
[x] Supplemental O2 [] Peripheral IV  [] PICC    [] PORT [x] CONDOM Catheter [] NG Tube   [] PEG Tube [] Ostomy   
[] AICD: Has ICD been deactivated? [] Yes [] No:______ Progression to DEPENDENCE WITH ADLs (include time frame): dependent w/ ADLS 
- Dependent for bathing: personal hygiene and grooming - Dependent for dressing: dressing and undressing - Dependent for transferring: movement and mobility - Dependent for toileting: continence-related tasks including control and hygiene - Dependent for eating: preparing food and feeding ASSESSMENT & PLAN 1. Symptom Issues Identified: pain and anxiety being managed w/ oxycodone and klonopin 2. Spiritual Issues Identified: none at this time, declines  visits 3. Psych/ Social/ Emotional Issues Identified: very supportive family, daughter and wife are primary caregivers.  Decline  services at this time 4. Care Coordination:  
Transfer to: home                (home or [facility]) Report given to: hospice intake  (Nurse and MD) Transportation by: [facility] to be arranged by CM at [facility] Scheduled for 09:00am on 03/24    (time) Medications Needs: has Oxycodone and Klonopin in the home DME: O2 set up, hospital bed, wheelchair Supplies: TBD 
 
 
COVID-19 Screen Institutional Screening (Acute care hospitals by liaisons, Nursing facilities by Intake/Adm RN): 
1. Is patient currently on isolation precautions?: NO 
2.  Covid-19 positive: NO

## 2020-03-23 NOTE — PROGRESS NOTES
[facility] Dosing Services: 03/23/20 Consult for Warfarin Dosing by Pharmacy by Dr. Sherrell Adams Consult provided for this 76 y.o.  male , for indication of AVR/MVR and atrial fibrillation Day of Therapy: Resumed from PTA medication list 
Home Regimen: Warfarin 2.5 mg alternating every other day with 1 mg Dose to achieve an INR goal of 2.5- 3.5 Assessment/Plan: H/H and platelets stable, no documentation of any bleeding events in progress notes Today's INR of 3.1 which is THERAPEUTIC Will order warfarin 1 mg x 1 tonight at 1800 INR daily with AM labs per protocol Significant drug interactions: None identified Previous dose given 2 mg on 3/22 PT/INR Lab Results Component Value Date/Time INR 3.1 (H) 03/23/2020 02:05 AM  
  
Platelets Lab Results Component Value Date/Time PLATELET 481 18/29/9584 04:41 AM  
  
H/H Lab Results Component Value Date/Time HGB 10.2 (L) 03/21/2020 04:41 AM  
  
 
Pharmacy to follow daily and will provide subsequent Warfarin dosing based on clinical status. ELISABET Laureano  Contact information [phone]

## 2020-03-23 NOTE — PROGRESS NOTES
[facility] Progress Note NAME: Melania Amaya :  1945 MRM:  [id number] Date/Time of service 3/23/2020  8:29 AM    
 
  
Assessment and Plan:  
 
Acute and chronic respiratory failure with hypoxia - POA, likely a combination of COPD and lung cancer, with deconditioning and splinting contributing. Pulmonary consulted. Oxygen as needed. Family re converted to full code. Intubation in this gravely ill patient with unstable C5 would almost certainly lead to paralysis, and probable death. COPD with acute exacerbation / leukocytosis - POA, unclear trigger. Check RVP. Pulmonary consulted. 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 Patient too debilitated for even palliative treatment. Family has not accepted prognosis. Hospice is the only reasonable option. Family not yet allowing discussion. Prn oxycodone po and prn IV dilaudid Multiple fractures of cervical spine - Noted on CT. Likely unstable C5. Wearing C collar. Ortho consulted, and initially risky intervention declined, but now family interested. I have attempted to contact family to discuss this, without success. PAF (paroxysmal atrial fibrillation) / Aortic stenosis S/P AVR (aortic valve replacement) / S/P MVR (mitral valve replacement) - POA and initially required diltizem, now stable on PO diltiazem and coumadin. CAD (coronary artery disease) S/P CABG / hyperlipidemia - No acute symptoms. Continue ISMO. No role for statin with impending death. Glaucoma - continue alphagan and xalatan Depression - Continue lexapro and prn clonazepam 
 
Debilitated patient / Hx of completed stroke - No hope of recovery or rehab. Hospice is only reasonable option. Anemia - POA due to chronic disease and chemo. GI loss due to coumadin not excluded, but not safe for any workup. Subjective: Chief Complaint:  Frail, answers vaguely. No family at bedside. ROS: 
(bold if positive, if negative) Not Tolerating PT  Not Tolerating Diet Objective:  
 
Last 24hrs VS reviewed since prior progress note. Most recent are: 
 
Visit Vitals /70 (BP 1 Location: Left arm, BP Patient Position: At rest) Pulse 69 Temp 97.5 °F (36.4 °C) Resp 20 Ht 5' 6\" (1.676 m) Wt 56.7 kg (125 lb) SpO2 98% BMI 20.18 kg/m² SpO2 Readings from Last 6 Encounters:  
03/23/20 98% 03/18/20 99% 03/18/20 94% 03/18/20 98% 03/10/20 100% 03/03/20 (!) 88% O2 Flow Rate (L/min): 2 l/min Intake/Output Summary (Last 24 hours) at 3/23/2020 3687 Last data filed at 3/23/2020 6327 Gross per 24 hour Intake 480 ml Output 2450 ml Net -1970 ml Physical Exam: 
 
Gen:  Thin, frail, cachectic, in no acute distress HEENT:  Pink conjunctivae, PERRL, hearing intact to voice, moist mucous membranes Neck:  Wearing C collar, without masses, thyroid non-tender Resp:  No accessory muscle use, bilateral reduced coarse breath sounds Card:  No murmurs, normal S1, S2 without thrills, bruits or peripheral edema Abd:  Soft, non-tender, non-distended, reduced bowel sounds are present, no mass Lymph:  No cervical or inguinal adenopathy Musc:  No cyanosis or clubbing Skin:  No rashes or ulcers, skin turgor is reduced Neuro:  Cranial nerves are grossly intact, severe motor weakness, does not follow commands appropriately Psych:  Poor insight, oriented to person, place and time, alert Telemetry reviewed:   normal sinus rhythm 
__________________________________________________________________ Medications Reviewed: (see below) Medications:  
 
Current Facility-Administered Medications Medication Dose Route Frequency  albuterol-ipratropium (DUO-NEB) 2.5 MG-0.5 MG/3 ML  3 mL Nebulization Q6H RT  
 methylPREDNISolone (PF) (SOLU-MEDROL) injection 40 mg  40 mg IntraVENous Q8H  
  guaiFENesin ER (MUCINEX) tablet 1,200 mg  1,200 mg Oral Q12H  
 benzonatate (TESSALON) capsule 100 mg  100 mg Oral TID PRN  
 atorvastatin (LIPITOR) tablet 80 mg  80 mg Oral QPM  
 brimonidine (ALPHAGAN) 0.2 % ophthalmic solution 1 Drop  1 Drop Both Eyes BID  clonazePAM (KlonoPIN) tablet 0.5 mg  0.5 mg Oral TID  escitalopram oxalate (LEXAPRO) tablet 10 mg  10 mg Oral DAILY  budesonide (PULMICORT) 500 mcg/2 ml nebulizer suspension  500 mcg Nebulization BID RT  
 arformoteroL (BROVANA) neb solution 15 mcg  15 mcg Nebulization BID RT  
 isosorbide mononitrate (ISMO, MONOKET) tablet 10 mg  10 mg Oral TID  latanoprost (XALATAN) 0.005 % ophthalmic solution 1 Drop  1 Drop Both Eyes QHS  oxyCODONE IR (ROXICODONE) tablet 15 mg  15 mg Oral Q3H PRN  polyethylene glycol (MIRALAX) packet 17 g  17 g Oral DAILY  senna (SENOKOT) tablet 17.2 mg  2 Tab Oral QHS  sodium chloride (NS) flush 5-40 mL  5-40 mL IntraVENous Q8H  
 sodium chloride (NS) flush 5-40 mL  5-40 mL IntraVENous PRN  
 acetaminophen (TYLENOL) tablet 650 mg  650 mg Oral Q4H PRN  
 HYDROmorphone (PF) (DILAUDID) injection 1 mg  1 mg IntraVENous Q4H PRN  
 naloxone (NARCAN) injection 0.4 mg  0.4 mg IntraVENous PRN  
 diphenhydrAMINE (BENADRYL) injection 12.5 mg  12.5 mg IntraVENous Q4H PRN  
 ondansetron (ZOFRAN) injection 4 mg  4 mg IntraVENous Q6H PRN  
 docusate sodium (COLACE) capsule 100 mg  100 mg Oral BID  
 0.9% sodium chloride infusion  75 mL/hr IntraVENous CONTINUOUS  
 Warfarin - Pharmacy to Dose   Other Rx Dosing/Monitoring  carboxymethylcellulose sodium (CELLUVISC) 1 % ophthalmic solution 2 Drop  2 Drop Both Eyes PRN  
 dilTIAZem CD (CARDIZEM CD) capsule 240 mg  240 mg Oral DAILY Lab Data Reviewed: (see below) Lab Review:  
 
Recent Labs  
  03/21/20 
0441 WBC 12.6* HGB 10.2* HCT 30.3*  Recent Labs  
  03/23/20 
0205 03/22/20 
0250 03/21/20 
0441 NA  --   --  133* K  --   --  3.9 CL  --   --  101 CO2  --   --  28  
GLU  --   --  186* BUN  --   --  16  
CREA  --   --  0.61* CA  --   --  8.0*  
MG  --   --  2.3 PHOS  --   --  1.9* ALB  --   --  2.4* TBILI  --   --  1.1*  
SGOT  --   --  40* ALT  --   --  124* INR 3.1* 2.3* 1.8* Lab Results Component Value Date/Time Glucose (POC) 94 11/17/2019 12:13 PM  
 Glucose (POC) 88 11/17/2019 12:01 PM  
 Glucose (POC) 97 06/19/2019 06:57 PM  
 Glucose (POC) 96 08/28/2017 12:11 PM  
 Glucose (POC) 120 (H) 08/28/2017 08:20 AM  
 
No results for input(s): PH, PCO2, PO2, HCO3, FIO2 in the last 72 hours. Recent Labs  
  03/23/20 
0205 03/22/20 
0250 03/21/20 
0441 INR 3.1* 2.3* 1.8* All Micro Results Procedure Component Value Units Date/Time RESPIRATORY PANEL,PCR,NASOPHARYNGEAL [[id number]] Collected:  03/21/20 1733 Order Status:  Completed Specimen:  Nasopharyngeal Updated:  03/21/20 2242 Adenovirus Not detected Coronavirus 229E Not detected Coronavirus HKU1 Not detected Coronavirus CVNL63 Not detected Coronavirus OC43 Not detected Metapneumovirus Not detected Rhinovirus and Enterovirus Not detected Influenza A Not detected Influenza A, subtype H1 Not detected Influenza A, subtype H3 Not detected INFLUENZA A H1N1 PCR Not detected Influenza B Not detected Parainfluenza 1 Not detected Parainfluenza 2 Not detected Parainfluenza 3 Not detected Parainfluenza virus 4 Not detected RSV by PCR Not detected B. parapertussis, PCR Not detected Bordetella pertussis - PCR Not detected Chlamydophila pneumoniae DNA, QL, PCR Not detected Mycoplasma pneumoniae DNA, QL, PCR Not detected Other pertinent lab: none Total time spent with patient: 28 Minutes I personally reviewed chart, notes, data and current medications in the medical record.   I have personally examined and treated the patient at bedside during this period. Care Plan discussed with: Patient, Family, Care Manager, Nursing Staff and >50% of time spent in counseling and coordination of care Discussed:  Code Status, Care Plan and D/C Planning Prophylaxis:  H2B/PPI Disposition:  Home w/Family 
        
___________________________________________________ Attending Physician: Beltran Connors MD

## 2020-03-24 NOTE — DISCHARGE SUMMARY
Physician Discharge Summary Patient ID: Aakash Bundy [id number] 
58 y.o. 
1945 Admit date: 3/20/2020 Discharge date of service and time: 3/24/2020 Admission Diagnoses: Acute and chronic respiratory failure with hypoxia ([facility] [address] [J96.21] Discharge Diagnoses:   
Principal Diagnosis Acute and chronic respiratory failure with hypoxia (HCC) Hospital Course and other diagnoses Acute and chronic respiratory failure with hypoxia - POA, likely a combination of COPD and lung cancer, with deconditioning and splinting contributing. Pulmonary consulted. Oxygen as needed. Family re converted to full code. Intubation in this gravely ill patient with unstable C5 would almost certainly lead to paralysis, and probable death. Going home with hospice today. COPD with acute exacerbation / leukocytosis - POA, unclear trigger. Check RVP. Pulmonary consulted. 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 Patient too debilitated for even palliative treatment. Family has not accepted prognosis. Hospice is the only reasonable option. Family not yet allowing discussion. Prn oxycodone po and prn IV dilaudid Multiple fractures of cervical spine - Noted on CT. Likely unstable C5. Wearing C collar. Ortho consulted, and initially risky intervention declined, but now family interested. I have attempted to contact family to discuss this, without success. PAF (paroxysmal atrial fibrillation) / Aortic stenosis S/P AVR (aortic valve replacement) / S/P MVR (mitral valve replacement) - POA and initially required diltizem, now stable on PO diltiazem and coumadin. CAD (coronary artery disease) S/P CABG / hyperlipidemia - No acute symptoms. Continue ISMO. No role for statin with impending death. Glaucoma - continue alphagan and xalatan Depression - Continue lexapro and prn clonazepam 
 
Debilitated patient / Hx of completed stroke - No hope of recovery or rehab. Hospice is only reasonable option. Anemia - POA due to chronic disease and chemo. GI loss due to coumadin not excluded, but not safe for any workup. PCP: Samantha Amador MD 
 
Consults: Cardiology, Pulmonary/Intensive care, Hematology/Oncology, Palliative Care and Hospice Significant Diagnostic Studies: See Hospital Course Discharged home in improved condition. Discharge Exam: 
/69 (BP 1 Location: Left arm, BP Patient Position: At rest) Pulse 79 Temp 97.9 °F (36.6 °C) Resp 18 Ht 5' 6\" (1.676 m) Wt 56.7 kg (125 lb) SpO2 95% BMI 20.18 kg/m² Gen:  Thin, frail, cachectic, in no acute distress HEENT:  Pink conjunctivae, PERRL, hearing intact to voice, moist mucous membranes Neck:  Wearing C collar, without masses, thyroid non-tender Resp:  No accessory muscle use, bilateral reduced coarse breath sounds Card:  No murmurs, normal S1, S2 without thrills, bruits or peripheral edema Abd:  Soft, non-tender, non-distended, reduced bowel sounds are present, no mass Lymph:  No cervical or inguinal adenopathy Musc:  No cyanosis or clubbing Skin:  No rashes or ulcers, skin turgor is reduced Neuro:  Cranial nerves are grossly intact, severe motor weakness, does not follow commands appropriately Psych:  Poor insight, oriented to person, place and time, alert Patient Instructions:  
Current Discharge Medication List  
  
START taking these medications Details  
dilTIAZem CD (CARDIZEM CD) 240 mg ER capsule Take 1 Cap by mouth daily for 30 days. Qty: 30 Cap, Refills: 0 CONTINUE these medications which have NOT CHANGED Details  
morphine 30 mg TR12 Take 30 mg by mouth three (3) times daily. !! oxyCODONE IR (ROXICODONE) 5 mg immediate release tablet Take 5 mg by mouth every three to four (3-4) hours as needed for Pain. fentaNYL (DURAGESIC) 12 mcg/hr patch 1 Patch by TransDERmal route every seventy-two (72) hours. !! oxyCODONE IR (OXY-IR) 15 mg immediate release tablet Take 1 Tab by mouth every three (3) hours as needed for Pain for up to 15 days. Max Daily Amount: 120 mg. 
Qty: 120 Tab, Refills: 0 Associated Diagnoses: Right-sided low back pain without sciatica, unspecified chronicity; Prostate cancer metastatic to bone (HCC)  
  
naloxone (Narcan) 4 mg/actuation nasal spray Use 1 spray intranasally, then discard. Repeat with new spray every 2 min as needed for opioid overdose symptoms, alternating nostrils. Qty: 1 Each, Refills: 0  
  
polyethylene glycol (MIRALAX) 17 gram packet Take 17 g by mouth daily. senna (SENNA) 8.6 mg tablet Take 2 Tabs by mouth nightly. brimonidine (ALPHAGAN) 0.2 % ophthalmic solution Administer 1 Drop to both eyes two (2) times a day. hydroxypropyl methylcellulose (GENTEAL) 0.2 % ophthalmic solution 2 drops right eye every 1-2 hours while awake Qty: 1 Bottle, Refills: 3  
  
latanoprost (XALATAN) 0.005 % ophthalmic solution Administer 1 Drop to both eyes nightly. albuterol (PROVENTIL VENTOLIN) 2.5 mg /3 mL (0.083 %) nebulizer solution 3 mL by Nebulization route every four (4) hours as needed for Wheezing. Qty: 30 Each, Refills: 0  
  
promethazine (PHENERGAN) 12.5 mg tablet Take 25 mg by mouth every eight (8) hours as needed. Indications: Nausea  
  
albuterol (PROVENTIL HFA, VENTOLIN HFA, PROAIR HFA) 90 mcg/actuation inhaler Take 2 Puffs by inhalation every six (6) hours as needed. fluticasone-salmeterol (ADVAIR) 500-50 mcg/dose diskus inhaler Take 1 Puff by inhalation every twelve (12) hours. clonazepam (KLONOPIN) 0.5 mg tablet Take 0.5 mg by mouth three (3) times daily. escitalopram oxalate (LEXAPRO) 10 mg tablet Take 10 mg by mouth daily.   
  
isosorbide mononitrate (ISMO, MONOKET) 10 mg tablet Take 10 mg by mouth three (3) times daily. Patient takes at [facility], noon, and 5pm  
  
 !! - Potential duplicate medications found. Please discuss with provider. STOP taking these medications  
  
 predniSONE (DELTASONE) 20 mg tablet Comments:  
Reason for Stoppin.9% sodium chloride solp 100 mL with pembrolizumab 25 mg/mL soln Comments:  
Reason for Stopping:   
   
 warfarin (COUMADIN) 1 mg tablet Comments:  
Reason for Stopping:   
   
 amLODIPine (NORVASC) 10 mg tablet Comments:  
Reason for Stopping:   
   
 atorvastatin (LIPITOR) 80 mg tablet Comments:  
Reason for Stopping:   
   
 warfarin (COUMADIN) 5 mg tablet Comments:  
Reason for Stopping:   
   
  
 
Activity: Activity as tolerated Diet: Comfort feeding Wound Care: None needed Follow-up with your PCP and hospice in 1 day. Follow-up tests/labs - none Signed: 
Rhiannon Sanchez MD 
3/24/2020 
8:51 AM

## 2020-03-24 NOTE — DISCHARGE INSTRUCTIONS
Patient Discharge Instructions    Mike Walters / [id number] : 1945    Admitted 3/20/2020 Discharged: 3/24/2020     Primary Diagnoses  Problem List as of 3/24/2020 Date Reviewed: 3/21/2020           Hx of completed stroke   Anemia   Leukocytosis   Chronic anticoagulation   * (Principal) Acute and chronic respiratory failure with hypoxia (HCC)   PAF (paroxysmal atrial fibrillation) (HCC)   Multiple fractures of cervical spine (HCC)   Lung cancer metastatic to bone (HCC)   S/P CABG x 1   S/P AVR (aortic valve replacement)   CAD (coronary artery disease)   Aortic stenosis   COPD with acute exacerbation (HCC)   S/P MVR (mitral valve replacement)   Depression          Take Home Medications       · It is important that you take the medication exactly as they are prescribed. · Keep your medication in the bottles provided by the pharmacist and keep a list of the medication names, dosages, and times to be taken in your wallet. · Do not take other medications without consulting your doctor. What to do at [address]: Comfort feeding    Recommended activity: Activity as tolerated    If you experience worse symptoms, please follow up with hospice at home. Follow-up with your PCP in a few weeks    Patient Education        Hospice: [facility] provides medical treatment to relieve symptoms at the end of life. The goal is to keep you comfortable, not to try to cure you. Hospice care does not speed up or lengthen dying. It focuses on easing pain and other symptoms. Hospice caregivers want to enhance your quality of life. Hospice care also offers emotional help and spiritual support when you are dying. It also helps family members care for a loved one who is dying. Hospice care can help you review your life, say important things to family and friends, and explore spiritual issues.  Hospice also helps your family and friends deal with their grief after you die. You can use hospice care if your illness cannot be cured and doctors believe you have no more than 6 months to live. You do not need to be confined to a bed or in a hospital to benefit from this type of care. The hospice team includes nurses, counselors, therapists, social workers, pastors, home health aides, and trained volunteers. You can get care in your own home or in a hospice center. Some hospice workers also go to nursing homes or hospitals. How can you care for yourself at home? · Prepare a list of advance directives. These are instructions to your doctor and family members about what kind of care you want if you become unable to speak or express yourself. · Find out if your health insurance covers hospice care. · Find hospice programs in your area. People who can help include your doctor, your state health department, and your insurance company. · Decide what kinds of hospice services you want. It helps to know what you want before you enter a hospice program.  · Think about some questions when preparing for hospice care. ? Who do you want to make decisions about your medical care if you are not able to? Many people choose their spouse, child, or doctor. ? What are you most afraid of that might happen? You might be afraid of having pain or losing your independence. Let your hospice team know your fears. The team can help you deal with them. ? Where would you prefer to die? Choices include your home, a hospital, or a nursing home. ? Do you want to donate organs when you die? Make sure that your family clearly understands this. ? Do you want any Holiness rites or practices to be done before you die? Let your hospice team know what you want. Where can you learn more? Go to http://rigoberto-marcos.info/  Enter N903 in the search box to learn more about \"Hospice: Care Instructions. \"  Current as of: December 8, 2019Content Version: 12.4  © 8926-4564 [employer]. Care instructions adapted under license by [employer] (which disclaims liability or warranty for this information). If you have questions about a medical condition or this instruction, always ask your healthcare professional. [place] [address] any warranty or liability for your use of this information. Information obtained by :  I understand that if any problems occur once I am at home I am to contact my physician. I understand and acknowledge receipt of the instructions indicated above.                                                                                                                                            Physician's or R.N.'s Signature                                                                  Date/Time                                                                                                                                              Patient or Representative Signature                                                          Date/Time

## 2020-03-24 NOTE — PROGRESS NOTES
Venous access port on the right upper chest removed. Patient is confused therefore unable to sign discharge papers. Discharge papers sent home with ambulance services.

## 2020-03-24 NOTE — PROGRESS NOTES
3/24/2020   CARE MANAGEMENT NOTE:  CM verified that pt will discharge today to home and will be admitted to [facility]. [facility] was arranged for  at 9 a.mBlank Ramirez

## 2020-03-24 NOTE — PROGRESS NOTES
[facility] Medical Progress Note NAME: Hola Frye :  1945 MRM:  [id number] Date/Time of service 3/24/2020  8:49 AM    
 
  
Assessment and Plan:  
 
Acute and chronic respiratory failure with hypoxia - POA, likely a combination of COPD and lung cancer, with deconditioning and splinting contributing. Pulmonary consulted. Oxygen as needed. Family re converted to full code. Intubation in this gravely ill patient with unstable C5 would almost certainly lead to paralysis, and probable death. Going home with hospice today. COPD with acute exacerbation / leukocytosis - POA, unclear trigger. Check RVP. Pulmonary consulted. 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 Patient too debilitated for even palliative treatment. Family has not accepted prognosis. Hospice is the only reasonable option. Family not yet allowing discussion. Prn oxycodone po and prn IV dilaudid Multiple fractures of cervical spine - Noted on CT. Likely unstable C5. Wearing C collar. Ortho consulted, and initially risky intervention declined, but now family interested. I have attempted to contact family to discuss this, without success. PAF (paroxysmal atrial fibrillation) / Aortic stenosis S/P AVR (aortic valve replacement) / S/P MVR (mitral valve replacement) - POA and initially required diltizem, now stable on PO diltiazem and coumadin. CAD (coronary artery disease) S/P CABG / hyperlipidemia - No acute symptoms. Continue ISMO. No role for statin with impending death. Glaucoma - continue alphagan and xalatan Depression - Continue lexapro and prn clonazepam 
 
Debilitated patient / Hx of completed stroke - No hope of recovery or rehab. Hospice is only reasonable option. Anemia - POA due to chronic disease and chemo.   GI loss due to coumadin not excluded, but not safe for any workup. Subjective: Chief Complaint:  Frail, answers vaguely. No family at bedside. ROS: 
(bold if positive, if negative) Not Tolerating PT  Not Tolerating Diet Objective:  
 
Last 24hrs VS reviewed since prior progress note. Most recent are: 
 
Visit Vitals /69 (BP 1 Location: Left arm, BP Patient Position: At rest) Pulse 79 Temp 97.9 °F (36.6 °C) Resp 18 Ht 5' 6\" (1.676 m) Wt 56.7 kg (125 lb) SpO2 95% BMI 20.18 kg/m² SpO2 Readings from Last 6 Encounters:  
03/24/20 95% 03/18/20 99% 03/18/20 94% 03/18/20 98% 03/10/20 100% 03/03/20 (!) 88% O2 Flow Rate (L/min): 2 l/min Intake/Output Summary (Last 24 hours) at 3/24/2020 2964 Last data filed at 3/24/2020 8109 Gross per 24 hour Intake 220 ml Output  Net 220 ml Physical Exam: 
 
Gen:  Thin, frail, cachectic, in no acute distress HEENT:  Pink conjunctivae, PERRL, hearing intact to voice, moist mucous membranes Neck:  Wearing C collar, without masses, thyroid non-tender Resp:  No accessory muscle use, bilateral reduced coarse breath sounds Card:  No murmurs, normal S1, S2 without thrills, bruits or peripheral edema Abd:  Soft, non-tender, non-distended, reduced bowel sounds are present, no mass Lymph:  No cervical or inguinal adenopathy Musc:  No cyanosis or clubbing Skin:  No rashes or ulcers, skin turgor is reduced Neuro:  Cranial nerves are grossly intact, severe motor weakness, does not follow commands appropriately Psych:  Poor insight, oriented to person, place and time, alert Telemetry reviewed:   normal sinus rhythm 
__________________________________________________________________ Medications Reviewed: (see below) Medications:  
 
Current Facility-Administered Medications Medication Dose Route Frequency  heparin (porcine) pf 500 Units  500 Units InterCATHeter ONCE PRN  
  methylPREDNISolone (PF) (SOLU-MEDROL) injection 40 mg  40 mg IntraVENous Q12H  
 albuterol-ipratropium (DUO-NEB) 2.5 MG-0.5 MG/3 ML  3 mL Nebulization Q6H RT  
 guaiFENesin ER (MUCINEX) tablet 1,200 mg  1,200 mg Oral Q12H  
 benzonatate (TESSALON) capsule 100 mg  100 mg Oral TID PRN  
 atorvastatin (LIPITOR) tablet 80 mg  80 mg Oral QPM  
 brimonidine (ALPHAGAN) 0.2 % ophthalmic solution 1 Drop  1 Drop Both Eyes BID  clonazePAM (KlonoPIN) tablet 0.5 mg  0.5 mg Oral TID  escitalopram oxalate (LEXAPRO) tablet 10 mg  10 mg Oral DAILY  budesonide (PULMICORT) 500 mcg/2 ml nebulizer suspension  500 mcg Nebulization BID RT  
 arformoteroL (BROVANA) neb solution 15 mcg  15 mcg Nebulization BID RT  
 isosorbide mononitrate (ISMO, MONOKET) tablet 10 mg  10 mg Oral TID  latanoprost (XALATAN) 0.005 % ophthalmic solution 1 Drop  1 Drop Both Eyes QHS  oxyCODONE IR (ROXICODONE) tablet 15 mg  15 mg Oral Q3H PRN  polyethylene glycol (MIRALAX) packet 17 g  17 g Oral DAILY  senna (SENOKOT) tablet 17.2 mg  2 Tab Oral QHS  sodium chloride (NS) flush 5-40 mL  5-40 mL IntraVENous Q8H  
 sodium chloride (NS) flush 5-40 mL  5-40 mL IntraVENous PRN  
 acetaminophen (TYLENOL) tablet 650 mg  650 mg Oral Q4H PRN  
 HYDROmorphone (PF) (DILAUDID) injection 1 mg  1 mg IntraVENous Q4H PRN  
 naloxone (NARCAN) injection 0.4 mg  0.4 mg IntraVENous PRN  
 diphenhydrAMINE (BENADRYL) injection 12.5 mg  12.5 mg IntraVENous Q4H PRN  
 ondansetron (ZOFRAN) injection 4 mg  4 mg IntraVENous Q6H PRN  
 docusate sodium (COLACE) capsule 100 mg  100 mg Oral BID  
 0.9% sodium chloride infusion  75 mL/hr IntraVENous CONTINUOUS  
 Warfarin - Pharmacy to Dose   Other Rx Dosing/Monitoring  carboxymethylcellulose sodium (CELLUVISC) 1 % ophthalmic solution 2 Drop  2 Drop Both Eyes PRN  
 dilTIAZem CD (CARDIZEM CD) capsule 240 mg  240 mg Oral DAILY Lab Data Reviewed: (see below) Lab Review: No results for input(s): WBC, HGB, HCT, PLT, HGBEXT, HCTEXT, PLTEXT, HGBEXT, HCTEXT, PLTEXT in the last 72 hours. Recent Labs  
  03/24/20 
0305 03/23/20 
0205 03/22/20 
0250 INR 3.2* 3.1* 2.3* Lab Results Component Value Date/Time Glucose (POC) 94 11/17/2019 12:13 PM  
 Glucose (POC) 88 11/17/2019 12:01 PM  
 Glucose (POC) 97 06/19/2019 06:57 PM  
 Glucose (POC) 96 08/28/2017 12:11 PM  
 Glucose (POC) 120 (H) 08/28/2017 08:20 AM  
 
No results for input(s): PH, PCO2, PO2, HCO3, FIO2 in the last 72 hours. Recent Labs  
  03/24/20 
0305 03/23/20 
0205 03/22/20 
0250 INR 3.2* 3.1* 2.3* All Micro Results Procedure Component Value Units Date/Time RESPIRATORY PANEL,PCR,NASOPHARYNGEAL [[id number]] Collected:  03/21/20 1733 Order Status:  Completed Specimen:  Nasopharyngeal Updated:  03/21/20 2242 Adenovirus Not detected Coronavirus 229E Not detected Coronavirus HKU1 Not detected Coronavirus CVNL63 Not detected Coronavirus OC43 Not detected Metapneumovirus Not detected Rhinovirus and Enterovirus Not detected Influenza A Not detected Influenza A, subtype H1 Not detected Influenza A, subtype H3 Not detected INFLUENZA A H1N1 PCR Not detected Influenza B Not detected Parainfluenza 1 Not detected Parainfluenza 2 Not detected Parainfluenza 3 Not detected Parainfluenza virus 4 Not detected RSV by PCR Not detected B. parapertussis, PCR Not detected Bordetella pertussis - PCR Not detected Chlamydophila pneumoniae DNA, QL, PCR Not detected Mycoplasma pneumoniae DNA, QL, PCR Not detected Other pertinent lab: none Total time spent with patient: 28 Minutes I personally reviewed chart, notes, data and current medications in the medical record. I have personally examined and treated the patient at bedside during this period. Care Plan discussed with: Patient, Family, Care Manager, Nursing Staff and >50% of time spent in counseling and coordination of care Discussed:  Code Status, Care Plan and D/C Planning Prophylaxis:  H2B/PPI Disposition:  Home w/Family 
        
___________________________________________________ Attending Physician: Danae Melendez MD

## 2020-03-24 NOTE — ROUTINE PROCESS
Bedside and Verbal shift change report given to [address]  (oncoming nurse) by Nithin Cortez (offgoing nurse). Report included the following information SBAR, Intake/Output, MAR, Accordion, Recent Results, Quality Measures and Dual Neuro Assessment.

## 2020-03-25 PROBLEM — J96.90 RESPIRATORY FAILURE (HCC): Status: ACTIVE | Noted: 2020-01-01

## 2020-03-25 NOTE — PROGRESS NOTES
Spiritual Care Assessment/Progress Note Chantel Medel 
 
 
NAME: Javier Duffy      MRN: [id number] AGE: 76 y.o. SEX: male Rastafarian Affiliation: Yarsanism  
Language: English  
 
3/25/2020     Total Time (in minutes): 9 Spiritual Assessment begun in [facility] 5M1 MED SURG 1 through conversation with: 
  
    [x]Patient        [] Family    [] Friend(s) Reason for Consult: End-of-life support Spiritual beliefs: (Please include comment if needed) 
   [] Identifies with a jeanine tradition:     
   [] Supported by a jeanine community:        
   [] Claims no spiritual orientation:       
   [] Seeking spiritual identity:            
   [] Adheres to an individual form of spirituality:       
   [x] Not able to assess:                   
 
    
Identified resources for coping:  
   [] Prayer                           
   [] Music                  [] Guided Imagery [x] Family/friends                 [] Pet visits [] Devotional reading                         [] Unknown 
   [] Other:                                     
 
 
Interventions offered during this visit: (See comments for more details) Family/Friend(s): Affirmation of emotions/emotional suffering, Catharsis/review of pertinent events in supportive environment Plan of Care: 
 
 [] Support spiritual and/or cultural needs  
 [] Support AMD and/or advance care planning process    
 [] Support grieving process 
 [] Coordinate Rites and/or Rituals  
 [] Coordination with community clergy [] No spiritual needs identified at this time 
 [] Detailed Plan of Care below (See Comments)  [] Make referral to Music Therapy 
[] Make referral to Pet Therapy    
[] Make referral to Addiction services 
[] Make referral to [facility] 
[] Make referral to Spiritual Care Partner 
[] No future visits requested       
[x] Follow up visits as needed Comments:  visited Mr. Luzmaria Kiran, for an end-of-life visit on the Med. Surgical unit. Mr Kenna Ruelas was lying in bed and appeared to be resting comfortably. His wife, Lillie, and another family member were at the bedside.  introduced herself and extended support. Rachel thanked the  for stopping by and expressed no other needs at this time. Advised of 's availability.  will follow up as able and/or needed Conchita Chahal. Deidre Schultz.  Paging Service: [phone])

## 2020-03-25 NOTE — PROGRESS NOTES
Bedside shift change report given to [employer], (oncoming nurse) by Edda Riedel,  (offgoing nurse). Report included the following information SBAR, Kardex, ED Summary and MAR.

## 2020-03-25 NOTE — PROGRESS NOTES
Courtesy Notification:   Mr. Bing Norman admitted to hospice services today with Dr. Heather Michael MD attending.

## 2020-03-25 NOTE — ED NOTES
Dr. Verito Breaux and Dr. Henry Koehler at the bedside with family, discussing admission to hospice.

## 2020-03-25 NOTE — ED TRIAGE NOTES
Pt arrives by EMS for c/o respiratory distress. Pt was just discharged yesterday on hospice hx of lung and bone CA. Pt normally on 4 L NC at home and developed difficulty breathing this morning. Wet breath sounds. Dr. Santosh Decker at bedside. Dr. Jerod Herrera MD is on his way to see pt in ED. Hospice RN Nas Cope at bedside. States \"only suctioning at this point. \"

## 2020-03-25 NOTE — ED PROVIDER NOTES
The history is provided by the spouse, a relative and the EMS personnel. Respiratory Distress This is a recurrent problem. The problem occurs frequently. The current episode started yesterday. The problem has been gradually worsening. Associated medical issues include asthma, COPD, chronic lung disease and CAD. Past Medical History:  
Diagnosis Date  Asthma  CAD (coronary artery disease)  Cancer ([facility] \"10 years ago\" throat  COPD (chronic obstructive pulmonary disease) ([facility])  CVA (cerebral vascular accident) ([facility]  H/O mechanical aortic valve replacement CT surgery 1994 - CABG (LIMA to LAD) and AVR ([facility])  Hypertension  S/P CABG x 1   
 CT surgery 1994 - CABG (LIMA to LAD) and AVR ([facility]) Past Surgical History:  
Procedure Laterality Date  CARDIAC SURG PROCEDURE UNLIST  HX HEART VALVE SURGERY    
 CT surgery 1994 - CABG (LIMA to LAD) and AVR ([facility])  HX HEMORRHOIDECTOMY  IR INSERT TUNL CVC W PORT OVER 5 YEARS  2/7/2020 Family History:  
Problem Relation Age of Onset  Heart Disease Mother  Diabetes Mother  Hypertension Mother  Heart Disease Sister  Hypertension Sister  Hypertension Father  Cancer Brother   
     lung  Diabetes Brother  Heart Disease Brother  Hypertension Brother  Stroke Brother Social History Socioeconomic History  Marital status:  Spouse name: Britany Hubbard  Number of children: 3  
 Years of education: Not on file  Highest education level: Not on file Occupational History  Not on file Social Needs  Financial resource strain: Somewhat hard  Food insecurity Worry: Never true Inability: Never true  Transportation needs Medical: No  
  Non-medical: No  
Tobacco Use  Smoking status: Former Smoker Packs/day: 3.00 Years: 40.00 Pack years: 120.00 Last attempt to quit: 1/1/2019 Years since quittin.2  Smokeless tobacco: Never Used Substance and Sexual Activity  Alcohol use: No  
 Drug use: No  
 Sexual activity: Not Currently Lifestyle  Physical activity Days per week: Patient refused Minutes per session: Patient refused  Stress: Only a little Relationships  Social connections Talks on phone: More than three times a week Gets together: More than three times a week Attends Confucianist service: Never Active member of club or organization: No  
  Attends meetings of clubs or organizations: Never Relationship status:   Intimate partner violence Fear of current or ex partner: No  
  Emotionally abused: No  
  Physically abused: No  
  Forced sexual activity: No  
Other Topics Concern  Not on file Social History Narrative  Not on file ALLERGIES: Adenosine; Iodinated contrast media; and Pcn [penicillins] Review of Systems Unable to perform ROS: Mental status change Vitals:  
 20 1202 20 1203 BP: (!) 158/98 Pulse:  (!) 119 Resp:  (!) 37 SpO2: 100% 100% Physical Exam 
Vitals signs and nursing note reviewed. Constitutional:   
   General: He is in acute distress. Appearance: He is cachectic. He is ill-appearing and toxic-appearing. He is not diaphoretic. Interventions: Cervical collar, nasal cannula and face mask in place. HENT:  
   Head: Atraumatic. Neck:  
   Trachea: No tracheal deviation. Cardiovascular:  
   Rate and Rhythm: Tachycardia present. Comments: Warm and well perfused Pulmonary:  
   Effort: Tachypnea, accessory muscle usage, respiratory distress and retractions present. Breath sounds: Rhonchi and rales present. Abdominal:  
   General: There is distension. Musculoskeletal: Normal range of motion. Skin: 
   General: Skin is warm and dry. Neurological:  
   Mental Status: He is lethargic.   
 
  
 
MDM 
  
 This is a 43-year-old male with past medical history, review of systems, physical exam as above, presenting with complaints of worsening respiratory distress, in the setting of hospice care for COPD, lung cancer, asthma, with chronically worsening respiratory status. Per family at bedside shortly after arrival, patient was restless last night, with worsening respiratory status, prompting him to call EMS. Upon arrival they stated they had not yet agreed on DO NOT RESUSCITATE discussion. However they expressed an interest in making him \"comfort care\" at the time of arrival.  Exam remarkable for uncomfortable, awake however not alert elderly male, with c-collar, nasal cannula, facemask, tachypneic, tachycardic with obvious respiratory distress, accessory muscle use. He has rails to the midlung zones, clear at the apices, distended soft abdomen, rapid cardiac rate without murmurs gallops or rubs, equal and reactive pupils, moving all 4 extremities spontaneously. Per report, hospice physician in route to have discussions at bedside with family in the meantime will order Lasix and morphine for diuresis and pain control. Disposition pending. Procedures 12:11 PM 
Discussed with family at bedside, patient unable to participate in conversation or decision making, family agreed to DNR with comfort measures only, order entered. 59:84 PM 
Dr. Afsaneh greene, plans to admit to the hospital for hospice care.

## 2020-03-25 NOTE — HOSPICE
Change to [facility] level of care; SN visit daily X 7 with 5 visits PRN symptom control; SW visit 1 X weekly and 5 visits PRN family support,  visit 1 X weekly and 5 visits PRN spiritual support. Patient transferred to the [facility] ED today from home due to dyspnea and excessive secretions that were not manageable at home. Prior to coming to the hospital, patient was with full resuscitative measures in place despite several conversations about DDNR with hospice team prior to discharge. Received patient in [facility] ED in respiratory distress- RR 45, audible secretions and frothy sputum coming from his mouth. Family arrived shortly after the patient arrived, and requested comfort measures. Wife, Robby Montejo signed Kaylin Benitez in ED. IV access obtained. Hospice attending evaluated patient in the ED. Patient admitted GIP at [facility] for dyspnea and excessive secretions. Patient received several doses of IV Morphine 4 mg and IV Ativan 1 mg with little benefit. Dilaudid PCA started 1mg/hr with good result. Patient is resting comfortably with daughter and wife at bedside. Robinul 0.2 mg q 4 hours PRN given for secretions with good result. Will reassess in the morning for next steps. Family verbalize understanding that if patient's condition improves that he could transfer the [facility] or back [place]. Claire Castro RN MSN 
[facility] Liaison [facility] [phone] ( mobile) [phone] ( office)

## 2020-03-26 NOTE — [facility] IDG CHAPLAIN NOTES
Patient: Javier Sadlertingham Date: 03/26/20 Time: 8:09 AM 
 
[facility]  Notes Mr. Vinicius Porras was discussed in [facility] team's SOHAIL [place]. The plan of care includes offering spiritual support to the patient and family through prayer and life review. Signed by: Faiza Chan

## 2020-03-26 NOTE — HOSPICE
ARNOLDW completed initial assessment in [facility]. Family has not yet decided on final arrangements and decline assistance. NATTY Santos, [facility] 
[phone]

## 2020-03-26 NOTE — PROGRESS NOTES
1400 Report received from Camryn. PCA pump dual signed and checked. 1900 Bedside shift change report given to Latrell Hernandez RN (oncoming nurse) by [facility], RN (offgoing nurse). Report included the following information SBAR, Kardex, Procedure Summary, Intake/Output, MAR and Recent Results.

## 2020-03-26 NOTE — PROGRESS NOTES
[facility] Good Help to Those in Need 
[phone] Patient Name: Ryder Thomas YOB: 1945 Date of Provider Hospice Visit: 03/26/20 Level of Care:   [x] General Inpatient (GIP)    [] Routine   [] Respite Current Location of Care: 
[] [facility] [x] [facility] [] [facility] [] [facility] [] Hospice [facility] IF [facility], patient referred from: 
[] [facility] [] [facility] [] [facility] [] [facility] [] Home [] Other:  
 
Date of Original Hospice Admission: 3/24/20 Hospice Medical Director at time of admission: Ray Quezada Principle Hospice Diagnosis: Acute respiratory failure Diagnoses RELATED to the terminal prognosis: Metastatic lung cancer, unstable C5 fracture related to metastatic disease, COPD Other Diagnoses: Jain Bambi Thomas is a 76y.o. year old who was admitted to [facility]. Patient is a 80-year-old male with metastatic lung cancer with unfortunately metastatic disease to C5 with an unstable fracture. Disease had also progressed C2, C3, C4, and C6. Patient was discharged from the hospital on 3/24 with the support of hospice care. He remained a full code but family agreed to have the additional support hospice. Overnight, he had a rapid decline with increased shortness of breath/secretions and work of breathing. Hospice nurse was at the home but EMS had already been called. Patient was seen in the emergency room in clear respiratory distress/failure with a nonrebreather in place and still breathing approximately 40 times a minute. Discussion had with both his wife and daughter at the bedside and everybody agrees on inpatient hospice care with symptomatic treatment for end-of-life care. They both agree on no attempts at resuscitation or intubation. The patient's principle diagnosis has resulted in respiratory failure Refer to LCD Functionally, the patient's Karnofsky and/or Palliative Performance Scale has declined over a period of days to weeks and is estimated at 10. The patient is dependent on the following ADLs: All Objective information that support this patients limited prognosis includes: CT cervical spine IMPRESSION: 
  
1. Multiple lytic metastases as above. 2. Moderate pathologic compression fracture of C5. Tumor extends into the 
posterior elements on the left destroying the bone. Local invasion is seen into 
the adjacent C4 and C6 posterior elements. Fractures extend into the posterior 
elements. Severe left neural foraminal narrowing is present at C4-5 and C5-6 
secondary to tumor. 3. Mild pathologic compression fracture of C6. 
4. Pathologic fracture of the posterior elements of C2. 
5. Spondylosis as above. 6. Spiculated nodule again seen in the right apex The patient/family chose comfort measures with the support of Hospice. HOSPICE DIAGNOSES Active Symptoms: 1. Acute respiratory failure 2. Significant secretions 3. Pathological C5 fracture 4. Restlessness with agitation PLAN 1. Patient will continue GIP level care as he needs frequent nursing assessment and medication management via IV route. Patient had ongoing severe shortness of breath yesterday afternoon and was switched to a PCA. This has seemed to help calm his respiratory rate much better. Still having significant secretions. 2. PCA of Dilaudid is 0.8 mg/h basal rate with 0.4 mg bolus dose every 6 minutes as needed. This definitely has helped significantly with his work of breathing. 3. Start Robinul 0.2 mg IV every 4 hours scheduled 4. Comfort order set for all other symptoms and I suspect will need scheduled medications once he arrives to the floor. 5. Discussed plan with bedside nurse, hospice liaison, family. 6.  and SW to support family needs 7. Disposition: Likely will pass in the hospital 
8. Hospice Plan of care was reviewed in detail and agree with current plan of care Prognosis estimated based on 03/26/20 clinical assessment is:  
[x] Hours to Days   
[] Days to Weeks   
[] Other: 
 
Communicated plan of care with: Hospice Case Manager; Hospice IDT; Care Team 
 
 GOALS OF CARE Patient/Medical POA stated Goal of Care: Transition to comfort/hospice care in the hospital 
 
[x] I have reviewed and/or updated ACP information in the Advance Care Planning Navigator. This information is available in the [address] link in the patient's chart header. Primary Decision [address] Agent):   Primary Decision Maker: Faith Morley Spouse - [phone] Resuscitation Status: DNR If DNR is there a Durable DNR on file? : [x] Yes [] No (If no, complete Durable DNR) HISTORY History obtained from: Chart, family CHIEF COMPLAINT: Shortness of breath The patient is:  
[x] Verbal 
[] Nonverbal 
[] Unresponsive HPI/SUBJECTIVE:   
 
Patient attempts to talk a little bit but very restless with increased work of breathing 3/26patient minimally responsive right now. Much calmer REVIEW OF SYSTEMS The following systems were: [] reviewed  [x] unable to be reviewed Positive ROS include: 
Constitutional: fatigue, weakness, in pain, short of breath Ears/nose/mouth/throat: increased airway secretions Respiratory:shortness of breath, wheezing Gastrointestinal:poor appetite, nausea, vomiting, abdominal pain, constipation, diarrhea Musculoskeletal:pain, deformities, swelling legs Neurologic:confusion, hallucinations, weakness Psychiatric:anxiety, feeling depressed, poor sleep Endocrine:  
 
Adult Non-Verbal Pain Assessment Score: 2 Face 
[] 0   No particular expression or smile 
[x] 1   Occasional grimace, tearing, frowning, wrinkled forehead 
[] 2   Frequent grimace, tearing, frowning, wrinkled forehead Activity (movement) [x] 0   Lying quietly, normal position 
[] 1   Seeking attention through movement or slow, cautious movement [] 2   Restless, excessive activity and/or withdrawal reflexes Guarding 
[x] 0   Lying quietly, no positioning of hands over areas of body 
[] 1   Splinting areas of the body, tense 
[] 2   Rigid, stiff Physiology (vital signs) [x] 0   Stable vital signs [] 1   Change in any of the following: SBP > 20mm Hg; HR > 20/minute 
[] 2   Change in any of the following: SBP > 30mm Hg; HR > 25/minute Respiratory 
[] 0   Baseline RR/SpO2, compliant with ventilator 
[x] 1   RR > 10 above baseline, or 5% drop SpO2, mild asynchrony with ventilator 
[] 2   RR > 20 above baseline, or 10% drop SpO2, asynchrony with ventilator FUNCTIONAL ASSESSMENT Palliative Performance Scale (PPS): 10 PSYCHOSOCIAL/SPIRITUAL ASSESSMENT Active Problems: 
  Respiratory failure ([facility] [address] (3/25/2020) Past Medical History:  
Diagnosis Date  Asthma  CAD (coronary artery disease)  Cancer ([facility] [address] \"10 years ago\" throat  COPD (chronic obstructive pulmonary disease) (HCC)  CVA (cerebral vascular accident) ([facility] [address]  H/O mechanical aortic valve replacement CT surgery  - CABG (LIMA to LAD) and AVR ([facility])  Hypertension  S/P CABG x 1   
 CT surgery  - CABG (LIMA to LAD) and AVR ([facility]) Past Surgical History:  
Procedure Laterality Date  CARDIAC SURG PROCEDURE UNLIST  HX HEART VALVE SURGERY    
 CT surgery  - CABG (LIMA to LAD) and AVR ([facility])  HX HEMORRHOIDECTOMY  IR INSERT TUNL CVC W PORT OVER 5 YEARS  2020 Social History Tobacco Use  Smoking status: Former Smoker Packs/day: 3.00 Years: 40.00 Pack years: 120.00 Last attempt to quit: 2019 Years since quittin.2  Smokeless tobacco: Never Used Substance Use Topics  Alcohol use: No  
 
Family History Problem Relation Age of Onset  Heart Disease Mother  Diabetes Mother  Hypertension Mother  Heart Disease Sister  Hypertension Sister  Hypertension Father  Cancer Brother   
     lung  Diabetes Brother  Heart Disease Brother  Hypertension Brother  Stroke Brother Allergies Allergen Reactions  Adenosine Shortness of Breath Tolerates well when given for SVT, per Dr. Edith Munoz. Given on 3/8/2020  Iodinated Contrast Media Shortness of Breath  Pcn [Penicillins] Shortness of Breath Patient reports to have tolerated a 10 day course of cefdinir started 8/24/17 Current Facility-Administered Medications Medication Dose Route Frequency  glycopyrrolate (ROBINUL) injection 0.2 mg  0.2 mg IntraVENous Q4H  
 haloperidol lactate (HALDOL) injection 2 mg  2 mg IntraVENous Q6H PRN  
 ondansetron (ZOFRAN) injection 4 mg  4 mg IntraVENous Q4H PRN  
 LORazepam (ATIVAN) injection 1 mg  1 mg IntraVENous Q15MIN PRN  
 ketorolac (TORADOL) injection 30 mg  30 mg IntraVENous Q8H PRN  
 scopolamine (TRANSDERM-SCOP) 1 mg over 3 days 1 Patch  1 Patch TransDERmal Q72H PRN  
 morphine injection 4 mg  4 mg IntraVENous Q15MIN PRN  prochlorperazine (COMPAZINE) with saline injection 10 mg  10 mg IntraVENous Q4H PRN  
 HYDROmorphone (PF) 25 mg/50 mL (DILAUDID) PCA   IntraVENous CONTINUOUS  
 
 
 PHYSICAL EXAM  
 
Wt Readings from Last 3 Encounters:  
03/25/20 125 lb (56.7 kg) 03/24/20 125 lb (56.7 kg) 03/21/20 125 lb (56.7 kg) Visit Vitals /81 (BP 1 Location: Left arm, BP Patient Position: At rest) Pulse (!) 104 Temp 99.8 °F (37.7 °C) Resp 16 Ht 5' 7\" (1.702 m) Wt 125 lb (56.7 kg) SpO2 93% BMI 19.58 kg/m² Supplemental O2  [x] Yes  [] NO nonrebreather Last bowel movement: 
 
Currently this patient has: 
[x] Peripheral IV [] PICC  [] PORT [] ICD [x] Vieyra Catheter [] NG Tube   [] PEG Tube   
[] Rectal Tube [] Drain 
[] Other:  
 
Constitutional: Much calmer, slightly opens his eyes Eyes: Reactive ENMT: Cervical collar in place Cardiovascular: Tachycardia Respiratory: No accessory muscle use noted now. Secretions noted Gastrointestinal: Soft and distended Musculoskeletal: Muscle wasting Skin: Unremarkable Neurologic: Nonfocal 
Psychiatric: Calm Other:  
 
 
Pertinent Lab and or Imaging Tests: 
Lab Results Component Value Date/Time Sodium 133 (L) 03/21/2020 04:41 AM  
 Potassium 3.9 03/21/2020 04:41 AM  
 Chloride 101 03/21/2020 04:41 AM  
 CO2 28 03/21/2020 04:41 AM  
 Anion gap 4 (L) 03/21/2020 04:41 AM  
 Glucose 186 (H) 03/21/2020 04:41 AM  
 BUN 16 03/21/2020 04:41 AM  
 Creatinine 0.61 (L) 03/21/2020 04:41 AM  
 BUN/Creatinine ratio 26 (H) 03/21/2020 04:41 AM  
 GFR est AA >60 03/21/2020 04:41 AM  
 GFR est non-AA >60 03/21/2020 04:41 AM  
 Calcium 8.0 (L) 03/21/2020 04:41 AM  
 
Lab Results Component Value Date/Time  Protein, total 5.3 (L) 03/21/2020 04:41 AM  
 Albumin 2.4 (L) 03/21/2020 04:41 AM  
 
   
 
Total time:  
Counseling / coordination time:  
> 50% counseling / coordination?:

## 2020-03-26 NOTE — HOSPICE
[employer] Good Help to Those in Need 
[phone] [facility] Daily Nursing Note Patient Name: Bebeto Parks YOB: 1945 Age: 76 y.o. Date of Visit: 20 Facility of Care: [facility] Patient Room: [facility]/ Hospice Attending: Rai Malone MD 
Hospice Diagnosis: Respiratory failure [facility]) [J96.90] Level of Care: [facility] Current [facility] Symptoms 1. Audible, excessive secretions 2. Pain 3. Agitation, restlessness 4. Unresponsive 5. Agonal breathing w/ periods of apnea ASSESSMENT & PLAN Must update Plan of Care including visit frequencies for IDT members 1. Patient continues to have audible secretions, schedule Robinul q4hr 2. Discontinue all oral medications, IV available 3. Continue Dilaudid PCA w/ bolus available 4. Provide support to daughter and wife at bedside 5. Remain GIP Spiritual Interventions:  visits as indicated Psych/ Social/ Emotional Interventions: visit from hospice SW today, family reluctant to discuss impending death Care Coordination Needs: family declined assistance w/  home selection Care plan and New Orders discussed / approved with Dr. Orion Joyner MD. Description History and Chart Review If this is initial [facility] note must document RN assessment/MD communication in previous setting. Specifically document nursing/medication needs in last 24 hours to support [facility] care Narrative History of last 24 hours that demonstrates care cannot be provided in another setting: 
Admitted from home hospice to [facility] at [facility] w/ excessive secretions, agitation, agonal breathing What has been done to control the patient's symptoms in the last 24 hours? Dilaudid PCA initiated, admit to [facility] under [facility] level of care Does the patient currently require IV medications? yes Does the patient currently require scheduled medications? yes Does the patient currently require a PCA? yes Supporting documentation for GIP need for pain control: 
[x] Frequent evaluation by a doctor, nurse practitioner, nurse  
[x] Frequent medication adjustment   
[x] IVs that cannot be administered at home  
[] Aggressive pain management  
[] Complicated technical delivery of medications Supporting documentation for GIP need for symptom control: 
[x]  Sudden decline necessitating intensive nursing intervention 
[]  Uncontrolled / intractable nausea or vomiting  
[]  Pathological fractures 
[]  Advanced open wounds requiring frequent skilled care 
[] Unmanageable respiratory distress 
[] New or worsening delirium  
[] Delirium with behavior issues: Is 24 hour caregiver present due to safety concerns with agitation? (yes/no) [x] Imminent death  with skilled nursing needs documented above DISCHARGE PLANNING Daily discharge planning required for GIP 1. Discharge Plan: will likely  in the hospital however if improves can transition home 2. Patient/Family teaching: EOL symptom management 3. Response to patient/family teaching: verbalized understanding ASSESSMENT   
KARNOFSKY: 10 Prognosis estimated based on 20 clinical assessment is:  
[x] Few to Many Hours [] Hours to Days  
[] Few to Many Days  
[] Days to Weeks  
[] Few to Many Weeks  
[] Weeks to Months  
[] Few to Many Months Quality Measure: Patient self-reports:  [] Yes    [x] No 
 
 
 
CLINICAL INFORMATION Patient Vitals for the past 12 hrs: 
 Temp Pulse Resp BP SpO2  
20 0924 99.8 °F (37.7 °C) (!) 104 16 124/81 93 % Currently this patient has: 
[x] Supplemental O2 [x] IV   
[] PICC [] PORT  
[] NG Tube   
[] PEG Tube  
[] Ostomy    
[x] Vieyra draining _dark amber______ urine 
[] Other:  
 
SIGNS/PHYSICAL FINDINGS Skin (including wound): 
[] Warm, dry, supple, intact and color normal for race 
[] Warm  
[] Dry  
[x] Cool    
[] Clammy      
[] Diaphoretic Turgor 
 [] Normal 
 [] Decreased Color: 
 [] Pink 
 [] Pale 
 [] Cyanotic 
 [] Erythema 
 [] Jaundice [] Normal for Race 
[]  Wounds: 
 
Neuro: 
[] Lethargy 
[] Restlessness / agitation 
[] Confusion / delirium 
[] Hallucinations 
[] Responds to maximal stimulation 
[x] Unresponsive 
[] Seizures Cardiac: 
[] Dyspnea on Exertion 
[] JVD [] Murmur 
[] Palpitations [] Hypotension 
[] Hypertension 
[x] Tachycardia [] Bradycardia 
[] Irregular HR 
[] Pulses Decreased 
[] Pulses Absent 
[] Edema:       (Location, Grade and Pitting) [] Mottling:      (Location) Respiratory: 
Breath sounds:  
 [] Diminished 
 [] Wheeze [x] Rhonchi 
 [] Rales  
[] Even and unlabored 
[] Labored:           
[] Cough 
 [] Non Productive 
 [] Productive 
  [] Description:          
[] Deep suctioned  
[x] O2 at _4__ LPM 
[] High flow oxygen greater than 10 LPM 
[] Bi-Pap GI 
[] Abdomen (describe) [] Ascites 
[] Nausea 
[] Vomiting 
[] Incontinent of bowels 
[] Bowel sounds (yes/no) [] Diarrhea 
[] Constipation (see above including last bowel movement) [] Checked for impaction 
[] Last BM Nutrition Diet:NPO__________ Appetite:  
[] Good  
[] Fair  
[] Poor  
[] Tube Feeding  
[] Voiding 
[] Incontinent [x] Vieyra Musculoskeletal 
[] Balance/[employer] Unsteady  
[] Weak Strength:  
 [] Normal  
 [] Limited  
 [] Decreasing Activities:  
 [] Up as tolerated 
 [x] Bedridden  
 [] Specify: 
 
SAFETY [] 24 hr. Caregiver [x] Side rails ? [x] Hospital bed  
[] Reviewed Falls & Safety ALLERGIES AND MEDICATIONS Allergies: Allergies Allergen Reactions  Adenosine Shortness of Breath Tolerates well when given for SVT, per Dr. Uday Block. Given on 3/8/2020  Iodinated Contrast Media Shortness of Breath  Pcn [Penicillins] Shortness of Breath Patient reports to have tolerated a 10 day course of cefdinir started 8/24/17 Current Facility-Administered Medications Medication Dose Route Frequency  glycopyrrolate (ROBINUL) injection 0.2 mg  0.2 mg IntraVENous Q4H  
 haloperidol lactate (HALDOL) injection 2 mg  2 mg IntraVENous Q6H PRN  
 ondansetron (ZOFRAN) injection 4 mg  4 mg IntraVENous Q4H PRN  
 LORazepam (ATIVAN) injection 1 mg  1 mg IntraVENous Q15MIN PRN  
 ketorolac (TORADOL) injection 30 mg  30 mg IntraVENous Q8H PRN  
 scopolamine (TRANSDERM-SCOP) 1 mg over 3 days 1 Patch  1 Patch TransDERmal Q72H PRN  
 morphine injection 4 mg  4 mg IntraVENous Q15MIN PRN  prochlorperazine (COMPAZINE) with saline injection 10 mg  10 mg IntraVENous Q4H PRN  
 HYDROmorphone (PF) 25 mg/50 mL (DILAUDID) PCA   IntraVENous CONTINUOUS Visit Time In:1030 Visit Time Out: 1100

## 2020-03-27 ENCOUNTER — HOME CARE VISIT (OUTPATIENT)
Dept: HOSPICE | Facility: HOSPICE | Age: 75
End: 2020-03-27
Payer: MEDICARE

## 2020-03-27 NOTE — PROGRESS NOTES
2315 Pt found to have absent breath sounds and no heart sounds. Dr Jeffry Bliss notified to come to bedside.  Notified. [address] [facility] Dr Lisa Colon death of pt., mary Tyson at bedside. Spouse Rachel notified, states she will call the floor back. [address] notified 0100 Spouse Rachel calls back hospital and states ok to send to  home [address] [facility] will not be coming to see the patient at this time. Post mortem care completed. 0120 Nursing Supervisor and security take pt off floor with record of death form completed.

## 2020-03-27 NOTE — PROGRESS NOTES
I was called to pronounce Mr. Francisco. Patient found unresponsive in his room. Pupils were bilaterally fixed and dilated. Corneal reflexes absent. Both apical and carotid pulses were absent. There is no breathing chest wall movement and no breath sounds during auscultation. Patient pronounced  at 2330. Discharge/death summary to be completed by the primary attending Dr. Sharlene Cruz

## 2020-03-27 NOTE — HOSPICE
[facility] [employer] Good Help to Those in Need 
[phone] Discharge/Death Nursing Note Patient Name: Manisha Gates YOB: 1945 Age: 76 y.o. Date of Death: 20 Admitted Date: 3/25/2020 Time of Death:  Facility of Care: [facility] Level of Care: GIP Patient Room: [facility] Hospice Attending: Dr. Juni Chauhan Hospice Diagnosis: Respiratory failure ([address] [J96.90] Death Pronouncement Pronouncement of death completed by: Dr. Lepe Coma staff was not present at the time of death At the time of death the patient was documented as Pupils were bilaterally fixed and dilated. Corneal reflexes absent. Both apical and carotid pulses were absent. There is no breathing chest wall movement and no breath sounds during auscultation The pt  within [facility] The following were notified of the patient's death: hospice intake, hospice MD, family Medications were disposed of per facility protocol Discharge Summary Discharge Reason: Death Summary of Care Provided: 
 
[x] Post mortem care provided by Bedside RN [x] Notification of  home by RN supervisor 
[] Referrals/Community resources provided:  
[] Goals completed 
[] Durable Medical Equipment vendor notified Disciplines involved: [x] RN [x] SW []  [] HA [] Vol [] PT [] OT [] ST [] BC 
 
[x] IDT communication/notification Attending Physician, Dr. Juni Chauhan, notified of death Bereaved Verify bereaved identified with name, address, telephone number and risk level Advance Care Planning 3/20/2020 Patient's Healthcare Decision Maker is: Legal Next of Kin Primary Decision Maker Name - Secondary Decision Maker Name - Secondary Decision Maker Phone Number - Secondary Decision Maker Relationship to Patient -  
Confirm Advance Directive None Patient Would Like to Complete Advance Directive No

## 2020-03-27 NOTE — PROGRESS NOTES
I was paged after Mr. Surya Quezada had . No family was present and it is unclear at this time if they plan to return to the hospital. [facility] remains available as needed. Rev. Kaylin López M.Div, [facility] Lead

## 2020-03-30 ENCOUNTER — HOME CARE VISIT (OUTPATIENT)
Dept: HOSPICE | Facility: HOSPICE | Age: 75
End: 2020-03-30
Payer: MEDICARE

## 2020-04-01 NOTE — DISCHARGE SUMMARY
Hospice Discharge Summary [employer] Good Help to Those in Need Date of Admission: 3/25/2020 Date of Discharge: 3/26/2020 Slime Mckee is a 76y.o. year old who was admitted to [employer] [facility] [facility] at [facility] with a Hospice diagnosis of Respiratory failure ([facility] [address] [J96.90]. The patient's care was focused on comfort and the patient passed away on 3/26/2020. Zachery Menard

## 2020-04-08 ENCOUNTER — APPOINTMENT (OUTPATIENT)
Dept: INFUSION THERAPY | Age: 75
End: 2020-04-08

## 2020-04-29 ENCOUNTER — APPOINTMENT (OUTPATIENT)
Dept: INFUSION THERAPY | Age: 75
End: 2020-04-29

## 2020-05-20 ENCOUNTER — APPOINTMENT (OUTPATIENT)
Dept: INFUSION THERAPY | Age: 75
End: 2020-05-20

## 2020-08-25 ENCOUNTER — TELEPHONE (OUTPATIENT)
Dept: ONCOLOGY | Age: 75
End: 2020-08-25

## 2020-08-25 NOTE — TELEPHONE ENCOUNTER
[facility]   Social Work Navigator Encounter     Patient Name:  Silvia Wilburn    Narrative: Return call placed to patient's wife, Joe Lawson. Provided guidance and assistance with the following.   - Itemized billing and recent progress notes for cancer policy claim.  will request statements from billing department and mail to her. - She is unable to cash reimbursement check from [facility]. Referred her back to billing department or [facility] for assistance. - Referred her to rad onc to provide FA information so it can be applied to his account and to request itemized billing for radiation treatment. She voiced understanding a appreciation. Encouraged her to call as needed.      Plan/Referral:   Insurance/Entitlements referral  Financial/Medication assistance referral   Legal referral    Thank you,  Shyam Cook LCSW

## 2020-09-30 ENCOUNTER — TELEPHONE (OUTPATIENT)
Dept: ONCOLOGY | Age: 75
End: 2020-09-30

## 2020-09-30 NOTE — TELEPHONE ENCOUNTER
[employer]  Social Work Navigator Encounter     Patient Name:  Shaye Shukla    Narrative: Received call from patient's wife advising Cancer Insurance policy is requesting additional information. Requested she drop off letter so we are able to assist. She voiced understanding and appreciation.      Plan/Referral:   Insurance/Entitlements referral    Thank you,  Vito Rider LCSW

## 2020-10-08 ENCOUNTER — TELEPHONE (OUTPATIENT)
Dept: ONCOLOGY | Age: 75
End: 2020-10-08

## 2020-10-08 NOTE — TELEPHONE ENCOUNTER
Pt wife called to ask if you received letter or not? Please give call back if necessary.  [phone]

## 2020-10-08 NOTE — TELEPHONE ENCOUNTER
10/08/20- Returned wife's phone call advised her [employer] form/letter was completed and faxed on 10/5. She verbalized understanding and denied any further questions or concerns.

## 2020-12-28 NOTE — PROGRESS NOTES
Form from [address] [place] [address] dated 9/21/2020 requesting additional information regarding patient's treatment dates of Pembrolizumab. Patient was received Pembrolizumab from 2/5/2020-3/18/2020 with the purpose of activating cells in the body to kill cancerous cells due to diagnosis of metastatic lung cancer.
back pain/injury

## 2024-02-27 NOTE — H&P
[facility] Good Help to Those in Need 
[phone] Patient Name: Melania Amaya YOB: 1945 Date of Provider Hospice Visit: 03/25/20 Level of Care:   [x] General Inpatient (GIP)    [] Routine   [] Respite Current Location of Care: 
[] [facility] [x] [facility] [] [facility] [] [facility] [] Hospice [facility] IF [facility], patient referred from: 
[] [facility] [] [facility] [] [facility] [] [facility] [] Home [] Other:  
 
Date of Original Hospice Admission: 3/24/20 Hospice Medical Director at time of admission: Lisbeth Bustillos Principle Hospice Diagnosis: Acute respiratory failure Diagnoses RELATED to the terminal prognosis: Metastatic lung cancer, unstable C5 fracture related to metastatic disease, COPD Other Diagnoses: [facility] Melania Amaya is a 76y.o. year old who was admitted to [facility]. Patient is a 70-year-old male with metastatic lung cancer with unfortunately metastatic disease to C5 with an unstable fracture. Disease had also progressed C2, C3, C4, and C6. Patient was discharged from the hospital on 3/24 with the support of hospice care. He remained a full code but family agreed to have the additional support hospice. Overnight, he had a rapid decline with increased shortness of breath/secretions and work of breathing. Hospice nurse was at the home but EMS had already been called. Patient was seen in the emergency room in clear respiratory distress/failure with a nonrebreather in place and still breathing approximately 40 times a minute. Discussion had with both his wife and daughter at the bedside and everybody agrees on inpatient hospice care with symptomatic treatment for end-of-life care. They both agree on no attempts at resuscitation or intubation. The patient's principle diagnosis has resulted in respiratory failure Refer to LCD Functionally, the patient's Karnofsky and/or Palliative Performance Scale Pt is completing a home sleep test. Pt was instructed on how to put on the Noxturnal T3 device and associated equipment before going to bed and given the opportunity to practice putting it on before leaving the sleep center. Pt was reminded to bring the home sleep test kit back to the center tomorrow, at agreed upon time for download and reporting.   Neck circumference: 45 CM / 17.75 inches.  Taya Stevenson ESTEFANIA  [facility]       has declined over a period of days to weeks and is estimated at 10. The patient is dependent on the following ADLs: All Objective information that support this patients limited prognosis includes: CT cervical spine IMPRESSION: 
  
1. Multiple lytic metastases as above. 2. Moderate pathologic compression fracture of C5. Tumor extends into the 
posterior elements on the left destroying the bone. Local invasion is seen into 
the adjacent C4 and C6 posterior elements. Fractures extend into the posterior 
elements. Severe left neural foraminal narrowing is present at C4-5 and C5-6 
secondary to tumor. 3. Mild pathologic compression fracture of C6. 
4. Pathologic fracture of the posterior elements of C2. 
5. Spondylosis as above. 6. Spiculated nodule again seen in the right apex The patient/family chose comfort measures with the support of Hospice. HOSPICE DIAGNOSES Active Symptoms: 1. Acute respiratory failure 2. Significant secretions 3. Pathological C5 fracture 4. Restlessness with agitation PLAN 1. Patient will be admitted GIP level care secondary to his rapid decline and the need for frequent nursing assessment and IV medication management. 2. Patient has been given 4 mg IV and 1 mg IV Ativan in the emergency room. 3. Comfort order set for all other symptoms and I suspect will need scheduled medications once he arrives to the floor. 4. Discussed plan with bedside nurse, hospice liaison, family. 5.  and SW to support family needs 6. Disposition: Likely will pass in the hospital 
7. Hospice Plan of care was reviewed in detail and agree with current plan of care Prognosis estimated based on 03/25/20 clinical assessment is:  
[x] Hours to Days   
[] Days to Weeks   
[] Other: 
 
Communicated plan of care with: Hospice Case Manager; Hospice IDT; Care Team 
 
 GOALS OF CARE Patient/Medical POA stated Goal of Care: Transition to comfort/hospice care in the hospital 
 
 [x] I have reviewed and/or updated ACP information in the Advance Care Planning Navigator. This information is available in the [address] link in the patient's chart header. Primary Decision [facility] Agent):   Primary Decision Maker: Sharmin Donnelly Spouse - [phone] Resuscitation Status: DNR If DNR is there a Durable DNR on file? : [x] Yes [] No (If no, complete Durable DNR) HISTORY History obtained from: Chart, family CHIEF COMPLAINT: Shortness of breath The patient is:  
[x] Verbal 
[] Nonverbal 
[] Unresponsive HPI/SUBJECTIVE:   
 
Patient attempts to talk a little bit but very restless with increased work of breathing REVIEW OF SYSTEMS The following systems were: [] reviewed  [x] unable to be reviewed Positive ROS include: 
Constitutional: fatigue, weakness, in pain, short of breath Ears/nose/mouth/throat: increased airway secretions Respiratory:shortness of breath, wheezing Gastrointestinal:poor appetite, nausea, vomiting, abdominal pain, constipation, diarrhea Musculoskeletal:pain, deformities, swelling legs Neurologic:confusion, hallucinations, weakness Psychiatric:anxiety, feeling depressed, poor sleep Endocrine:  
 
Adult Non-Verbal Pain Assessment Score: 8 Face 
[] 0   No particular expression or smile 
[] 1   Occasional grimace, tearing, frowning, wrinkled forehead 
[x] 2   Frequent grimace, tearing, frowning, wrinkled forehead Activity (movement) [] 0   Lying quietly, normal position 
[] 1   Seeking attention through movement or slow, cautious movement 
[x] 2   Restless, excessive activity and/or withdrawal reflexes Guarding 
[] 0   Lying quietly, no positioning of hands over areas of body 
[] 1   Splinting areas of the body, tense 
[x] 2   Rigid, stiff Physiology (vital signs) 
[] 0   Stable vital signs [x] 1   Change in any of the following: SBP > 20mm Hg; HR > 20/minute [] 2   Change in any of the following: SBP > 30mm Hg; HR > 25/minute Respiratory 
[] 0   Baseline RR/SpO2, compliant with ventilator 
[] 1   RR > 10 above baseline, or 5% drop SpO2, mild asynchrony with ventilator 
[x] 2   RR > 20 above baseline, or 10% drop SpO2, asynchrony with ventilator FUNCTIONAL ASSESSMENT Palliative Performance Scale (PPS): 10-20 PSYCHOSOCIAL/SPIRITUAL ASSESSMENT Active Problems: 
  Respiratory failure ([facility] [address] (3/25/2020) Past Medical History:  
Diagnosis Date  Asthma  CAD (coronary artery disease)  Cancer ([facility] [address] \"10 years ago\" throat  COPD (chronic obstructive pulmonary disease) (HCC)  CVA (cerebral vascular accident) ([facility] [address]  H/O mechanical aortic valve replacement CT surgery  - CABG (LIMA to LAD) and AVR ([facility])  Hypertension  S/P CABG x 1   
 CT surgery  - CABG (LIMA to LAD) and AVR ([facility]) Past Surgical History:  
Procedure Laterality Date  CARDIAC SURG PROCEDURE UNLIST  HX HEART VALVE SURGERY    
 CT surgery  - CABG (LIMA to LAD) and AVR ([facility])  HX HEMORRHOIDECTOMY  IR INSERT TUNL CVC W PORT OVER 5 YEARS  2020 Social History Tobacco Use  Smoking status: Former Smoker Packs/day: 3.00 Years: 40.00 Pack years: 120.00 Last attempt to quit: 2019 Years since quittin.2  Smokeless tobacco: Never Used Substance Use Topics  Alcohol use: No  
 
Family History Problem Relation Age of Onset  Heart Disease Mother  Diabetes Mother  Hypertension Mother  Heart Disease Sister  Hypertension Sister  Hypertension Father  Cancer Brother   
     lung  Diabetes Brother  Heart Disease Brother  Hypertension Brother  Stroke Brother Allergies Allergen Reactions  Adenosine Shortness of Breath Tolerates well when given for SVT, per Dr. Kushal Schulz. Given on 3/8/2020  Iodinated Contrast Media Shortness of Breath  Pcn [Penicillins] Shortness of Breath Patient reports to have tolerated a 10 day course of cefdinir started 8/24/17 Current Facility-Administered Medications Medication Dose Route Frequency  haloperidol (HALDOL) 2 mg/mL oral solution 2 mg  2 mg SubLINGual Q6H PRN Or  
 haloperidol lactate (HALDOL) injection 2 mg  2 mg IntraVENous Q6H PRN  
 ondansetron (ZOFRAN) injection 4 mg  4 mg IntraVENous Q4H PRN  
 LORazepam (ATIVAN) injection 1 mg  1 mg IntraVENous Q15MIN PRN  
 ketorolac (TORADOL) injection 30 mg  30 mg IntraVENous Q8H PRN  
 acetaminophen (TYLENOL) tablet 650 mg  650 mg Oral Q4H PRN Or  
 acetaminophen (TYLENOL) solution 650 mg  650 mg Oral Q4H PRN Or  
 acetaminophen (TYLENOL) suppository 650 mg  650 mg Rectal Q4H PRN  
 scopolamine (TRANSDERM-SCOP) 1 mg over 3 days 1 Patch  1 Patch TransDERmal Q72H PRN  
 glycopyrrolate (ROBINUL) injection 0.2 mg  0.2 mg IntraVENous Q4H PRN  
 morphine injection 4 mg  4 mg IntraVENous Q15MIN PRN  
 morphine injection 4 mg  4 mg IntraVENous ONCE  prochlorperazine (COMPAZINE) with saline injection 10 mg  10 mg IntraVENous Q4H PRN Current Outpatient Medications Medication Sig  
 dilTIAZem CD (CARDIZEM CD) 240 mg ER capsule Take 1 Cap by mouth daily for 30 days.  aspirin (ASPIRIN) 325 mg tablet Take 325 mg by mouth daily.  escitalopram oxalate (LEXAPRO) 10 mg tablet Take 10 mg by mouth daily.  senna-docusate (Senna Laxative-Stool Softener) 8.6-50 mg per tablet Take 2 Tabs by mouth nightly.  dimethicone (REMEDY DIMETHICONE) 5 % topical cream Apply 1 Each to affected area as needed for Other (Moisture Barrier).  morphine (ROXANOL) 100 mg/5 mL (20 mg/mL) concentrated solution Take 5 mg by mouth every three (3) hours as needed (Dyspnea).  oxygen-air delivery systems (PV CLASSIC OXYGEN CONCENTRATOR) 2 L/min by Nasal route continuous.  bisacodyL (DULCOLAX) 10 mg suppository Insert 10 mg into rectum daily as needed (Constipation, unresolved by Miralax).  haloperidol (HALDOL) 2 mg/mL oral concentrate Take 1 mg by mouth every six (6) hours as needed (Agitation).  LORazepam (ATIVAN) 0.5 mg tablet Take 0.5 mg by mouth every three (3) hours as needed for Anxiety.  acetaminophen (TYLENOL) 650 mg suppository Insert 650 mg into rectum every four (4) hours as needed for Fever.  prochlorperazine (Compazine) 10 mg tablet Take 10 mg by mouth every six (6) hours as needed for Nausea.  morphine 30 mg TR12 Take 30 mg by mouth three (3) times daily.  oxyCODONE IR (ROXICODONE) 5 mg immediate release tablet Take 5 mg by mouth every three to four (3-4) hours as needed for Pain.  fentaNYL (DURAGESIC) 12 mcg/hr patch 1 Patch by TransDERmal route every seventy-two (72) hours.  oxyCODONE IR (OXY-IR) 15 mg immediate release tablet Take 1 Tab by mouth every three (3) hours as needed for Pain for up to 15 days. Max Daily Amount: 120 mg.  
 naloxone (Narcan) 4 mg/actuation nasal spray Use 1 spray intranasally, then discard. Repeat with new spray every 2 min as needed for opioid overdose symptoms, alternating nostrils.  polyethylene glycol (MIRALAX) 17 gram packet Take 17 g by mouth daily.  brimonidine (ALPHAGAN) 0.2 % ophthalmic solution Administer 1 Drop to both eyes two (2) times a day.  hydroxypropyl methylcellulose (GENTEAL) 0.2 % ophthalmic solution 2 drops right eye every 1-2 hours while awake  latanoprost (XALATAN) 0.005 % ophthalmic solution Administer 1 Drop to both eyes nightly.  albuterol (PROVENTIL VENTOLIN) 2.5 mg /3 mL (0.083 %) nebulizer solution 3 mL by Nebulization route every four (4) hours as needed for Wheezing.  promethazine (PHENERGAN) 12.5 mg tablet Take 25 mg by mouth every eight (8) hours as needed. Indications: Nausea  albuterol (PROVENTIL HFA, VENTOLIN HFA, PROAIR HFA) 90 mcg/actuation inhaler Take 2 Puffs by inhalation every six (6) hours as needed.  fluticasone-salmeterol (ADVAIR) 500-50 mcg/dose diskus inhaler Take 1 Puff by inhalation every twelve (12) hours.  clonazepam (KLONOPIN) 0.5 mg tablet Take 0.5 mg by mouth three (3) times daily.  isosorbide mononitrate (ISMO, MONOKET) 10 mg tablet Take 10 mg by mouth three (3) times daily. Patient takes at 9am, noon, and 5pm  
 
 
 PHYSICAL EXAM  
 
Wt Readings from Last 3 Encounters:  
03/25/20 125 lb (56.7 kg) 03/24/20 125 lb (56.7 kg) 03/21/20 125 lb (56.7 kg) Visit Vitals /59 Pulse (!) 143 Temp 97.7 °F (36.5 °C) Resp (!) 37 Ht 5' 7\" (1.702 m) Wt 125 lb (56.7 kg) SpO2 100% BMI 19.58 kg/m² Supplemental O2  [x] Yes  [] NO nonrebreather Last bowel movement: 
 
Currently this patient has: 
[x] Peripheral IV [] PICC  [] PORT [] ICD [x] Vieyra Catheter [] NG Tube   [] PEG Tube   
[] Rectal Tube [] Drain 
[] Other:  
 
Constitutional: Ill-appearing, agitated, restless Eyes: Reactive ENMT: Cervical collar in place Cardiovascular: Tachycardia Respiratory: Severe distress with accessory muscle use noted, bilateral secretions and rales Gastrointestinal: Soft and distended Musculoskeletal: Muscle wasting Skin: Unremarkable Neurologic: Nonfocal 
Psychiatric: Restless Other:  
 
 
Pertinent Lab and or Imaging Tests: 
Lab Results Component Value Date/Time Sodium 133 (L) 03/21/2020 04:41 AM  
 Potassium 3.9 03/21/2020 04:41 AM  
 Chloride 101 03/21/2020 04:41 AM  
 CO2 28 03/21/2020 04:41 AM  
 Anion gap 4 (L) 03/21/2020 04:41 AM  
 Glucose 186 (H) 03/21/2020 04:41 AM  
 BUN 16 03/21/2020 04:41 AM  
 Creatinine 0.61 (L) 03/21/2020 04:41 AM  
 BUN/Creatinine ratio 26 (H) 03/21/2020 04:41 AM  
 GFR est AA >60 03/21/2020 04:41 AM  
 GFR est non-AA >60 03/21/2020 04:41 AM  
 Calcium 8.0 (L) 03/21/2020 04:41 AM  
 
Lab Results Component Value Date/Time  Protein, total 5.3 (L) 03/21/2020 04:41 AM  
 Albumin 2.4 (L) 03/21/2020 04:41 AM  
 
   
 
Total time:  
Counseling / coordination time:  
> 50% counseling / coordination?:

## 2024-04-05 NOTE — PROGRESS NOTES
Pt, wife and daughter asked to change code status to full. I spoke with MR Luis Armando Meyers and his family and this was confirmed. Not applicable

## (undated) DEVICE — SYR 5ML 1/5 GRAD LL NSAF LF --

## (undated) DEVICE — CATH IV AUTOGRD BC BLU 22GA 25 -- INSYTE

## (undated) DEVICE — SOLIDIFIER MEDC 1200ML -- CONVERT TO 356117

## (undated) DEVICE — SYR 3ML LL TIP 1/10ML GRAD --

## (undated) DEVICE — TRAP SUC MUCOUS 70ML -- MEDICHOICE MEDLINE

## (undated) DEVICE — (D)SYR 10ML 1/5ML GRAD NSAF -- PKGING CHANGE USE ITEM 338027

## (undated) DEVICE — CANN NASAL O2 CAPNOGRAPHY AD -- FILTERLINE

## (undated) DEVICE — BASIN EMESIS 500CC ROSE 250/CS 60/PLT: Brand: MEDEGEN MEDICAL PRODUCTS, LLC

## (undated) DEVICE — SET ADMIN 16ML TBNG L100IN 2 Y INJ SITE IV PIGGY BK DISP

## (undated) DEVICE — FCPS BIOP PULM RAD JAW 100CML -- BX/10 M00515180

## (undated) DEVICE — NEEDLE SCLERO 23GA L4MM CATH L240CM CNTRST SHTH DIA1.8MM

## (undated) DEVICE — BRUSH CYTO BRONCHSCP 1.5/140MM -- CELLEBRITY

## (undated) DEVICE — BITEBLOCK ENDOSCP 60FR MAXI WHT POLYETH STURDY W/ VELC WVN

## (undated) DEVICE — CONTAINER SPEC 20 ML LID NEUT BUFF FORMALIN 10 % POLYPR STS

## (undated) DEVICE — MASK,OXYGEN,3-IN-1,ADULT,7,SC: Brand: MEDLINE

## (undated) DEVICE — 1200 GUARD II KIT W/5MM TUBE W/O VAC TUBE: Brand: GUARDIAN

## (undated) DEVICE — BAG SPEC BIOHZRD 10 X 10 IN --

## (undated) DEVICE — SYRINGE MED 10CC ECC TIP W/O NDL

## (undated) DEVICE — BAG BELONG PT PERS CLEAR HANDL

## (undated) DEVICE — NDL FLTR TIP 5 MIC 18GX1.5IN --

## (undated) DEVICE — NDL PRT INJ NSAF BLNT 18GX1.5 --

## (undated) DEVICE — AIRLIFE™ CORRUGATED FLEXIBLE EVA TUBING FOR AEROSOL AND IPPB USE, SEGMENTED, 6 FEET (1.8 M) LENGTH, 22 MM I.D.: Brand: AIRLIFE™

## (undated) DEVICE — KENDALL RADIOLUCENT FOAM MONITORING ELECTRODE -RECTANGULAR SHAPE: Brand: KENDALL

## (undated) DEVICE — KIT COLON W/ 1.1OZ LUB AND 2 END